# Patient Record
Sex: FEMALE | Race: WHITE | NOT HISPANIC OR LATINO | Employment: FULL TIME | URBAN - METROPOLITAN AREA
[De-identification: names, ages, dates, MRNs, and addresses within clinical notes are randomized per-mention and may not be internally consistent; named-entity substitution may affect disease eponyms.]

---

## 2022-08-16 PROBLEM — E66.813 CLASS 3 SEVERE OBESITY IN ADULT (HCC): Status: ACTIVE | Noted: 2022-08-16

## 2024-01-22 ENCOUNTER — OFFICE VISIT (OUTPATIENT)
Dept: WOUND CARE | Facility: CLINIC | Age: 44
End: 2024-01-22
Payer: COMMERCIAL

## 2024-01-22 VITALS
HEART RATE: 87 BPM | TEMPERATURE: 98 F | RESPIRATION RATE: 20 BRPM | SYSTOLIC BLOOD PRESSURE: 159 MMHG | DIASTOLIC BLOOD PRESSURE: 88 MMHG

## 2024-01-22 DIAGNOSIS — F17.200 TOBACCO USE DISORDER: ICD-10-CM

## 2024-01-22 DIAGNOSIS — Z90.710 S/P HYSTERECTOMY: ICD-10-CM

## 2024-01-22 DIAGNOSIS — Z98.890 S/P HERNIA REPAIR: ICD-10-CM

## 2024-01-22 DIAGNOSIS — Z87.19 S/P HERNIA REPAIR: ICD-10-CM

## 2024-01-22 DIAGNOSIS — E66.01 OBESITY, CLASS III, BMI 40-49.9 (MORBID OBESITY) (HCC): ICD-10-CM

## 2024-01-22 DIAGNOSIS — S31.109D OPEN WOUND OF ABDOMEN, SUBSEQUENT ENCOUNTER: Primary | ICD-10-CM

## 2024-01-22 DIAGNOSIS — L30.8 DERMATITIS ASSOCIATED WITH MOISTURE: ICD-10-CM

## 2024-01-22 PROCEDURE — 99213 OFFICE O/P EST LOW 20 MIN: CPT | Performed by: STUDENT IN AN ORGANIZED HEALTH CARE EDUCATION/TRAINING PROGRAM

## 2024-01-22 PROCEDURE — 99213 OFFICE O/P EST LOW 20 MIN: CPT | Performed by: FAMILY MEDICINE

## 2024-01-22 PROCEDURE — G0463 HOSPITAL OUTPT CLINIC VISIT: HCPCS | Performed by: STUDENT IN AN ORGANIZED HEALTH CARE EDUCATION/TRAINING PROGRAM

## 2024-01-22 NOTE — PROGRESS NOTES
Patient ID: Maria R Webb is a 43 y.o. female Date of Birth 1980       Chief Complaint   Patient presents with    Follow Up Wound Care Visit     Abdomen wound        Allergies:  Patient has no known allergies.    Diagnosis:   Diagnosis ICD-10-CM Associated Orders   1. Open wound of abdomen, subsequent encounter  S31.109D Wound cleansing and dressings Abdomen      2. Dermatitis associated with moisture  L30.8       3. S/P hysterectomy  Z90.710       4. S/P hernia repair  Z98.890     Z87.19       5. Tobacco use disorder  F17.200       6. Obesity, Class III, BMI 40-49.9 (morbid obesity) (Formerly [facility])  E66.01            Assessment  & Plan:    F/u open wound of abdomen. Measuring smaller in size. There is no longer green discoloration to drainage visualized on dressing. Drainage has lessened and is no longer purulent when compared to prior visit. Periwound MASD is improved compared to prior visit.   Continue with Dakin wet-to-dry packing for 10 days total then will transition to silver gel coated plain packing. Change daily to twice daily depending on drainage amount. Continue with barrier cream to periwound for protection.   Attempt smoking reduction to assist with healing.   Monitor for signs of infection including fevers, chills, N/V, increase in redness of periwound, change in malodor.   Obtain 3-4 servings of protein daily for wound healing.   F/u in 2 weeks. Instructed to call if any questions or concerns arise in meantime.            Subjective:   01/27/23: 43 y/o F with PMHx of HTN, asthma, RA,abnormal uterine bleeding due to intramural leiomyoma s/p hysterectomy on 12/15/22  presents for evaluation of abdominal wound related to surgical wound dehiscence. Pt initially had hysterectomy on 12/15/2022 with operation complicated by desaturation events in the middle of the robotic procedure which forced a conversion to open ex lap with midline abdominal incision.  Her recovery course was complicated by a fascial  dehiscence and abdominal herniation and on 12/20/2022 she underwent surgery for incisional hernia with mesh. Her recovery was once again complicated by fluid collection underneath surgical incision that was treated with drain placement by interventional radiology on 1/9/2022- no bacterial growthained fluid. Pt then presented to ED on 01/13/23 over concerns of further surgical wound dehiscence at inferior aspect of the wound. She was evaluated by surgery at that time whom probed the wound and noted all areas appeared intact with a portion of the inferior aspect probing to intact fascia without concerns for evisceration or breakdown of fascial closure on examination. Wet-to-dry packing to the wound was recommended and an abdominal binder was provided. She has been continuing to follow with IR in regards to the abdominal cavity from seroma and underwent tube check and sclerotherapy session of lymphocele on 01/23/23 as well as today (01/27/23)- size fluid of cavity continues to decrease. On evaluation today, pt notes that she has been getting blue/green drainage from her wound. Drainage is moderate. Does not feel as though the wound has gotten worse since initiation of packing, but does not feel as though it has improved either which is concerning to her. Denies denzel abdominal pain but does note a full/pulling sensation intermittently. No fevers, chills, malaise. Does not have a hx of DM. Currently smoking approximately 1/2 PPD which is reduced from previous PPD.       02/06/23: Pt presents for f/u of abdominal wound related to surgical wound dehiscence. Pt is s/p hysterectomy on 12/15/22 which was complicated by fascial dehiscence and abdominal herniation which then required incisional hernia repair with mesh on 12/20/22. Pt was using dry packing until she was able to obtain the Dakin solution from the pharmacy- she has been using Dakins solution for approximately five days now. Her wound cultures from previous visit  demonstrated growth of Pseudomonas and Klebsiella oxytoca both susceptible to Ciprofloxacin. Has two pills left in course of abx. States the green/blue discoloration of the drainage has resolved. She has been following with interventional radiology related to abdominal fluid collection underneath the surgical incision site- at most recent IR visit on 02/03/23, it was found the drain space communicated with the open wound- sclerosis was performed with 200 mg doxycyline at that time. Her next IR appointment is on 02/13/23. No longer using abdominal binder. Is limiting her activities. Continues to smoke approximately a 1/2 PPD. She denies fevers, chills, denzel abdominal pain but does endorse a soreness and occasional tugging sensation.       02/15/23: Pt presents for f/u abdominal wound related to surgical wound dehiscence s/p hysterectomy complicated by fascial dehiscence and abdominal herniation requiring incisional hernia repair with mesh. Last week the pt noticed a return of green drainage on the Dakins wet-to-dry dressing and therefore she was initiated on an additional course of Ciprofloxacin; green drainage has resolved after starting the antibiotic. She is continuing to follow with interventional radiology for abdominal fluid collection adjacent to the wound. At her most recent appointment with IR on 02/13/23, the drain was exchanged and repositioned for 10 Chinese drain deeper in the collection and sclerosis with Doxycyline was performed- there are concerns that the fluid collection communicates with the wound and that healing with current intervention may not be possible/surgical intervention may be required regarding the collection of fluid. Pt continues to have soreness behind her umbilical region but not at the site of the abdominal wound itself. Denies fevers, chills, malaise.     07/10/23: Pt presents for evaluation of surgical abdominal wound dehiscence s/p hysterectomy complicated by fascial dehiscence  and abdominal herniation requiring incision hernia repair with mesh. Pt was last seen in wound care center on 03/09/23 by Dr. Montano in March whom recommended debridement of the wound in the OR at that time. Pt has since undergone wound debridement and wound VAC placement in OR with Dr. Conroy on 03/15/23. Intraoperative cultures were taken and pt was admitted for IV abx therapy with Zosyn. Following d/c from hospital she has been following with general surgery for wound management. Wound has been swabbed with Betadine then irrigated at each visit and pt has been continuing with wound VAC since March. It is noted the wound has exposed mesh at the base that has slowly had granulation tissue incorporating into it. There was formation of abscess around 5 o'clock position on 04/17/23; abscess was irrigated and packed and pt was treated with course of Levaquin. The cavity has since granulated in. On evaluation today pt states she has been doing much better and feels much healthier. Her  is currently assisting with VAC changes. She does endorse a chronic odor to the wound that has been present for months and is unchanged. She obtains protein in her diet. Was recommended she shower and cleanse the area. Has regular bowel movements. No N/V/D, fevers, chills. Continues to smoke about 1/2 PPD and is not ready to quit at this moment.       07/17/23: Pt presents for f/u abdominal wound secondary to dehiscence of surgical site. Pt notes that no new VAC supplies were sent to her so she has returned to use of the saline wet to dry and is changing this twice daily. Since using the saline wet-to-dry she has noticed less odor with dressing changes. Overall there have not been significant changes since previous visit. Denies N/V/D, no change in BM. Denies fevers, chills.        07/31/23: Pt presents for f/u abdominal wound secondary to surgical site dehiscence. Since previous visit pt has been using wound VAC.     08/07/23: Pt  presents for f/u abdominal wound secondary to surgical site dehiscence. Wound VAC was irritating skin and therefore was d/c at previous visit. Pt has been using aquacel ag rope packing but there was a lag time in supply delivery and therefore she returned to wet-to-dry until supplies were received. Notes the drainage remains heavy and the dressing is typically soaked about mid-day following packing changes. No fevers, chills.     08/21/23: Pt presents for f/u abdominal wound secondary to surgical site dehiscence. Is doing well with aquacel ag rope packing. Is feeling well without changes in bowel habits, abdominal pain, fevers, chills. Wound continues to have significant drainage but drainage does not have fecal smell. Offers no complaints today.     08/28/23: Presents for follow-up abdominal wound postsurgical site dehiscence.  Patient has heavy drainage but unfortunately Aquacel Ag rope packing is not covered for daily changes via insurance therefore pt has been switching between use of Aquacel Ag rope packing and saline wet to dry packing. Will occasionally forget to use zinc to the periwound.  Overall  no significant changes since prior visit.  Benjamin the same.  No severe abdominal pain, change in bowel habits, nausea, vomiting, fever, chills.     09/11/23: Patient presents for follow-up abdominal wound postsurgical site dehiscence.  Has been using saline wet-to-dry recently to insurance only covering specific amount of Aquacel AG rope dressings.  Has been having some irritation of surrounding skin due to the surgical tape.  Overall no major changes since previous visit.  Denies abdominal pain, changes in the smell or amount of drainage.  No nausea, vomiting, fevers, chills.    09/25/23: Pt presents for f/u abdominal wound post surgical site dehiscence.  Currently Aquacel Ag rope is being packed into the wound.  She states she has noticed an improvement since the prior visit in terms of the drainage slowing down  and is no longer noticing an odor.  Overall feels well.  Denies significant abdominal pain, nausea, vomiting, change in bowel habits, fevers or chills.     10/09/23: Pt presents for f/u abdominal wound post surgical site dehiscence. Is continuing to use aquacel ag rope; drainage is reduced from previous amounts but remains moderate still saturating the dressing. Overall no significant changes in medical history from prior visit.      10/23/23: Pt presents for f/u abdominal wound post surgical site dehiscence. Continues to do well. Is getting about 1.5 day wear-time out of aquacel ag rope, drainage is amount the same or possibly a little less. Remains the same color without any new malodor. Overall is doing well. Continues to get protein in her diet. Is smoking approximately the same amount. No severe abdominal pain, fevers, chills, changes in bowel habits, N/V.     11/06/23: Pt presents for f/u abdominal wound post surgical site dehiscence. Has been doing well with aquacel ag rope. Drainage has picked up a small amount since previous visit. Overall no major changes, continues to deny malodor to drainage, abdominal pain, fevers, chills, bowel habit changes.     11/20/23: Patient presents for follow-up of abdominal wound postsurgical site dehiscence.  Currently the wound is being dressed with Aquacel Ag rope packing.  The defect continues to drain however there are no major changes since prior visit. Is attempting to reduce tobacco intake but has not yet been able to do so. Continues to deny malodor to drainage, abdominal pain, fevers, chills, bowel habit changes.       12/04/23: Pt presents for f/u abdominal wound post surgical site dehiscence. Currently Aquacel ag rope packing is being utilized. Pt continues to report the same amount of drainage without changes since prior visit. She is inquiring if any further imaging of abdomen is necessary at this time. No fevers, chills.       12/26/23: Pt presents for f/u  abdominal wound s/p hysterectomy complicated by fascial dehiscence and abdominal herniation requiring incisional hernia repair with mesh repair. She is continuing with silver alginate to the site. Pt had f/u with general surgery since prior visit at which time a culture was obtained from the wound as well as a repeat CT scan to r/o un-drained fluid collections. Culture results demonstrated 3+ growth of Pasturella multocida, 2+ growth of E. Coli and 3+ growth of mixed skin mary. Pt was since initiated on Bactrim and has been taking this as prescribed without any reported SE. Has f/u with Dr. Conroy scheduled on 01/08/24 to discuss results of CT scan. Wound is continuing to drain with some reports of occasional malodor to the drainage which has been chronic. No reports of drainage smelling fecal in nature. Notes some soreness in abdomen but has been very active recently.       01/15/24: Pt presents for f/u abdominal wound s/p hysterectomy complicated by fascial dehiscence and abdominal herniation requiring incisional hernia repair with mesh repair. She has completed a course of Bactrim based on a wound culture taken at general surgery a few weeks prior. Since her most recent visit at the wound care center pt had a f/u with general surgery on 01/08/24. It was noted her updated CT did not demonstrate any abscess to warrant additional antibiotics and there was improvement in the size of the wound compared to prior CT. It was encouraged the pt focus on smoking cessation, continue with wound care as there is no acute role for surgical intervention at this time. She will additionally be following up with Dr. Conroy, general surgery at the end of the month as well. Is currently alternating between alginate and wet to dry dressings when product is not available. There is a change in the color of the drainage since previous visit. No new sx. Denies abdominal pain, nausea, vomiting, changes with bowel movements, fevers,  chills.     24: Pt presents for f/u abdominal wound. Has been using Dakin soaked packing since Wednesday. Has noticed a small amount of green tinge to the drainage still but overall improved. Denies any severe abdominal pain, N/V, change in bowel movements, fevers, chills. Is continuing to smoke about 1/2 PPD.  States she has an upcoming appointment with Dr. Conroy on 2024.            The following portions of the patient's history were reviewed and updated as appropriate:   Patient Active Problem List   Diagnosis    Tobacco use disorder    COVID-19    Obesity, morbid, BMI 50 or higher (HCC)    Bulky or enlarged uterus    Pelvic pain    Preoperative exam for gynecologic surgery    HTN (hypertension)    Gastroesophageal reflux disease    Asthma    Obstructive sleep apnea syndrome    S/P hysterectomy    Postoperative anemia    Rheumatoid arthritis involving multiple sites with positive rheumatoid factor (HCC)    Encounter for postoperative care    Open wound of abdomen    Surgical wound, non healing    Cigarette nicotine dependence without complication     Past Medical History:   Diagnosis Date    Abdominal wall abscess at site of surgical wound 2023    Abnormal uterine bleeding due to intramural leiomyoma 2022    Arthritis     Rheumatoid    Asthma     Breathing difficulty     only occured during inverted robotic hysterectomy    Cellulitis of drainage site following surgery 2023    CPAP (continuous positive airway pressure) dependence     GERD (gastroesophageal reflux disease)     Hypertension     Obese     Pneumonia 2007    Sleep apnea     Uterine fibroid     LTH today 12/15/2022    Wound dehiscence, surgical 2022     Past Surgical History:   Procedure Laterality Date    ABDOMINAL WASHOUT      post  with wound vac     SECTION      had hematoma removed after the surgery    FOREARM SURGERY Right     repair of fracture with plating    INCISIONAL HERNIA REPAIR   12/20/2022    Procedure: REPAIR  RECURRENT HERNIA INCISIONAL WITH MESH;  Surgeon: Donna Lau MD;  Location: [facility];  Service: Gynecology    IR DRAINAGE TUBE CHECK WITH SCLEROSIS  2/3/2023    IR DRAINAGE TUBE CHECK WITH SCLEROSIS  3/7/2023    IR DRAINAGE TUBE CHECK/CHANGE/REPOSITION/REINSERTION/UPSIZE  1/23/2023    IR DRAINAGE TUBE CHECK/CHANGE/REPOSITION/REINSERTION/UPSIZE  1/27/2023    IR DRAINAGE TUBE CHECK/CHANGE/REPOSITION/REINSERTION/UPSIZE  2/13/2023    IR DRAINAGE TUBE CHECK/CHANGE/REPOSITION/REINSERTION/UPSIZE  2/27/2023    IR DRAINAGE TUBE PLACEMENT  1/9/2023    LAPAROTOMY N/A 12/20/2022    Procedure: LAPAROTOMY EXPLORATORY;  Surgeon: Donna Lau MD;  Location: [facility];  Service: Gynecology    MYRINGOTOMY W/ TUBES      two sets as a young child    OK CYSTOURETHROSCOPY N/A 12/15/2022    Procedure: CYSTO W/ ROBOT;  Surgeon: Donna Lau MD;  Location: [facility];  Service: Gynecology    OK LAPS TOTAL HYSTERECT 250 GM/< W/RMVL TUBE/OVARY N/A 12/15/2022    Procedure: (LTH) W/ BILATERAL SALPINGECTOMY  W/ ROBOT COVERTED TO OPEN;  Surgeon: Donna Lau MD;  Location: [facility];  Service: Gynecology    VAC DRESSING APPLICATION N/A 3/17/2023    Procedure: APPLICATION VAC DRESSING ABDOMEN/TRUNK;  Surgeon: Chan Conroy MD;  Location: [facility];  Service: General    WOUND DEBRIDEMENT N/A 3/15/2023    Procedure: DEBRIDEMENT Abdominal WOUND and placement of Wound VAC;  Surgeon: Chan Conroy MD;  Location: [facility];  Service: General    WOUND DEBRIDEMENT N/A 3/17/2023    Procedure: DEBRIDEMENT WOUND (WASH OUT);  Surgeon: Chan Conroy MD;  Location: [facility];  Service: General     Family History   Problem Relation Age of Onset    Multiple sclerosis Mother     Hypertension Father     Hyperlipidemia Father     Cerebral aneurysm Father     Pulmonary embolism Father     Liver disease Brother      Social History     Socioeconomic History    Marital status:       Spouse name: None    Number of children: None    Years of education: None    Highest education level: None   Occupational History    None   Tobacco Use    Smoking status: Every Day     Current packs/day: 0.50     Average packs/day: 0.5 packs/day for 30.1 years (15.0 ttl pk-yrs)     Types: Cigarettes     Start date: 1995    Smokeless tobacco: Never   Vaping Use    Vaping status: Never Used   Substance and Sexual Activity    Alcohol use: Not Currently     Alcohol/week: 1.0 standard drink of alcohol     Types: 1 Standard drinks or equivalent per week     Comment: 3 x monthly    Drug use: Never    Sexual activity: Yes     Partners: Male     Birth control/protection: Male Sterilization   Other Topics Concern    None   Social History Narrative    None     Social Determinants of Health     Financial Resource Strain: Not on file   Food Insecurity: No Food Insecurity (3/16/2023)    Hunger Vital Sign     Worried About Running Out of Food in the Last Year: Never true     Ran Out of Food in the Last Year: Never true   Transportation Needs: No Transportation Needs (3/16/2023)    PRAPARE - Transportation     Lack of Transportation (Medical): No     Lack of Transportation (Non-Medical): No   Physical Activity: Not on file   Stress: Not on file   Social Connections: Not on file   Intimate Partner Violence: Not on file   Housing Stability: Low Risk  (3/16/2023)    Housing Stability Vital Sign     Unable to Pay for Housing in the Last Year: No     Number of Places Lived in the Last Year: 1     Unstable Housing in the Last Year: No       Current Outpatient Medications:     neomycin-polymyxin-hydrocortisone (CORTISPORIN) otic solution, Administer 3 drops into the left ear every 6 (six) hours, Disp: , Rfl:     albuterol (2.5 mg/3 mL) 0.083 % nebulizer solution, Take 2.5 mg by nebulization every 4 (four) hours as needed for shortness of breath or wheezing, Disp: , Rfl:     albuterol (ProAir HFA) 90 mcg/act inhaler, Inhale 2 puffs  every 6 (six) hours as needed for wheezing or shortness of breath, Disp: 8.5 g, Rfl: 0    albuterol (PROVENTIL HFA,VENTOLIN HFA) 90 mcg/act inhaler, Inhale 2 puffs every 6 (six) hours as needed for wheezing, Disp: 8 g, Rfl: 2    amoxicillin (AMOXIL) 500 mg capsule, , Disp: , Rfl:     Arthritis Pain Relief 650 MG CR tablet, take 1 tablet by mouth every 8 hours if needed for mild pain, Disp: , Rfl:     aspirin 325 mg tablet, Take 325 mg by mouth daily. Indications: as needed, Disp: , Rfl:     chlorhexidine (HIBICLENS) 4 % external liquid, Apply 1 Application topically daily as needed for wound care Can use to clean wound during VAC changes., Disp: 473 mL, Rfl: 5    clonazePAM (KlonoPIN) 0.5 mg tablet, Take 0.5 mg by mouth 2 (two) times a day as needed, Disp: , Rfl:     cyclobenzaprine (FLEXERIL) 5 mg tablet, Take 1 tablet (5 mg total) by mouth 3 (three) times a day as needed for muscle spasms (Patient taking differently: Take 5 mg by mouth 3 (three) times a day as needed for muscle spasms As needed), Disp: 60 tablet, Rfl: 0    dulaglutide (Trulicity) 0.75 MG/0.5ML injection, Inject 0.75 mg under the skin Once a week, Disp: , Rfl:     escitalopram (LEXAPRO) 10 mg tablet, Take 10 mg by mouth daily, Disp: , Rfl:     escitalopram (LEXAPRO) 5 mg tablet, Take 5 mg by mouth daily, Disp: , Rfl:     fluticasone (FLONASE) 50 mcg/act nasal spray, 2 sprays into each nostril daily, Disp: 16 g, Rfl: 4    Fluticasone-Salmeterol (Advair) 250-50 mcg/dose inhaler, , Disp: , Rfl:     Fluticasone-Salmeterol (Wixela Inhub) 500-50 mcg/dose inhaler, Inhale 1 puff 2 (two) times a day, Disp: , Rfl:     folic acid (FOLVITE) 1 mg tablet, Take by mouth daily, Disp: , Rfl:     gabapentin (NEURONTIN) 100 mg capsule, Take 1 capsule (100 mg total) by mouth 3 (three) times a day (Patient not taking: Reported on 6/2/2023), Disp: 90 capsule, Rfl: 0    hydrochlorothiazide (HYDRODIURIL) 12.5 mg tablet, Take 12.5 mg by mouth daily Pt only  taking as  needed for leg swelling, Disp: , Rfl:     ibuprofen (MOTRIN) 600 mg tablet, Take 600 mg by mouth every 6 (six) hours as needed for moderate pain As needed, Disp: , Rfl:     methocarbamol (ROBAXIN) 500 mg tablet, Take 1 tablet (500 mg total) by mouth every 6 (six) hours for 14 days (Patient taking differently: Take 500 mg by mouth every 6 (six) hours As needed), Disp: 56 tablet, Rfl: 0    methotrexate 2.5 mg tablet, Four 2.5 tabs one time a week ( Every Saturday), Disp: , Rfl:     montelukast (SINGULAIR) 10 mg tablet, Take by mouth daily at bedtime as needed, Disp: , Rfl:     naloxone (NARCAN) 4 mg/0.1 mL nasal spray, Administer 1 spray into a nostril. If no response after 2-3 minutes, give another dose in the other nostril using a new spray. (Patient not taking: Reported on 4/14/2023), Disp: 1 each, Rfl: 1    nicotine (NICODERM CQ) 14 mg/24hr TD 24 hr patch, Place 1 patch on the skin every 24 hours (Patient not taking: Reported on 6/2/2023), Disp: 28 patch, Rfl: 3    omeprazole (PriLOSEC) 40 MG capsule, Take 40 mg by mouth daily, Disp: , Rfl:     predniSONE 10 mg tablet, Take 5 tabs po x 2 days; 4 tabs po x 2 days; 3 tabs po x 1 day; 2 tabs po x 1 day. 1 tab po x 1 day., Disp: 24 tablet, Rfl: 0    semaglutide, 0.25 or 0.5 mg/dose, (Ozempic) 2 mg/3 mL injection pen, Inject 0.5 mg under the skin Once a week, Disp: , Rfl:     sodium chloride, PF, 0.9 %, Inject 10 mL into a catheter in a vein 3 (three) times a week Wound irrigation., Disp: 100 mL, Rfl: 10    sodium hypochlorite (DAKIN'S HALF-STRENGTH) external solution, Apply 1 Application topically daily for 7 days, Disp: 3311 mL, Rfl: 0    Vitamin D, Ergocalciferol, 42397 units CAPS, Take by mouth once a week, Disp: , Rfl:     Review of Systems   Constitutional:  Negative for chills and fever.   Gastrointestinal:  Negative for abdominal pain, constipation, diarrhea, nausea and vomiting.   Skin:  Positive for wound (abdomen). Negative for color change and rash.    Psychiatric/Behavioral:  Negative for agitation and confusion.          Objective:  /88   Pulse 87   Temp 98 °F (36.7 °C)   Resp 20   LMP 11/23/2022 (Exact Date) Comment: partial  Pain Score: 0-No pain     Physical Exam  Vitals reviewed.   Constitutional:       Appearance: She is morbidly obese.   Pulmonary:      Effort: Pulmonary effort is normal. No respiratory distress.   Abdominal:          Comments: Open wound of the abdomen is measuring smaller in size. There is no longer green discoloration to drainage visualized on dressing. Drainage has lessened and is no longer purulent when compared to prior visit. Periwound MASD is improved compared to prior visit.        Skin:     Findings: Wound present.   Neurological:      Mental Status: She is alert.   Psychiatric:         Mood and Affect: Mood normal.                Wound 07/10/23 Abdomen (Active)   Wound Image   01/22/24 0823   Wound Description Pale;Pink;White 01/22/24 0823   Alexa-wound Assessment Dry;Intact;Erythema;Scar Tissue 01/22/24 0823   Wound Length (cm) 0.3 cm 01/22/24 0823   Wound Width (cm) 0.2 cm 01/22/24 0823   Wound Depth (cm) 1.9 cm 01/22/24 0823   Wound Surface Area (cm^2) 0.06 cm^2 01/22/24 0823   Wound Volume (cm^3) 0.114 cm^3 01/22/24 0823   Calculated Wound Volume (cm^3) 0.11 cm^3 01/22/24 0823   Change in Wound Size % 99.74 01/22/24 0823   Tunneling 1 2.4 cm 08/28/23 0855   Tunneling 1 in depth located at 12 o clock 08/28/23 0855   Number of underminings 1 01/22/24 0823   Undermining 1 1.6 01/22/24 0823   Undermining 1 is depth extending from 11-12 o'clock 01/22/24 0823   Drainage Amount Moderate 01/22/24 0823   Drainage Description Serosanguineous 01/22/24 0823   Non-staged Wound Description Full thickness 01/22/24 0823   Treatments Cleansed 01/22/24 0823   Dressing Wound V.A.C. 07/31/23 0859   Wound packed? Yes 01/22/24 0823   Packing- # removed 1 01/22/24 0823   Packing- # inserted 2 07/24/23 0832   Dressing Changed New  "07/24/23 0832   Patient Tolerance Tolerated well 01/22/24 0823   Dressing Status Intact 01/22/24 0823       Results from last 6 Months   Lab Units 12/18/23  1030   WOUND CULTURE  3+ Growth of Pasteurella multocida*  2+ Growth of Escherichia coli*  3+ Growth of           Wound Instructions:  Orders Placed This Encounter   Procedures    Wound cleansing and dressings Abdomen     Remove abdominal dressing, wash with soap and water (Dove for sensitive skin)     May apply Zinc Oxide to surrounding skin with each dressing change     Loosely Pack the wound with Dakins moistened Plain Packing and leave a tail out taped to the skin. Do not pack too tightly.  Cover with ABD pad secure with tape.   Continue this dressing until 1-27-24                       Then Starting on 1-28-24 Apply silvergel (this is available over the counter) to the plain packing then loosely pack the wound    Change dressing 1-2 x daily and as needed for leakage.    Treatments completed as above today at the Wound Center     Please call the [facility]aling Center Monday through Friday between the hours of 8:00 AM and 4:30 PM at [phone] if you have any questions, if you experience any major changes in your wound(s) or for any signs or symptoms of infection such as fever; changes in the redness, swelling, drainage, or odor of your wound. After hours, weekends or holidays please contact your primary care physician or go to the hospital emergency room.    Follow up in 2 weeks     Standing Status:   Future     Standing Expiration Date:   1/22/2025       Salma Garcia PA-C      Portions of the record may have been created with voice recognition software. Occasional wrong word or \"sound alike\" substitutions may have occurred due to the inherent limitations of voice recognition software. Read the chart carefully and recognize, using context, where substitutions have occurred.    "

## 2024-01-26 RX ORDER — FLUTICASONE PROPIONATE AND SALMETEROL 500; 50 UG/1; UG/1
1 POWDER RESPIRATORY (INHALATION) 2 TIMES DAILY
COMMUNITY
Start: 2024-01-25

## 2024-01-26 NOTE — PROGRESS NOTES
"Assessment/Plan:    Open wound of abdomen  Patient is a pleasant 43-year-old female status post repair of a complex open wound with onlay phasic's mesh on December 20, 2022 returns for routine general surgery follow-up.    Patient has chronic sinus extending down to and involving the face especially.    Patient is working full-time without restriction.  He has no specific complaints at the time of today's appointment.    On physical examination she is well-appearing.  Quique competent reliable as a historian.  Accompanied by her significant other.  Inspection of the wound reveals chronic sinus extending down to her phasic's mesh.  The maximum diameter less than a centimeter.  Wound was repacked with silver and \"gauze.  The original dressing taken down had a green discoloration consistent with colonization by Pseudomonas.    Patient is clinically stable.  No additional diagnostic or therapeutic interventions are indicated at present time.    I look forward to seeing her back in 2 months time.       Diagnoses and all orders for this visit:    Open wound of abdomen, subsequent encounter    Non-healing surgical wound, sequela    Other orders  -     Fluticasone-Salmeterol (Advair) 500-50 mcg/dose inhaler; Inhale 1 puff 2 (two) times a day  -     gabapentin (NEURONTIN) 300 mg capsule; Take 300 mg by mouth 3 (three) times a day          Subjective:      Patient ID: Maria R Webb is a 43 y.o. female.    Patient is here for a abd wound check/ CT scan abd/pelvis 12/26/23. Patient states she there blue/ greenish drainage due to exposure of pseudomas. She has been on dakins, a solution with bleach for 11 days. Patient wants to ask the doctor of possibly getting on antibiotics. Patient denies foul smell, pain or fevers/chills. BRIAN Bryant            Review of Systems   Constitutional:  Negative for chills and fever.   HENT:  Negative for ear pain and sore throat.    Eyes:  Negative for pain and visual disturbance. " "  Respiratory:  Negative for cough and shortness of breath.    Cardiovascular:  Negative for chest pain and palpitations.   Gastrointestinal:  Negative for abdominal pain and vomiting.   Genitourinary:  Negative for dysuria and hematuria.   Musculoskeletal:  Negative for arthralgias and back pain.   Skin:  Negative for color change and rash.   Neurological:  Negative for seizures and syncope.   All other systems reviewed and are negative.        Objective:      /80 (BP Location: Left arm, Patient Position: Sitting, Cuff Size: Large)   Pulse 89   Temp (!) 97.2 °F (36.2 °C) (Temporal)   Resp 18   Ht 5' 5\" (1.651 m)   Wt 136 kg (299 lb)   LMP 11/23/2022 (Exact Date) Comment: partial  SpO2 98%   BMI 49.76 kg/m²          Physical Exam  Constitutional:       Appearance: She is well-developed.   HENT:      Head: Normocephalic and atraumatic.   Eyes:      Conjunctiva/sclera: Conjunctivae normal.      Pupils: Pupils are equal, round, and reactive to light.   Cardiovascular:      Rate and Rhythm: Normal rate and regular rhythm.   Pulmonary:      Effort: Pulmonary effort is normal.      Breath sounds: Normal breath sounds.   Abdominal:      General: Bowel sounds are normal.      Palpations: Abdomen is soft.      Comments: Abdominal wound exam as described above   Musculoskeletal:         General: Normal range of motion.      Cervical back: Normal range of motion and neck supple.   Skin:     General: Skin is warm and dry.   Neurological:      Mental Status: She is alert and oriented to person, place, and time.   Psychiatric:         Behavior: Behavior normal.         Thought Content: Thought content normal.         Judgment: Judgment normal.           "

## 2024-01-29 ENCOUNTER — OFFICE VISIT (OUTPATIENT)
Dept: SURGERY | Facility: CLINIC | Age: 44
End: 2024-01-29
Payer: COMMERCIAL

## 2024-01-29 VITALS
HEART RATE: 89 BPM | OXYGEN SATURATION: 98 % | RESPIRATION RATE: 18 BRPM | WEIGHT: 293 LBS | HEIGHT: 65 IN | DIASTOLIC BLOOD PRESSURE: 80 MMHG | TEMPERATURE: 97.2 F | BODY MASS INDEX: 48.82 KG/M2 | SYSTOLIC BLOOD PRESSURE: 114 MMHG

## 2024-01-29 DIAGNOSIS — T81.89XS NON-HEALING SURGICAL WOUND, SEQUELA: ICD-10-CM

## 2024-01-29 DIAGNOSIS — S31.109D OPEN WOUND OF ABDOMEN, SUBSEQUENT ENCOUNTER: Primary | ICD-10-CM

## 2024-01-29 PROCEDURE — 99213 OFFICE O/P EST LOW 20 MIN: CPT | Performed by: SURGERY

## 2024-01-29 RX ORDER — GABAPENTIN 300 MG/1
300 CAPSULE ORAL 3 TIMES DAILY
COMMUNITY
Start: 2023-10-25

## 2024-01-29 NOTE — ASSESSMENT & PLAN NOTE
"Patient is a pleasant 43-year-old female status post repair of a complex open wound with onlay phasic's mesh on December 20, 2022 returns for routine general surgery follow-up.    Patient has chronic sinus extending down to and involving the face especially.    Patient is working full-time without restriction.  He has no specific complaints at the time of today's appointment.    On physical examination she is well-appearing.  Pleasant competent reliable as a historian.  Accompanied by her significant other.  Inspection of the wound reveals chronic sinus extending down to her phasic's mesh.  The maximum diameter less than a centimeter.  Wound was repacked with silver and \"gauze.  The original dressing taken down had a green discoloration consistent with colonization by Pseudomonas.    Patient is clinically stable.  No additional diagnostic or therapeutic interventions are indicated at present time.    I look forward to seeing her back in 2 months time.  "

## 2024-02-05 ENCOUNTER — OFFICE VISIT (OUTPATIENT)
Dept: WOUND CARE | Facility: CLINIC | Age: 44
End: 2024-02-05
Payer: COMMERCIAL

## 2024-02-05 VITALS
RESPIRATION RATE: 20 BRPM | SYSTOLIC BLOOD PRESSURE: 138 MMHG | TEMPERATURE: 97.5 F | DIASTOLIC BLOOD PRESSURE: 84 MMHG | HEART RATE: 90 BPM

## 2024-02-05 DIAGNOSIS — Z98.890 S/P HERNIA REPAIR: ICD-10-CM

## 2024-02-05 DIAGNOSIS — Z87.19 S/P HERNIA REPAIR: ICD-10-CM

## 2024-02-05 DIAGNOSIS — L30.8 DERMATITIS ASSOCIATED WITH MOISTURE: ICD-10-CM

## 2024-02-05 DIAGNOSIS — E66.01 OBESITY, CLASS III, BMI 40-49.9 (MORBID OBESITY) (HCC): ICD-10-CM

## 2024-02-05 DIAGNOSIS — F17.200 TOBACCO USE DISORDER: ICD-10-CM

## 2024-02-05 DIAGNOSIS — S31.109D OPEN WOUND OF ABDOMEN, SUBSEQUENT ENCOUNTER: Primary | ICD-10-CM

## 2024-02-05 DIAGNOSIS — Z90.710 S/P HYSTERECTOMY: ICD-10-CM

## 2024-02-05 PROCEDURE — 99213 OFFICE O/P EST LOW 20 MIN: CPT | Performed by: STUDENT IN AN ORGANIZED HEALTH CARE EDUCATION/TRAINING PROGRAM

## 2024-02-05 PROCEDURE — G0463 HOSPITAL OUTPT CLINIC VISIT: HCPCS | Performed by: STUDENT IN AN ORGANIZED HEALTH CARE EDUCATION/TRAINING PROGRAM

## 2024-02-05 NOTE — PATIENT INSTRUCTIONS
Orders Placed This Encounter   Procedures    Wound cleansing and dressings     Remove abdominal dressing, wash with soap and water (Dove for sensitive skin)      May apply Zinc Oxide to surrounding skin with each dressing change      Apply silvergel (this is available over the counter) to the plain packing then loosely pack the wound. Then cover with dry dressing. Change dressing 1-2 x daily and as needed for leakage.     Treatments completed as above today at the Wound Center      Please call the [facility]aling Center Monday through Friday between the hours of 8:00 AM and 4:30 PM at [phone] if you have any questions, if you experience any major changes in your wound(s) or for any signs or symptoms of infection such as fever; changes in the redness, swelling, drainage, or odor of your wound. After hours, weekends or holidays please contact your primary care physician or go to the hospital emergency room.     Follow up in 2 weeks     Standing Status:   Future     Standing Expiration Date:   2/5/2025

## 2024-02-05 NOTE — PROGRESS NOTES
Patient ID: Maria R Webb is a 43 y.o. female Date of Birth 1980       Chief Complaint   Patient presents with    Follow Up Wound Care Visit       Allergies:  Patient has no known allergies.    Diagnosis:   Diagnosis ICD-10-CM Associated Orders   1. Open wound of abdomen, subsequent encounter  S31.109D Wound cleansing and dressings      2. Dermatitis associated with moisture  L30.8       3. S/P hysterectomy  Z90.710       4. S/P hernia repair  Z98.890     Z87.19       5. Tobacco use disorder  F17.200       6. Obesity, Class III, BMI 40-49.9 (morbid obesity) ([facility])  E66.01            Assessment  & Plan:    F/u open wound of abdomen. Is measuring the same in size. Very minimal green discoloration to drainage on removed packing. Appears to have epithelization from edges of cavity. Cavity with granulation tissue and Phasix mesh. Area of deepest depth is at approximately 7 o'clock measuring 2.4 cm. Periwound continues to appear irritated consistent with MASD. No malodor appreciated.   Continue with silver gel coated plain packing. Change daily to twice daily depending on drainage amount. Discussed importance of being consistent with use of zinc to periwound given irritation of skin.   Attempt smoking reduction to assist with healing.   Had appropriate f/u with general surgery. Continued local wound care was recommended. No need for further intervention or oral abx at this time.   Monitor for signs of infection including fevers, chills, N/V, increase in redness of periwound, change in malodor.   Obtain 3-4 servings of protein daily for wound healing.   F/u in 2-3 weeks. Instructed to call if any questions or concerns arise in meantime.          Subjective:   01/27/23: 41 y/o F with PMHx of HTN, asthma, RA,abnormal uterine bleeding due to intramural leiomyoma s/p hysterectomy on 12/15/22  presents for evaluation of abdominal wound related to surgical wound dehiscence. Pt initially had hysterectomy on 12/15/2022 with  operation complicated by desaturation events in the middle of the robotic procedure which forced a conversion to open ex lap with midline abdominal incision.  Her recovery course was complicated by a fascial dehiscence and abdominal herniation and on 12/20/2022 she underwent surgery for incisional hernia with mesh. Her recovery was once again complicated by fluid collection underneath surgical incision that was treated with drain placement by interventional radiology on 1/9/2022- no bacterial growthained fluid. Pt then presented to ED on 01/13/23 over concerns of further surgical wound dehiscence at inferior aspect of the wound. She was evaluated by surgery at that time whom probed the wound and noted all areas appeared intact with a portion of the inferior aspect probing to intact fascia without concerns for evisceration or breakdown of fascial closure on examination. Wet-to-dry packing to the wound was recommended and an abdominal binder was provided. She has been continuing to follow with IR in regards to the abdominal cavity from seroma and underwent tube check and sclerotherapy session of lymphocele on 01/23/23 as well as today (01/27/23)- size fluid of cavity continues to decrease. On evaluation today, pt notes that she has been getting blue/green drainage from her wound. Drainage is moderate. Does not feel as though the wound has gotten worse since initiation of packing, but does not feel as though it has improved either which is concerning to her. Denies denzel abdominal pain but does note a full/pulling sensation intermittently. No fevers, chills, malaise. Does not have a hx of DM. Currently smoking approximately 1/2 PPD which is reduced from previous PPD.       02/06/23: Pt presents for f/u of abdominal wound related to surgical wound dehiscence. Pt is s/p hysterectomy on 12/15/22 which was complicated by fascial dehiscence and abdominal herniation which then required incisional hernia repair with mesh on  12/20/22. Pt was using dry packing until she was able to obtain the Dakin solution from the pharmacy- she has been using Dakins solution for approximately five days now. Her wound cultures from previous visit demonstrated growth of Pseudomonas and Klebsiella oxytoca both susceptible to Ciprofloxacin. Has two pills left in course of abx. States the green/blue discoloration of the drainage has resolved. She has been following with interventional radiology related to abdominal fluid collection underneath the surgical incision site- at most recent IR visit on 02/03/23, it was found the drain space communicated with the open wound- sclerosis was performed with 200 mg doxycyline at that time. Her next IR appointment is on 02/13/23. No longer using abdominal binder. Is limiting her activities. Continues to smoke approximately a 1/2 PPD. She denies fevers, chills, denzel abdominal pain but does endorse a soreness and occasional tugging sensation.       02/15/23: Pt presents for f/u abdominal wound related to surgical wound dehiscence s/p hysterectomy complicated by fascial dehiscence and abdominal herniation requiring incisional hernia repair with mesh. Last week the pt noticed a return of green drainage on the Dakins wet-to-dry dressing and therefore she was initiated on an additional course of Ciprofloxacin; green drainage has resolved after starting the antibiotic. She is continuing to follow with interventional radiology for abdominal fluid collection adjacent to the wound. At her most recent appointment with IR on 02/13/23, the drain was exchanged and repositioned for 10 Frisian drain deeper in the collection and sclerosis with Doxycyline was performed- there are concerns that the fluid collection communicates with the wound and that healing with current intervention may not be possible/surgical intervention may be required regarding the collection of fluid. Pt continues to have soreness behind her umbilical region but  not at the site of the abdominal wound itself. Denies fevers, chills, malaise.     07/10/23: Pt presents for evaluation of surgical abdominal wound dehiscence s/p hysterectomy complicated by fascial dehiscence and abdominal herniation requiring incision hernia repair with mesh. Pt was last seen in wound care center on 03/09/23 by Dr. Montano in March whom recommended debridement of the wound in the OR at that time. Pt has since undergone wound debridement and wound VAC placement in OR with Dr. Conroy on 03/15/23. Intraoperative cultures were taken and pt was admitted for IV abx therapy with Zosyn. Following d/c from hospital she has been following with general surgery for wound management. Wound has been swabbed with Betadine then irrigated at each visit and pt has been continuing with wound VAC since March. It is noted the wound has exposed mesh at the base that has slowly had granulation tissue incorporating into it. There was formation of abscess around 5 o'clock position on 04/17/23; abscess was irrigated and packed and pt was treated with course of Levaquin. The cavity has since granulated in. On evaluation today pt states she has been doing much better and feels much healthier. Her  is currently assisting with VAC changes. She does endorse a chronic odor to the wound that has been present for months and is unchanged. She obtains protein in her diet. Was recommended she shower and cleanse the area. Has regular bowel movements. No N/V/D, fevers, chills. Continues to smoke about 1/2 PPD and is not ready to quit at this moment.       07/17/23: Pt presents for f/u abdominal wound secondary to dehiscence of surgical site. Pt notes that no new VAC supplies were sent to her so she has returned to use of the saline wet to dry and is changing this twice daily. Since using the saline wet-to-dry she has noticed less odor with dressing changes. Overall there have not been significant changes since previous visit.  Denies N/V/D, no change in BM. Denies fevers, chills.        07/31/23: Pt presents for f/u abdominal wound secondary to surgical site dehiscence. Since previous visit pt has been using wound VAC.     08/07/23: Pt presents for f/u abdominal wound secondary to surgical site dehiscence. Wound VAC was irritating skin and therefore was d/c at previous visit. Pt has been using aquacel ag rope packing but there was a lag time in supply delivery and therefore she returned to wet-to-dry until supplies were received. Notes the drainage remains heavy and the dressing is typically soaked about mid-day following packing changes. No fevers, chills.     08/21/23: Pt presents for f/u abdominal wound secondary to surgical site dehiscence. Is doing well with aquacel ag rope packing. Is feeling well without changes in bowel habits, abdominal pain, fevers, chills. Wound continues to have significant drainage but drainage does not have fecal smell. Offers no complaints today.     08/28/23: Presents for follow-up abdominal wound postsurgical site dehiscence.  Patient has heavy drainage but unfortunately Aquacel Ag rope packing is not covered for daily changes via insurance therefore pt has been switching between use of Aquacel Ag rope packing and saline wet to dry packing. Will occasionally forget to use zinc to the periwound.  Overall  no significant changes since prior visit.  Benjamin the same.  No severe abdominal pain, change in bowel habits, nausea, vomiting, fever, chills.     09/11/23: Patient presents for follow-up abdominal wound postsurgical site dehiscence.  Has been using saline wet-to-dry recently to insurance only covering specific amount of Aquacel AG rope dressings.  Has been having some irritation of surrounding skin due to the surgical tape.  Overall no major changes since previous visit.  Denies abdominal pain, changes in the smell or amount of drainage.  No nausea, vomiting, fevers, chills.    09/25/23: Pt presents for  f/u abdominal wound post surgical site dehiscence.  Currently Aquacel Ag rope is being packed into the wound.  She states she has noticed an improvement since the prior visit in terms of the drainage slowing down and is no longer noticing an odor.  Overall feels well.  Denies significant abdominal pain, nausea, vomiting, change in bowel habits, fevers or chills.     10/09/23: Pt presents for f/u abdominal wound post surgical site dehiscence. Is continuing to use aquacel ag rope; drainage is reduced from previous amounts but remains moderate still saturating the dressing. Overall no significant changes in medical history from prior visit.      10/23/23: Pt presents for f/u abdominal wound post surgical site dehiscence. Continues to do well. Is getting about 1.5 day wear-time out of aquacel ag rope, drainage is amount the same or possibly a little less. Remains the same color without any new malodor. Overall is doing well. Continues to get protein in her diet. Is smoking approximately the same amount. No severe abdominal pain, fevers, chills, changes in bowel habits, N/V.     11/06/23: Pt presents for f/u abdominal wound post surgical site dehiscence. Has been doing well with aquacel ag rope. Drainage has picked up a small amount since previous visit. Overall no major changes, continues to deny malodor to drainage, abdominal pain, fevers, chills, bowel habit changes.     11/20/23: Patient presents for follow-up of abdominal wound postsurgical site dehiscence.  Currently the wound is being dressed with Aquacel Ag rope packing.  The defect continues to drain however there are no major changes since prior visit. Is attempting to reduce tobacco intake but has not yet been able to do so. Continues to deny malodor to drainage, abdominal pain, fevers, chills, bowel habit changes.       12/04/23: Pt presents for f/u abdominal wound post surgical site dehiscence. Currently Aquacel ag rope packing is being utilized. Pt  continues to report the same amount of drainage without changes since prior visit. She is inquiring if any further imaging of abdomen is necessary at this time. No fevers, chills.       12/26/23: Pt presents for f/u abdominal wound s/p hysterectomy complicated by fascial dehiscence and abdominal herniation requiring incisional hernia repair with mesh repair. She is continuing with silver alginate to the site. Pt had f/u with general surgery since prior visit at which time a culture was obtained from the wound as well as a repeat CT scan to r/o un-drained fluid collections. Culture results demonstrated 3+ growth of Pasturella multocida, 2+ growth of E. Coli and 3+ growth of mixed skin mary. Pt was since initiated on Bactrim and has been taking this as prescribed without any reported SE. Has f/u with Dr. Conroy scheduled on 01/08/24 to discuss results of CT scan. Wound is continuing to drain with some reports of occasional malodor to the drainage which has been chronic. No reports of drainage smelling fecal in nature. Notes some soreness in abdomen but has been very active recently.       01/15/24: Pt presents for f/u abdominal wound s/p hysterectomy complicated by fascial dehiscence and abdominal herniation requiring incisional hernia repair with mesh repair. She has completed a course of Bactrim based on a wound culture taken at general surgery a few weeks prior. Since her most recent visit at the wound care center pt had a f/u with general surgery on 01/08/24. It was noted her updated CT did not demonstrate any abscess to warrant additional antibiotics and there was improvement in the size of the wound compared to prior CT. It was encouraged the pt focus on smoking cessation, continue with wound care as there is no acute role for surgical intervention at this time. She will additionally be following up with Dr. Conroy, general surgery at the end of the month as well. Is currently alternating between alginate  and wet to dry dressings when product is not available. There is a change in the color of the drainage since previous visit. No new sx. Denies abdominal pain, nausea, vomiting, changes with bowel movements, fevers, chills.     01/22/24: Pt presents for f/u abdominal wound. Has been using Dakin soaked packing since Wednesday. Has noticed a small amount of green tinge to the drainage still but overall improved. Denies any severe abdominal pain, N/V, change in bowel movements, fevers, chills. Is continuing to smoke about 1/2 PPD.  States she has an upcoming appointment with Dr. Conroy on 01/29/2024.      02/05/24: Pt presents for f/u abdominal wound. Has switched from Dakin soaked packing to silver gel coated plain packing and has noticed a decrease in the amount of green drainage. Has been consistently forgetting to put zinc around the periwound which is contributing to some irritation of surrounding skin. Saw Dr. Conroy, general surgery since prior visit whom did not feel as though any further medication/imaging/intervention was necessary at this time and would like pt to continue with local wound care and follow up with him in 3 months. Pt denies fevers, chills, N/V, change in bowel movements or abdominal pain. Continues to smoke.           The following portions of the patient's history were reviewed and updated as appropriate:   Patient Active Problem List   Diagnosis    Tobacco use disorder    COVID-19    Obesity, morbid, BMI 50 or higher (HCC)    Bulky or enlarged uterus    Pelvic pain    Preoperative exam for gynecologic surgery    HTN (hypertension)    Gastroesophageal reflux disease    Asthma    Obstructive sleep apnea syndrome    S/P hysterectomy    Postoperative anemia    Rheumatoid arthritis involving multiple sites with positive rheumatoid factor (HCC)    Encounter for postoperative care    Open wound of abdomen    Surgical wound, non healing    Cigarette nicotine dependence without complication      Past Medical History:   Diagnosis Date    Abdominal wall abscess at site of surgical wound 2023    Abnormal uterine bleeding due to intramural leiomyoma 2022    Arthritis     Rheumatoid    Asthma     Breathing difficulty     only occured during inverted robotic hysterectomy    Cellulitis of drainage site following surgery 2023    CPAP (continuous positive airway pressure) dependence     GERD (gastroesophageal reflux disease)     Hypertension     Obese     Pneumonia 2007    Sleep apnea     Uterine fibroid     LTH today 12/15/2022    Wound dehiscence, surgical 2022     Past Surgical History:   Procedure Laterality Date    ABDOMINAL WASHOUT      post  with wound vac     SECTION      had hematoma removed after the surgery    FOREARM SURGERY Right     repair of fracture with plating    INCISIONAL HERNIA REPAIR  2022    Procedure: REPAIR  RECURRENT HERNIA INCISIONAL WITH MESH;  Surgeon: Donna Lau MD;  Location: [facility];  Service: Gynecology    IR DRAINAGE TUBE CHECK WITH SCLEROSIS  2/3/2023    IR DRAINAGE TUBE CHECK WITH SCLEROSIS  3/7/2023    IR DRAINAGE TUBE CHECK/CHANGE/REPOSITION/REINSERTION/UPSIZE  2023    IR DRAINAGE TUBE CHECK/CHANGE/REPOSITION/REINSERTION/UPSIZE  2023    IR DRAINAGE TUBE CHECK/CHANGE/REPOSITION/REINSERTION/UPSIZE  2023    IR DRAINAGE TUBE CHECK/CHANGE/REPOSITION/REINSERTION/UPSIZE  2023    IR DRAINAGE TUBE PLACEMENT  2023    LAPAROTOMY N/A 2022    Procedure: LAPAROTOMY EXPLORATORY;  Surgeon: Donna Lau MD;  Location: [facility];  Service: Gynecology    MYRINGOTOMY W/ TUBES      two sets as a young child    NH CYSTOURETHROSCOPY N/A 12/15/2022    Procedure: CYSTO W/ ROBOT;  Surgeon: Donna Lau MD;  Location: [facility];  Service: Gynecology    NH LAPS TOTAL HYSTERECT 250 GM/< W/RMVL TUBE/OVARY N/A 12/15/2022    Procedure: (LTH) W/ BILATERAL SALPINGECTOMY  W/ ROBOT COVERTED TO OPEN;   Surgeon: Donna Lau MD;  Location: [facility];  Service: Gynecology    VAC DRESSING APPLICATION N/A 3/17/2023    Procedure: APPLICATION VAC DRESSING ABDOMEN/TRUNK;  Surgeon: Chan Conroy MD;  Location: [facility];  Service: General    WOUND DEBRIDEMENT N/A 3/15/2023    Procedure: DEBRIDEMENT Abdominal WOUND and placement of Wound VAC;  Surgeon: Chan Conroy MD;  Location: [facility];  Service: General    WOUND DEBRIDEMENT N/A 3/17/2023    Procedure: DEBRIDEMENT WOUND (WASH OUT);  Surgeon: Chan Conroy MD;  Location: [facility];  Service: General     Family History   Problem Relation Age of Onset    Multiple sclerosis Mother     Hypertension Father     Hyperlipidemia Father     Cerebral aneurysm Father     Pulmonary embolism Father     Liver disease Brother      Social History     Socioeconomic History    Marital status:      Spouse name: Not on file    Number of children: Not on file    Years of education: Not on file    Highest education level: Not on file   Occupational History    Not on file   Tobacco Use    Smoking status: Every Day     Current packs/day: 0.50     Average packs/day: 0.5 packs/day for 30.1 years (15.0 ttl pk-yrs)     Types: Cigarettes     Start date: 1995    Smokeless tobacco: Never   Vaping Use    Vaping status: Never Used   Substance and Sexual Activity    Alcohol use: Not Currently     Alcohol/week: 1.0 standard drink of alcohol     Types: 1 Standard drinks or equivalent per week     Comment: 3 x monthly    Drug use: Never    Sexual activity: Yes     Partners: Male     Birth control/protection: Male Sterilization   Other Topics Concern    Not on file   Social History Narrative    Not on file     Social Determinants of Health     Financial Resource Strain: Not on file   Food Insecurity: No Food Insecurity (3/16/2023)    Hunger Vital Sign     Worried About Running Out of Food in the Last Year: Never true     Ran Out of Food in the Last Year: Never true    Transportation Needs: No Transportation Needs (3/16/2023)    PRAPARE - Transportation     Lack of Transportation (Medical): No     Lack of Transportation (Non-Medical): No   Physical Activity: Not on file   Stress: Not on file   Social Connections: Not on file   Intimate Partner Violence: Not on file   Housing Stability: Low Risk  (3/16/2023)    Housing Stability Vital Sign     Unable to Pay for Housing in the Last Year: No     Number of Places Lived in the Last Year: 1     Unstable Housing in the Last Year: No       Current Outpatient Medications:     albuterol (2.5 mg/3 mL) 0.083 % nebulizer solution, Take 2.5 mg by nebulization every 4 (four) hours as needed for shortness of breath or wheezing, Disp: , Rfl:     albuterol (ProAir HFA) 90 mcg/act inhaler, Inhale 2 puffs every 6 (six) hours as needed for wheezing or shortness of breath, Disp: 8.5 g, Rfl: 0    albuterol (PROVENTIL HFA,VENTOLIN HFA) 90 mcg/act inhaler, Inhale 2 puffs every 6 (six) hours as needed for wheezing, Disp: 8 g, Rfl: 2    amoxicillin (AMOXIL) 500 mg capsule, , Disp: , Rfl:     Arthritis Pain Relief 650 MG CR tablet, take 1 tablet by mouth every 8 hours if needed for mild pain, Disp: , Rfl:     aspirin 325 mg tablet, Take 325 mg by mouth daily. Indications: as needed, Disp: , Rfl:     chlorhexidine (HIBICLENS) 4 % external liquid, Apply 1 Application topically daily as needed for wound care Can use to clean wound during VAC changes. (Patient not taking: Reported on 1/29/2024), Disp: 473 mL, Rfl: 5    clonazePAM (KlonoPIN) 0.5 mg tablet, Take 0.5 mg by mouth 2 (two) times a day as needed (Patient not taking: Reported on 1/29/2024), Disp: , Rfl:     cyclobenzaprine (FLEXERIL) 5 mg tablet, Take 1 tablet (5 mg total) by mouth 3 (three) times a day as needed for muscle spasms (Patient taking differently: Take 5 mg by mouth 3 (three) times a day as needed for muscle spasms As needed), Disp: 60 tablet, Rfl: 0    dulaglutide (Trulicity) 0.75  MG/0.5ML injection, Inject 0.75 mg under the skin Once a week (Patient not taking: Reported on 1/29/2024), Disp: , Rfl:     escitalopram (LEXAPRO) 10 mg tablet, Take 10 mg by mouth daily, Disp: , Rfl:     escitalopram (LEXAPRO) 5 mg tablet, Take 5 mg by mouth daily, Disp: , Rfl:     fluticasone (FLONASE) 50 mcg/act nasal spray, 2 sprays into each nostril daily, Disp: 16 g, Rfl: 4    Fluticasone-Salmeterol (Advair) 250-50 mcg/dose inhaler, , Disp: , Rfl:     Fluticasone-Salmeterol (Advair) 500-50 mcg/dose inhaler, Inhale 1 puff 2 (two) times a day, Disp: , Rfl:     folic acid (FOLVITE) 1 mg tablet, Take by mouth daily, Disp: , Rfl:     gabapentin (NEURONTIN) 100 mg capsule, Take 1 capsule (100 mg total) by mouth 3 (three) times a day (Patient not taking: Reported on 6/2/2023), Disp: 90 capsule, Rfl: 0    gabapentin (NEURONTIN) 300 mg capsule, Take 300 mg by mouth 3 (three) times a day, Disp: , Rfl:     hydrochlorothiazide (HYDRODIURIL) 12.5 mg tablet, Take 12.5 mg by mouth daily Pt only  taking as needed for leg swelling, Disp: , Rfl:     ibuprofen (MOTRIN) 600 mg tablet, Take 600 mg by mouth every 6 (six) hours as needed for moderate pain As needed, Disp: , Rfl:     methocarbamol (ROBAXIN) 500 mg tablet, Take 1 tablet (500 mg total) by mouth every 6 (six) hours for 14 days (Patient taking differently: Take 500 mg by mouth every 6 (six) hours As needed), Disp: 56 tablet, Rfl: 0    methotrexate 2.5 mg tablet, Four 2.5 tabs one time a week ( Every Saturday), Disp: , Rfl:     montelukast (SINGULAIR) 10 mg tablet, Take by mouth daily at bedtime as needed, Disp: , Rfl:     naloxone (NARCAN) 4 mg/0.1 mL nasal spray, Administer 1 spray into a nostril. If no response after 2-3 minutes, give another dose in the other nostril using a new spray. (Patient not taking: Reported on 4/14/2023), Disp: 1 each, Rfl: 1    neomycin-polymyxin-hydrocortisone (CORTISPORIN) otic solution, Administer 3 drops into the left ear every 6 (six)  hours (Patient not taking: Reported on 1/29/2024), Disp: , Rfl:     nicotine (NICODERM CQ) 14 mg/24hr TD 24 hr patch, Place 1 patch on the skin every 24 hours (Patient not taking: Reported on 6/2/2023), Disp: 28 patch, Rfl: 3    omeprazole (PriLOSEC) 40 MG capsule, Take 40 mg by mouth daily, Disp: , Rfl:     predniSONE 10 mg tablet, Take 5 tabs po x 2 days; 4 tabs po x 2 days; 3 tabs po x 1 day; 2 tabs po x 1 day. 1 tab po x 1 day. (Patient not taking: Reported on 1/29/2024), Disp: 24 tablet, Rfl: 0    semaglutide, 0.25 or 0.5 mg/dose, (Ozempic) 2 mg/3 mL injection pen, Inject 0.5 mg under the skin Once a week (Patient not taking: Reported on 1/29/2024), Disp: , Rfl:     sodium chloride, PF, 0.9 %, Inject 10 mL into a catheter in a vein 3 (three) times a week Wound irrigation. (Patient not taking: Reported on 1/29/2024), Disp: 100 mL, Rfl: 10    Vitamin D, Ergocalciferol, 10941 units CAPS, Take by mouth once a week, Disp: , Rfl:     Review of Systems   Constitutional:  Negative for chills and fever.   Gastrointestinal:  Negative for abdominal pain, constipation, diarrhea, nausea and vomiting.   Skin:  Positive for rash (periwound) and wound (abdomen). Negative for color change.   Psychiatric/Behavioral:  Negative for agitation and confusion.          Objective:  /84   Pulse 90   Temp 97.5 °F (36.4 °C)   Resp 20   LMP 11/23/2022 (Exact Date) Comment: partial  Pain Score: 0-No pain     Physical Exam  Vitals reviewed.   Constitutional:       Appearance: She is morbidly obese.   Pulmonary:      Effort: Pulmonary effort is normal. No respiratory distress.   Abdominal:          Comments: Open wound of abdomen. Is measuring the same in size. Very minimal green discoloration to drainage on removed packing. Appears to have epithelization from edges of cavity. Cavity with granulation tissue and Phasix mesh. Area of deepest depth is at approximately 7 o'clock measuring 2.4 cm. Periwound continues to appear irritated  consistent with MASD. No malodor appreciated.      Skin:     Findings: Wound present.   Neurological:      Mental Status: She is alert.   Psychiatric:         Mood and Affect: Mood normal.         Wound 07/10/23 Abdomen (Active)   Wound Image Images linked 02/05/24 0821   Wound Description Pale;Pink;White 02/05/24 0821   Alexa-wound Assessment Excoriated;Scar Tissue 02/05/24 0821   Wound Length (cm) 0.3 cm 02/05/24 0821   Wound Width (cm) 0.2 cm 02/05/24 0821   Wound Depth (cm) 0.5 cm 02/05/24 0821   Wound Surface Area (cm^2) 0.06 cm^2 02/05/24 0821   Wound Volume (cm^3) 0.03 cm^3 02/05/24 0821   Calculated Wound Volume (cm^3) 0.03 cm^3 02/05/24 0821   Change in Wound Size % 99.93 02/05/24 0821   Number of underminings 1 02/05/24 0821   Undermining 1 2.5 02/05/24 0821   Undermining 1 is depth extending from 6-7 02/05/24 0821   Drainage Amount Moderate 02/05/24 0821   Drainage Description Serosanguineous 02/05/24 0821   Non-staged Wound Description Full thickness 02/05/24 0821   Treatments Irrigation with NSS 02/05/24 0821   Patient Tolerance Tolerated well 02/05/24 0821          Results from last 6 Months   Lab Units 12/18/23  1030   WOUND CULTURE  3+ Growth of Pasteurella multocida*  2+ Growth of Escherichia coli*  3+ Growth of           Wound Instructions:  Orders Placed This Encounter   Procedures    Wound cleansing and dressings     Remove abdominal dressing, wash with soap and water (Dove for sensitive skin)      May apply Zinc Oxide to surrounding skin with each dressing change      Apply silvergel (this is available over the counter) to the plain packing then loosely pack the wound. Then cover with dry dressing. Change dressing 1-2 x daily and as needed for leakage.     Treatments completed as above today at the Wound Center      Please call the [facility] Monday through Friday between the hours of 8:00 AM and 4:30 PM at [phone] if you have any questions, if you experience any major changes in  "your wound(s) or for any signs or symptoms of infection such as fever; changes in the redness, swelling, drainage, or odor of your wound. After hours, weekends or holidays please contact your primary care physician or go to the hospital emergency room.     Follow up in 2 weeks     Standing Status:   Future     Standing Expiration Date:   2/5/2025       Salma Garcia PA-C      Portions of the record may have been created with voice recognition software. Occasional wrong word or \"sound alike\" substitutions may have occurred due to the inherent limitations of voice recognition software. Read the chart carefully and recognize, using context, where substitutions have occurred.    "

## 2024-02-19 ENCOUNTER — OFFICE VISIT (OUTPATIENT)
Dept: WOUND CARE | Facility: CLINIC | Age: 44
End: 2024-02-19
Payer: COMMERCIAL

## 2024-02-19 VITALS — RESPIRATION RATE: 16 BRPM | TEMPERATURE: 97.2 F | SYSTOLIC BLOOD PRESSURE: 140 MMHG | DIASTOLIC BLOOD PRESSURE: 76 MMHG

## 2024-02-19 DIAGNOSIS — F17.200 TOBACCO USE DISORDER: ICD-10-CM

## 2024-02-19 DIAGNOSIS — Z90.710 S/P HYSTERECTOMY: ICD-10-CM

## 2024-02-19 DIAGNOSIS — S31.109D OPEN WOUND OF ABDOMEN, SUBSEQUENT ENCOUNTER: Primary | ICD-10-CM

## 2024-02-19 DIAGNOSIS — Z87.19 S/P HERNIA REPAIR: ICD-10-CM

## 2024-02-19 DIAGNOSIS — Z98.890 S/P HERNIA REPAIR: ICD-10-CM

## 2024-02-19 DIAGNOSIS — E66.01 OBESITY, CLASS III, BMI 40-49.9 (MORBID OBESITY) (HCC): ICD-10-CM

## 2024-02-19 PROCEDURE — G0463 HOSPITAL OUTPT CLINIC VISIT: HCPCS | Performed by: STUDENT IN AN ORGANIZED HEALTH CARE EDUCATION/TRAINING PROGRAM

## 2024-02-19 PROCEDURE — 99213 OFFICE O/P EST LOW 20 MIN: CPT | Performed by: STUDENT IN AN ORGANIZED HEALTH CARE EDUCATION/TRAINING PROGRAM

## 2024-02-19 NOTE — PATIENT INSTRUCTIONS
Orders Placed This Encounter   Procedures    Wound cleansing and dressings Abdomen     Wound cleansing and dressings     Remove abdominal dressing, wash with soap and water (Dove for sensitive skin)      May apply Zinc Oxide to surrounding skin with each dressing change      Apply silvergel (this is available over the counter) to the 1/4 inch plain packing then loosely pack the wound. Then cover with dry dressing and tape. Change dressing 1-2 x daily and as needed for leakage.      Treatments completed as above today at the Wound Center      Please call the [facility]aling Center Monday through Friday between the hours of 8:00 AM and 4:30 PM at [phone] if you have any questions, if you experience any major changes in your wound(s) or for any signs or symptoms of infection such as fever; changes in the redness, swelling, drainage, or odor of your wound. After hours, weekends or holidays please contact your primary care physician or go to the hospital emergency room.      Follow up in 2 weeks     Standing Status:   Future     Standing Expiration Date:   2/19/2025

## 2024-02-19 NOTE — PROGRESS NOTES
Patient ID: Maria R Cadena is a 43 y.o. female Date of Birth 1980       Chief Complaint   Patient presents with    Follow Up Wound Care Visit     Abdominal wound care       Allergies:  Patient has no known allergies.    Diagnosis:   Diagnosis ICD-10-CM Associated Orders   1. Open wound of abdomen, subsequent encounter  S31.109D Wound cleansing and dressings Abdomen      2. S/P hysterectomy  Z90.710       3. S/P hernia repair  Z98.890     Z87.19       4. Tobacco use disorder  F17.200       5. Obesity, Class III, BMI 40-49.9 (morbid obesity) ([facility])  E66.01            Assessment  & Plan:    F/u open wound of abdomen. Very small diameter opening remaining. Periwound appears less irritated. Drainage is small-moderate. No further green discoloration present on dressing removed today. Area of deepest depth at 7 o'clock is filling in and no longer with apparent tunnel. Cavity with granulation tissue and exposed Phaisx mesh. No malodor appreciated.   Continue with silver gel coated plain packing. Change daily to twice daily depending on drainage amount. Discussed importance of being consistent with use of zinc to periwound given irritation of skin.   Attempt smoking reduction to assist with healing.   Monitor for signs of infection including fevers, chills, N/V, increase in redness of periwound, change in malodor.   Obtain 3-4 servings of protein daily for wound healing.   F/u in 2-3 weeks. Instructed to call if any questions or concerns arise in meantime.          Subjective:   01/27/23: 43 y/o F with PMHx of HTN, asthma, RA,abnormal uterine bleeding due to intramural leiomyoma s/p hysterectomy on 12/15/22  presents for evaluation of abdominal wound related to surgical wound dehiscence. Pt initially had hysterectomy on 12/15/2022 with operation complicated by desaturation events in the middle of the robotic procedure which forced a conversion to open ex lap with midline abdominal incision.  Her recovery course was  complicated by a fascial dehiscence and abdominal herniation and on 12/20/2022 she underwent surgery for incisional hernia with mesh. Her recovery was once again complicated by fluid collection underneath surgical incision that was treated with drain placement by interventional radiology on 1/9/2022- no bacterial growthained fluid. Pt then presented to ED on 01/13/23 over concerns of further surgical wound dehiscence at inferior aspect of the wound. She was evaluated by surgery at that time whom probed the wound and noted all areas appeared intact with a portion of the inferior aspect probing to intact fascia without concerns for evisceration or breakdown of fascial closure on examination. Wet-to-dry packing to the wound was recommended and an abdominal binder was provided. She has been continuing to follow with IR in regards to the abdominal cavity from seroma and underwent tube check and sclerotherapy session of lymphocele on 01/23/23 as well as today (01/27/23)- size fluid of cavity continues to decrease. On evaluation today, pt notes that she has been getting blue/green drainage from her wound. Drainage is moderate. Does not feel as though the wound has gotten worse since initiation of packing, but does not feel as though it has improved either which is concerning to her. Denies denzel abdominal pain but does note a full/pulling sensation intermittently. No fevers, chills, malaise. Does not have a hx of DM. Currently smoking approximately 1/2 PPD which is reduced from previous PPD.       02/06/23: Pt presents for f/u of abdominal wound related to surgical wound dehiscence. Pt is s/p hysterectomy on 12/15/22 which was complicated by fascial dehiscence and abdominal herniation which then required incisional hernia repair with mesh on 12/20/22. Pt was using dry packing until she was able to obtain the Dakin solution from the pharmacy- she has been using Dakins solution for approximately five days now. Her wound  cultures from previous visit demonstrated growth of Pseudomonas and Klebsiella oxytoca both susceptible to Ciprofloxacin. Has two pills left in course of abx. States the green/blue discoloration of the drainage has resolved. She has been following with interventional radiology related to abdominal fluid collection underneath the surgical incision site- at most recent IR visit on 02/03/23, it was found the drain space communicated with the open wound- sclerosis was performed with 200 mg doxycyline at that time. Her next IR appointment is on 02/13/23. No longer using abdominal binder. Is limiting her activities. Continues to smoke approximately a 1/2 PPD. She denies fevers, chills, denzel abdominal pain but does endorse a soreness and occasional tugging sensation.       02/15/23: Pt presents for f/u abdominal wound related to surgical wound dehiscence s/p hysterectomy complicated by fascial dehiscence and abdominal herniation requiring incisional hernia repair with mesh. Last week the pt noticed a return of green drainage on the Dakins wet-to-dry dressing and therefore she was initiated on an additional course of Ciprofloxacin; green drainage has resolved after starting the antibiotic. She is continuing to follow with interventional radiology for abdominal fluid collection adjacent to the wound. At her most recent appointment with IR on 02/13/23, the drain was exchanged and repositioned for 10 [place] drain deeper in the collection and sclerosis with Doxycyline was performed- there are concerns that the fluid collection communicates with the wound and that healing with current intervention may not be possible/surgical intervention may be required regarding the collection of fluid. Pt continues to have soreness behind her umbilical region but not at the site of the abdominal wound itself. Denies fevers, chills, malaise.     07/10/23: Pt presents for evaluation of surgical abdominal wound dehiscence s/p hysterectomy  complicated by fascial dehiscence and abdominal herniation requiring incision hernia repair with mesh. Pt was last seen in wound care center on 03/09/23 by Dr. Montano in March whom recommended debridement of the wound in the OR at that time. Pt has since undergone wound debridement and wound VAC placement in OR with Dr. Conroy on 03/15/23. Intraoperative cultures were taken and pt was admitted for IV abx therapy with Zosyn. Following d/c from hospital she has been following with general surgery for wound management. Wound has been swabbed with Betadine then irrigated at each visit and pt has been continuing with wound VAC since March. It is noted the wound has exposed mesh at the base that has slowly had granulation tissue incorporating into it. There was formation of abscess around 5 o'clock position on 04/17/23; abscess was irrigated and packed and pt was treated with course of Levaquin. The cavity has since granulated in. On evaluation today pt states she has been doing much better and feels much healthier. Her  is currently assisting with VAC changes. She does endorse a chronic odor to the wound that has been present for months and is unchanged. She obtains protein in her diet. Was recommended she shower and cleanse the area. Has regular bowel movements. No N/V/D, fevers, chills. Continues to smoke about 1/2 PPD and is not ready to quit at this moment.       07/17/23: Pt presents for f/u abdominal wound secondary to dehiscence of surgical site. Pt notes that no new VAC supplies were sent to her so she has returned to use of the saline wet to dry and is changing this twice daily. Since using the saline wet-to-dry she has noticed less odor with dressing changes. Overall there have not been significant changes since previous visit. Denies N/V/D, no change in BM. Denies fevers, chills.        07/31/23: Pt presents for f/u abdominal wound secondary to surgical site dehiscence. Since previous visit pt has been  using wound VAC.     08/07/23: Pt presents for f/u abdominal wound secondary to surgical site dehiscence. Wound VAC was irritating skin and therefore was d/c at previous visit. Pt has been using aquacel ag rope packing but there was a lag time in supply delivery and therefore she returned to wet-to-dry until supplies were received. Notes the drainage remains heavy and the dressing is typically soaked about mid-day following packing changes. No fevers, chills.     08/21/23: Pt presents for f/u abdominal wound secondary to surgical site dehiscence. Is doing well with aquacel ag rope packing. Is feeling well without changes in bowel habits, abdominal pain, fevers, chills. Wound continues to have significant drainage but drainage does not have fecal smell. Offers no complaints today.     08/28/23: Presents for follow-up abdominal wound postsurgical site dehiscence.  Patient has heavy drainage but unfortunately Aquacel Ag rope packing is not covered for daily changes via insurance therefore pt has been switching between use of Aquacel Ag rope packing and saline wet to dry packing. Will occasionally forget to use zinc to the periwound.  Overall  no significant changes since prior visit.  Benjamin the same.  No severe abdominal pain, change in bowel habits, nausea, vomiting, fever, chills.     09/11/23: Patient presents for follow-up abdominal wound postsurgical site dehiscence.  Has been using saline wet-to-dry recently to insurance only covering specific amount of Aquacel AG rope dressings.  Has been having some irritation of surrounding skin due to the surgical tape.  Overall no major changes since previous visit.  Denies abdominal pain, changes in the smell or amount of drainage.  No nausea, vomiting, fevers, chills.    09/25/23: Pt presents for f/u abdominal wound post surgical site dehiscence.  Currently Aquacel Ag rope is being packed into the wound.  She states she has noticed an improvement since the prior visit in  terms of the drainage slowing down and is no longer noticing an odor.  Overall feels well.  Denies significant abdominal pain, nausea, vomiting, change in bowel habits, fevers or chills.     10/09/23: Pt presents for f/u abdominal wound post surgical site dehiscence. Is continuing to use aquacel ag rope; drainage is reduced from previous amounts but remains moderate still saturating the dressing. Overall no significant changes in medical history from prior visit.      10/23/23: Pt presents for f/u abdominal wound post surgical site dehiscence. Continues to do well. Is getting about 1.5 day wear-time out of aquacel ag rope, drainage is amount the same or possibly a little less. Remains the same color without any new malodor. Overall is doing well. Continues to get protein in her diet. Is smoking approximately the same amount. No severe abdominal pain, fevers, chills, changes in bowel habits, N/V.     11/06/23: Pt presents for f/u abdominal wound post surgical site dehiscence. Has been doing well with aquacel ag rope. Drainage has picked up a small amount since previous visit. Overall no major changes, continues to deny malodor to drainage, abdominal pain, fevers, chills, bowel habit changes.     11/20/23: Patient presents for follow-up of abdominal wound postsurgical site dehiscence.  Currently the wound is being dressed with Aquacel Ag rope packing.  The defect continues to drain however there are no major changes since prior visit. Is attempting to reduce tobacco intake but has not yet been able to do so. Continues to deny malodor to drainage, abdominal pain, fevers, chills, bowel habit changes.       12/04/23: Pt presents for f/u abdominal wound post surgical site dehiscence. Currently Aquacel ag rope packing is being utilized. Pt continues to report the same amount of drainage without changes since prior visit. She is inquiring if any further imaging of abdomen is necessary at this time. No fevers, chills.        12/26/23: Pt presents for f/u abdominal wound s/p hysterectomy complicated by fascial dehiscence and abdominal herniation requiring incisional hernia repair with mesh repair. She is continuing with silver alginate to the site. Pt had f/u with general surgery since prior visit at which time a culture was obtained from the wound as well as a repeat CT scan to r/o un-drained fluid collections. Culture results demonstrated 3+ growth of Pasturella multocida, 2+ growth of E. Coli and 3+ growth of mixed skin mary. Pt was since initiated on Bactrim and has been taking this as prescribed without any reported SE. Has f/u with Dr. Conroy scheduled on 01/08/24 to discuss results of CT scan. Wound is continuing to drain with some reports of occasional malodor to the drainage which has been chronic. No reports of drainage smelling fecal in nature. Notes some soreness in abdomen but has been very active recently.       01/15/24: Pt presents for f/u abdominal wound s/p hysterectomy complicated by fascial dehiscence and abdominal herniation requiring incisional hernia repair with mesh repair. She has completed a course of Bactrim based on a wound culture taken at general surgery a few weeks prior. Since her most recent visit at the wound care center pt had a f/u with general surgery on 01/08/24. It was noted her updated CT did not demonstrate any abscess to warrant additional antibiotics and there was improvement in the size of the wound compared to prior CT. It was encouraged the pt focus on smoking cessation, continue with wound care as there is no acute role for surgical intervention at this time. She will additionally be following up with Dr. Conroy, general surgery at the end of the month as well. Is currently alternating between alginate and wet to dry dressings when product is not available. There is a change in the color of the drainage since previous visit. No new sx. Denies abdominal pain, nausea, vomiting,  changes with bowel movements, fevers, chills.     01/22/24: Pt presents for f/u abdominal wound. Has been using Dakin soaked packing since Wednesday. Has noticed a small amount of green tinge to the drainage still but overall improved. Denies any severe abdominal pain, N/V, change in bowel movements, fevers, chills. Is continuing to smoke about 1/2 PPD.  States she has an upcoming appointment with Dr. Conroy on 01/29/2024.      02/05/24: Pt presents for f/u abdominal wound. Has switched from Dakin soaked packing to silver gel coated plain packing and has noticed a decrease in the amount of green drainage. Has been consistently forgetting to put zinc around the periwound which is contributing to some irritation of surrounding skin. Saw Dr. Conroy, general surgery since prior visit whom did not feel as though any further medication/imaging/intervention was necessary at this time and would like pt to continue with local wound care and follow up with him in 3 months. Pt denies fevers, chills, N/V, change in bowel movements or abdominal pain. Continues to smoke.     02/19/24: Pt presents for f/u abdominal wound. Currently silver gel coated packing is being utilized. There is minimal to no green drainage on the packing. Has been consistent with use of zinc to the periwound. Overall no significant changes since prior visit.           The following portions of the patient's history were reviewed and updated as appropriate:   Patient Active Problem List   Diagnosis    Tobacco use disorder    COVID-19    Obesity, morbid, BMI 50 or higher (HCC)    Bulky or enlarged uterus    Pelvic pain    Preoperative exam for gynecologic surgery    HTN (hypertension)    Gastroesophageal reflux disease    Asthma    Obstructive sleep apnea syndrome    S/P hysterectomy    Postoperative anemia    Rheumatoid arthritis involving multiple sites with positive rheumatoid factor (HCC)    Encounter for postoperative care    Open wound of abdomen     Surgical wound, non healing    Cigarette nicotine dependence without complication     Past Medical History:   Diagnosis Date    Abdominal wall abscess at site of surgical wound 2023    Abnormal uterine bleeding due to intramural leiomyoma 2022    Arthritis     Rheumatoid    Asthma     Breathing difficulty     only occured during inverted robotic hysterectomy    Cellulitis of drainage site following surgery 2023    CPAP (continuous positive airway pressure) dependence     GERD (gastroesophageal reflux disease)     Hypertension     Obese     Pneumonia 2007    Sleep apnea     Uterine fibroid     LTH today 12/15/2022    Wound dehiscence, surgical 2022     Past Surgical History:   Procedure Laterality Date    ABDOMINAL WASHOUT      post  with wound vac     SECTION      had hematoma removed after the surgery    FOREARM SURGERY Right     repair of fracture with plating    INCISIONAL HERNIA REPAIR  2022    Procedure: REPAIR  RECURRENT HERNIA INCISIONAL WITH MESH;  Surgeon: Donna Lau MD;  Location: [facility];  Service: Gynecology    IR DRAINAGE TUBE CHECK WITH SCLEROSIS  2/3/2023    IR DRAINAGE TUBE CHECK WITH SCLEROSIS  3/7/2023    IR DRAINAGE TUBE CHECK/CHANGE/REPOSITION/REINSERTION/UPSIZE  2023    IR DRAINAGE TUBE CHECK/CHANGE/REPOSITION/REINSERTION/UPSIZE  2023    IR DRAINAGE TUBE CHECK/CHANGE/REPOSITION/REINSERTION/UPSIZE  2023    IR DRAINAGE TUBE CHECK/CHANGE/REPOSITION/REINSERTION/UPSIZE  2023    IR DRAINAGE TUBE PLACEMENT  2023    LAPAROTOMY N/A 2022    Procedure: LAPAROTOMY EXPLORATORY;  Surgeon: Donna Lau MD;  Location: [facility];  Service: Gynecology    MYRINGOTOMY W/ TUBES      two sets as a young child    TX CYSTOURETHROSCOPY N/A 12/15/2022    Procedure: CYSTO W/ ROBOT;  Surgeon: Donna Lau MD;  Location: [facility];  Service: Gynecology    TX LAPS TOTAL HYSTERECT 250 GM/< W/RMVL TUBE/OVARY N/A 12/15/2022     Procedure: (LTH) W/ BILATERAL SALPINGECTOMY  W/ ROBOT COVERTED TO OPEN;  Surgeon: Donna Lau MD;  Location: [facility];  Service: Gynecology    VAC DRESSING APPLICATION N/A 3/17/2023    Procedure: APPLICATION VAC DRESSING ABDOMEN/TRUNK;  Surgeon: Chan Conroy MD;  Location: [facility];  Service: General    WOUND DEBRIDEMENT N/A 3/15/2023    Procedure: DEBRIDEMENT Abdominal WOUND and placement of Wound VAC;  Surgeon: Chan Conroy MD;  Location: [facility];  Service: General    WOUND DEBRIDEMENT N/A 3/17/2023    Procedure: DEBRIDEMENT WOUND (WASH OUT);  Surgeon: Chan Conroy MD;  Location: [facility];  Service: General     Family History   Problem Relation Age of Onset    Multiple sclerosis Mother     Hypertension Father     Hyperlipidemia Father     Cerebral aneurysm Father     Pulmonary embolism Father     Liver disease Brother      Social History     Socioeconomic History    Marital status:      Spouse name: None    Number of children: None    Years of education: None    Highest education level: None   Occupational History    None   Tobacco Use    Smoking status: Every Day     Current packs/day: 0.50     Average packs/day: 0.5 packs/day for 30.1 years (15.1 ttl pk-yrs)     Types: Cigarettes     Start date: 1995    Smokeless tobacco: Never   Vaping Use    Vaping status: Never Used   Substance and Sexual Activity    Alcohol use: Not Currently     Alcohol/week: 1.0 standard drink of alcohol     Types: 1 Standard drinks or equivalent per week     Comment: 3 x monthly    Drug use: Never    Sexual activity: Yes     Partners: Male     Birth control/protection: Male Sterilization   Other Topics Concern    None   Social History Narrative    None     Social Determinants of Health     Financial Resource Strain: Not on file   Food Insecurity: No Food Insecurity (3/16/2023)    Hunger Vital Sign     Worried About Running Out of Food in the Last Year: Never true     Ran Out of Food in the Last  Year: Never true   Transportation Needs: No Transportation Needs (3/16/2023)    PRAPARE - Transportation     Lack of Transportation (Medical): No     Lack of Transportation (Non-Medical): No   Physical Activity: Not on file   Stress: Not on file   Social Connections: Not on file   Intimate Partner Violence: Not on file   Housing Stability: Low Risk  (3/16/2023)    Housing Stability Vital Sign     Unable to Pay for Housing in the Last Year: No     Number of Places Lived in the Last Year: 1     Unstable Housing in the Last Year: No       Current Outpatient Medications:     albuterol (2.5 mg/3 mL) 0.083 % nebulizer solution, Take 2.5 mg by nebulization every 4 (four) hours as needed for shortness of breath or wheezing, Disp: , Rfl:     albuterol (ProAir HFA) 90 mcg/act inhaler, Inhale 2 puffs every 6 (six) hours as needed for wheezing or shortness of breath, Disp: 8.5 g, Rfl: 0    albuterol (PROVENTIL HFA,VENTOLIN HFA) 90 mcg/act inhaler, Inhale 2 puffs every 6 (six) hours as needed for wheezing, Disp: 8 g, Rfl: 2    amoxicillin (AMOXIL) 500 mg capsule, , Disp: , Rfl:     Arthritis Pain Relief 650 MG CR tablet, take 1 tablet by mouth every 8 hours if needed for mild pain, Disp: , Rfl:     aspirin 325 mg tablet, Take 325 mg by mouth daily. Indications: as needed, Disp: , Rfl:     chlorhexidine (HIBICLENS) 4 % external liquid, Apply 1 Application topically daily as needed for wound care Can use to clean wound during VAC changes. (Patient not taking: Reported on 1/29/2024), Disp: 473 mL, Rfl: 5    clonazePAM (KlonoPIN) 0.5 mg tablet, Take 0.5 mg by mouth 2 (two) times a day as needed (Patient not taking: Reported on 1/29/2024), Disp: , Rfl:     cyclobenzaprine (FLEXERIL) 5 mg tablet, Take 1 tablet (5 mg total) by mouth 3 (three) times a day as needed for muscle spasms (Patient taking differently: Take 5 mg by mouth 3 (three) times a day as needed for muscle spasms As needed), Disp: 60 tablet, Rfl: 0    dulaglutide  (Trulicity) 0.75 MG/0.5ML injection, Inject 0.75 mg under the skin Once a week (Patient not taking: Reported on 1/29/2024), Disp: , Rfl:     escitalopram (LEXAPRO) 10 mg tablet, Take 10 mg by mouth daily, Disp: , Rfl:     escitalopram (LEXAPRO) 5 mg tablet, Take 5 mg by mouth daily, Disp: , Rfl:     fluticasone (FLONASE) 50 mcg/act nasal spray, 2 sprays into each nostril daily, Disp: 16 g, Rfl: 4    Fluticasone-Salmeterol (Advair) 250-50 mcg/dose inhaler, , Disp: , Rfl:     Fluticasone-Salmeterol (Advair) 500-50 mcg/dose inhaler, Inhale 1 puff 2 (two) times a day, Disp: , Rfl:     folic acid (FOLVITE) 1 mg tablet, Take by mouth daily, Disp: , Rfl:     gabapentin (NEURONTIN) 100 mg capsule, Take 1 capsule (100 mg total) by mouth 3 (three) times a day (Patient not taking: Reported on 6/2/2023), Disp: 90 capsule, Rfl: 0    gabapentin (NEURONTIN) 300 mg capsule, Take 300 mg by mouth 3 (three) times a day, Disp: , Rfl:     hydrochlorothiazide (HYDRODIURIL) 12.5 mg tablet, Take 12.5 mg by mouth daily Pt only  taking as needed for leg swelling, Disp: , Rfl:     ibuprofen (MOTRIN) 600 mg tablet, Take 600 mg by mouth every 6 (six) hours as needed for moderate pain As needed, Disp: , Rfl:     methocarbamol (ROBAXIN) 500 mg tablet, Take 1 tablet (500 mg total) by mouth every 6 (six) hours for 14 days (Patient taking differently: Take 500 mg by mouth every 6 (six) hours As needed), Disp: 56 tablet, Rfl: 0    methotrexate 2.5 mg tablet, Four 2.5 tabs one time a week ( Every Saturday), Disp: , Rfl:     montelukast (SINGULAIR) 10 mg tablet, Take by mouth daily at bedtime as needed, Disp: , Rfl:     naloxone (NARCAN) 4 mg/0.1 mL nasal spray, Administer 1 spray into a nostril. If no response after 2-3 minutes, give another dose in the other nostril using a new spray. (Patient not taking: Reported on 4/14/2023), Disp: 1 each, Rfl: 1    neomycin-polymyxin-hydrocortisone (CORTISPORIN) otic solution, Administer 3 drops into the left ear  every 6 (six) hours (Patient not taking: Reported on 1/29/2024), Disp: , Rfl:     nicotine (NICODERM CQ) 14 mg/24hr TD 24 hr patch, Place 1 patch on the skin every 24 hours (Patient not taking: Reported on 6/2/2023), Disp: 28 patch, Rfl: 3    omeprazole (PriLOSEC) 40 MG capsule, Take 40 mg by mouth daily, Disp: , Rfl:     predniSONE 10 mg tablet, Take 5 tabs po x 2 days; 4 tabs po x 2 days; 3 tabs po x 1 day; 2 tabs po x 1 day. 1 tab po x 1 day. (Patient not taking: Reported on 1/29/2024), Disp: 24 tablet, Rfl: 0    semaglutide, 0.25 or 0.5 mg/dose, (Ozempic) 2 mg/3 mL injection pen, Inject 0.5 mg under the skin Once a week (Patient not taking: Reported on 1/29/2024), Disp: , Rfl:     sodium chloride, PF, 0.9 %, Inject 10 mL into a catheter in a vein 3 (three) times a week Wound irrigation. (Patient not taking: Reported on 1/29/2024), Disp: 100 mL, Rfl: 10    Vitamin D, Ergocalciferol, 23339 units CAPS, Take by mouth once a week, Disp: , Rfl:     Review of Systems   Constitutional:  Negative for chills and fever.   Gastrointestinal:  Negative for abdominal pain, constipation, diarrhea, nausea and vomiting.   Skin:  Positive for rash (periwound) and wound (abdomen). Negative for color change.   Psychiatric/Behavioral:  Negative for agitation and confusion.          Objective:  /76   Temp (!) 97.2 °F (36.2 °C)   Resp 16   LMP 11/23/2022 (Exact Date) Comment: partial        Physical Exam  Vitals reviewed.   Constitutional:       Appearance: She is morbidly obese.   Pulmonary:      Effort: Pulmonary effort is normal. No respiratory distress.   Abdominal:          Comments: Open wound of abdomen. Very small diameter opening remaining. Periwound appears less irritated. Drainage is small-moderate. No further green discoloration present on dressing removed today. Area of deepest depth at 7 o'clock is filling in and no longer with apparent tunnel. Cavity with granulation tissue and exposed Phaisx mesh. No malodor  "appreciated.      Skin:     Findings: Wound present.   Neurological:      Mental Status: She is alert.   Psychiatric:         Mood and Affect: Mood normal.                Results from last 6 Months   Lab Units 12/18/23  1030   WOUND CULTURE  3+ Growth of Pasteurella multocida*  2+ Growth of Escherichia coli*  3+ Growth of           Wound Instructions:  Orders Placed This Encounter   Procedures    Wound cleansing and dressings Abdomen     Wound cleansing and dressings     Remove abdominal dressing, wash with soap and water (Dove for sensitive skin)      May apply Zinc Oxide to surrounding skin with each dressing change      Apply silvergel (this is available over the counter) to the 1/4 inch plain packing then loosely pack the wound. Then cover with dry dressing and tape. Change dressing 1-2 x daily and as needed for leakage.      Treatments completed as above today at the [facility]      Please call the [facility] Monday through Friday between the hours of 8:00 AM and 4:30 PM at [phone] if you have any questions, if you experience any major changes in your wound(s) or for any signs or symptoms of infection such as fever; changes in the redness, swelling, drainage, or odor of your wound. After hours, weekends or holidays please contact your primary care physician or go to the hospital emergency room.      Follow up in 2 weeks     Standing Status:   Future     Standing Expiration Date:   2/19/2025       Salma Garcia PA-C      Portions of the record may have been created with voice recognition software. Occasional wrong word or \"sound alike\" substitutions may have occurred due to the inherent limitations of voice recognition software. Read the chart carefully and recognize, using context, where substitutions have occurred.    "

## 2024-03-04 ENCOUNTER — OFFICE VISIT (OUTPATIENT)
Dept: WOUND CARE | Facility: CLINIC | Age: 44
End: 2024-03-04
Payer: COMMERCIAL

## 2024-03-04 VITALS
RESPIRATION RATE: 20 BRPM | DIASTOLIC BLOOD PRESSURE: 70 MMHG | TEMPERATURE: 97.6 F | SYSTOLIC BLOOD PRESSURE: 130 MMHG | HEART RATE: 96 BPM

## 2024-03-04 DIAGNOSIS — Z98.890 S/P HERNIA REPAIR: ICD-10-CM

## 2024-03-04 DIAGNOSIS — T81.31XS SURGICAL WOUND DEHISCENCE, SEQUELA: ICD-10-CM

## 2024-03-04 DIAGNOSIS — Z90.710 S/P HYSTERECTOMY: ICD-10-CM

## 2024-03-04 DIAGNOSIS — S31.109D OPEN WOUND OF ABDOMEN, SUBSEQUENT ENCOUNTER: Primary | ICD-10-CM

## 2024-03-04 DIAGNOSIS — F17.200 TOBACCO USE DISORDER: ICD-10-CM

## 2024-03-04 DIAGNOSIS — Z87.19 S/P HERNIA REPAIR: ICD-10-CM

## 2024-03-04 DIAGNOSIS — E66.01 OBESITY, CLASS III, BMI 40-49.9 (MORBID OBESITY) (HCC): ICD-10-CM

## 2024-03-04 PROCEDURE — NC001 PR NO CHARGE: Performed by: STUDENT IN AN ORGANIZED HEALTH CARE EDUCATION/TRAINING PROGRAM

## 2024-03-04 PROCEDURE — 97597 DBRDMT OPN WND 1ST 20 CM/<: CPT | Performed by: STUDENT IN AN ORGANIZED HEALTH CARE EDUCATION/TRAINING PROGRAM

## 2024-03-04 RX ORDER — PREDNISONE 10 MG/1
TABLET ORAL 2 TIMES DAILY WITH MEALS
COMMUNITY

## 2024-03-04 NOTE — PROGRESS NOTES
Patient ID: Maria R Webb is a 43 y.o. female Date of Birth 1980       Chief Complaint   Patient presents with    Follow Up Wound Care Visit     Abdomen wound        Allergies:  Patient has no known allergies.    Diagnosis:   Diagnosis ICD-10-CM Associated Orders   1. Open wound of abdomen, subsequent encounter  S31.109D Wound cleansing and dressings Abdomen     Debridement Abdomen      2. Surgical wound dehiscence, sequela  T81.31XS       3. S/P hysterectomy  Z90.710       4. S/P hernia repair  Z98.890     Z87.19       5. Tobacco use disorder  F17.200       6. Obesity, Class III, BMI 40-49.9 (morbid obesity) ([facility])  E66.01            Assessment  & Plan:    F/u surgical wound of abdomen. Is measuring approximately the same in size. Continues to have a tunnel at 7 o'clock. Wound with fibrinous material, granulation tissue, no obvious exposed Phasix mesh. Drainage appears bright green but is small-moderate. No malodor appreciated. Periwound with scar tissue and minor irritation but no diffuse erythema or swelling to suggest soft tissue infection.   Selective debridement, as below.   Will return to use of Dakin soaked packing given bright green drainage. After bright green drainage resolves for multiple consecutive days may return to use of silver gel coated packing. Continue with zinc to the periwound.   Attempt smoking reduction to assist with healing.   Obtain 3-4 servings of protein daily for wound healing.    Attempt smoking reduction.   Plans to initiate use of semiglutide for weight reduction. No CI from wound care perspective as medication is not considered an immunosuppressant.   F/u in one week. Instructed to call if any questions or concerns arise in meantime.            Subjective:   01/27/23: 43 y/o F with PMHx of HTN, asthma, RA,abnormal uterine bleeding due to intramural leiomyoma s/p hysterectomy on 12/15/22  presents for evaluation of abdominal wound related to surgical wound dehiscence. Pt  initially had hysterectomy on 12/15/2022 with operation complicated by desaturation events in the middle of the robotic procedure which forced a conversion to open ex lap with midline abdominal incision.  Her recovery course was complicated by a fascial dehiscence and abdominal herniation and on 12/20/2022 she underwent surgery for incisional hernia with mesh. Her recovery was once again complicated by fluid collection underneath surgical incision that was treated with drain placement by interventional radiology on 1/9/2022- no bacterial growthained fluid. Pt then presented to ED on 01/13/23 over concerns of further surgical wound dehiscence at inferior aspect of the wound. She was evaluated by surgery at that time whom probed the wound and noted all areas appeared intact with a portion of the inferior aspect probing to intact fascia without concerns for evisceration or breakdown of fascial closure on examination. Wet-to-dry packing to the wound was recommended and an abdominal binder was provided. She has been continuing to follow with IR in regards to the abdominal cavity from seroma and underwent tube check and sclerotherapy session of lymphocele on 01/23/23 as well as today (01/27/23)- size fluid of cavity continues to decrease. On evaluation today, pt notes that she has been getting blue/green drainage from her wound. Drainage is moderate. Does not feel as though the wound has gotten worse since initiation of packing, but does not feel as though it has improved either which is concerning to her. Denies denzel abdominal pain but does note a full/pulling sensation intermittently. No fevers, chills, malaise. Does not have a hx of DM. Currently smoking approximately 1/2 PPD which is reduced from previous PPD.       02/06/23: Pt presents for f/u of abdominal wound related to surgical wound dehiscence. Pt is s/p hysterectomy on 12/15/22 which was complicated by fascial dehiscence and abdominal herniation which then  required incisional hernia repair with mesh on 12/20/22. Pt was using dry packing until she was able to obtain the Dakin solution from the pharmacy- she has been using Dakins solution for approximately five days now. Her wound cultures from previous visit demonstrated growth of Pseudomonas and Klebsiella oxytoca both susceptible to Ciprofloxacin. Has two pills left in course of abx. States the green/blue discoloration of the drainage has resolved. She has been following with interventional radiology related to abdominal fluid collection underneath the surgical incision site- at most recent IR visit on 02/03/23, it was found the drain space communicated with the open wound- sclerosis was performed with 200 mg doxycyline at that time. Her next IR appointment is on 02/13/23. No longer using abdominal binder. Is limiting her activities. Continues to smoke approximately a 1/2 PPD. She denies fevers, chills, denzel abdominal pain but does endorse a soreness and occasional tugging sensation.       02/15/23: Pt presents for f/u abdominal wound related to surgical wound dehiscence s/p hysterectomy complicated by fascial dehiscence and abdominal herniation requiring incisional hernia repair with mesh. Last week the pt noticed a return of green drainage on the Dakins wet-to-dry dressing and therefore she was initiated on an additional course of Ciprofloxacin; green drainage has resolved after starting the antibiotic. She is continuing to follow with interventional radiology for abdominal fluid collection adjacent to the wound. At her most recent appointment with IR on 02/13/23, the drain was exchanged and repositioned for 10 Kinyarwanda drain deeper in the collection and sclerosis with Doxycyline was performed- there are concerns that the fluid collection communicates with the wound and that healing with current intervention may not be possible/surgical intervention may be required regarding the collection of fluid. Pt continues to  have soreness behind her umbilical region but not at the site of the abdominal wound itself. Denies fevers, chills, malaise.     07/10/23: Pt presents for evaluation of surgical abdominal wound dehiscence s/p hysterectomy complicated by fascial dehiscence and abdominal herniation requiring incision hernia repair with mesh. Pt was last seen in wound care center on 03/09/23 by Dr. Montano in March whom recommended debridement of the wound in the OR at that time. Pt has since undergone wound debridement and wound VAC placement in OR with Dr. Conroy on 03/15/23. Intraoperative cultures were taken and pt was admitted for IV abx therapy with Zosyn. Following d/c from hospital she has been following with general surgery for wound management. Wound has been swabbed with Betadine then irrigated at each visit and pt has been continuing with wound VAC since March. It is noted the wound has exposed mesh at the base that has slowly had granulation tissue incorporating into it. There was formation of abscess around 5 o'clock position on 04/17/23; abscess was irrigated and packed and pt was treated with course of Levaquin. The cavity has since granulated in. On evaluation today pt states she has been doing much better and feels much healthier. Her  is currently assisting with VAC changes. She does endorse a chronic odor to the wound that has been present for months and is unchanged. She obtains protein in her diet. Was recommended she shower and cleanse the area. Has regular bowel movements. No N/V/D, fevers, chills. Continues to smoke about 1/2 PPD and is not ready to quit at this moment.       07/17/23: Pt presents for f/u abdominal wound secondary to dehiscence of surgical site. Pt notes that no new VAC supplies were sent to her so she has returned to use of the saline wet to dry and is changing this twice daily. Since using the saline wet-to-dry she has noticed less odor with dressing changes. Overall there have not  been significant changes since previous visit. Denies N/V/D, no change in BM. Denies fevers, chills.        07/31/23: Pt presents for f/u abdominal wound secondary to surgical site dehiscence. Since previous visit pt has been using wound VAC.     08/07/23: Pt presents for f/u abdominal wound secondary to surgical site dehiscence. Wound VAC was irritating skin and therefore was d/c at previous visit. Pt has been using aquacel ag rope packing but there was a lag time in supply delivery and therefore she returned to wet-to-dry until supplies were received. Notes the drainage remains heavy and the dressing is typically soaked about mid-day following packing changes. No fevers, chills.     08/21/23: Pt presents for f/u abdominal wound secondary to surgical site dehiscence. Is doing well with aquacel ag rope packing. Is feeling well without changes in bowel habits, abdominal pain, fevers, chills. Wound continues to have significant drainage but drainage does not have fecal smell. Offers no complaints today.     08/28/23: Presents for follow-up abdominal wound postsurgical site dehiscence.  Patient has heavy drainage but unfortunately Aquacel Ag rope packing is not covered for daily changes via insurance therefore pt has been switching between use of Aquacel Ag rope packing and saline wet to dry packing. Will occasionally forget to use zinc to the periwound.  Overall  no significant changes since prior visit.  Benjamin the same.  No severe abdominal pain, change in bowel habits, nausea, vomiting, fever, chills.     09/11/23: Patient presents for follow-up abdominal wound postsurgical site dehiscence.  Has been using saline wet-to-dry recently to insurance only covering specific amount of Aquacel AG rope dressings.  Has been having some irritation of surrounding skin due to the surgical tape.  Overall no major changes since previous visit.  Denies abdominal pain, changes in the smell or amount of drainage.  No nausea,  vomiting, fevers, chills.    09/25/23: Pt presents for f/u abdominal wound post surgical site dehiscence.  Currently Aquacel Ag rope is being packed into the wound.  She states she has noticed an improvement since the prior visit in terms of the drainage slowing down and is no longer noticing an odor.  Overall feels well.  Denies significant abdominal pain, nausea, vomiting, change in bowel habits, fevers or chills.     10/09/23: Pt presents for f/u abdominal wound post surgical site dehiscence. Is continuing to use aquacel ag rope; drainage is reduced from previous amounts but remains moderate still saturating the dressing. Overall no significant changes in medical history from prior visit.      10/23/23: Pt presents for f/u abdominal wound post surgical site dehiscence. Continues to do well. Is getting about 1.5 day wear-time out of aquacel ag rope, drainage is amount the same or possibly a little less. Remains the same color without any new malodor. Overall is doing well. Continues to get protein in her diet. Is smoking approximately the same amount. No severe abdominal pain, fevers, chills, changes in bowel habits, N/V.     11/06/23: Pt presents for f/u abdominal wound post surgical site dehiscence. Has been doing well with aquacel ag rope. Drainage has picked up a small amount since previous visit. Overall no major changes, continues to deny malodor to drainage, abdominal pain, fevers, chills, bowel habit changes.     11/20/23: Patient presents for follow-up of abdominal wound postsurgical site dehiscence.  Currently the wound is being dressed with Aquacel Ag rope packing.  The defect continues to drain however there are no major changes since prior visit. Is attempting to reduce tobacco intake but has not yet been able to do so. Continues to deny malodor to drainage, abdominal pain, fevers, chills, bowel habit changes.       12/04/23: Pt presents for f/u abdominal wound post surgical site dehiscence. Currently  Aquacel ag rope packing is being utilized. Pt continues to report the same amount of drainage without changes since prior visit. She is inquiring if any further imaging of abdomen is necessary at this time. No fevers, chills.       12/26/23: Pt presents for f/u abdominal wound s/p hysterectomy complicated by fascial dehiscence and abdominal herniation requiring incisional hernia repair with mesh repair. She is continuing with silver alginate to the site. Pt had f/u with general surgery since prior visit at which time a culture was obtained from the wound as well as a repeat CT scan to r/o un-drained fluid collections. Culture results demonstrated 3+ growth of Pasturella multocida, 2+ growth of E. Coli and 3+ growth of mixed skin mary. Pt was since initiated on Bactrim and has been taking this as prescribed without any reported SE. Has f/u with Dr. Conroy scheduled on 01/08/24 to discuss results of CT scan. Wound is continuing to drain with some reports of occasional malodor to the drainage which has been chronic. No reports of drainage smelling fecal in nature. Notes some soreness in abdomen but has been very active recently.       01/15/24: Pt presents for f/u abdominal wound s/p hysterectomy complicated by fascial dehiscence and abdominal herniation requiring incisional hernia repair with mesh repair. She has completed a course of Bactrim based on a wound culture taken at general surgery a few weeks prior. Since her most recent visit at the wound care center pt had a f/u with general surgery on 01/08/24. It was noted her updated CT did not demonstrate any abscess to warrant additional antibiotics and there was improvement in the size of the wound compared to prior CT. It was encouraged the pt focus on smoking cessation, continue with wound care as there is no acute role for surgical intervention at this time. She will additionally be following up with Dr. Conroy, general surgery at the end of the month as  well. Is currently alternating between alginate and wet to dry dressings when product is not available. There is a change in the color of the drainage since previous visit. No new sx. Denies abdominal pain, nausea, vomiting, changes with bowel movements, fevers, chills.     01/22/24: Pt presents for f/u abdominal wound. Has been using Dakin soaked packing since Wednesday. Has noticed a small amount of green tinge to the drainage still but overall improved. Denies any severe abdominal pain, N/V, change in bowel movements, fevers, chills. Is continuing to smoke about 1/2 PPD.  States she has an upcoming appointment with Dr. Conroy on 01/29/2024.      02/05/24: Pt presents for f/u abdominal wound. Has switched from Dakin soaked packing to silver gel coated plain packing and has noticed a decrease in the amount of green drainage. Has been consistently forgetting to put zinc around the periwound which is contributing to some irritation of surrounding skin. Saw Dr. Conroy, general surgery since prior visit whom did not feel as though any further medication/imaging/intervention was necessary at this time and would like pt to continue with local wound care and follow up with him in 3 months. Pt denies fevers, chills, N/V, change in bowel movements or abdominal pain. Continues to smoke.     02/19/24: Pt presents for f/u abdominal wound. Currently silver gel coated packing is being utilized. There is minimal to no green drainage on the packing. Has been consistent with use of zinc to the periwound. Overall no significant changes since prior visit.     03/04/24: Pt presents for f/u abdominal wound s/p hysterectomy complicated by fascial dehiscence and abdominal herniation requiring incisional hernia repair with mesh repair. Since previous visit she has noticed a return of bright green drainage approximately two days ago. Is currently using silver gel coated packing and zinc to periwound. Is feeling better overall. Denies  abdominal pain, N/V, fevers, chills, change in bowel movements. Plans to start semiglutide injections to assist with weight reduction.           The following portions of the patient's history were reviewed and updated as appropriate:   Patient Active Problem List   Diagnosis    Tobacco use disorder    COVID-19    Obesity, morbid, BMI 50 or higher (HCC)    Bulky or enlarged uterus    Pelvic pain    Preoperative exam for gynecologic surgery    HTN (hypertension)    Gastroesophageal reflux disease    Asthma    Obstructive sleep apnea syndrome    S/P hysterectomy    Postoperative anemia    Rheumatoid arthritis involving multiple sites with positive rheumatoid factor (HCC)    Encounter for postoperative care    Open wound of abdomen    Surgical wound, non healing    Cigarette nicotine dependence without complication     Past Medical History:   Diagnosis Date    Abdominal wall abscess at site of surgical wound 2023    Abnormal uterine bleeding due to intramural leiomyoma 2022    Arthritis     Rheumatoid    Asthma     Breathing difficulty     only occured during inverted robotic hysterectomy    Cellulitis of drainage site following surgery 2023    CPAP (continuous positive airway pressure) dependence     GERD (gastroesophageal reflux disease)     Hypertension     Obese     Pneumonia 2007    Sleep apnea     Uterine fibroid     LTH today 12/15/2022    Wound dehiscence, surgical 2022     Past Surgical History:   Procedure Laterality Date    ABDOMINAL WASHOUT      post  with wound vac     SECTION      had hematoma removed after the surgery    FOREARM SURGERY Right     repair of fracture with plating    INCISIONAL HERNIA REPAIR  2022    Procedure: REPAIR  RECURRENT HERNIA INCISIONAL WITH MESH;  Surgeon: Donna Lau MD;  Location: [facility];  Service: Gynecology    IR DRAINAGE TUBE CHECK WITH SCLEROSIS  2/3/2023    IR DRAINAGE TUBE CHECK WITH SCLEROSIS  3/7/2023    IR  DRAINAGE TUBE CHECK/CHANGE/REPOSITION/REINSERTION/UPSIZE  1/23/2023    IR DRAINAGE TUBE CHECK/CHANGE/REPOSITION/REINSERTION/UPSIZE  1/27/2023    IR DRAINAGE TUBE CHECK/CHANGE/REPOSITION/REINSERTION/UPSIZE  2/13/2023    IR DRAINAGE TUBE CHECK/CHANGE/REPOSITION/REINSERTION/UPSIZE  2/27/2023    IR DRAINAGE TUBE PLACEMENT  1/9/2023    LAPAROTOMY N/A 12/20/2022    Procedure: LAPAROTOMY EXPLORATORY;  Surgeon: Donna Lau MD;  Location: [facility];  Service: Gynecology    MYRINGOTOMY W/ TUBES      two sets as a young child    WI CYSTOURETHROSCOPY N/A 12/15/2022    Procedure: CYSTO W/ ROBOT;  Surgeon: Donna Lau MD;  Location: [facility];  Service: Gynecology    WI LAPS TOTAL HYSTERECT 250 GM/< W/RMVL TUBE/OVARY N/A 12/15/2022    Procedure: (LTH) W/ BILATERAL SALPINGECTOMY  W/ ROBOT COVERTED TO OPEN;  Surgeon: Donna Lau MD;  Location: [facility];  Service: Gynecology    VAC DRESSING APPLICATION N/A 3/17/2023    Procedure: APPLICATION VAC DRESSING ABDOMEN/TRUNK;  Surgeon: Chan Conroy MD;  Location: [facility];  Service: General    WOUND DEBRIDEMENT N/A 3/15/2023    Procedure: DEBRIDEMENT Abdominal WOUND and placement of Wound VAC;  Surgeon: Chan Conroy MD;  Location: [facility];  Service: General    WOUND DEBRIDEMENT N/A 3/17/2023    Procedure: DEBRIDEMENT WOUND (WASH OUT);  Surgeon: Chan Conroy MD;  Location: [facility];  Service: General     Family History   Problem Relation Age of Onset    Multiple sclerosis Mother     Hypertension Father     Hyperlipidemia Father     Cerebral aneurysm Father     Pulmonary embolism Father     Liver disease Brother      Social History     Socioeconomic History    Marital status:      Spouse name: None    Number of children: None    Years of education: None    Highest education level: None   Occupational History    None   Tobacco Use    Smoking status: Every Day     Current packs/day: 0.50     Average packs/day: 0.5 packs/day for 30.2  years (15.1 ttl pk-yrs)     Types: Cigarettes     Start date: 1995    Smokeless tobacco: Never   Vaping Use    Vaping status: Never Used   Substance and Sexual Activity    Alcohol use: Not Currently     Alcohol/week: 1.0 standard drink of alcohol     Types: 1 Standard drinks or equivalent per week     Comment: 3 x monthly    Drug use: Never    Sexual activity: Yes     Partners: Male     Birth control/protection: Male Sterilization   Other Topics Concern    None   Social History Narrative    None     Social Determinants of Health     Financial Resource Strain: Not on file   Food Insecurity: No Food Insecurity (3/16/2023)    Hunger Vital Sign     Worried About Running Out of Food in the Last Year: Never true     Ran Out of Food in the Last Year: Never true   Transportation Needs: No Transportation Needs (3/16/2023)    PRAPARE - Transportation     Lack of Transportation (Medical): No     Lack of Transportation (Non-Medical): No   Physical Activity: Not on file   Stress: Not on file   Social Connections: Not on file   Intimate Partner Violence: Not on file   Housing Stability: Low Risk  (3/16/2023)    Housing Stability Vital Sign     Unable to Pay for Housing in the Last Year: No     Number of Places Lived in the Last Year: 1     Unstable Housing in the Last Year: No       Current Outpatient Medications:     predniSONE 10 mg tablet, Take by mouth 2 (two) times a day with meals For 10 days, Disp: , Rfl:     albuterol (2.5 mg/3 mL) 0.083 % nebulizer solution, Take 2.5 mg by nebulization every 4 (four) hours as needed for shortness of breath or wheezing (Patient not taking: Reported on 3/7/2024), Disp: , Rfl:     albuterol (ProAir HFA) 90 mcg/act inhaler, Inhale 2 puffs every 6 (six) hours as needed for wheezing or shortness of breath, Disp: 8.5 g, Rfl: 0    albuterol (PROVENTIL HFA,VENTOLIN HFA) 90 mcg/act inhaler, Inhale 2 puffs every 6 (six) hours as needed for wheezing, Disp: 8 g, Rfl: 2    amoxicillin (AMOXIL) 500 mg  capsule, , Disp: , Rfl:     Arthritis Pain Relief 650 MG CR tablet, take 1 tablet by mouth every 8 hours if needed for mild pain (Patient not taking: Reported on 3/7/2024), Disp: , Rfl:     aspirin 325 mg tablet, Take 325 mg by mouth daily. Indications: as needed (Patient not taking: Reported on 3/7/2024), Disp: , Rfl:     azithromycin (ZITHROMAX) 250 mg tablet, Take 2 tablets today then 1 tablet daily x 4 days, Disp: 6 tablet, Rfl: 0    chlorhexidine (HIBICLENS) 4 % external liquid, Apply 1 Application topically daily as needed for wound care Can use to clean wound during VAC changes. (Patient not taking: Reported on 1/29/2024), Disp: 473 mL, Rfl: 5    clonazePAM (KlonoPIN) 0.5 mg tablet, Take 0.5 mg by mouth 2 (two) times a day as needed (Patient not taking: Reported on 1/29/2024), Disp: , Rfl:     cyclobenzaprine (FLEXERIL) 5 mg tablet, Take 1 tablet (5 mg total) by mouth 3 (three) times a day as needed for muscle spasms (Patient taking differently: Take 5 mg by mouth 3 (three) times a day as needed for muscle spasms As needed), Disp: 60 tablet, Rfl: 0    dulaglutide (Trulicity) 0.75 MG/0.5ML injection, Inject 0.75 mg under the skin Once a week (Patient not taking: Reported on 1/29/2024), Disp: , Rfl:     escitalopram (LEXAPRO) 10 mg tablet, Take 10 mg by mouth daily (Patient not taking: Reported on 3/7/2024), Disp: , Rfl:     escitalopram (LEXAPRO) 5 mg tablet, Take 5 mg by mouth daily (Patient not taking: Reported on 3/7/2024), Disp: , Rfl:     fluticasone (FLONASE) 50 mcg/act nasal spray, 2 sprays into each nostril daily, Disp: 16 g, Rfl: 4    Fluticasone-Salmeterol (Advair) 250-50 mcg/dose inhaler, , Disp: , Rfl:     Fluticasone-Salmeterol (Advair) 500-50 mcg/dose inhaler, Inhale 1 puff 2 (two) times a day, Disp: , Rfl:     folic acid (FOLVITE) 1 mg tablet, Take by mouth daily, Disp: , Rfl:     gabapentin (NEURONTIN) 100 mg capsule, Take 1 capsule (100 mg total) by mouth 3 (three) times a day (Patient not  taking: Reported on 3/7/2024), Disp: 90 capsule, Rfl: 0    gabapentin (NEURONTIN) 300 mg capsule, Take 300 mg by mouth 3 (three) times a day, Disp: , Rfl:     hydrochlorothiazide (HYDRODIURIL) 12.5 mg tablet, Take 12.5 mg by mouth daily Pt only  taking as needed for leg swelling (Patient not taking: Reported on 3/7/2024), Disp: , Rfl:     ibuprofen (MOTRIN) 600 mg tablet, Take 600 mg by mouth every 6 (six) hours as needed for moderate pain As needed, Disp: , Rfl:     methocarbamol (ROBAXIN) 500 mg tablet, Take 1 tablet (500 mg total) by mouth every 6 (six) hours for 14 days (Patient taking differently: Take 500 mg by mouth every 6 (six) hours As needed), Disp: 56 tablet, Rfl: 0    methotrexate 2.5 mg tablet, Four 2.5 tabs one time a week ( Every Saturday), Disp: , Rfl:     montelukast (SINGULAIR) 10 mg tablet, Take by mouth daily at bedtime as needed, Disp: , Rfl:     naloxone (NARCAN) 4 mg/0.1 mL nasal spray, Administer 1 spray into a nostril. If no response after 2-3 minutes, give another dose in the other nostril using a new spray. (Patient not taking: Reported on 4/14/2023), Disp: 1 each, Rfl: 1    neomycin-polymyxin-hydrocortisone (CORTISPORIN) otic solution, Administer 3 drops into the left ear every 6 (six) hours (Patient not taking: Reported on 1/29/2024), Disp: , Rfl:     nicotine (NICODERM CQ) 14 mg/24hr TD 24 hr patch, Place 1 patch on the skin every 24 hours (Patient not taking: Reported on 6/2/2023), Disp: 28 patch, Rfl: 3    omeprazole (PriLOSEC) 40 MG capsule, Take 40 mg by mouth daily, Disp: , Rfl:     predniSONE 10 mg tablet, Take 5 tabs po x 2 days; 4 tabs po x 2 days; 3 tabs po x 1 day; 2 tabs po x 1 day. 1 tab po x 1 day. (Patient not taking: Reported on 1/29/2024), Disp: 24 tablet, Rfl: 0    predniSONE 10 mg tablet, Take 5 tabs po x 2 days; 4 tabs po x 2 days; 3 tabs po x 1 day; 2 tabs po x 1 day. 1 tab po x 1 day., Disp: 24 tablet, Rfl: 0    semaglutide, 0.25 or 0.5 mg/dose, (Ozempic) 2 mg/3 mL  injection pen, Inject 0.5 mg under the skin Once a week (Patient not taking: Reported on 1/29/2024), Disp: , Rfl:     sodium chloride, PF, 0.9 %, Inject 10 mL into a catheter in a vein 3 (three) times a week Wound irrigation. (Patient not taking: Reported on 1/29/2024), Disp: 100 mL, Rfl: 10    Vitamin D, Ergocalciferol, 33861 units CAPS, Take by mouth once a week, Disp: , Rfl:     Review of Systems   Constitutional:  Negative for chills and fever.   Gastrointestinal:  Negative for abdominal pain, constipation, diarrhea, nausea and vomiting.   Skin:  Positive for rash (periwound) and wound (abdomen). Negative for color change.   Psychiatric/Behavioral:  Negative for agitation and confusion.          Objective:  /70   Pulse 96   Temp 97.6 °F (36.4 °C)   Resp 20   LMP 11/23/2022 (Exact Date) Comment: partial  Pain Score: 0-No pain     Physical Exam  Vitals reviewed.   Constitutional:       Appearance: She is morbidly obese.   Pulmonary:      Effort: Pulmonary effort is normal. No respiratory distress.   Abdominal:          Comments: Open wound of abdomen. Small diameter opening remaining. Is measuring approximately the same in size. Continues to have a tunnel at 7 o'clock. Drainage appears bright green but is small-moderate. No malodor appreciated. Periwound with scar tissue and minor irritation but no diffuse erythema or swelling to suggest soft tissue infection. See full wound assessment         Skin:     Findings: Wound present.   Neurological:      Mental Status: She is alert.   Psychiatric:         Mood and Affect: Mood normal.         Wound 07/10/23 Abdomen (Active)   Wound Image   03/04/24 0818   Wound Description Pink;Pale;White 03/04/24 0822   Alexa-wound Assessment Pink 03/04/24 0822   Wound Length (cm) 0.7 cm 03/04/24 0822   Wound Width (cm) 0.3 cm 03/04/24 0822   Wound Depth (cm) 1.4 cm 03/04/24 0822   Wound Surface Area (cm^2) 0.21 cm^2 03/04/24 0822   Wound Volume (cm^3) 0.294 cm^3 03/04/24 0822  "  Calculated Wound Volume (cm^3) 0.29 cm^3 03/04/24 0822   Change in Wound Size % 99.32 03/04/24 0822   Tunneling 1 2 cm 03/04/24 0822   Tunneling 1 in depth located at 7 o'clock 03/04/24 0822   Number of underminings 1 02/05/24 0821   Undermining 1 2.5 02/05/24 0821   Undermining 1 is depth extending from 6-7 02/05/24 0821   Drainage Amount Moderate 03/04/24 0822   Drainage Description Yellow;Green 03/04/24 0822   Non-staged Wound Description Full thickness 03/04/24 0822   Treatments Cleansed 03/04/24 0822   Dressing Wound V.A.C. 07/31/23 0859   Wound packed? Yes 01/22/24 0823   Packing- # removed 1 01/22/24 0823   Packing- # inserted 2 07/24/23 0832   Dressing Changed New 07/24/23 0832   Patient Tolerance Tolerated well 03/04/24 0822   Dressing Status Intact 03/04/24 0822              Debridement   Wound 07/10/23 Abdomen    Universal Protocol:  Consent: Verbal consent obtained.  Consent given by: patient  Time out: Immediately prior to procedure a \"time out\" was called to verify the correct patient, procedure, equipment, support staff and site/side marked as required.  Patient understanding: patient states understanding of the procedure being performed  Patient identity confirmed: verbally with patient    Debridement Details  Performed by: PA  Debridement type: selective  Pain control: lidocaine 4%      Post-debridement measurements  Length (cm): 0.7  Width (cm): 0.3  Depth (cm): 1.4  Percent debrided: 30%  Surface Area (cm^2): 0.21  Area Debrided (cm^2): 0.06  Volume (cm^3): 0.29    Devitalized tissue debrided: fibrin  Instrument(s) utilized: curette  Bleeding: small  Hemostasis obtained with: pressure  Procedural pain (0-10): 0  Post-procedural pain: 0   Response to treatment: procedure was tolerated well          Results from last 6 Months   Lab Units 12/18/23  1030   WOUND CULTURE  3+ Growth of Pasteurella multocida*  2+ Growth of Escherichia coli*  3+ Growth of           Wound Instructions:  Orders Placed " "This Encounter   Procedures    Wound cleansing and dressings Abdomen     Abdomen Wound     Remove abdominal dressing, wash with soap and water (Dove for sensitive skin), pat dry prior to applying new dressing      May apply Zinc Oxide to surrounding skin with each dressing change     Apply Dakins soaked plain packing  (1/4 inch plain packing) then loosely pack the wound. Then cover with dry dressing and tape. Change dressing daily and as needed for leakage.    May change back to the Silver Gel soaked Plain packing if the drainage is not green for 3 days     Treatments completed as above today at the Wound Center     Please call the Wound Healing Center Monday through Friday between the hours of 8:00 AM and 4:30 PM at [phone] if you have any questions, if you experience any major changes in your wound(s) or for any signs or symptoms of infection such as fever; changes in the redness, swelling, drainage, or odor of your wound. After hours, weekends or holidays please contact your primary care physician or go to the hospital emergency room.     Follow up in 1 week with Dr. Vines     Standing Status:   Future     Standing Expiration Date:   3/4/2025    Debridement Abdomen     This order was created via procedure documentation       Cally Garcia, PA-C      Portions of the record may have been created with voice recognition software. Occasional wrong word or \"sound alike\" substitutions may have occurred due to the inherent limitations of voice recognition software. Read the chart carefully and recognize, using context, where substitutions have occurred.    "

## 2024-03-04 NOTE — PATIENT INSTRUCTIONS
Orders Placed This Encounter   Procedures    Wound cleansing and dressings Abdomen     Abdomen Wound     Remove abdominal dressing, wash with soap and water (Dove for sensitive skin), pat dry prior to applying new dressing      May apply Zinc Oxide to surrounding skin with each dressing change     Apply Dakins soaked plain packing  (1/4 inch plain packing) then loosely pack the wound. Then cover with dry dressing and tape. Change dressing daily and as needed for leakage.    May change back to the Silver Gel soaked Plain packing if the drainage is not green for 3 days     Treatments completed as above today at the Wound Center     Please call the Wound Healing Center Monday through Friday between the hours of 8:00 AM and 4:30 PM at [phone] if you have any questions, if you experience any major changes in your wound(s) or for any signs or symptoms of infection such as fever; changes in the redness, swelling, drainage, or odor of your wound. After hours, weekends or holidays please contact your primary care physician or go to the hospital emergency room.     Follow up in 1 week with Dr. Vines     Standing Status:   Future     Standing Expiration Date:   3/4/2025

## 2024-03-07 ENCOUNTER — OFFICE VISIT (OUTPATIENT)
Dept: URGENT CARE | Facility: CLINIC | Age: 44
End: 2024-03-07
Payer: COMMERCIAL

## 2024-03-07 ENCOUNTER — APPOINTMENT (OUTPATIENT)
Dept: RADIOLOGY | Facility: CLINIC | Age: 44
End: 2024-03-07
Payer: COMMERCIAL

## 2024-03-07 VITALS
HEART RATE: 88 BPM | OXYGEN SATURATION: 95 % | SYSTOLIC BLOOD PRESSURE: 131 MMHG | RESPIRATION RATE: 22 BRPM | DIASTOLIC BLOOD PRESSURE: 88 MMHG | TEMPERATURE: 97.9 F

## 2024-03-07 DIAGNOSIS — Z87.09 HISTORY OF ASTHMA: ICD-10-CM

## 2024-03-07 DIAGNOSIS — J22 ACUTE RESPIRATORY INFECTION: ICD-10-CM

## 2024-03-07 DIAGNOSIS — R06.02 SHORTNESS OF BREATH: ICD-10-CM

## 2024-03-07 DIAGNOSIS — R06.2 WHEEZING: ICD-10-CM

## 2024-03-07 DIAGNOSIS — R06.02 SHORTNESS OF BREATH: Primary | ICD-10-CM

## 2024-03-07 PROCEDURE — 71046 X-RAY EXAM CHEST 2 VIEWS: CPT

## 2024-03-07 PROCEDURE — 99214 OFFICE O/P EST MOD 30 MIN: CPT | Performed by: NURSE PRACTITIONER

## 2024-03-07 RX ORDER — PREDNISONE 10 MG/1
TABLET ORAL
Qty: 24 TABLET | Refills: 0 | Status: SHIPPED | OUTPATIENT
Start: 2024-03-07

## 2024-03-07 RX ORDER — AZITHROMYCIN 250 MG/1
TABLET, FILM COATED ORAL
Qty: 6 TABLET | Refills: 0 | Status: SHIPPED | OUTPATIENT
Start: 2024-03-07 | End: 2024-03-11

## 2024-03-07 NOTE — PROGRESS NOTES
"=  [facility]        NAME: Maria R Webb is a 43 y.o. female  : 1980    MRN: [id number]  DATE: 2024  TIME: 10:31 AM    Assessment and Plan   Shortness of breath [R06.02]  1. Shortness of breath  XR chest pa & lateral    azithromycin (ZITHROMAX) 250 mg tablet    predniSONE 10 mg tablet      2. Acute respiratory infection  azithromycin (ZITHROMAX) 250 mg tablet    predniSONE 10 mg tablet      3. Wheezing  azithromycin (ZITHROMAX) 250 mg tablet    predniSONE 10 mg tablet      4. History of asthma  azithromycin (ZITHROMAX) 250 mg tablet    predniSONE 10 mg tablet            Patient Instructions       Follow up with PCP in 3-5 days.  Proceed to  ER if symptoms worsen.    If tests have been performed at [facility], our office will contact you with results if changes need to be made to the care plan discussed with you at the visit.  [facility] on [facility]      You have been prescribed prednisone for wheezing.  You have been prescribed prednisone. Take with food. Do NOT take any NSAID products (motrin, ibuprofen, aleve, advil) while taking this medication.  You may take tylenol.   You have been prescribed azithromycin.   You are to use your inhalers you stated you have at home.  Drink plenty of water  Follow up with your PCP in 3-5 days  Go to the ED if symptoms worsen     Chief Complaint     Chief Complaint   Patient presents with    Cold Like Symptoms    Cough     Chest congestion           History of Present Illness       This is a 43 year old female who has had a head cold that started last Wednesday. Has had \"rough breathing problems\". Friday felt better.  Now with loss of voice, congestion, cold like symptoms.  Yesterday woke up with trouble breathing, + wheezing.  + asthma, using inhalers.  Denies fevers chills, n/v/d.  + hot/cold.   Tested for covid last Friday night and it was negative.  Denies pregnancy.   PMH is listed  Pt states she has RA and this also " limits the ability of her breathing.    Daughter is ill with similar.     Cough  Associated symptoms include shortness of breath and wheezing.       Review of Systems   Review of Systems   Constitutional: Negative.    HENT:  Positive for congestion.    Eyes: Negative.    Respiratory:  Positive for cough, chest tightness, shortness of breath and wheezing.    Cardiovascular: Negative.    Gastrointestinal: Negative.    Endocrine: Negative.    Genitourinary: Negative.    Musculoskeletal: Negative.    Skin: Negative.    Allergic/Immunologic: Negative.    Neurological: Negative.    Hematological: Negative.    Psychiatric/Behavioral: Negative.           Current Medications       Current Outpatient Medications:     albuterol (ProAir HFA) 90 mcg/act inhaler, Inhale 2 puffs every 6 (six) hours as needed for wheezing or shortness of breath, Disp: 8.5 g, Rfl: 0    albuterol (PROVENTIL HFA,VENTOLIN HFA) 90 mcg/act inhaler, Inhale 2 puffs every 6 (six) hours as needed for wheezing, Disp: 8 g, Rfl: 2    azithromycin (ZITHROMAX) 250 mg tablet, Take 2 tablets today then 1 tablet daily x 4 days, Disp: 6 tablet, Rfl: 0    folic acid (FOLVITE) 1 mg tablet, Take by mouth daily, Disp: , Rfl:     gabapentin (NEURONTIN) 300 mg capsule, Take 300 mg by mouth 3 (three) times a day, Disp: , Rfl:     ibuprofen (MOTRIN) 600 mg tablet, Take 600 mg by mouth every 6 (six) hours as needed for moderate pain As needed, Disp: , Rfl:     methotrexate 2.5 mg tablet, Four 2.5 tabs one time a week ( Every Saturday), Disp: , Rfl:     montelukast (SINGULAIR) 10 mg tablet, Take by mouth daily at bedtime as needed, Disp: , Rfl:     omeprazole (PriLOSEC) 40 MG capsule, Take 40 mg by mouth daily, Disp: , Rfl:     predniSONE 10 mg tablet, Take by mouth 2 (two) times a day with meals For 10 days, Disp: , Rfl:     predniSONE 10 mg tablet, Take 5 tabs po x 2 days; 4 tabs po x 2 days; 3 tabs po x 1 day; 2 tabs po x 1 day. 1 tab po x 1 day., Disp: 24 tablet, Rfl: 0     Vitamin D, Ergocalciferol, 22748 units CAPS, Take by mouth once a week, Disp: , Rfl:     albuterol (2.5 mg/3 mL) 0.083 % nebulizer solution, Take 2.5 mg by nebulization every 4 (four) hours as needed for shortness of breath or wheezing (Patient not taking: Reported on 3/7/2024), Disp: , Rfl:     amoxicillin (AMOXIL) 500 mg capsule, , Disp: , Rfl:     Arthritis Pain Relief 650 MG CR tablet, take 1 tablet by mouth every 8 hours if needed for mild pain (Patient not taking: Reported on 3/7/2024), Disp: , Rfl:     aspirin 325 mg tablet, Take 325 mg by mouth daily. Indications: as needed (Patient not taking: Reported on 3/7/2024), Disp: , Rfl:     chlorhexidine (HIBICLENS) 4 % external liquid, Apply 1 Application topically daily as needed for wound care Can use to clean wound during VAC changes. (Patient not taking: Reported on 1/29/2024), Disp: 473 mL, Rfl: 5    clonazePAM (KlonoPIN) 0.5 mg tablet, Take 0.5 mg by mouth 2 (two) times a day as needed (Patient not taking: Reported on 1/29/2024), Disp: , Rfl:     cyclobenzaprine (FLEXERIL) 5 mg tablet, Take 1 tablet (5 mg total) by mouth 3 (three) times a day as needed for muscle spasms (Patient taking differently: Take 5 mg by mouth 3 (three) times a day as needed for muscle spasms As needed), Disp: 60 tablet, Rfl: 0    dulaglutide (Trulicity) 0.75 MG/0.5ML injection, Inject 0.75 mg under the skin Once a week (Patient not taking: Reported on 1/29/2024), Disp: , Rfl:     escitalopram (LEXAPRO) 10 mg tablet, Take 10 mg by mouth daily (Patient not taking: Reported on 3/7/2024), Disp: , Rfl:     escitalopram (LEXAPRO) 5 mg tablet, Take 5 mg by mouth daily (Patient not taking: Reported on 3/7/2024), Disp: , Rfl:     fluticasone (FLONASE) 50 mcg/act nasal spray, 2 sprays into each nostril daily, Disp: 16 g, Rfl: 4    Fluticasone-Salmeterol (Advair) 250-50 mcg/dose inhaler, , Disp: , Rfl:     Fluticasone-Salmeterol (Advair) 500-50 mcg/dose inhaler, Inhale 1 puff 2 (two) times a  day, Disp: , Rfl:     gabapentin (NEURONTIN) 100 mg capsule, Take 1 capsule (100 mg total) by mouth 3 (three) times a day (Patient not taking: Reported on 3/7/2024), Disp: 90 capsule, Rfl: 0    hydrochlorothiazide (HYDRODIURIL) 12.5 mg tablet, Take 12.5 mg by mouth daily Pt only  taking as needed for leg swelling (Patient not taking: Reported on 3/7/2024), Disp: , Rfl:     methocarbamol (ROBAXIN) 500 mg tablet, Take 1 tablet (500 mg total) by mouth every 6 (six) hours for 14 days (Patient taking differently: Take 500 mg by mouth every 6 (six) hours As needed), Disp: 56 tablet, Rfl: 0    naloxone (NARCAN) 4 mg/0.1 mL nasal spray, Administer 1 spray into a nostril. If no response after 2-3 minutes, give another dose in the other nostril using a new spray. (Patient not taking: Reported on 4/14/2023), Disp: 1 each, Rfl: 1    neomycin-polymyxin-hydrocortisone (CORTISPORIN) otic solution, Administer 3 drops into the left ear every 6 (six) hours (Patient not taking: Reported on 1/29/2024), Disp: , Rfl:     nicotine (NICODERM CQ) 14 mg/24hr TD 24 hr patch, Place 1 patch on the skin every 24 hours (Patient not taking: Reported on 6/2/2023), Disp: 28 patch, Rfl: 3    predniSONE 10 mg tablet, Take 5 tabs po x 2 days; 4 tabs po x 2 days; 3 tabs po x 1 day; 2 tabs po x 1 day. 1 tab po x 1 day. (Patient not taking: Reported on 1/29/2024), Disp: 24 tablet, Rfl: 0    semaglutide, 0.25 or 0.5 mg/dose, (Ozempic) 2 mg/3 mL injection pen, Inject 0.5 mg under the skin Once a week (Patient not taking: Reported on 1/29/2024), Disp: , Rfl:     sodium chloride, PF, 0.9 %, Inject 10 mL into a catheter in a vein 3 (three) times a week Wound irrigation. (Patient not taking: Reported on 1/29/2024), Disp: 100 mL, Rfl: 10    Current Allergies     Allergies as of 03/07/2024    (No Known Allergies)            The following portions of the patient's history were reviewed and updated as appropriate: allergies, current medications, past family  history, past medical history, past social history, past surgical history and problem list.     Past Medical History:   Diagnosis Date    Abdominal wall abscess at site of surgical wound 2023    Abnormal uterine bleeding due to intramural leiomyoma 2022    Arthritis     Rheumatoid    Asthma     Breathing difficulty     only occured during inverted robotic hysterectomy    Cellulitis of drainage site following surgery 2023    CPAP (continuous positive airway pressure) dependence     GERD (gastroesophageal reflux disease)     Hypertension     Obese     Pneumonia 2007    Sleep apnea     Uterine fibroid     LTH today 12/15/2022    Wound dehiscence, surgical 2022       Past Surgical History:   Procedure Laterality Date    ABDOMINAL WASHOUT      post  with wound vac     SECTION      had hematoma removed after the surgery    FOREARM SURGERY Right     repair of fracture with plating    INCISIONAL HERNIA REPAIR  2022    Procedure: REPAIR  RECURRENT HERNIA INCISIONAL WITH MESH;  Surgeon: Donna Lau MD;  Location: [facility];  Service: Gynecology    IR DRAINAGE TUBE CHECK WITH SCLEROSIS  2/3/2023    IR DRAINAGE TUBE CHECK WITH SCLEROSIS  3/7/2023    IR DRAINAGE TUBE CHECK/CHANGE/REPOSITION/REINSERTION/UPSIZE  2023    IR DRAINAGE TUBE CHECK/CHANGE/REPOSITION/REINSERTION/UPSIZE  2023    IR DRAINAGE TUBE CHECK/CHANGE/REPOSITION/REINSERTION/UPSIZE  2023    IR DRAINAGE TUBE CHECK/CHANGE/REPOSITION/REINSERTION/UPSIZE  2023    IR DRAINAGE TUBE PLACEMENT  2023    LAPAROTOMY N/A 2022    Procedure: LAPAROTOMY EXPLORATORY;  Surgeon: Donna Lau MD;  Location: [facility];  Service: Gynecology    MYRINGOTOMY W/ TUBES      two sets as a young child    RI CYSTOURETHROSCOPY N/A 12/15/2022    Procedure: CYSTO W/ ROBOT;  Surgeon: Donna Lau MD;  [facility]: [facility];  Service: Gynecology    RI LAPS TOTAL HYSTERECT 250 GM/< W/RMVL TUBE/OVARY N/A  12/15/2022    Procedure: (LTH) W/ BILATERAL SALPINGECTOMY  W/ ROBOT COVERTED TO OPEN;  Surgeon: Donna Lau MD;  Location: [facility];  Service: Gynecology    VAC DRESSING APPLICATION N/A 3/17/2023    Procedure: APPLICATION VAC DRESSING ABDOMEN/TRUNK;  Surgeon: Chan Conroy MD;  Location: [facility];  Service: General    WOUND DEBRIDEMENT N/A 3/15/2023    Procedure: DEBRIDEMENT Abdominal WOUND and placement of Wound VAC;  Surgeon: Chan Conroy MD;  Location: [facility];  Service: General    WOUND DEBRIDEMENT N/A 3/17/2023    Procedure: DEBRIDEMENT WOUND (WASH OUT);  Surgeon: Chan Conroy MD;  Location: [facility];  Service: General       Family History   Problem Relation Age of Onset    Multiple sclerosis Mother     Hypertension Father     Hyperlipidemia Father     Cerebral aneurysm Father     Pulmonary embolism Father     Liver disease Brother          Medications have been verified.        Objective   /88   Pulse 88   Temp 97.9 °F (36.6 °C)   Resp 22   LMP 11/23/2022 (Exact Date) Comment: partial  SpO2 95%   Patient's last menstrual period was 11/23/2022 (exact date).       Physical Exam     Physical Exam  Vitals and nursing note reviewed.   Constitutional:       General: She is not in acute distress.     Appearance: Normal appearance. She is obese. She is not ill-appearing, toxic-appearing or diaphoretic.   HENT:      Head: Normocephalic and atraumatic.      Right Ear: Tympanic membrane and ear canal normal.      Left Ear: Tympanic membrane and ear canal normal.      Nose: Congestion present. No rhinorrhea.      Mouth/Throat:      Mouth: Mucous membranes are moist.      Pharynx: No oropharyngeal exudate or posterior oropharyngeal erythema.   Eyes:      Extraocular Movements: Extraocular movements intact.   Cardiovascular:      Rate and Rhythm: Normal rate and regular rhythm.      Pulses: Normal pulses.      Heart sounds: Normal heart sounds. No murmur heard.  Pulmonary:       Effort: Pulmonary effort is normal. Tachypnea present.      Breath sounds: Normal air entry. Wheezing present.      Comments: Slight tachypnea noted with prolonged speaking.   + wheezing noted RUL   Musculoskeletal:         General: Normal range of motion.      Cervical back: Normal range of motion and neck supple.   Skin:     General: Skin is warm and dry.      Capillary Refill: Capillary refill takes less than 2 seconds.   Neurological:      General: No focal deficit present.      Mental Status: She is alert and oriented to person, place, and time.   Psychiatric:         Mood and Affect: Mood normal.         Behavior: Behavior normal.         Thought Content: Thought content normal.         Judgment: Judgment normal.         Preliminary reading CXR  Bronchial enlargement ? Bronchitis   Waiting on rad read

## 2024-03-07 NOTE — LETTER
March 7, 2024     Patient: Maria R Webb   YOB: 1980   Date of Visit: 3/7/2024       To Whom It May Concern:    It is my medical opinion that Maria R Webb may return to work on 3/11/2024 .    If you have any questions or concerns, please don't hesitate to call.         Sincerely,        GOPI Gaston    CC: No Recipients

## 2024-03-07 NOTE — PATIENT INSTRUCTIONS
You have been prescribed prednisone for wheezing.  You have been prescribed prednisone. Take with food. Do NOT take any NSAID products (motrin, ibuprofen, aleve, advil) while taking this medication.  You may take tylenol.   You have been prescribed azithromycin.   You are to use your inhalers you stated you have at home.  Drink plenty of water  Follow up with your PCP in 3-5 days  Go to the ED if symptoms worsen   If tests have been performed at [facility], our office will contact you with results if changes need to be made to the care plan discussed with you at the visit.  You can review your full results on [facility] MyChart.

## 2024-03-11 ENCOUNTER — OFFICE VISIT (OUTPATIENT)
Dept: WOUND CARE | Facility: CLINIC | Age: 44
End: 2024-03-11
Payer: COMMERCIAL

## 2024-03-11 VITALS
HEART RATE: 92 BPM | TEMPERATURE: 96.9 F | SYSTOLIC BLOOD PRESSURE: 142 MMHG | RESPIRATION RATE: 20 BRPM | DIASTOLIC BLOOD PRESSURE: 84 MMHG

## 2024-03-11 DIAGNOSIS — F17.200 TOBACCO USE DISORDER: ICD-10-CM

## 2024-03-11 DIAGNOSIS — Z98.890 S/P HERNIA REPAIR: ICD-10-CM

## 2024-03-11 DIAGNOSIS — S31.109D OPEN WOUND OF ABDOMEN, SUBSEQUENT ENCOUNTER: Primary | ICD-10-CM

## 2024-03-11 DIAGNOSIS — T81.31XS SURGICAL WOUND DEHISCENCE, SEQUELA: ICD-10-CM

## 2024-03-11 DIAGNOSIS — Z87.19 S/P HERNIA REPAIR: ICD-10-CM

## 2024-03-11 DIAGNOSIS — Z90.710 S/P HYSTERECTOMY: ICD-10-CM

## 2024-03-11 PROCEDURE — 11042 DBRDMT SUBQ TIS 1ST 20SQCM/<: CPT | Performed by: FAMILY MEDICINE

## 2024-03-11 NOTE — PATIENT INSTRUCTIONS
Orders Placed This Encounter   Procedures    Wound cleansing and dressings Abdomen     Abdomen Wound      Remove abdominal dressing, wash with soap and water (Dove for sensitive skin), pat dry prior to applying new dressing      May apply Zinc Oxide to surrounding skin with each dressing change     Apply Dakins soaked plain packing  (1/4 inch plain packing) then loosely pack the wound. Then cover with dry dressing and tape. Change dressing daily and as needed for leakage.        Treatments completed as above today at the Wound Center     Standing Status:   Future     Standing Expiration Date:   3/11/2025    Debridement     This order was created via procedure documentation

## 2024-03-11 NOTE — PROGRESS NOTES
"Patient ID: Maria R Webb is a 43 y.o. female Date of Birth 1980       Chief Complaint   Patient presents with   • Follow Up Wound Care Visit     Abdominal wound       Allergies:  Patient has no known allergies.    Diagnosis:      Diagnosis ICD-10-CM Associated Orders   1. Open wound of abdomen, subsequent encounter  S31.109D Wound cleansing and dressings Abdomen     Debridement      2. Surgical wound dehiscence, sequela  T81.31XS Wound cleansing and dressings Abdomen      3. S/P hysterectomy  Z90.710 Wound cleansing and dressings Abdomen      4. S/P hernia repair  Z98.890 Wound cleansing and dressings Abdomen    Z87.19       5. Tobacco use disorder  F17.200 Wound cleansing and dressings Abdomen                Assessment & Plan:  Follow-up surgical wound of abdomen s/p hysterectomy, s/p hernia repair: Small improvement in measurements, tunnel at 7:00 no longer appreciated.  Wound bed very fibrous with loose pieces of mesh.  No malodor, purulent drainage.  Periwound 10 scar tissue slightly pink no acute erythema.  Surgical debridement  As measurements improved, will continue Dakin's packing for this week and reevaluate at next visit  Tobacco use disorder: Continues to smoke, encouraged smoking cessation.  She expressed understanding  ED precautions reviewed with patient, she expressed understanding  Follow-up in 1 week or sooner if needed    Portions of the record may have been created with voice recognition software. Occasional wrong word or \"sound alike\" substitutions may have occurred due to the inherent limitations of voice recognition software. Read the chart carefully and recognize, using context, where substitutions have occurred.    Subjective:   3/11/2024: Maria R is a 43-year-old female who is presenting to wound center today for follow-up of abdominal wound s/p hysterectomy complicated by fascial dehiscence and abdominal herniation that required incisional hernia repair w/ mesh.  Has been following " "with wound center since January '23.  Per wound center visit on 2/5/2024 \"saw Dr. Conroy, general surgery since prior visit whom did not feel as though any further medication/imaging/intervention was necessary at this time and would like pt to continue with local wound care and follow up with him in 3 months.\" At last wound center visit, was changed to Dakins soaked packing after she was experiencing green drainage.  She was instructed to resume silver gel coated packing once the green drainage resolved.  She reports that she has been utilizing Dakin soaked packing over the past week and had not switched to green drainage though this seemed to resolve a few days ago. Of note, is to have recently started Ozempic for weight reduction.  Currently on Z-Dallin and prednisone for bronchitis.  She denies fever and/or chills.  Continues with cough and shortness of breath but no acute worsening.        The following portions of the patient's history were reviewed and updated as appropriate:   Patient Active Problem List   Diagnosis   • Tobacco use disorder   • COVID-19   • Obesity, morbid, BMI 50 or higher (HCC)   • Bulky or enlarged uterus   • Pelvic pain   • Preoperative exam for gynecologic surgery   • HTN (hypertension)   • Gastroesophageal reflux disease   • Asthma   • Obstructive sleep apnea syndrome   • S/P hysterectomy   • Postoperative anemia   • Rheumatoid arthritis involving multiple sites with positive rheumatoid factor (HCC)   • Encounter for postoperative care   • Open wound of abdomen   • Surgical wound, non healing   • Cigarette nicotine dependence without complication     Past Medical History:   Diagnosis Date   • Abdominal wall abscess at site of surgical wound 1/9/2023   • Abnormal uterine bleeding due to intramural leiomyoma 08/16/2022   • Arthritis     Rheumatoid   • Asthma    • Breathing difficulty     only occured during inverted robotic hysterectomy   • Cellulitis of drainage site following surgery " 2023   • CPAP (continuous positive airway pressure) dependence    • GERD (gastroesophageal reflux disease)    • Hypertension    • Obese    • Pneumonia    • Sleep apnea    • Uterine fibroid     LTH today 12/15/2022   • Wound dehiscence, surgical 2022     Past Surgical History:   Procedure Laterality Date   • ABDOMINAL WASHOUT      post  with wound vac   •  SECTION      had hematoma removed after the surgery   • FOREARM SURGERY Right     repair of fracture with plating   • INCISIONAL HERNIA REPAIR  2022    Procedure: REPAIR  RECURRENT HERNIA INCISIONAL WITH MESH;  Surgeon: Donna Lau MD;  Location: [facility];  Service: Gynecology   • IR DRAINAGE TUBE CHECK WITH SCLEROSIS  2/3/2023   • IR DRAINAGE TUBE CHECK WITH SCLEROSIS  3/7/2023   • IR DRAINAGE TUBE CHECK/CHANGE/REPOSITION/REINSERTION/UPSIZE  2023   • IR DRAINAGE TUBE CHECK/CHANGE/REPOSITION/REINSERTION/UPSIZE  2023   • IR DRAINAGE TUBE CHECK/CHANGE/REPOSITION/REINSERTION/UPSIZE  2023   • IR DRAINAGE TUBE CHECK/CHANGE/REPOSITION/REINSERTION/UPSIZE  2023   • IR DRAINAGE TUBE PLACEMENT  2023   • LAPAROTOMY N/A 2022    Procedure: LAPAROTOMY EXPLORATORY;  Surgeon: Donna Lau MD;  Location: [facility];  Service: Gynecology   • MYRINGOTOMY W/ TUBES      two sets as a young child   • OH CYSTOURETHROSCOPY N/A 12/15/2022    Procedure: CYSTO W/ ROBOT;  Surgeon: Donna Lau MD;  Location[facility];  Service: Gynecology   • OH LAPS TOTAL HYSTERECT 250 GM/< W/RMVL TUBE/OVARY N/A 12/15/2022    Procedure: (LTH) W/ BILATERAL SALPINGECTOMY  W/ ROBOT COVERTED TO OPEN;  Surgeon: Donna Lau MD;  Location: [facility];  Service: Gynecology   • VAC DRESSING APPLICATION N/A 3/17/2023    Procedure: APPLICATION VAC DRESSING ABDOMEN/TRUNK;  Surgeon: Chan Conroy MD;  Location: [facility];  Service: General   • WOUND DEBRIDEMENT N/A 3/15/2023    Procedure: DEBRIDEMENT Abdominal  WOUND and placement of Wound VAC;  Surgeon: Chan Conroy MD;  Location: [facility];  Service: General   • WOUND DEBRIDEMENT N/A 3/17/2023    Procedure: DEBRIDEMENT WOUND (WASH OUT);  Surgeon: Chan Conroy MD;  Location: [facility];  Service: General     Family History   Problem Relation Age of Onset   • Multiple sclerosis Mother    • Hypertension Father    • Hyperlipidemia Father    • Cerebral aneurysm Father    • Pulmonary embolism Father    • Liver disease Brother       Social History     Socioeconomic History   • Marital status:      Spouse name: None   • Number of children: None   • Years of education: None   • Highest education level: None   Occupational History   • None   Tobacco Use   • Smoking status: Every Day     Current packs/day: 0.50     Average packs/day: 0.5 packs/day for 30.2 years (15.1 ttl pk-yrs)     Types: Cigarettes     Start date: 1995   • Smokeless tobacco: Never   Vaping Use   • Vaping status: Never Used   Substance and Sexual Activity   • Alcohol use: Not Currently     Alcohol/week: 1.0 standard drink of alcohol     Types: 1 Standard drinks or equivalent per week     Comment: 3 x monthly   • Drug use: Never   • Sexual activity: Yes     Partners: Male     Birth control/protection: Male Sterilization   Other Topics Concern   • None   Social History Narrative   • None     Social Determinants of Health     Financial Resource Strain: Not on file   Food Insecurity: No Food Insecurity (3/16/2023)    Hunger Vital Sign    • Worried About Running Out of Food in the Last Year: Never true    • Ran Out of Food in the Last Year: Never true   Transportation Needs: No Transportation Needs (3/16/2023)    PRAPARE - Transportation    • Lack of Transportation (Medical): No    • Lack of Transportation (Non-Medical): No   Physical Activity: Not on file   Stress: Not on file   Social Connections: Not on file   Intimate Partner Violence: Not on file   Housing Stability: Low Risk  (3/16/2023)     Housing Stability Vital Sign    • Unable to Pay for Housing in the Last Year: No    • Number of Places Lived in the Last Year: 1    • Unstable Housing in the Last Year: No        Current Outpatient Medications:   •  albuterol (2.5 mg/3 mL) 0.083 % nebulizer solution, Take 2.5 mg by nebulization every 4 (four) hours as needed for shortness of breath or wheezing (Patient not taking: Reported on 3/7/2024), Disp: , Rfl:   •  albuterol (ProAir HFA) 90 mcg/act inhaler, Inhale 2 puffs every 6 (six) hours as needed for wheezing or shortness of breath, Disp: 8.5 g, Rfl: 0  •  albuterol (PROVENTIL HFA,VENTOLIN HFA) 90 mcg/act inhaler, Inhale 2 puffs every 6 (six) hours as needed for wheezing, Disp: 8 g, Rfl: 2  •  amoxicillin (AMOXIL) 500 mg capsule, , Disp: , Rfl:   •  Arthritis Pain Relief 650 MG CR tablet, take 1 tablet by mouth every 8 hours if needed for mild pain (Patient not taking: Reported on 3/7/2024), Disp: , Rfl:   •  aspirin 325 mg tablet, Take 325 mg by mouth daily. Indications: as needed (Patient not taking: Reported on 3/7/2024), Disp: , Rfl:   •  azithromycin (ZITHROMAX) 250 mg tablet, Take 2 tablets today then 1 tablet daily x 4 days, Disp: 6 tablet, Rfl: 0  •  chlorhexidine (HIBICLENS) 4 % external liquid, Apply 1 Application topically daily as needed for wound care Can use to clean wound during VAC changes. (Patient not taking: Reported on 1/29/2024), Disp: 473 mL, Rfl: 5  •  clonazePAM (KlonoPIN) 0.5 mg tablet, Take 0.5 mg by mouth 2 (two) times a day as needed (Patient not taking: Reported on 1/29/2024), Disp: , Rfl:   •  cyclobenzaprine (FLEXERIL) 5 mg tablet, Take 1 tablet (5 mg total) by mouth 3 (three) times a day as needed for muscle spasms (Patient taking differently: Take 5 mg by mouth 3 (three) times a day as needed for muscle spasms As needed), Disp: 60 tablet, Rfl: 0  •  dulaglutide (Trulicity) 0.75 MG/0.5ML injection, Inject 0.75 mg under the skin Once a week (Patient not taking: Reported on  1/29/2024), Disp: , Rfl:   •  escitalopram (LEXAPRO) 10 mg tablet, Take 10 mg by mouth daily (Patient not taking: Reported on 3/7/2024), Disp: , Rfl:   •  escitalopram (LEXAPRO) 5 mg tablet, Take 5 mg by mouth daily (Patient not taking: Reported on 3/7/2024), Disp: , Rfl:   •  fluticasone (FLONASE) 50 mcg/act nasal spray, 2 sprays into each nostril daily, Disp: 16 g, Rfl: 4  •  Fluticasone-Salmeterol (Advair) 250-50 mcg/dose inhaler, , Disp: , Rfl:   •  Fluticasone-Salmeterol (Advair) 500-50 mcg/dose inhaler, Inhale 1 puff 2 (two) times a day, Disp: , Rfl:   •  folic acid (FOLVITE) 1 mg tablet, Take by mouth daily, Disp: , Rfl:   •  gabapentin (NEURONTIN) 100 mg capsule, Take 1 capsule (100 mg total) by mouth 3 (three) times a day (Patient not taking: Reported on 3/7/2024), Disp: 90 capsule, Rfl: 0  •  gabapentin (NEURONTIN) 300 mg capsule, Take 300 mg by mouth 3 (three) times a day, Disp: , Rfl:   •  hydrochlorothiazide (HYDRODIURIL) 12.5 mg tablet, Take 12.5 mg by mouth daily Pt only  taking as needed for leg swelling (Patient not taking: Reported on 3/7/2024), Disp: , Rfl:   •  ibuprofen (MOTRIN) 600 mg tablet, Take 600 mg by mouth every 6 (six) hours as needed for moderate pain As needed, Disp: , Rfl:   •  methocarbamol (ROBAXIN) 500 mg tablet, Take 1 tablet (500 mg total) by mouth every 6 (six) hours for 14 days (Patient taking differently: Take 500 mg by mouth every 6 (six) hours As needed), Disp: 56 tablet, Rfl: 0  •  methotrexate 2.5 mg tablet, Four 2.5 tabs one time a week ( Every Saturday), Disp: , Rfl:   •  montelukast (SINGULAIR) 10 mg tablet, Take by mouth daily at bedtime as needed, Disp: , Rfl:   •  naloxone (NARCAN) 4 mg/0.1 mL nasal spray, Administer 1 spray into a nostril. If no response after 2-3 minutes, give another dose in the other nostril using a new spray. (Patient not taking: Reported on 4/14/2023), Disp: 1 each, Rfl: 1  •  neomycin-polymyxin-hydrocortisone (CORTISPORIN) otic solution,  Administer 3 drops into the left ear every 6 (six) hours (Patient not taking: Reported on 1/29/2024), Disp: , Rfl:   •  nicotine (NICODERM CQ) 14 mg/24hr TD 24 hr patch, Place 1 patch on the skin every 24 hours (Patient not taking: Reported on 6/2/2023), Disp: 28 patch, Rfl: 3  •  omeprazole (PriLOSEC) 40 MG capsule, Take 40 mg by mouth daily, Disp: , Rfl:   •  predniSONE 10 mg tablet, Take 5 tabs po x 2 days; 4 tabs po x 2 days; 3 tabs po x 1 day; 2 tabs po x 1 day. 1 tab po x 1 day. (Patient not taking: Reported on 1/29/2024), Disp: 24 tablet, Rfl: 0  •  predniSONE 10 mg tablet, Take by mouth 2 (two) times a day with meals For 10 days, Disp: , Rfl:   •  predniSONE 10 mg tablet, Take 5 tabs po x 2 days; 4 tabs po x 2 days; 3 tabs po x 1 day; 2 tabs po x 1 day. 1 tab po x 1 day., Disp: 24 tablet, Rfl: 0  •  semaglutide, 0.25 or 0.5 mg/dose, (Ozempic) 2 mg/3 mL injection pen, Inject 0.5 mg under the skin Once a week (Patient not taking: Reported on 1/29/2024), Disp: , Rfl:   •  sodium chloride, PF, 0.9 %, Inject 10 mL into a catheter in a vein 3 (three) times a week Wound irrigation. (Patient not taking: Reported on 1/29/2024), Disp: 100 mL, Rfl: 10  •  Vitamin D, Ergocalciferol, 20133 units CAPS, Take by mouth once a week, Disp: , Rfl:     Review of Systems   Constitutional:  Negative for chills and fever.   Respiratory:  Positive for cough and shortness of breath (History of asthma, current bronchitis).         Recent urgent care visit for bronchitis    Gastrointestinal:  Negative for abdominal pain, constipation, diarrhea, nausea and vomiting.   Skin:  Positive for wound (open surgical wound abdomen).   Psychiatric/Behavioral:  The patient is not nervous/anxious.        Objective:  /84   Pulse 92   Temp (!) 96.9 °F (36.1 °C)   Resp 20   LMP 11/23/2022 (Exact Date) Comment: partial  Pain Score: 0-No pain     Physical Exam  Vitals reviewed.   Constitutional:       General: She is not in acute distress.      Appearance: She is morbidly obese. She is not ill-appearing, toxic-appearing or diaphoretic.      Comments: Very pleasant, no acute distress   HENT:      Head: Normocephalic and atraumatic.   Cardiovascular:      Rate and Rhythm: Normal rate.   Pulmonary:      Effort: Pulmonary effort is normal. No respiratory distress.   Abdominal:      Tenderness: There is no abdominal tenderness.          Comments: Open surgical wound of abdomen: Small improvement in measurements, tunnel at 7:00 no longer appreciated.  Wound bed very fibrous with loose pieces of mesh.  No malodor, purulent drainage.  Periwound 10 scar tissue slightly pink no acute erythema.  See full wound assessment     Skin:     Findings: Wound present.   Neurological:      Mental Status: She is alert and oriented to person, place, and time.   Psychiatric:         Mood and Affect: Mood normal.         Behavior: Behavior normal.           Wound 07/10/23 Abdomen (Active)   Wound Image   03/11/24 0830   Wound Description Pink;Pale;White 03/11/24 0831   Alexa-wound Assessment Pink;Scar Tissue;Maceration 03/11/24 0831   Wound Length (cm) 0.3 cm 03/11/24 0831   Wound Width (cm) 0.3 cm 03/11/24 0831   Wound Depth (cm) 0.7 cm 03/11/24 0831   Wound Surface Area (cm^2) 0.09 cm^2 03/11/24 0831   Wound Volume (cm^3) 0.063 cm^3 03/11/24 0831   Calculated Wound Volume (cm^3) 0.06 cm^3 03/11/24 0831   Change in Wound Size % 99.86 03/11/24 0831   Tunneling 1 2 cm 03/04/24 0822   Tunneling 1 in depth located at 7 o'clock 03/04/24 0822   Number of underminings 1 02/05/24 0821   Undermining 1 2.5 02/05/24 0821   Undermining 1 is depth extending from 6-7 02/05/24 0821   Drainage Amount Small 03/11/24 0831   Drainage Description Yellow 03/11/24 0831   Non-staged Wound Description Full thickness 03/11/24 0831   Treatments Cleansed 03/04/24 0822   Dressing Wound V.A.C. 07/31/23 0859   Wound packed? Yes 03/11/24 0831   Packing- # removed 1 03/11/24 0831   Packing- # inserted 2 07/24/23  "0832   Dressing Changed New 07/24/23 0832   Patient Tolerance Tolerated well 03/04/24 0822   Dressing Status Intact 03/11/24 0831       Debridement    Universal Protocol:  Consent: Verbal consent obtained. Written consent obtained.  Risks and benefits: risks, benefits and alternatives were discussed  Consent given by: patient  Time out: Immediately prior to procedure a \"time out\" was called to verify the correct patient, procedure, equipment, support staff and site/side marked as required.  Patient understanding: patient states understanding of the procedure being performed  Patient identity confirmed: verbally with patient    Debridement Details  Performed by: physician  Debridement type: surgical  Level of debridement: subcutaneous tissue  Pain control: lidocaine 4%      Post-debridement measurements  Length (cm): 0.3  Width (cm): 0.3  Depth (cm): 0.8  Percent debrided: 100%  Surface Area (cm^2): 0.09  Area Debrided (cm^2): 0.09  Volume (cm^3): 0.07    Tissue and other material debrided: subcutaneous tissue  Devitalized tissue debrided: fibrin, slough and Loose pieces of mesh  Instrument(s) utilized: curette  Bleeding: medium  Hemostasis obtained with: pressure  Procedural pain (0-10): 0  Post-procedural pain: 0   Response to treatment: procedure was tolerated well         Results from last 6 Months   Lab Units 12/18/23  1030   WOUND CULTURE  3+ Growth of Pasteurella multocida*  2+ Growth of Escherichia coli*  3+ Growth of         Lab Results   Component Value Date    HGBA1C 5.7 (H) 12/04/2023       Wound Instructions:  Orders Placed This Encounter   Procedures   • Wound cleansing and dressings Abdomen     Abdomen Wound      Remove abdominal dressing, wash with soap and water (Dove for sensitive skin), pat dry prior to applying new dressing      May apply Zinc Oxide to surrounding skin with each dressing change     Apply Dakins soaked plain packing  (1/4 inch plain packing) then loosely pack the wound. Then " cover with dry dressing and tape. Change dressing daily and as needed for leakage.        Treatments completed as above today at the Wound Center     Standing Status:   Future     Standing Expiration Date:   3/11/2025   • Debridement     This order was created via procedure documentation       Aria Lopez MD

## 2024-03-18 ENCOUNTER — OFFICE VISIT (OUTPATIENT)
Dept: WOUND CARE | Facility: CLINIC | Age: 44
End: 2024-03-18
Payer: COMMERCIAL

## 2024-03-18 VITALS
HEART RATE: 82 BPM | TEMPERATURE: 97.4 F | RESPIRATION RATE: 18 BRPM | DIASTOLIC BLOOD PRESSURE: 82 MMHG | SYSTOLIC BLOOD PRESSURE: 132 MMHG

## 2024-03-18 DIAGNOSIS — F17.200 TOBACCO USE DISORDER: ICD-10-CM

## 2024-03-18 DIAGNOSIS — Z98.890 S/P HERNIA REPAIR: ICD-10-CM

## 2024-03-18 DIAGNOSIS — Z87.19 S/P HERNIA REPAIR: ICD-10-CM

## 2024-03-18 DIAGNOSIS — E66.01 OBESITY, CLASS III, BMI 40-49.9 (MORBID OBESITY) (HCC): ICD-10-CM

## 2024-03-18 DIAGNOSIS — Z90.710 S/P HYSTERECTOMY: ICD-10-CM

## 2024-03-18 DIAGNOSIS — T81.31XS SURGICAL WOUND DEHISCENCE, SEQUELA: ICD-10-CM

## 2024-03-18 DIAGNOSIS — S31.109D OPEN WOUND OF ABDOMEN, SUBSEQUENT ENCOUNTER: Primary | ICD-10-CM

## 2024-03-18 PROCEDURE — 99214 OFFICE O/P EST MOD 30 MIN: CPT | Performed by: STUDENT IN AN ORGANIZED HEALTH CARE EDUCATION/TRAINING PROGRAM

## 2024-03-18 PROCEDURE — 99213 OFFICE O/P EST LOW 20 MIN: CPT | Performed by: STUDENT IN AN ORGANIZED HEALTH CARE EDUCATION/TRAINING PROGRAM

## 2024-03-18 PROCEDURE — G0463 HOSPITAL OUTPT CLINIC VISIT: HCPCS | Performed by: STUDENT IN AN ORGANIZED HEALTH CARE EDUCATION/TRAINING PROGRAM

## 2024-03-18 NOTE — PATIENT INSTRUCTIONS
Orders Placed This Encounter   Procedures    Wound cleansing and dressings Abdomen     Wound cleansing and dressings Abdomen   Abdomen Wound      Remove abdominal dressing, wash with soap and water (Dove for sensitive skin), pat dry prior to applying new dressing      May apply Zinc Oxide to surrounding skin with each dressing change       Gently pack AMD packing strip into abdominal wound and then cover with dry dressing and tape. Change daily and as needed for excessive drainage.    If you notice increased odor or see greenish drainage, you may resume using the Dakin's soak with plain packing strips.      Treatments completed as above today at the Wound Center     Standing Status:   Future     Standing Expiration Date:   3/18/2025

## 2024-03-18 NOTE — PROGRESS NOTES
"Patient ID: Maria R Webb is a 43 y.o. female Date of Birth 1980       Chief Complaint   Patient presents with    Follow Up Wound Care Visit     Abd wound       Allergies:  Patient has no known allergies.    Diagnosis:   Diagnosis ICD-10-CM Associated Orders   1. Open wound of abdomen, subsequent encounter  S31.109D Wound cleansing and dressings Abdomen      2. Surgical wound dehiscence, sequela  T81.31XS       3. S/P hernia repair  Z98.890     Z87.19       4. S/P hysterectomy  Z90.710       5. Tobacco use disorder  F17.200       6. Obesity, Class III, BMI 40-49.9 (morbid obesity) ([facility])  E66.01            Assessment  & Plan:    F/u surgical wound of abdomen s/p herniation post hysterectomy and dehiscence post hernia repair. Measuring about the same. Small diameter opening remains. Quality of tissue appears improved there is noticeable epithelization surrounding open cavity. No further green discoloration to drainage. No further tunnel. Periwound with scar tissue (no maceration, irritation or erythema despite looking erythematous in photograph-none on clinical examination).   AMD packing to the site. D/c use of Dakins for now. Should there be any return of green discoloration, increased creamy drainage or malodor re-initiate use of Dakin soaked packing.   Attempt smoking reduction to assist with healing.   Obtain 3-4 servings of protein daily for wound healing.    Attempt smoking reduction.   F/u in 2-3 weeks. Instructed to call if any questions or concerns arise in meantime.           Subjective:   3/11/2024: Maria R is a 43-year-old female who is presenting to wound center today for follow-up of abdominal wound s/p hysterectomy complicated by fascial dehiscence and abdominal herniation that required incisional hernia repair w/ mesh.  Has been following with wound center since January '23.  Per wound center visit on 2/5/2024 \"saw Dr. Conroy, general surgery since prior visit whom did not feel as though any " "further medication/imaging/intervention was necessary at this time and would like pt to continue with local wound care and follow up with him in 3 months.\" At last wound center visit, was changed to Dakins soaked packing after she was experiencing green drainage.  She was instructed to resume silver gel coated packing once the green drainage resolved.  She reports that she has been utilizing Dakin soaked packing over the past week and had not switched to green drainage though this seemed to resolve a few days ago. Of note, is to have recently started Ozempic for weight reduction.  Currently on Z-Dallin and prednisone for bronchitis.  She denies fever and/or chills.  Continues with cough and shortness of breath but no acute worsening.    03/18/24: Pt presents for f/u surgical abdominal wound dehiscence s/p hysterectomy complicated by fascial dehiscence and abdominal herniation requiring incision hernia repair with mesh complicated by further dehiscence. Pt notes that Dr. Lopez was able to surgically debride a piece of remaining mesh at her visit last week and opted to continue with Dakin soaked packing giving significant improvement in the wound with its use. No nausea, vomiting, severe abdominal pain, change in bowel habits nor fevers/chills. Continuing to smoke about the same amount.           The following portions of the patient's history were reviewed and updated as appropriate:   Patient Active Problem List   Diagnosis    Tobacco use disorder    COVID-19    Obesity, morbid, BMI 50 or higher (HCC)    Bulky or enlarged uterus    Pelvic pain    Preoperative exam for gynecologic surgery    HTN (hypertension)    Gastroesophageal reflux disease    Asthma    Obstructive sleep apnea syndrome    S/P hysterectomy    Postoperative anemia    Rheumatoid arthritis involving multiple sites with positive rheumatoid factor (HCC)    Encounter for postoperative care    Open wound of abdomen    Surgical wound, non healing    " Cigarette nicotine dependence without complication     Past Medical History:   Diagnosis Date    Abdominal wall abscess at site of surgical wound 2023    Abnormal uterine bleeding due to intramural leiomyoma 2022    Arthritis     Rheumatoid    Asthma     Breathing difficulty     only occured during inverted robotic hysterectomy    Cellulitis of drainage site following surgery 2023    CPAP (continuous positive airway pressure) dependence     GERD (gastroesophageal reflux disease)     Hypertension     Obese     Pneumonia 2007    Sleep apnea     Uterine fibroid     LTH today 12/15/2022    Wound dehiscence, surgical 2022     Past Surgical History:   Procedure Laterality Date    ABDOMINAL WASHOUT      post  with wound vac     SECTION      had hematoma removed after the surgery    FOREARM SURGERY Right     repair of fracture with plating    INCISIONAL HERNIA REPAIR  2022    Procedure: REPAIR  RECURRENT HERNIA INCISIONAL WITH MESH;  Surgeon: Donna Lau MD;  Location: [facility];  Service: Gynecology    IR DRAINAGE TUBE CHECK WITH SCLEROSIS  2/3/2023    IR DRAINAGE TUBE CHECK WITH SCLEROSIS  3/7/2023    IR DRAINAGE TUBE CHECK/CHANGE/REPOSITION/REINSERTION/UPSIZE  2023    IR DRAINAGE TUBE CHECK/CHANGE/REPOSITION/REINSERTION/UPSIZE  2023    IR DRAINAGE TUBE CHECK/CHANGE/REPOSITION/REINSERTION/UPSIZE  2023    IR DRAINAGE TUBE CHECK/CHANGE/REPOSITION/REINSERTION/UPSIZE  2023    IR DRAINAGE TUBE PLACEMENT  2023    LAPAROTOMY N/A 2022    Procedure: LAPAROTOMY EXPLORATORY;  Surgeon: Donna Lau MD;  Location[facility];  Service: Gynecology    MYRINGOTOMY W/ TUBES      two sets as a young child    MS CYSTOURETHROSCOPY N/A 12/15/2022    Procedure: CYSTO W/ ROBOT;  Surgeon: Donna Lau MD;  [facility];  Service: Gynecology    MS LAPS TOTAL HYSTERECT 250 GM/< W/RMVL TUBE/OVARY N/A 12/15/2022    Procedure: (LTH) W/ BILATERAL  SALPINGECTOMY  W/ ROBOT COVERTED TO OPEN;  Surgeon: Donna Lau MD;  Location: [facility];  Service: Gynecology    VAC DRESSING APPLICATION N/A 3/17/2023    Procedure: APPLICATION VAC DRESSING ABDOMEN/TRUNK;  Surgeon: Chan Conroy MD;  Location: [facility];  Service: General    WOUND DEBRIDEMENT N/A 3/15/2023    Procedure: DEBRIDEMENT Abdominal WOUND and placement of Wound VAC;  Surgeon: Chan Conroy MD;  Location: [facility];  Service: General    WOUND DEBRIDEMENT N/A 3/17/2023    Procedure: DEBRIDEMENT WOUND (WASH OUT);  Surgeon: Chan Conroy MD;  Location: [facility];  Service: General     Family History   Problem Relation Age of Onset    Multiple sclerosis Mother     Hypertension Father     Hyperlipidemia Father     Cerebral aneurysm Father     Pulmonary embolism Father     Liver disease Brother      Social History     Socioeconomic History    Marital status:      Spouse name: None    Number of children: None    Years of education: None    Highest education level: None   Occupational History    None   Tobacco Use    Smoking status: Every Day     Current packs/day: 0.50     Average packs/day: 0.5 packs/day for 30.2 years (15.1 ttl pk-yrs)     Types: Cigarettes     Start date: 1995    Smokeless tobacco: Never   Vaping Use    Vaping status: Never Used   Substance and Sexual Activity    Alcohol use: Not Currently     Alcohol/week: 1.0 standard drink of alcohol     Types: 1 Standard drinks or equivalent per week     Comment: 3 x monthly    Drug use: Never    Sexual activity: Yes     Partners: Male     Birth control/protection: Male Sterilization   Other Topics Concern    None   Social History Narrative    None     Social Determinants of Health     Financial Resource Strain: Not on file   Food Insecurity: No Food Insecurity (3/16/2023)    Hunger Vital Sign     Worried About Running Out of Food in the Last Year: Never true     Ran Out of Food in the Last Year: Never true   Transportation  Needs: No Transportation Needs (3/16/2023)    PRAPARE - Transportation     Lack of Transportation (Medical): No     Lack of Transportation (Non-Medical): No   Physical Activity: Not on file   Stress: Not on file   Social Connections: Not on file   Intimate Partner Violence: Not on file   Housing Stability: Low Risk  (3/16/2023)    Housing Stability Vital Sign     Unable to Pay for Housing in the Last Year: No     Number of Places Lived in the Last Year: 1     Unstable Housing in the Last Year: No       Current Outpatient Medications:     albuterol (2.5 mg/3 mL) 0.083 % nebulizer solution, Take 2.5 mg by nebulization every 4 (four) hours as needed for shortness of breath or wheezing (Patient not taking: Reported on 3/7/2024), Disp: , Rfl:     albuterol (ProAir HFA) 90 mcg/act inhaler, Inhale 2 puffs every 6 (six) hours as needed for wheezing or shortness of breath, Disp: 8.5 g, Rfl: 0    albuterol (PROVENTIL HFA,VENTOLIN HFA) 90 mcg/act inhaler, Inhale 2 puffs every 6 (six) hours as needed for wheezing, Disp: 8 g, Rfl: 2    amoxicillin (AMOXIL) 500 mg capsule, , Disp: , Rfl:     Arthritis Pain Relief 650 MG CR tablet, take 1 tablet by mouth every 8 hours if needed for mild pain (Patient not taking: Reported on 3/7/2024), Disp: , Rfl:     aspirin 325 mg tablet, Take 325 mg by mouth daily. Indications: as needed (Patient not taking: Reported on 3/7/2024), Disp: , Rfl:     chlorhexidine (HIBICLENS) 4 % external liquid, Apply 1 Application topically daily as needed for wound care Can use to clean wound during VAC changes. (Patient not taking: Reported on 1/29/2024), Disp: 473 mL, Rfl: 5    clonazePAM (KlonoPIN) 0.5 mg tablet, Take 0.5 mg by mouth 2 (two) times a day as needed (Patient not taking: Reported on 1/29/2024), Disp: , Rfl:     cyclobenzaprine (FLEXERIL) 5 mg tablet, Take 1 tablet (5 mg total) by mouth 3 (three) times a day as needed for muscle spasms (Patient taking differently: Take 5 mg by mouth 3 (three)  times a day as needed for muscle spasms As needed), Disp: 60 tablet, Rfl: 0    dulaglutide (Trulicity) 0.75 MG/0.5ML injection, Inject 0.75 mg under the skin Once a week (Patient not taking: Reported on 1/29/2024), Disp: , Rfl:     escitalopram (LEXAPRO) 10 mg tablet, Take 10 mg by mouth daily (Patient not taking: Reported on 3/7/2024), Disp: , Rfl:     escitalopram (LEXAPRO) 5 mg tablet, Take 5 mg by mouth daily (Patient not taking: Reported on 3/7/2024), Disp: , Rfl:     fluticasone (FLONASE) 50 mcg/act nasal spray, 2 sprays into each nostril daily, Disp: 16 g, Rfl: 4    Fluticasone-Salmeterol (Advair) 250-50 mcg/dose inhaler, , Disp: , Rfl:     Fluticasone-Salmeterol (Advair) 500-50 mcg/dose inhaler, Inhale 1 puff 2 (two) times a day, Disp: , Rfl:     folic acid (FOLVITE) 1 mg tablet, Take by mouth daily, Disp: , Rfl:     gabapentin (NEURONTIN) 100 mg capsule, Take 1 capsule (100 mg total) by mouth 3 (three) times a day (Patient not taking: Reported on 3/7/2024), Disp: 90 capsule, Rfl: 0    gabapentin (NEURONTIN) 300 mg capsule, Take 300 mg by mouth 3 (three) times a day, Disp: , Rfl:     hydrochlorothiazide (HYDRODIURIL) 12.5 mg tablet, Take 12.5 mg by mouth daily Pt only  taking as needed for leg swelling (Patient not taking: Reported on 3/7/2024), Disp: , Rfl:     ibuprofen (MOTRIN) 600 mg tablet, Take 600 mg by mouth every 6 (six) hours as needed for moderate pain As needed, Disp: , Rfl:     methocarbamol (ROBAXIN) 500 mg tablet, Take 1 tablet (500 mg total) by mouth every 6 (six) hours for 14 days (Patient taking differently: Take 500 mg by mouth every 6 (six) hours As needed), Disp: 56 tablet, Rfl: 0    methotrexate 2.5 mg tablet, Four 2.5 tabs one time a week ( Every Saturday), Disp: , Rfl:     montelukast (SINGULAIR) 10 mg tablet, Take by mouth daily at bedtime as needed, Disp: , Rfl:     naloxone (NARCAN) 4 mg/0.1 mL nasal spray, Administer 1 spray into a nostril. If no response after 2-3 minutes, give  another dose in the other nostril using a new spray. (Patient not taking: Reported on 4/14/2023), Disp: 1 each, Rfl: 1    neomycin-polymyxin-hydrocortisone (CORTISPORIN) otic solution, Administer 3 drops into the left ear every 6 (six) hours (Patient not taking: Reported on 1/29/2024), Disp: , Rfl:     nicotine (NICODERM CQ) 14 mg/24hr TD 24 hr patch, Place 1 patch on the skin every 24 hours (Patient not taking: Reported on 6/2/2023), Disp: 28 patch, Rfl: 3    omeprazole (PriLOSEC) 40 MG capsule, Take 40 mg by mouth daily, Disp: , Rfl:     predniSONE 10 mg tablet, Take 5 tabs po x 2 days; 4 tabs po x 2 days; 3 tabs po x 1 day; 2 tabs po x 1 day. 1 tab po x 1 day. (Patient not taking: Reported on 1/29/2024), Disp: 24 tablet, Rfl: 0    predniSONE 10 mg tablet, Take by mouth 2 (two) times a day with meals For 10 days, Disp: , Rfl:     predniSONE 10 mg tablet, Take 5 tabs po x 2 days; 4 tabs po x 2 days; 3 tabs po x 1 day; 2 tabs po x 1 day. 1 tab po x 1 day., Disp: 24 tablet, Rfl: 0    semaglutide, 0.25 or 0.5 mg/dose, (Ozempic) 2 mg/3 mL injection pen, Inject 0.5 mg under the skin Once a week (Patient not taking: Reported on 1/29/2024), Disp: , Rfl:     sodium chloride, PF, 0.9 %, Inject 10 mL into a catheter in a vein 3 (three) times a week Wound irrigation. (Patient not taking: Reported on 1/29/2024), Disp: 100 mL, Rfl: 10    Vitamin D, Ergocalciferol, 59182 units CAPS, Take by mouth once a week, Disp: , Rfl:     Review of Systems   Constitutional:  Negative for chills and fever.   Respiratory:          Recent urgent care visit for bronchitis    Gastrointestinal:  Negative for abdominal pain, constipation, diarrhea, nausea and vomiting.   Skin:  Positive for wound (open surgical wound abdomen).   Psychiatric/Behavioral:  The patient is not nervous/anxious.          Objective:  /82   Pulse 82   Temp (!) 97.4 °F (36.3 °C)   Resp 18   LMP 11/23/2022 (Exact Date) Comment: partial        Physical Exam  Vitals  reviewed.   Constitutional:       General: She is not in acute distress.     Appearance: She is morbidly obese. She is not ill-appearing, toxic-appearing or diaphoretic.      Comments: Very pleasant, no acute distress   HENT:      Head: Normocephalic and atraumatic.   Cardiovascular:      Rate and Rhythm: Normal rate.   Pulmonary:      Effort: Pulmonary effort is normal. No respiratory distress.   Abdominal:      Tenderness: There is no abdominal tenderness.          Comments: Open surgical wound of abdomen secondary to dehiscence. Measuring about the same. Small diameter opening remains. Quality of tissue appears improved there is noticeable epithelization surrounding open cavity. No further green discoloration to drainage. No further tunnel. Periwound with scar tissue (no maceration, irritation or erythema despite looking erythematous in photograph-none on clinical examination).      Skin:     Findings: Wound present.   Neurological:      Mental Status: She is alert and oriented to person, place, and time.   Psychiatric:         Mood and Affect: Mood normal.         Behavior: Behavior normal.                Results from last 6 Months   Lab Units 12/18/23  1030   WOUND CULTURE  3+ Growth of Pasteurella multocida*  2+ Growth of Escherichia coli*  3+ Growth of           Wound Instructions:  Orders Placed This Encounter   Procedures    Wound cleansing and dressings Abdomen     Wound cleansing and dressings Abdomen   Abdomen Wound      Remove abdominal dressing, wash with soap and water (Dove for sensitive skin), pat dry prior to applying new dressing      May apply Zinc Oxide to surrounding skin with each dressing change       Gently pack AMD packing strip into abdominal wound and then cover with dry dressing and tape. Change daily and as needed for excessive drainage.    If you notice increased odor or see greenish drainage, you may resume using the Dakin's soak with plain packing strips.      Treatments completed  "as above today at the Wound Center     Standing Status:   Future     Standing Expiration Date:   3/18/2025       Salma Garcia PA-C      Portions of the record may have been created with voice recognition software. Occasional wrong word or \"sound alike\" substitutions may have occurred due to the inherent limitations of voice recognition software. Read the chart carefully and recognize, using context, where substitutions have occurred.    "

## 2024-03-29 NOTE — PROGRESS NOTES
Assessment/Plan:    Open wound of abdomen  The patient is a pleasant 43-year-old female presenting status post open repair of her complex open wound with onlay phasic's mesh on December 20, 2022.    Patient continues to follow with the wound care center.    She has voiced no complaints or concerns at the time of today's appointment.    On physical examination she is well-appearing.  She is accompanied by her significant other.  Lower midline has a small central sinus perhaps millimeters in maximum diameter.  There is no active soft tissue infection.  The Pseudomonas colonization appears to have resolved.    The wound was redressed with silver gel and packing.  ABD applied.    Patient is stable from a surgical perspective.    She will continue to follow with wound care the patient was instructed to follow-up with our practice on an as-needed basis.    All questions answered to satisfaction.  Patient is in agreement with the treatment plan outlined above.       Diagnoses and all orders for this visit:    Open wound of abdomen, subsequent encounter          Subjective:      Patient ID: Maria R Webb is a 43 y.o. female.    Patient is here for a wound check of the abd. Patient last packing change was Saturday night. Patient states theres still drainage but denies foul smell, pain or fevers/chills Patient states the wound is getting smaller. BRIAN Bryant          Review of Systems   Constitutional:  Negative for chills and fever.   HENT:  Negative for ear pain and sore throat.    Eyes:  Negative for pain and visual disturbance.   Respiratory:  Negative for cough and shortness of breath.    Cardiovascular:  Negative for chest pain and palpitations.   Gastrointestinal:  Negative for abdominal pain and vomiting.   Genitourinary:  Negative for dysuria and hematuria.   Musculoskeletal:  Negative for arthralgias and back pain.   Skin:  Negative for color change and rash.   Neurological:  Negative for seizures and syncope.   All  "other systems reviewed and are negative.        Objective:      /80 (BP Location: Left arm, Patient Position: Sitting, Cuff Size: Large)   Pulse 85   Temp (!) 97.2 °F (36.2 °C) (Temporal)   Resp 18   Ht 5' 5\" (1.651 m)   Wt (!) 139 kg (306 lb)   LMP 11/23/2022 (Exact Date) Comment: partial  SpO2 96%   BMI 50.92 kg/m²          Physical Exam  Constitutional:       Appearance: She is well-developed.   HENT:      Head: Normocephalic and atraumatic.   Eyes:      Conjunctiva/sclera: Conjunctivae normal.      Pupils: Pupils are equal, round, and reactive to light.   Cardiovascular:      Rate and Rhythm: Normal rate and regular rhythm.   Pulmonary:      Effort: Pulmonary effort is normal.      Breath sounds: Normal breath sounds.   Abdominal:      General: Bowel sounds are normal.      Palpations: Abdomen is soft.      Comments: Wound as described above   Musculoskeletal:         General: Normal range of motion.      Cervical back: Normal range of motion and neck supple.   Skin:     General: Skin is warm and dry.   Neurological:      Mental Status: She is alert and oriented to person, place, and time.   Psychiatric:         Behavior: Behavior normal.         Thought Content: Thought content normal.         Judgment: Judgment normal.           "

## 2024-04-01 ENCOUNTER — OFFICE VISIT (OUTPATIENT)
Dept: SURGERY | Facility: CLINIC | Age: 44
End: 2024-04-01
Payer: COMMERCIAL

## 2024-04-01 VITALS
SYSTOLIC BLOOD PRESSURE: 124 MMHG | DIASTOLIC BLOOD PRESSURE: 80 MMHG | WEIGHT: 293 LBS | HEART RATE: 85 BPM | OXYGEN SATURATION: 96 % | BODY MASS INDEX: 48.82 KG/M2 | HEIGHT: 65 IN | RESPIRATION RATE: 18 BRPM | TEMPERATURE: 97.2 F

## 2024-04-01 DIAGNOSIS — S31.109D OPEN WOUND OF ABDOMEN, SUBSEQUENT ENCOUNTER: Primary | ICD-10-CM

## 2024-04-01 PROCEDURE — 99213 OFFICE O/P EST LOW 20 MIN: CPT | Performed by: SURGERY

## 2024-04-01 NOTE — ASSESSMENT & PLAN NOTE
The patient is a pleasant 43-year-old female presenting status post open repair of her complex open wound with onlay phasic's mesh on December 20, 2022.    Patient continues to follow with the wound care center.    She has voiced no complaints or concerns at the time of today's appointment.    On physical examination she is well-appearing.  She is accompanied by her significant other.  Lower midline has a small central sinus perhaps millimeters in maximum diameter.  There is no active soft tissue infection.  The Pseudomonas colonization appears to have resolved.    The wound was redressed with silver gel and packing.  ABD applied.    Patient is stable from a surgical perspective.    She will continue to follow with wound care the patient was instructed to follow-up with our practice on an as-needed basis.    All questions answered to satisfaction.  Patient is in agreement with the treatment plan outlined above.

## 2024-04-08 ENCOUNTER — OFFICE VISIT (OUTPATIENT)
Dept: WOUND CARE | Facility: CLINIC | Age: 44
End: 2024-04-08
Payer: COMMERCIAL

## 2024-04-08 VITALS
DIASTOLIC BLOOD PRESSURE: 66 MMHG | SYSTOLIC BLOOD PRESSURE: 122 MMHG | TEMPERATURE: 97.2 F | HEART RATE: 64 BPM | RESPIRATION RATE: 20 BRPM

## 2024-04-08 DIAGNOSIS — Z90.710 S/P HYSTERECTOMY: ICD-10-CM

## 2024-04-08 DIAGNOSIS — T81.31XS SURGICAL WOUND DEHISCENCE, SEQUELA: ICD-10-CM

## 2024-04-08 DIAGNOSIS — Z98.890 S/P HERNIA REPAIR: ICD-10-CM

## 2024-04-08 DIAGNOSIS — F17.200 TOBACCO USE DISORDER: ICD-10-CM

## 2024-04-08 DIAGNOSIS — Z87.19 S/P HERNIA REPAIR: ICD-10-CM

## 2024-04-08 DIAGNOSIS — E66.01 OBESITY, CLASS III, BMI 40-49.9 (MORBID OBESITY) (HCC): ICD-10-CM

## 2024-04-08 DIAGNOSIS — S31.109D OPEN WOUND OF ABDOMEN, SUBSEQUENT ENCOUNTER: Primary | ICD-10-CM

## 2024-04-08 PROCEDURE — G0463 HOSPITAL OUTPT CLINIC VISIT: HCPCS | Performed by: STUDENT IN AN ORGANIZED HEALTH CARE EDUCATION/TRAINING PROGRAM

## 2024-04-08 PROCEDURE — 99213 OFFICE O/P EST LOW 20 MIN: CPT | Performed by: STUDENT IN AN ORGANIZED HEALTH CARE EDUCATION/TRAINING PROGRAM

## 2024-04-08 NOTE — PROGRESS NOTES
"Patient ID: Maria R Webb is a 43 y.o. female Date of Birth 1980       Chief Complaint   Patient presents with    Follow Up Wound Care Visit     Abdomen wound          Allergies:  Patient has no known allergies.    Diagnosis:   Diagnosis ICD-10-CM Associated Orders   1. Open wound of abdomen, subsequent encounter  S31.109D Wound compression and edema control Abdomen      2. Surgical wound dehiscence, sequela  T81.31XS       3. S/P hysterectomy  Z90.710       4. S/P hernia repair  Z98.890     Z87.19       5. Tobacco use disorder  F17.200       6. Obesity, Class III, BMI 40-49.9 (morbid obesity) ([facility])  E66.01            Assessment  & Plan:    F/u surgical wound of abdomen s/p herniation post hysterectomy and dehiscence post hernia repair. Slight reduction in size. New edge epithelization is present at the superior aspect of the wound. Depth has reduced from 2 cm to 1.5 cm. Continues to drain moderate amount. No malodor appreciated. Periowund with scar tissue.   Endoform am packed into remaining depth. Change daily to every other day.   May use Dakins should there be a return of foul odor or green drainage.   Attempt smoking reduction to assist with healing.   Obtain 3-4 servings of protein daily for wound healing.    Instructed to monitor for any changes including redness or swelling surrounding the wound, increased drainage or pain as well as fevers or chills.    F/u in one week. Instructed to call if any questions or concerns arise in meantime.            Subjective:   3/11/2024: Maria R is a 43-year-old female who is presenting to wound center today for follow-up of abdominal wound s/p hysterectomy complicated by fascial dehiscence and abdominal herniation that required incisional hernia repair w/ mesh.  Has been following with wound center since January '23.  Per wound center visit on 2/5/2024 \"saw Dr. Conroy, general surgery since prior visit whom did not feel as though any further " "medication/imaging/intervention was necessary at this time and would like pt to continue with local wound care and follow up with him in 3 months.\" At last wound center visit, was changed to Dakins soaked packing after she was experiencing green drainage.  She was instructed to resume silver gel coated packing once the green drainage resolved.  She reports that she has been utilizing Dakin soaked packing over the past week and had not switched to green drainage though this seemed to resolve a few days ago. Of note, is to have recently started Ozempic for weight reduction.  Currently on Z-Dallin and prednisone for bronchitis.  She denies fever and/or chills.  Continues with cough and shortness of breath but no acute worsening.    03/18/24: Pt presents for f/u surgical abdominal wound dehiscence s/p hysterectomy complicated by fascial dehiscence and abdominal herniation requiring incision hernia repair with mesh complicated by further dehiscence. Pt notes that Dr. Lopez was able to surgically debride a piece of remaining mesh at her visit last week and opted to continue with Dakin soaked packing giving significant improvement in the wound with its use. No nausea, vomiting, severe abdominal pain, change in bowel habits nor fevers/chills. Continuing to smoke about the same amount.     04/08/24: Pt presents for f/u surgical abdominal wound dehiscence. Since her previous visit she has had f/u with Dr. Conroy, general surgery. No further f/u with general surgery was required, pt is to continue with wound care but may f/u with surgery if needed. Pt reports she noticed a slight increase in the odor related to the drainage the other day so she used Dakins and it went away. She ran out of the AMD packing therefore returned to use of the silver gel coated packing. Is feeling well. Has started Ozempic and has noticed a 3 lb weight loss already. Smoking about the same amount. No fevers, chills.           The following portions of " the patient's history were reviewed and updated as appropriate:   Patient Active Problem List   Diagnosis    Tobacco use disorder    COVID-19    Obesity, morbid, BMI 50 or higher (HCC)    Bulky or enlarged uterus    Pelvic pain    Preoperative exam for gynecologic surgery    HTN (hypertension)    Gastroesophageal reflux disease    Asthma    Obstructive sleep apnea syndrome    S/P hysterectomy    Postoperative anemia    Rheumatoid arthritis involving multiple sites with positive rheumatoid factor (HCC)    Encounter for postoperative care    Open wound of abdomen    Surgical wound, non healing    Cigarette nicotine dependence without complication     Past Medical History:   Diagnosis Date    Abdominal wall abscess at site of surgical wound 2023    Abnormal uterine bleeding due to intramural leiomyoma 2022    Arthritis     Rheumatoid    Asthma     Breathing difficulty     only occured during inverted robotic hysterectomy    Cellulitis of drainage site following surgery 2023    CPAP (continuous positive airway pressure) dependence     GERD (gastroesophageal reflux disease)     Hypertension     Obese     Pneumonia 2007    Sleep apnea     Uterine fibroid     LTH today 12/15/2022    Wound dehiscence, surgical 2022     Past Surgical History:   Procedure Laterality Date    ABDOMINAL WASHOUT      post  with wound vac     SECTION      had hematoma removed after the surgery    FOREARM SURGERY Right     repair of fracture with plating    INCISIONAL HERNIA REPAIR  2022    Procedure: REPAIR  RECURRENT HERNIA INCISIONAL WITH MESH;  Surgeon: Donna Lau MD;  Location: [facility];  Service: Gynecology    IR DRAINAGE TUBE CHECK WITH SCLEROSIS  2/3/2023    IR DRAINAGE TUBE CHECK WITH SCLEROSIS  3/7/2023    IR DRAINAGE TUBE CHECK/CHANGE/REPOSITION/REINSERTION/UPSIZE  2023    IR DRAINAGE TUBE CHECK/CHANGE/REPOSITION/REINSERTION/UPSIZE  2023    IR DRAINAGE TUBE  CHECK/CHANGE/REPOSITION/REINSERTION/UPSIZE  2/13/2023    IR DRAINAGE TUBE CHECK/CHANGE/REPOSITION/REINSERTION/UPSIZE  2/27/2023    IR DRAINAGE TUBE PLACEMENT  1/9/2023    LAPAROTOMY N/A 12/20/2022    Procedure: LAPAROTOMY EXPLORATORY;  Surgeon: Donna Lau MD;  Location: [facility];  Service: Gynecology    MYRINGOTOMY W/ TUBES      two sets as a young child    ID CYSTOURETHROSCOPY N/A 12/15/2022    Procedure: CYSTO W/ ROBOT;  Surgeon: Donna Lau MD;  Location: [facility];  Service: Gynecology    ID LAPS TOTAL HYSTERECT 250 GM/< W/RMVL TUBE/OVARY N/A 12/15/2022    Procedure: (LTH) W/ BILATERAL SALPINGECTOMY  W/ ROBOT COVERTED TO OPEN;  Surgeon: Donna Lau MD;  Location: [facility];  Service: Gynecology    VAC DRESSING APPLICATION N/A 3/17/2023    Procedure: APPLICATION VAC DRESSING ABDOMEN/TRUNK;  Surgeon: Chan Conroy MD;  Location: [facility];  Service: General    WOUND DEBRIDEMENT N/A 3/15/2023    Procedure: DEBRIDEMENT Abdominal WOUND and placement of Wound VAC;  Surgeon: Chan Conroy MD;  Location: [facility];  Service: General    WOUND DEBRIDEMENT N/A 3/17/2023    Procedure: DEBRIDEMENT WOUND (WASH OUT);  Surgeon: Chan Conroy MD;  Location: [facility];  Service: General     Family History   Problem Relation Age of Onset    Multiple sclerosis Mother     Hypertension Father     Hyperlipidemia Father     Cerebral aneurysm Father     Pulmonary embolism Father     Liver disease Brother      Social History     Socioeconomic History    Marital status:      Spouse name: None    Number of children: None    Years of education: None    Highest education level: None   Occupational History    None   Tobacco Use    Smoking status: Every Day     Current packs/day: 0.50     Average packs/day: 0.5 packs/day for 30.3 years (15.1 ttl pk-yrs)     Types: Cigarettes     Start date: 1995    Smokeless tobacco: Never   Vaping Use    Vaping status: Never Used   Substance and Sexual  Activity    Alcohol use: Not Currently     Alcohol/week: 1.0 standard drink of alcohol     Types: 1 Standard drinks or equivalent per week     Comment: 3 x monthly    Drug use: Never    Sexual activity: Yes     Partners: Male     Birth control/protection: Male Sterilization   Other Topics Concern    None   Social History Narrative    None     Social Determinants of Health     Financial Resource Strain: Not on file   Food Insecurity: No Food Insecurity (3/16/2023)    Hunger Vital Sign     Worried About Running Out of Food in the Last Year: Never true     Ran Out of Food in the Last Year: Never true   Transportation Needs: No Transportation Needs (3/16/2023)    PRAPARE - Transportation     Lack of Transportation (Medical): No     Lack of Transportation (Non-Medical): No   Physical Activity: Not on file   Stress: Not on file   Social Connections: Not on file   Intimate Partner Violence: Not on file   Housing Stability: Low Risk  (3/16/2023)    Housing Stability Vital Sign     Unable to Pay for Housing in the Last Year: No     Number of Places Lived in the Last Year: 1     Unstable Housing in the Last Year: No       Current Outpatient Medications:     albuterol (2.5 mg/3 mL) 0.083 % nebulizer solution, Take 2.5 mg by nebulization every 4 (four) hours as needed for shortness of breath or wheezing, Disp: , Rfl:     albuterol (ProAir HFA) 90 mcg/act inhaler, Inhale 2 puffs every 6 (six) hours as needed for wheezing or shortness of breath, Disp: 8.5 g, Rfl: 0    albuterol (PROVENTIL HFA,VENTOLIN HFA) 90 mcg/act inhaler, Inhale 2 puffs every 6 (six) hours as needed for wheezing, Disp: 8 g, Rfl: 2    amoxicillin (AMOXIL) 500 mg capsule, , Disp: , Rfl:     Arthritis Pain Relief 650 MG CR tablet, take 1 tablet by mouth every 8 hours if needed for mild pain (Patient not taking: Reported on 3/7/2024), Disp: , Rfl:     aspirin 325 mg tablet, Take 325 mg by mouth daily. Indications: as needed (Patient not taking: Reported on  3/7/2024), Disp: , Rfl:     chlorhexidine (HIBICLENS) 4 % external liquid, Apply 1 Application topically daily as needed for wound care Can use to clean wound during VAC changes. (Patient not taking: Reported on 1/29/2024), Disp: 473 mL, Rfl: 5    clonazePAM (KlonoPIN) 0.5 mg tablet, Take 0.5 mg by mouth 2 (two) times a day as needed (Patient not taking: Reported on 1/29/2024), Disp: , Rfl:     cyclobenzaprine (FLEXERIL) 5 mg tablet, Take 1 tablet (5 mg total) by mouth 3 (three) times a day as needed for muscle spasms (Patient taking differently: Take 5 mg by mouth 3 (three) times a day as needed for muscle spasms As needed), Disp: 60 tablet, Rfl: 0    dulaglutide (Trulicity) 0.75 MG/0.5ML injection, Inject 0.75 mg under the skin Once a week (Patient not taking: Reported on 1/29/2024), Disp: , Rfl:     escitalopram (LEXAPRO) 10 mg tablet, Take 10 mg by mouth daily, Disp: , Rfl:     escitalopram (LEXAPRO) 5 mg tablet, Take 5 mg by mouth daily, Disp: , Rfl:     fluticasone (FLONASE) 50 mcg/act nasal spray, 2 sprays into each nostril daily, Disp: 16 g, Rfl: 4    Fluticasone-Salmeterol (Advair) 250-50 mcg/dose inhaler, , Disp: , Rfl:     Fluticasone-Salmeterol (Advair) 500-50 mcg/dose inhaler, Inhale 1 puff 2 (two) times a day, Disp: , Rfl:     folic acid (FOLVITE) 1 mg tablet, Take by mouth daily, Disp: , Rfl:     gabapentin (NEURONTIN) 100 mg capsule, Take 1 capsule (100 mg total) by mouth 3 (three) times a day (Patient not taking: Reported on 3/7/2024), Disp: 90 capsule, Rfl: 0    gabapentin (NEURONTIN) 300 mg capsule, Take 300 mg by mouth 3 (three) times a day, Disp: , Rfl:     hydrochlorothiazide (HYDRODIURIL) 12.5 mg tablet, Take 12.5 mg by mouth daily Pt only  taking as needed for leg swelling (Patient not taking: Reported on 3/7/2024), Disp: , Rfl:     ibuprofen (MOTRIN) 600 mg tablet, Take 600 mg by mouth every 6 (six) hours as needed for moderate pain As needed, Disp: , Rfl:     methocarbamol (ROBAXIN) 500 mg  tablet, Take 1 tablet (500 mg total) by mouth every 6 (six) hours for 14 days (Patient taking differently: Take 500 mg by mouth every 6 (six) hours As needed), Disp: 56 tablet, Rfl: 0    methotrexate 2.5 mg tablet, Four 2.5 tabs one time a week ( Every Saturday), Disp: , Rfl:     montelukast (SINGULAIR) 10 mg tablet, Take by mouth daily at bedtime as needed, Disp: , Rfl:     naloxone (NARCAN) 4 mg/0.1 mL nasal spray, Administer 1 spray into a nostril. If no response after 2-3 minutes, give another dose in the other nostril using a new spray. (Patient not taking: Reported on 4/14/2023), Disp: 1 each, Rfl: 1    neomycin-polymyxin-hydrocortisone (CORTISPORIN) otic solution, Administer 3 drops into the left ear every 6 (six) hours (Patient not taking: Reported on 1/29/2024), Disp: , Rfl:     nicotine (NICODERM CQ) 14 mg/24hr TD 24 hr patch, Place 1 patch on the skin every 24 hours (Patient not taking: Reported on 6/2/2023), Disp: 28 patch, Rfl: 3    omeprazole (PriLOSEC) 40 MG capsule, Take 40 mg by mouth daily, Disp: , Rfl:     predniSONE 10 mg tablet, Take 5 tabs po x 2 days; 4 tabs po x 2 days; 3 tabs po x 1 day; 2 tabs po x 1 day. 1 tab po x 1 day. (Patient not taking: Reported on 1/29/2024), Disp: 24 tablet, Rfl: 0    predniSONE 10 mg tablet, Take by mouth 2 (two) times a day with meals For 10 days (Patient not taking: Reported on 4/1/2024), Disp: , Rfl:     predniSONE 10 mg tablet, Take 5 tabs po x 2 days; 4 tabs po x 2 days; 3 tabs po x 1 day; 2 tabs po x 1 day. 1 tab po x 1 day. (Patient not taking: Reported on 4/1/2024), Disp: 24 tablet, Rfl: 0    semaglutide, 0.25 or 0.5 mg/dose, (Ozempic) 2 mg/3 mL injection pen, Inject 0.5 mg under the skin Once a week (Patient not taking: Reported on 1/29/2024), Disp: , Rfl:     sodium chloride, PF, 0.9 %, Inject 10 mL into a catheter in a vein 3 (three) times a week Wound irrigation. (Patient not taking: Reported on 1/29/2024), Disp: 100 mL, Rfl: 10    Vitamin D,  Ergocalciferol, 07456 units CAPS, Take by mouth once a week, Disp: , Rfl:     Review of Systems   Constitutional:  Negative for chills and fever.   Respiratory:          Recent urgent care visit for bronchitis    Gastrointestinal:  Negative for abdominal pain, constipation, diarrhea, nausea and vomiting.   Skin:  Positive for wound (open surgical wound abdomen).   Psychiatric/Behavioral:  The patient is not nervous/anxious.          Objective:  /66   Pulse 64   Temp (!) 97.2 °F (36.2 °C)   Resp 20   LMP 11/23/2022 (Exact Date) Comment: partial  Pain Score: 0-No pain     Physical Exam  Vitals reviewed.   Constitutional:       General: She is not in acute distress.     Appearance: She is morbidly obese. She is not ill-appearing, toxic-appearing or diaphoretic.      Comments: Very pleasant, no acute distress   HENT:      Head: Normocephalic and atraumatic.   Cardiovascular:      Rate and Rhythm: Normal rate.   Pulmonary:      Effort: Pulmonary effort is normal. No respiratory distress.   Abdominal:          Comments: Open surgical wound of abdomen secondary to dehiscence. Slight reduction in size. New epithelization is visible at superior aspect of the wound. Depth has reduced from 2 cm to 1.5 cm. Continues to drain moderate amount. No malodor appreciated. Periowund with scar tissue. See full wound assessment.      Skin:     Findings: Wound present.   Neurological:      Mental Status: She is alert and oriented to person, place, and time.   Psychiatric:         Mood and Affect: Mood normal.         Behavior: Behavior normal.           Wound 07/10/23 Abdomen (Active)   Wound Image   04/08/24 0820   Wound Description Pink;Pale 04/08/24 0822   Alexa-wound Assessment Pink;Scar Tissue 04/08/24 0822   Wound Length (cm) 0.3 cm 04/08/24 0822   Wound Width (cm) 0.2 cm 04/08/24 0822   Wound Depth (cm) 1.5 cm 04/08/24 0822   Wound Surface Area (cm^2) 0.06 cm^2 04/08/24 0822   Wound Volume (cm^3) 0.09 cm^3 04/08/24 0822  "  Calculated Wound Volume (cm^3) 0.09 cm^3 04/08/24 0822   Change in Wound Size % 99.79 04/08/24 0822   Tunneling 1 2 cm 03/04/24 0822   Tunneling 1 in depth located at 7 o'clock 03/04/24 0822   Number of underminings 1 02/05/24 0821   Undermining 1 2.5 02/05/24 0821   Undermining 1 is depth extending from 6-7 02/05/24 0821   Drainage Amount Scant 04/08/24 0822   Drainage Description Yellow;Brown 04/08/24 0822   Non-staged Wound Description Full thickness 04/08/24 0822   Treatments Irrigation with NSS 04/08/24 0822   Dressing Wound V.A.C. 07/31/23 0859   Wound packed? No 04/08/24 0822   Packing- # removed 1 03/11/24 0831   Packing- # inserted 2 07/24/23 0832   Dressing Changed New 07/24/23 0832   Patient Tolerance Tolerated well 04/08/24 0822   Dressing Status Intact;Old drainage 04/08/24 0822              Results from last 6 Months   Lab Units 12/18/23  1030   WOUND CULTURE  3+ Growth of Pasteurella multocida*  2+ Growth of Escherichia coli*  3+ Growth of           Wound Instructions:  Orders Placed This Encounter   Procedures    Wound compression and edema control Abdomen     Abdomen Wound      Remove abdominal dressing, wash with Normal Saline, pat dry prior to applying new dressing      Apply Endoform AM to the wound.   Cover with dry dressing and tape.   Change every other day and as needed for excessive drainage.   if you notice increased odor or see greenish drainage, you may resume using the Dakin's soak with plain packing strips. Do not use the endoform am if you use the Dakins.      Treatments completed as above today at the Wound Center.    Follow up in 1 week     Standing Status:   Future     Standing Expiration Date:   4/8/2025       Salma Garcia PA-C      Portions of the record may have been created with voice recognition software. Occasional wrong word or \"sound alike\" substitutions may have occurred due to the inherent limitations of voice recognition software. Read the chart carefully and " recognize, using context, where substitutions have occurred.

## 2024-04-08 NOTE — PATIENT INSTRUCTIONS
Orders Placed This Encounter   Procedures    Wound compression and edema control Abdomen     Abdomen Wound      Remove abdominal dressing, wash with Normal Saline, pat dry prior to applying new dressing      Apply Endoform AM to the wound.   Cover with dry dressing and tape.   Change every other day and as needed for excessive drainage.   if you notice increased odor or see greenish drainage, you may resume using the Dakin's soak with plain packing strips. Do not use the endoform am if you use the Dakins.      Treatments completed as above today at the Wound Center.    Follow up in 1 week     Standing Status:   Future     Standing Expiration Date:   4/8/2025

## 2024-04-15 ENCOUNTER — OFFICE VISIT (OUTPATIENT)
Dept: WOUND CARE | Facility: CLINIC | Age: 44
End: 2024-04-15
Payer: COMMERCIAL

## 2024-04-15 VITALS
SYSTOLIC BLOOD PRESSURE: 130 MMHG | DIASTOLIC BLOOD PRESSURE: 72 MMHG | HEART RATE: 72 BPM | RESPIRATION RATE: 18 BRPM | TEMPERATURE: 97.3 F

## 2024-04-15 DIAGNOSIS — F17.200 TOBACCO USE DISORDER: ICD-10-CM

## 2024-04-15 DIAGNOSIS — Z87.19 S/P HERNIA REPAIR: ICD-10-CM

## 2024-04-15 DIAGNOSIS — Z90.710 S/P HYSTERECTOMY: ICD-10-CM

## 2024-04-15 DIAGNOSIS — E66.01 OBESITY, CLASS III, BMI 40-49.9 (MORBID OBESITY) (HCC): ICD-10-CM

## 2024-04-15 DIAGNOSIS — S31.109D OPEN WOUND OF ABDOMEN, SUBSEQUENT ENCOUNTER: Primary | ICD-10-CM

## 2024-04-15 DIAGNOSIS — Z98.890 S/P HERNIA REPAIR: ICD-10-CM

## 2024-04-15 DIAGNOSIS — T81.31XS SURGICAL WOUND DEHISCENCE, SEQUELA: ICD-10-CM

## 2024-04-15 PROCEDURE — G0463 HOSPITAL OUTPT CLINIC VISIT: HCPCS | Performed by: STUDENT IN AN ORGANIZED HEALTH CARE EDUCATION/TRAINING PROGRAM

## 2024-04-15 PROCEDURE — 99213 OFFICE O/P EST LOW 20 MIN: CPT | Performed by: STUDENT IN AN ORGANIZED HEALTH CARE EDUCATION/TRAINING PROGRAM

## 2024-04-15 NOTE — PATIENT INSTRUCTIONS
Orders Placed This Encounter   Procedures    Wound Procedure Treatment     This order was created via procedure documentation    Wound cleansing and dressings Abdomen     Routine, Clinic Performed, Future, Expires: 4/8/2025,     Abdomen Wound    Remove abdominal dressing, wash with Normal Saline, pat dry prior to applying new dressing        Apply Endoform AM to the wound.  Cover with dry dressing and tape.  Change every other day and as needed for excessive drainage.  if you notice increased odor or see greenish drainage, you may resume using the Dakin's soak with plain packing strips.      If you run out of Endoform AM, you may use the plain packing strips and silver gel until more Endoform is acquired.     Standing Status:   Future     Standing Expiration Date:   4/22/2024

## 2024-04-15 NOTE — PROGRESS NOTES
Patient ID: Maria R Webb is a 43 y.o. female Date of Birth 1980       Chief Complaint   Patient presents with    Follow Up Wound Care Visit     ABD wound care follow up       Allergies:  Patient has no known allergies.    Diagnosis:   Diagnosis ICD-10-CM Associated Orders   1. Open wound of abdomen, subsequent encounter  S31.109D Wound Procedure Treatment Abdomen     Wound cleansing and dressings Abdomen      2. Surgical wound dehiscence, sequela  T81.31XS       3. S/P hysterectomy  Z90.710       4. S/P hernia repair  Z98.890     Z87.19       5. Tobacco use disorder  F17.200       6. Obesity, Class III, BMI 40-49.9 (morbid obesity) ([facility])  E66.01            Assessment  & Plan:    F/u surgical wound of abdomen s/p herniation post hysterectomy and dehiscence post hernia repair. No significant change since prior visit. Wound continues to have a depth of 1.5 cm. Continues to have moderate drainage. No appreciable odor. Periwound with scar tissue.   Continue with current regimen of endoform am packing into remaining opening. Change daily to every other day. Should she run out of product may return to use of silver gel coated packing until next appointment.   May use Dakins should there be a return of foul odor or green drainage. Notify office should this occur.   Attempt smoking reduction to assist with healing.   Obtain 3-4 servings of protein daily for wound healing.   Instructed to monitor for any changes including redness or swelling surrounding the wound, increased drainage or pain as well as fevers or chills.    F/u in two weeks. Instructed to call if any questions or concerns arise in meantime.         Subjective:   3/11/2024: Maria R is a 43-year-old female who is presenting to wound center today for follow-up of abdominal wound s/p hysterectomy complicated by fascial dehiscence and abdominal herniation that required incisional hernia repair w/ mesh.  Has been following with wound center since January '23.   "Per wound center visit on 2/5/2024 \"saw Dr. Conroy, general surgery since prior visit whom did not feel as though any further medication/imaging/intervention was necessary at this time and would like pt to continue with local wound care and follow up with him in 3 months.\" At last wound center visit, was changed to Dakins soaked packing after she was experiencing green drainage.  She was instructed to resume silver gel coated packing once the green drainage resolved.  She reports that she has been utilizing Dakin soaked packing over the past week and had not switched to green drainage though this seemed to resolve a few days ago. Of note, is to have recently started Ozempic for weight reduction.  Currently on Z-Dallin and prednisone for bronchitis.  She denies fever and/or chills.  Continues with cough and shortness of breath but no acute worsening.    03/18/24: Pt presents for f/u surgical abdominal wound dehiscence s/p hysterectomy complicated by fascial dehiscence and abdominal herniation requiring incision hernia repair with mesh complicated by further dehiscence. Pt notes that Dr. Lopez was able to surgically debride a piece of remaining mesh at her visit last week and opted to continue with Dakin soaked packing giving significant improvement in the wound with its use. No nausea, vomiting, severe abdominal pain, change in bowel habits nor fevers/chills. Continuing to smoke about the same amount.     04/08/24: Pt presents for f/u surgical abdominal wound dehiscence. Since her previous visit she has had f/u with Dr. Conroy, general surgery. No further f/u with general surgery was required, pt is to continue with wound care but may f/u with surgery if needed. Pt reports she noticed a slight increase in the odor related to the drainage the other day so she used Dakins and it went away. She ran out of the AMD packing therefore returned to use of the silver gel coated packing. Is feeling well. Has started Ozempic " and has noticed a 3 lb weight loss already. Smoking about the same amount. No fevers, chills.     04/15/24: Pt presents for wound related to surgical site dehiscence of the abdomen. Has been using endoform am packing into the remaining opening. Has not had a return of green or foul smelling drainage since her previous visit. Offers no complaints today. No significant changes in medical history since prior visit.           The following portions of the patient's history were reviewed and updated as appropriate:   Patient Active Problem List   Diagnosis    Tobacco use disorder    COVID-19    Obesity, morbid, BMI 50 or higher (HCC)    Bulky or enlarged uterus    Pelvic pain    Preoperative exam for gynecologic surgery    HTN (hypertension)    Gastroesophageal reflux disease    Asthma    Obstructive sleep apnea syndrome    S/P hysterectomy    Postoperative anemia    Rheumatoid arthritis involving multiple sites with positive rheumatoid factor (HCC)    Encounter for postoperative care    Open wound of abdomen    Surgical wound, non healing    Cigarette nicotine dependence without complication     Past Medical History:   Diagnosis Date    Abdominal wall abscess at site of surgical wound 2023    Abnormal uterine bleeding due to intramural leiomyoma 2022    Arthritis     Rheumatoid    Asthma     Breathing difficulty     only occured during inverted robotic hysterectomy    Cellulitis of drainage site following surgery 2023    CPAP (continuous positive airway pressure) dependence     GERD (gastroesophageal reflux disease)     Hypertension     Obese     Pneumonia 2007    Sleep apnea     Uterine fibroid     LTH today 12/15/2022    Wound dehiscence, surgical 2022     Past Surgical History:   Procedure Laterality Date    ABDOMINAL WASHOUT      post  with wound vac     SECTION      had hematoma removed after the surgery    FOREARM SURGERY Right     repair of fracture with plating    INCISIONAL  HERNIA REPAIR  12/20/2022    Procedure: REPAIR  RECURRENT HERNIA INCISIONAL WITH MESH;  Surgeon: Donna Lau MD;  Location: [facility];  Service: Gynecology    IR DRAINAGE TUBE CHECK WITH SCLEROSIS  2/3/2023    IR DRAINAGE TUBE CHECK WITH SCLEROSIS  3/7/2023    IR DRAINAGE TUBE CHECK/CHANGE/REPOSITION/REINSERTION/UPSIZE  1/23/2023    IR DRAINAGE TUBE CHECK/CHANGE/REPOSITION/REINSERTION/UPSIZE  1/27/2023    IR DRAINAGE TUBE CHECK/CHANGE/REPOSITION/REINSERTION/UPSIZE  2/13/2023    IR DRAINAGE TUBE CHECK/CHANGE/REPOSITION/REINSERTION/UPSIZE  2/27/2023    IR DRAINAGE TUBE PLACEMENT  1/9/2023    LAPAROTOMY N/A 12/20/2022    Procedure: LAPAROTOMY EXPLORATORY;  Surgeon: Donna Lau MD;  Location[facility];  Service: Gynecology    MYRINGOTOMY W/ TUBES      two sets as a young child    AR CYSTOURETHROSCOPY N/A 12/15/2022    Procedure: CYSTO W/ ROBOT;  Surgeon: Donna Lau MD;  [facility];  Service: Gynecology    AR LAPS TOTAL HYSTERECT 250 GM/< W/RMVL TUBE/OVARY N/A 12/15/2022    Procedure: (LTH) W/ BILATERAL SALPINGECTOMY  W/ ROBOT COVERTED TO OPEN;  Surgeon: Donna Lau MD;  Location: [facility];  Service: Gynecology    VAC DRESSING APPLICATION N/A 3/17/2023    Procedure: APPLICATION VAC DRESSING ABDOMEN/TRUNK;  Surgeon: Chan Conroy MD;  Location: [facility];  Service: General    WOUND DEBRIDEMENT N/A 3/15/2023    Procedure: DEBRIDEMENT Abdominal WOUND and placement of Wound VAC;  Surgeon: Chan Conroy MD;  Location[facility];  Service: General    WOUND DEBRIDEMENT N/A 3/17/2023    Procedure: DEBRIDEMENT WOUND (WASH OUT);  Surgeon: Chan Conroy MD;  [facility];  Service: General     Family History   Problem Relation Age of Onset    Multiple sclerosis Mother     Hypertension Father     Hyperlipidemia Father     Cerebral aneurysm Father     Pulmonary embolism Father     Liver disease Brother      Social History     Socioeconomic History    Marital  status:      Spouse name: None    Number of children: None    Years of education: None    Highest education level: None   Occupational History    None   Tobacco Use    Smoking status: Every Day     Current packs/day: 0.50     Average packs/day: 0.5 packs/day for 30.3 years (15.1 ttl pk-yrs)     Types: Cigarettes     Start date: 1995    Smokeless tobacco: Never   Vaping Use    Vaping status: Never Used   Substance and Sexual Activity    Alcohol use: Not Currently     Alcohol/week: 1.0 standard drink of alcohol     Types: 1 Standard drinks or equivalent per week     Comment: 3 x monthly    Drug use: Never    Sexual activity: Yes     Partners: Male     Birth control/protection: Male Sterilization   Other Topics Concern    None   Social History Narrative    None     Social Determinants of Health     Financial Resource Strain: Not on file   Food Insecurity: No Food Insecurity (3/16/2023)    Hunger Vital Sign     Worried About Running Out of Food in the Last Year: Never true     Ran Out of Food in the Last Year: Never true   Transportation Needs: No Transportation Needs (3/16/2023)    PRAPARE - Transportation     Lack of Transportation (Medical): No     Lack of Transportation (Non-Medical): No   Physical Activity: Not on file   Stress: Not on file   Social Connections: Not on file   Intimate Partner Violence: Not on file   Housing Stability: Low Risk  (3/16/2023)    Housing Stability Vital Sign     Unable to Pay for Housing in the Last Year: No     Number of Places Lived in the Last Year: 1     Unstable Housing in the Last Year: No       Current Outpatient Medications:     albuterol (2.5 mg/3 mL) 0.083 % nebulizer solution, Take 2.5 mg by nebulization every 4 (four) hours as needed for shortness of breath or wheezing, Disp: , Rfl:     albuterol (ProAir HFA) 90 mcg/act inhaler, Inhale 2 puffs every 6 (six) hours as needed for wheezing or shortness of breath, Disp: 8.5 g, Rfl: 0    albuterol (PROVENTIL HFA,VENTOLIN  HFA) 90 mcg/act inhaler, Inhale 2 puffs every 6 (six) hours as needed for wheezing, Disp: 8 g, Rfl: 2    amoxicillin (AMOXIL) 500 mg capsule, , Disp: , Rfl:     Arthritis Pain Relief 650 MG CR tablet, take 1 tablet by mouth every 8 hours if needed for mild pain (Patient not taking: Reported on 3/7/2024), Disp: , Rfl:     aspirin 325 mg tablet, Take 325 mg by mouth daily. Indications: as needed (Patient not taking: Reported on 3/7/2024), Disp: , Rfl:     chlorhexidine (HIBICLENS) 4 % external liquid, Apply 1 Application topically daily as needed for wound care Can use to clean wound during VAC changes. (Patient not taking: Reported on 1/29/2024), Disp: 473 mL, Rfl: 5    clonazePAM (KlonoPIN) 0.5 mg tablet, Take 0.5 mg by mouth 2 (two) times a day as needed (Patient not taking: Reported on 1/29/2024), Disp: , Rfl:     cyclobenzaprine (FLEXERIL) 5 mg tablet, Take 1 tablet (5 mg total) by mouth 3 (three) times a day as needed for muscle spasms (Patient taking differently: Take 5 mg by mouth 3 (three) times a day as needed for muscle spasms As needed), Disp: 60 tablet, Rfl: 0    dulaglutide (Trulicity) 0.75 MG/0.5ML injection, Inject 0.75 mg under the skin Once a week (Patient not taking: Reported on 1/29/2024), Disp: , Rfl:     escitalopram (LEXAPRO) 10 mg tablet, Take 10 mg by mouth daily, Disp: , Rfl:     escitalopram (LEXAPRO) 5 mg tablet, Take 5 mg by mouth daily, Disp: , Rfl:     fluticasone (FLONASE) 50 mcg/act nasal spray, 2 sprays into each nostril daily, Disp: 16 g, Rfl: 4    Fluticasone-Salmeterol (Advair) 250-50 mcg/dose inhaler, , Disp: , Rfl:     Fluticasone-Salmeterol (Advair) 500-50 mcg/dose inhaler, Inhale 1 puff 2 (two) times a day, Disp: , Rfl:     folic acid (FOLVITE) 1 mg tablet, Take by mouth daily, Disp: , Rfl:     gabapentin (NEURONTIN) 100 mg capsule, Take 1 capsule (100 mg total) by mouth 3 (three) times a day (Patient not taking: Reported on 3/7/2024), Disp: 90 capsule, Rfl: 0    gabapentin  (NEURONTIN) 300 mg capsule, Take 300 mg by mouth 3 (three) times a day, Disp: , Rfl:     hydrochlorothiazide (HYDRODIURIL) 12.5 mg tablet, Take 12.5 mg by mouth daily Pt only  taking as needed for leg swelling (Patient not taking: Reported on 3/7/2024), Disp: , Rfl:     ibuprofen (MOTRIN) 600 mg tablet, Take 600 mg by mouth every 6 (six) hours as needed for moderate pain As needed, Disp: , Rfl:     methocarbamol (ROBAXIN) 500 mg tablet, Take 1 tablet (500 mg total) by mouth every 6 (six) hours for 14 days (Patient taking differently: Take 500 mg by mouth every 6 (six) hours As needed), Disp: 56 tablet, Rfl: 0    methotrexate 2.5 mg tablet, Four 2.5 tabs one time a week ( Every Saturday), Disp: , Rfl:     montelukast (SINGULAIR) 10 mg tablet, Take by mouth daily at bedtime as needed, Disp: , Rfl:     naloxone (NARCAN) 4 mg/0.1 mL nasal spray, Administer 1 spray into a nostril. If no response after 2-3 minutes, give another dose in the other nostril using a new spray. (Patient not taking: Reported on 4/14/2023), Disp: 1 each, Rfl: 1    neomycin-polymyxin-hydrocortisone (CORTISPORIN) otic solution, Administer 3 drops into the left ear every 6 (six) hours (Patient not taking: Reported on 1/29/2024), Disp: , Rfl:     nicotine (NICODERM CQ) 14 mg/24hr TD 24 hr patch, Place 1 patch on the skin every 24 hours (Patient not taking: Reported on 6/2/2023), Disp: 28 patch, Rfl: 3    omeprazole (PriLOSEC) 40 MG capsule, Take 40 mg by mouth daily, Disp: , Rfl:     predniSONE 10 mg tablet, Take 5 tabs po x 2 days; 4 tabs po x 2 days; 3 tabs po x 1 day; 2 tabs po x 1 day. 1 tab po x 1 day. (Patient not taking: Reported on 1/29/2024), Disp: 24 tablet, Rfl: 0    predniSONE 10 mg tablet, Take by mouth 2 (two) times a day with meals For 10 days (Patient not taking: Reported on 4/1/2024), Disp: , Rfl:     predniSONE 10 mg tablet, Take 5 tabs po x 2 days; 4 tabs po x 2 days; 3 tabs po x 1 day; 2 tabs po x 1 day. 1 tab po x 1 day. (Patient  not taking: Reported on 4/1/2024), Disp: 24 tablet, Rfl: 0    semaglutide, 0.25 or 0.5 mg/dose, (Ozempic) 2 mg/3 mL injection pen, Inject 0.5 mg under the skin Once a week (Patient not taking: Reported on 1/29/2024), Disp: , Rfl:     sodium chloride, PF, 0.9 %, Inject 10 mL into a catheter in a vein 3 (three) times a week Wound irrigation. (Patient not taking: Reported on 1/29/2024), Disp: 100 mL, Rfl: 10    Vitamin D, Ergocalciferol, 64238 units CAPS, Take by mouth once a week, Disp: , Rfl:     Review of Systems   Constitutional:  Negative for chills and fever.   Respiratory:          Recent urgent care visit for bronchitis    Gastrointestinal:  Negative for abdominal pain, constipation, diarrhea, nausea and vomiting.   Skin:  Positive for wound (open surgical wound abdomen).   Psychiatric/Behavioral:  The patient is not nervous/anxious.          Objective:  /72   Pulse 72   Temp (!) 97.3 °F (36.3 °C)   Resp 18   LMP 11/23/2022 (Exact Date) Comment: partial  Pain Score: 0-No pain     Physical Exam  Vitals reviewed.   Constitutional:       General: She is not in acute distress.     Appearance: She is morbidly obese. She is not ill-appearing, toxic-appearing or diaphoretic.      Comments: Very pleasant, no acute distress   HENT:      Head: Normocephalic and atraumatic.   Cardiovascular:      Rate and Rhythm: Normal rate.   Pulmonary:      Effort: Pulmonary effort is normal. No respiratory distress.   Abdominal:          Comments: Open surgical wound of abdomen secondary to dehiscence. Measuring the same in size at 1.5 cm depth. Continues to drain moderate amount. No malodor appreciated. Periowund with scar tissue. See full wound assessment.      Skin:     Findings: Wound present.   Neurological:      Mental Status: She is alert and oriented to person, place, and time.   Psychiatric:         Mood and Affect: Mood normal.         Behavior: Behavior normal.                  Wound 07/10/23 Abdomen (Active)    Wound Image   04/15/24 0818   Wound Description Pink;Pale 04/15/24 0800   Alexa-wound Assessment Pink;Scar Tissue;Clean 04/15/24 0800   Wound Length (cm) 0.4 cm 04/15/24 0800   Wound Width (cm) 0.3 cm 04/15/24 0800   Wound Depth (cm) 1.5 cm 04/15/24 0800   Wound Surface Area (cm^2) 0.12 cm^2 04/15/24 0800   Wound Volume (cm^3) 0.18 cm^3 04/15/24 0800   Calculated Wound Volume (cm^3) 0.18 cm^3 04/15/24 0800   Change in Wound Size % 99.58 04/15/24 0800   Tunneling 1 2 cm 03/04/24 0822   Tunneling 1 in depth located at 7 o'clock 03/04/24 0822   Number of underminings 1 02/05/24 0821   Undermining 1 2.5 02/05/24 0821   Undermining 1 is depth extending from 6-7 02/05/24 0821   Drainage Amount Moderate 04/15/24 0800   Drainage Description Yellow;Brown 04/15/24 0800   Non-staged Wound Description Full thickness 04/15/24 0800   Treatments Irrigation with NSS 04/08/24 0822   Dressing Wound V.A.C. 07/31/23 0859   Wound packed? No 04/08/24 0822   Packing- # removed 1 03/11/24 0831   Packing- # inserted 2 07/24/23 0832   Dressing Changed New 07/24/23 0832   Patient Tolerance Tolerated well 04/15/24 0800   Dressing Status Intact;Old drainage 04/15/24 0800                     Results from last 6 Months   Lab Units 12/18/23  1030   WOUND CULTURE  3+ Growth of Pasteurella multocida*  2+ Growth of Escherichia coli*  3+ Growth of           Wound Instructions:  Orders Placed This Encounter   Procedures    Wound Procedure Treatment Abdomen     This order was created via procedure documentation    Wound cleansing and dressings Abdomen     Routine, Clinic Performed, Future, Expires: 4/8/2025,     Abdomen Wound    Remove abdominal dressing, wash with Normal Saline, pat dry prior to applying new dressing        Apply Endoform AM to the wound.  Cover with dry dressing and tape.  Change every other day and as needed for excessive drainage.  if you notice increased odor or see greenish drainage, you may resume using the Dakin's soak with  "plain packing strips.      If you run out of Endoform AM, you may use the plain packing strips and silver gel until more Endoform is acquired.     Standing Status:   Future     Standing Expiration Date:   4/22/2024       Salma Garcia PA-C      Portions of the record may have been created with voice recognition software. Occasional wrong word or \"sound alike\" substitutions may have occurred due to the inherent limitations of voice recognition software. Read the chart carefully and recognize, using context, where substitutions have occurred.      "

## 2024-04-15 NOTE — PROGRESS NOTES
Wound Procedure Treatment Abdomen    Performed by: Gudelia Combs RN  Authorized by: Salma Garcia PA-C  Associated wounds:   Wound 07/10/23 Abdomen  Wound cleansed with:  NSS  Applied to periwound:  Zinc oxide paste  Applied primary dressing:  Collagen dressing  Applied secondary dressing:  ABD  Wound secured with:  Tape

## 2024-04-29 ENCOUNTER — OFFICE VISIT (OUTPATIENT)
Dept: WOUND CARE | Facility: CLINIC | Age: 44
End: 2024-04-29
Payer: COMMERCIAL

## 2024-04-29 VITALS
HEART RATE: 88 BPM | TEMPERATURE: 97.6 F | SYSTOLIC BLOOD PRESSURE: 122 MMHG | RESPIRATION RATE: 18 BRPM | DIASTOLIC BLOOD PRESSURE: 62 MMHG

## 2024-04-29 DIAGNOSIS — Z90.710 S/P HYSTERECTOMY: ICD-10-CM

## 2024-04-29 DIAGNOSIS — S31.109D OPEN WOUND OF ABDOMEN, SUBSEQUENT ENCOUNTER: Primary | ICD-10-CM

## 2024-04-29 DIAGNOSIS — T81.31XS SURGICAL WOUND DEHISCENCE, SEQUELA: ICD-10-CM

## 2024-04-29 PROCEDURE — 99214 OFFICE O/P EST MOD 30 MIN: CPT | Performed by: STUDENT IN AN ORGANIZED HEALTH CARE EDUCATION/TRAINING PROGRAM

## 2024-04-29 PROCEDURE — 99213 OFFICE O/P EST LOW 20 MIN: CPT | Performed by: STUDENT IN AN ORGANIZED HEALTH CARE EDUCATION/TRAINING PROGRAM

## 2024-04-29 PROCEDURE — G0463 HOSPITAL OUTPT CLINIC VISIT: HCPCS | Performed by: STUDENT IN AN ORGANIZED HEALTH CARE EDUCATION/TRAINING PROGRAM

## 2024-04-29 RX ORDER — GENTAMICIN SULFATE 1 MG/G
OINTMENT TOPICAL DAILY
Qty: 15 G | Refills: 1 | Status: SHIPPED | OUTPATIENT
Start: 2024-04-29 | End: 2024-05-06 | Stop reason: SDUPTHER

## 2024-04-29 NOTE — PATIENT INSTRUCTIONS
Orders Placed This Encounter   Procedures    Wound cleansing and dressings Abdomen     Abdomen Wound    Remove abdominal dressing, wash with Normal Saline, pat dry prior to applying new dressing        Loosely pack the wound with Plain packing coated with Gentamicin ointment    -----if the packing does not stay in then may apply small amount of the Gentamicin ointment to a q tip and place small amount into the wound   Cover with dry dressing and tape.    Change daily and as needed for excessive drainage      Please  the Gentamicin ointment at your pharmacy today     Standing Status:   Future     Standing Expiration Date:   5/6/2024

## 2024-04-29 NOTE — PROGRESS NOTES
Wound Procedure Treatment Abdomen    Performed by: Qi Tripp RN  Authorized by: Salma Garcia PA-C  Associated wounds:   Wound 07/10/23 Abdomen  Wound cleansed with:  NSS  Applied primary dressing:  Packing  Applied secondary dressing:  ABD  Wound secured with:  Tape    Silvasorb gel coated to 1/4 inch plain packing

## 2024-04-29 NOTE — PROGRESS NOTES
"Patient ID: Maria R Webb is a 43 y.o. female Date of Birth 1980       Chief Complaint   Patient presents with    Follow Up Wound Care Visit     Abdomen wound         Allergies:  Patient has no known allergies.    Diagnosis:   Diagnosis ICD-10-CM Associated Orders   1. Open wound of abdomen, subsequent encounter  S31.109D Wound cleansing and dressings Abdomen     Wound Procedure Treatment Abdomen     gentamicin (GARAMYCIN) 0.1 % ointment      2. Surgical wound dehiscence, sequela  T81.31XS gentamicin (GARAMYCIN) 0.1 % ointment      3. S/P hysterectomy  Z90.710 gentamicin (GARAMYCIN) 0.1 % ointment           Assessment  & Plan:    F/u surgical wound of abdomen s/p herniation post hysterectomy and dehiscence post hernia repair. Depth is improved and is currently measuring 0.7 cm comapred to prior 1.5 cm. Continues to drainage moderate amount of tan colored drainage. No obvious odor on examination however pt reports more obvious odor with use of endoform compared to silver gel. Periwound with scar tissue.   Given reports of odor will  to gentamicin coated packing rather than endoform. Change daily. If cannot place packing into wound can place gentamicin ointment directly to wound bed.   Attempt smoking reduction to assist with healing.   Obtain 3-4 servings of protein daily for wound healing.   Instructed to monitor for any changes including redness or swelling surrounding the wound, increased drainage or pain as well as fevers or chills.    F/u in one weeks. Instructed to call if any questions or concerns arise in meantime.         Subjective:   3/11/2024: Maria R is a 43-year-old female who is presenting to wound center today for follow-up of abdominal wound s/p hysterectomy complicated by fascial dehiscence and abdominal herniation that required incisional hernia repair w/ mesh.  Has been following with wound center since January '23.  Per wound center visit on 2/5/2024 \"saw Dr. Conroy, " "general surgery since prior visit whom did not feel as though any further medication/imaging/intervention was necessary at this time and would like pt to continue with local wound care and follow up with him in 3 months.\" At last wound center visit, was changed to Dakins soaked packing after she was experiencing green drainage.  She was instructed to resume silver gel coated packing once the green drainage resolved.  She reports that she has been utilizing Dakin soaked packing over the past week and had not switched to green drainage though this seemed to resolve a few days ago. Of note, is to have recently started Ozempic for weight reduction.  Currently on Z-Dallin and prednisone for bronchitis.  She denies fever and/or chills.  Continues with cough and shortness of breath but no acute worsening.    03/18/24: Pt presents for f/u surgical abdominal wound dehiscence s/p hysterectomy complicated by fascial dehiscence and abdominal herniation requiring incision hernia repair with mesh complicated by further dehiscence. Pt notes that Dr. Lopez was able to surgically debride a piece of remaining mesh at her visit last week and opted to continue with Dakin soaked packing giving significant improvement in the wound with its use. No nausea, vomiting, severe abdominal pain, change in bowel habits nor fevers/chills. Continuing to smoke about the same amount.     04/08/24: Pt presents for f/u surgical abdominal wound dehiscence. Since her previous visit she has had f/u with Dr. Conroy, general surgery. No further f/u with general surgery was required, pt is to continue with wound care but may f/u with surgery if needed. Pt reports she noticed a slight increase in the odor related to the drainage the other day so she used Dakins and it went away. She ran out of the AMD packing therefore returned to use of the silver gel coated packing. Is feeling well. Has started Ozempic and has noticed a 3 lb weight loss already. Smoking " about the same amount. No fevers, chills.     04/15/24: Pt presents for wound related to surgical site dehiscence of the abdomen. Has been using endoform am packing into the remaining opening. Has not had a return of green or foul smelling drainage since her previous visit. Offers no complaints today. No significant changes in medical history since prior visit.     04/29/24: Pt presents for f/u wound related to surgical site dehiscence of the abdomen. Is using endoform am but has noticed a more obvious odor since switching from use of silver gel to the endoform. Drainage is tan in color. Aside from the odor of the drainage offers no complaints today.           The following portions of the patient's history were reviewed and updated as appropriate:   Patient Active Problem List   Diagnosis    Tobacco use disorder    COVID-19    Obesity, morbid, BMI 50 or higher (HCC)    Bulky or enlarged uterus    Pelvic pain    Preoperative exam for gynecologic surgery    HTN (hypertension)    Gastroesophageal reflux disease    Asthma    Obstructive sleep apnea syndrome    S/P hysterectomy    Postoperative anemia    Rheumatoid arthritis involving multiple sites with positive rheumatoid factor (HCC)    Encounter for postoperative care    Open wound of abdomen    Surgical wound, non healing    Cigarette nicotine dependence without complication     Past Medical History:   Diagnosis Date    Abdominal wall abscess at site of surgical wound 1/9/2023    Abnormal uterine bleeding due to intramural leiomyoma 08/16/2022    Arthritis     Rheumatoid    Asthma     Breathing difficulty     only occured during inverted robotic hysterectomy    Cellulitis of drainage site following surgery 1/9/2023    CPAP (continuous positive airway pressure) dependence     GERD (gastroesophageal reflux disease)     Hypertension     Obese     Pneumonia 2007    Sleep apnea     Uterine fibroid     LTH today 12/15/2022    Wound dehiscence, surgical 12/20/2022      Past Surgical History:   Procedure Laterality Date    ABDOMINAL WASHOUT      post  with wound vac     SECTION      had hematoma removed after the surgery    FOREARM SURGERY Right     repair of fracture with plating    INCISIONAL HERNIA REPAIR  2022    Procedure: REPAIR  RECURRENT HERNIA INCISIONAL WITH MESH;  Surgeon: Donna Lau MD;  Location: [facility];  Service: Gynecology    IR DRAINAGE TUBE CHECK WITH SCLEROSIS  2/3/2023    IR DRAINAGE TUBE CHECK WITH SCLEROSIS  3/7/2023    IR DRAINAGE TUBE CHECK/CHANGE/REPOSITION/REINSERTION/UPSIZE  2023    IR DRAINAGE TUBE CHECK/CHANGE/REPOSITION/REINSERTION/UPSIZE  2023    IR DRAINAGE TUBE CHECK/CHANGE/REPOSITION/REINSERTION/UPSIZE  2023    IR DRAINAGE TUBE CHECK/CHANGE/REPOSITION/REINSERTION/UPSIZE  2023    IR DRAINAGE TUBE PLACEMENT  2023    LAPAROTOMY N/A 2022    Procedure: LAPAROTOMY EXPLORATORY;  Surgeon: Donna Lau MD;  Location: [facility];  Service: Gynecology    MYRINGOTOMY W/ TUBES      two sets as a young child    MT CYSTOURETHROSCOPY N/A 12/15/2022    Procedure: CYSTO W/ ROBOT;  Surgeon: Donna Lau MD;  Location: [facility];  Service: Gynecology    MT LAPS TOTAL HYSTERECT 250 GM/< W/RMVL TUBE/OVARY N/A 12/15/2022    Procedure: (LTH) W/ BILATERAL SALPINGECTOMY  W/ ROBOT COVERTED TO OPEN;  Surgeon: Donna Lau MD;  Location: [facility];  Service: Gynecology    VAC DRESSING APPLICATION N/A 3/17/2023    Procedure: APPLICATION VAC DRESSING ABDOMEN/TRUNK;  Surgeon: Chan Conroy MD;  Location: [facility];  Service: General    WOUND DEBRIDEMENT N/A 3/15/2023    Procedure: DEBRIDEMENT Abdominal WOUND and placement of Wound VAC;  Surgeon: Chan Conroy MD;  Location: [facility];  Service: General    WOUND DEBRIDEMENT N/A 3/17/2023    Procedure: DEBRIDEMENT WOUND (WASH OUT);  Surgeon: Chan Conroy MD;  Location: [facility];  Service: General     Family History    Problem Relation Age of Onset    Multiple sclerosis Mother     Hypertension Father     Hyperlipidemia Father     Cerebral aneurysm Father     Pulmonary embolism Father     Liver disease Brother      Social History     Socioeconomic History    Marital status:      Spouse name: None    Number of children: None    Years of education: None    Highest education level: None   Occupational History    None   Tobacco Use    Smoking status: Every Day     Current packs/day: 0.50     Average packs/day: 0.5 packs/day for 30.3 years (15.2 ttl pk-yrs)     Types: Cigarettes     Start date: 1995    Smokeless tobacco: Never   Vaping Use    Vaping status: Never Used   Substance and Sexual Activity    Alcohol use: Not Currently     Alcohol/week: 1.0 standard drink of alcohol     Types: 1 Standard drinks or equivalent per week     Comment: 3 x monthly    Drug use: Never    Sexual activity: Yes     Partners: Male     Birth control/protection: Male Sterilization   Other Topics Concern    None   Social History Narrative    None     Social Determinants of Health     Financial Resource Strain: Not on file   Food Insecurity: No Food Insecurity (3/16/2023)    Hunger Vital Sign     Worried About Running Out of Food in the Last Year: Never true     Ran Out of Food in the Last Year: Never true   Transportation Needs: No Transportation Needs (3/16/2023)    PRAPARE - Transportation     Lack of Transportation (Medical): No     Lack of Transportation (Non-Medical): No   Physical Activity: Not on file   Stress: Not on file   Social Connections: Not on file   Intimate Partner Violence: Not on file   Housing Stability: Low Risk  (3/16/2023)    Housing Stability Vital Sign     Unable to Pay for Housing in the Last Year: No     Number of Places Lived in the Last Year: 1     Unstable Housing in the Last Year: No       Current Outpatient Medications:     gentamicin (GARAMYCIN) 0.1 % ointment, Apply topically daily for 7 days To packing and place  into open wound, Disp: 15 g, Rfl: 1    albuterol (2.5 mg/3 mL) 0.083 % nebulizer solution, Take 2.5 mg by nebulization every 4 (four) hours as needed for shortness of breath or wheezing, Disp: , Rfl:     albuterol (ProAir HFA) 90 mcg/act inhaler, Inhale 2 puffs every 6 (six) hours as needed for wheezing or shortness of breath, Disp: 8.5 g, Rfl: 0    albuterol (PROVENTIL HFA,VENTOLIN HFA) 90 mcg/act inhaler, Inhale 2 puffs every 6 (six) hours as needed for wheezing, Disp: 8 g, Rfl: 2    amoxicillin (AMOXIL) 500 mg capsule, , Disp: , Rfl:     Arthritis Pain Relief 650 MG CR tablet, take 1 tablet by mouth every 8 hours if needed for mild pain (Patient not taking: Reported on 3/7/2024), Disp: , Rfl:     aspirin 325 mg tablet, Take 325 mg by mouth daily. Indications: as needed (Patient not taking: Reported on 3/7/2024), Disp: , Rfl:     chlorhexidine (HIBICLENS) 4 % external liquid, Apply 1 Application topically daily as needed for wound care Can use to clean wound during VAC changes. (Patient not taking: Reported on 1/29/2024), Disp: 473 mL, Rfl: 5    clonazePAM (KlonoPIN) 0.5 mg tablet, Take 0.5 mg by mouth 2 (two) times a day as needed (Patient not taking: Reported on 1/29/2024), Disp: , Rfl:     cyclobenzaprine (FLEXERIL) 5 mg tablet, Take 1 tablet (5 mg total) by mouth 3 (three) times a day as needed for muscle spasms (Patient taking differently: Take 5 mg by mouth 3 (three) times a day as needed for muscle spasms As needed), Disp: 60 tablet, Rfl: 0    dulaglutide (Trulicity) 0.75 MG/0.5ML injection, Inject 0.75 mg under the skin Once a week (Patient not taking: Reported on 1/29/2024), Disp: , Rfl:     escitalopram (LEXAPRO) 10 mg tablet, Take 10 mg by mouth daily, Disp: , Rfl:     escitalopram (LEXAPRO) 5 mg tablet, Take 5 mg by mouth daily, Disp: , Rfl:     fluticasone (FLONASE) 50 mcg/act nasal spray, 2 sprays into each nostril daily, Disp: 16 g, Rfl: 4    Fluticasone-Salmeterol (Advair) 250-50 mcg/dose inhaler,  , Disp: , Rfl:     Fluticasone-Salmeterol (Advair) 500-50 mcg/dose inhaler, Inhale 1 puff 2 (two) times a day, Disp: , Rfl:     folic acid (FOLVITE) 1 mg tablet, Take by mouth daily, Disp: , Rfl:     gabapentin (NEURONTIN) 100 mg capsule, Take 1 capsule (100 mg total) by mouth 3 (three) times a day (Patient not taking: Reported on 3/7/2024), Disp: 90 capsule, Rfl: 0    gabapentin (NEURONTIN) 300 mg capsule, Take 300 mg by mouth 3 (three) times a day, Disp: , Rfl:     hydrochlorothiazide (HYDRODIURIL) 12.5 mg tablet, Take 12.5 mg by mouth daily Pt only  taking as needed for leg swelling (Patient not taking: Reported on 3/7/2024), Disp: , Rfl:     ibuprofen (MOTRIN) 600 mg tablet, Take 600 mg by mouth every 6 (six) hours as needed for moderate pain As needed, Disp: , Rfl:     methocarbamol (ROBAXIN) 500 mg tablet, Take 1 tablet (500 mg total) by mouth every 6 (six) hours for 14 days (Patient taking differently: Take 500 mg by mouth every 6 (six) hours As needed), Disp: 56 tablet, Rfl: 0    methotrexate 2.5 mg tablet, Four 2.5 tabs one time a week ( Every Saturday), Disp: , Rfl:     montelukast (SINGULAIR) 10 mg tablet, Take by mouth daily at bedtime as needed, Disp: , Rfl:     naloxone (NARCAN) 4 mg/0.1 mL nasal spray, Administer 1 spray into a nostril. If no response after 2-3 minutes, give another dose in the other nostril using a new spray. (Patient not taking: Reported on 4/14/2023), Disp: 1 each, Rfl: 1    neomycin-polymyxin-hydrocortisone (CORTISPORIN) otic solution, Administer 3 drops into the left ear every 6 (six) hours (Patient not taking: Reported on 1/29/2024), Disp: , Rfl:     nicotine (NICODERM CQ) 14 mg/24hr TD 24 hr patch, Place 1 patch on the skin every 24 hours (Patient not taking: Reported on 6/2/2023), Disp: 28 patch, Rfl: 3    omeprazole (PriLOSEC) 40 MG capsule, Take 40 mg by mouth daily, Disp: , Rfl:     predniSONE 10 mg tablet, Take 5 tabs po x 2 days; 4 tabs po x 2 days; 3 tabs po x 1 day; 2  tabs po x 1 day. 1 tab po x 1 day. (Patient not taking: Reported on 1/29/2024), Disp: 24 tablet, Rfl: 0    predniSONE 10 mg tablet, Take by mouth 2 (two) times a day with meals For 10 days (Patient not taking: Reported on 4/1/2024), Disp: , Rfl:     predniSONE 10 mg tablet, Take 5 tabs po x 2 days; 4 tabs po x 2 days; 3 tabs po x 1 day; 2 tabs po x 1 day. 1 tab po x 1 day. (Patient not taking: Reported on 4/1/2024), Disp: 24 tablet, Rfl: 0    semaglutide, 0.25 or 0.5 mg/dose, (Ozempic) 2 mg/3 mL injection pen, Inject 0.5 mg under the skin Once a week (Patient not taking: Reported on 1/29/2024), Disp: , Rfl:     sodium chloride, PF, 0.9 %, Inject 10 mL into a catheter in a vein 3 (three) times a week Wound irrigation. (Patient not taking: Reported on 1/29/2024), Disp: 100 mL, Rfl: 10    Vitamin D, Ergocalciferol, 26895 units CAPS, Take by mouth once a week, Disp: , Rfl:     Review of Systems   Constitutional:  Negative for chills and fever.   Respiratory:          Recent urgent care visit for bronchitis    Gastrointestinal:  Negative for abdominal pain, constipation, diarrhea, nausea and vomiting.   Skin:  Positive for wound (open surgical wound abdomen).   Psychiatric/Behavioral:  The patient is not nervous/anxious.          Objective:  /62   Pulse 88   Temp 97.6 °F (36.4 °C)   Resp 18   LMP 11/23/2022 (Exact Date) Comment: partial  Pain Score: 0-No pain     Physical Exam  Vitals reviewed.   Constitutional:       General: She is not in acute distress.     Appearance: She is morbidly obese. She is not ill-appearing, toxic-appearing or diaphoretic.      Comments: Very pleasant, no acute distress   HENT:      Head: Normocephalic and atraumatic.   Cardiovascular:      Rate and Rhythm: Normal rate.   Pulmonary:      Effort: Pulmonary effort is normal. No respiratory distress.   Abdominal:          Comments: Open surgical wound of abdomen secondary to dehiscence. Depth is improved and is currently measuring 0.7  cm comapred to prior 1.5 cm. Continues to drainage moderate amount of tan colored drainage. No obvious odor on examination however pt reports more obvious odor with use of endoform compared to silver gel. Periwound with scar tissue.      Skin:     Findings: Wound present.   Neurological:      Mental Status: She is alert and oriented to person, place, and time.   Psychiatric:         Mood and Affect: Mood normal.         Behavior: Behavior normal.                Wound 07/10/23 Abdomen (Active)   Wound Image   04/29/24 1129   Wound Description Pink;Pale 04/15/24 0800   Alexa-wound Assessment Pink;Scar Tissue;Clean 04/15/24 0800   Wound Length (cm) 0.4 cm 04/29/24 1136   Wound Width (cm) 0.3 cm 04/29/24 1136   Wound Depth (cm) 0.7 cm 04/29/24 1136   Wound Surface Area (cm^2) 0.12 cm^2 04/29/24 1136   Wound Volume (cm^3) 0.084 cm^3 04/29/24 1136   Calculated Wound Volume (cm^3) 0.08 cm^3 04/29/24 1136   Change in Wound Size % 99.81 04/29/24 1136   Tunneling 1 2 cm 03/04/24 0822   Tunneling 1 in depth located at 7 o'clock 03/04/24 0822   Number of underminings 1 02/05/24 0821   Undermining 1 2.5 02/05/24 0821   Undermining 1 is depth extending from 6-7 02/05/24 0821   Drainage Amount Moderate 04/29/24 1136   Drainage Description Yellow;Brown;Serosanguineous 04/29/24 1136   Non-staged Wound Description Full thickness 04/29/24 1136   Treatments Irrigation with NSS 04/29/24 1136   Dressing Wound V.A.C. 07/31/23 0859   Wound packed? No 04/08/24 0822   Packing- # removed 1 03/11/24 0831   Packing- # inserted 2 07/24/23 0832   Dressing Changed New 07/24/23 0832   Patient Tolerance Tolerated well 04/29/24 1136   Dressing Status Intact;Old drainage 04/29/24 1136              Results from last 6 Months   Lab Units 12/18/23  1030   WOUND CULTURE  3+ Growth of Pasteurella multocida*  2+ Growth of Escherichia coli*  3+ Growth of           Wound Instructions:  Orders Placed This Encounter   Procedures    Wound cleansing and  "dressings Abdomen     Abdomen Wound    Remove abdominal dressing, wash with Normal Saline, pat dry prior to applying new dressing        Loosely pack the wound with Plain packing coated with Gentamicin ointment    -----if the packing does not stay in then may apply small amount of the Gentamicin ointment to a q tip and place small amount into the wound   Cover with dry dressing and tape.    Change daily and as needed for excessive drainage      Please  the Gentamicin ointment at your pharmacy today     Standing Status:   Future     Standing Expiration Date:   5/6/2024    Wound Procedure Treatment Abdomen     This order was created via procedure documentation       Cally Garcia, PA-C      Portions of the record may have been created with voice recognition software. Occasional wrong word or \"sound alike\" substitutions may have occurred due to the inherent limitations of voice recognition software. Read the chart carefully and recognize, using context, where substitutions have occurred.      "

## 2024-05-06 ENCOUNTER — OFFICE VISIT (OUTPATIENT)
Dept: WOUND CARE | Facility: CLINIC | Age: 44
End: 2024-05-06
Payer: COMMERCIAL

## 2024-05-06 VITALS
TEMPERATURE: 97 F | SYSTOLIC BLOOD PRESSURE: 120 MMHG | DIASTOLIC BLOOD PRESSURE: 60 MMHG | HEART RATE: 72 BPM | RESPIRATION RATE: 18 BRPM

## 2024-05-06 DIAGNOSIS — S31.109D OPEN WOUND OF ABDOMEN, SUBSEQUENT ENCOUNTER: Primary | ICD-10-CM

## 2024-05-06 DIAGNOSIS — T81.31XS SURGICAL WOUND DEHISCENCE, SEQUELA: ICD-10-CM

## 2024-05-06 DIAGNOSIS — Z90.710 S/P HYSTERECTOMY: ICD-10-CM

## 2024-05-06 PROCEDURE — 99213 OFFICE O/P EST LOW 20 MIN: CPT | Performed by: STUDENT IN AN ORGANIZED HEALTH CARE EDUCATION/TRAINING PROGRAM

## 2024-05-06 PROCEDURE — G0463 HOSPITAL OUTPT CLINIC VISIT: HCPCS | Performed by: STUDENT IN AN ORGANIZED HEALTH CARE EDUCATION/TRAINING PROGRAM

## 2024-05-06 PROCEDURE — 99212 OFFICE O/P EST SF 10 MIN: CPT | Performed by: STUDENT IN AN ORGANIZED HEALTH CARE EDUCATION/TRAINING PROGRAM

## 2024-05-06 RX ORDER — GENTAMICIN SULFATE 1 MG/G
OINTMENT TOPICAL 3 TIMES DAILY
Qty: 30 G | Refills: 1 | Status: SHIPPED | OUTPATIENT
Start: 2024-05-06 | End: 2024-05-13

## 2024-05-06 NOTE — PROGRESS NOTES
Wound Procedure Treatment Abdomen    Performed by: Qi Tripp RN  Authorized by: Salma Garcia PA-C  Associated wounds:   Wound 07/10/23 Abdomen  Wound cleansed with:  NSS  Applied secondary dressing:  ABD  Wound secured with:  Tape    Mupirocin

## 2024-05-06 NOTE — PROGRESS NOTES
"Patient ID: MariaR Webb is a 43 y.o. female Date of Birth 1980       Chief Complaint   Patient presents with    Follow Up Wound Care Visit     Abdominal wound       Allergies:  Patient has no known allergies.    Diagnosis:   Diagnosis ICD-10-CM Associated Orders   1. Open wound of abdomen, subsequent encounter  S31.109D Wound cleansing and dressings Abdomen     gentamicin (GARAMYCIN) 0.1 % ointment      2. Surgical wound dehiscence, sequela  T81.31XS gentamicin (GARAMYCIN) 0.1 % ointment      3. S/P hysterectomy  Z90.710 gentamicin (GARAMYCIN) 0.1 % ointment           Assessment  & Plan:    F/u surgical wound of abdomen s/p herniation post hysterectomy and dehiscence post hernia repair. Very small opening remains. Small amount of drainage. No odor appreciated. Periwound with scar tissue.   Gentamicin ointment to the opening three times daily. D/c packing as the open area is very small and packing will not stay. Continue to keep covered with ABD pad.   Attempt smoking reduction to assist with healing.   Obtain 3-4 servings of protein daily for wound healing.   Instructed to monitor for any changes including redness or swelling surrounding the wound, increased drainage or pain as well as fevers or chills.    F/u in one weeks. Instructed to call if any questions or concerns arise in meantime.         Subjective:   3/11/2024: Maria R is a 43-year-old female who is presenting to wound center today for follow-up of abdominal wound s/p hysterectomy complicated by fascial dehiscence and abdominal herniation that required incisional hernia repair w/ mesh.  Has been following with wound center since January '23.  Per wound center visit on 2/5/2024 \"saw Dr. Conroy, general surgery since prior visit whom did not feel as though any further medication/imaging/intervention was necessary at this time and would like pt to continue with local wound care and follow up with him in 3 months.\" At last wound center visit, was " changed to Dakins soaked packing after she was experiencing green drainage.  She was instructed to resume silver gel coated packing once the green drainage resolved.  She reports that she has been utilizing Dakin soaked packing over the past week and had not switched to green drainage though this seemed to resolve a few days ago. Of note, is to have recently started Ozempic for weight reduction.  Currently on Z-Dallin and prednisone for bronchitis.  She denies fever and/or chills.  Continues with cough and shortness of breath but no acute worsening.    03/18/24: Pt presents for f/u surgical abdominal wound dehiscence s/p hysterectomy complicated by fascial dehiscence and abdominal herniation requiring incision hernia repair with mesh complicated by further dehiscence. Pt notes that Dr. Lopez was able to surgically debride a piece of remaining mesh at her visit last week and opted to continue with Dakin soaked packing giving significant improvement in the wound with its use. No nausea, vomiting, severe abdominal pain, change in bowel habits nor fevers/chills. Continuing to smoke about the same amount.     04/08/24: Pt presents for f/u surgical abdominal wound dehiscence. Since her previous visit she has had f/u with Dr. Conroy, general surgery. No further f/u with general surgery was required, pt is to continue with wound care but may f/u with surgery if needed. Pt reports she noticed a slight increase in the odor related to the drainage the other day so she used Dakins and it went away. She ran out of the AMD packing therefore returned to use of the silver gel coated packing. Is feeling well. Has started Ozempic and has noticed a 3 lb weight loss already. Smoking about the same amount. No fevers, chills.     04/15/24: Pt presents for wound related to surgical site dehiscence of the abdomen. Has been using endoform am packing into the remaining opening. Has not had a return of green or foul smelling drainage since her  previous visit. Offers no complaints today. No significant changes in medical history since prior visit.     04/29/24: Pt presents for f/u wound related to surgical site dehiscence of the abdomen. Is using endoform am but has noticed a more obvious odor since switching from use of silver gel to the endoform. Drainage is tan in color. Aside from the odor of the drainage offers no complaints today.     05/06/24: Pt presents for f/u wound related to surgical site dehiscence of the abdomen. Has been using gentamicin ointment; packing is no longer staying in well. The odor from the drainage has improved from the prior visit. Offers no complaints today.           The following portions of the patient's history were reviewed and updated as appropriate:   Patient Active Problem List   Diagnosis    Tobacco use disorder    COVID-19    Obesity, morbid, BMI 50 or higher ([facility])    Bulky or enlarged uterus    Pelvic pain    Preoperative exam for gynecologic surgery    HTN (hypertension)    Gastroesophageal reflux disease    Asthma    Obstructive sleep apnea syndrome    S/P hysterectomy    Postoperative anemia    Rheumatoid arthritis involving multiple sites with positive rheumatoid factor ([facility])    Encounter for postoperative care    Open wound of abdomen    Surgical wound, non healing    Cigarette nicotine dependence without complication     Past Medical History:   Diagnosis Date    Abdominal wall abscess at site of surgical wound 1/9/2023    Abnormal uterine bleeding due to intramural leiomyoma 08/16/2022    Arthritis     Rheumatoid    Asthma     Breathing difficulty     only occured during inverted robotic hysterectomy    Cellulitis of drainage site following surgery 1/9/2023    CPAP (continuous positive airway pressure) dependence     GERD (gastroesophageal reflux disease)     Hypertension     Obese     Pneumonia 2007    Sleep apnea     Uterine fibroid     LTH today 12/15/2022    Wound dehiscence, surgical 12/20/2022     Past  Surgical History:   Procedure Laterality Date    ABDOMINAL WASHOUT      post  with wound vac     SECTION      had hematoma removed after the surgery    FOREARM SURGERY Right     repair of fracture with plating    INCISIONAL HERNIA REPAIR  2022    Procedure: REPAIR  RECURRENT HERNIA INCISIONAL WITH MESH;  Surgeon: Donna Lau MD;  Location: [facility];  Service: Gynecology    IR DRAINAGE TUBE CHECK WITH SCLEROSIS  2/3/2023    IR DRAINAGE TUBE CHECK WITH SCLEROSIS  3/7/2023    IR DRAINAGE TUBE CHECK/CHANGE/REPOSITION/REINSERTION/UPSIZE  2023    IR DRAINAGE TUBE CHECK/CHANGE/REPOSITION/REINSERTION/UPSIZE  2023    IR DRAINAGE TUBE CHECK/CHANGE/REPOSITION/REINSERTION/UPSIZE  2023    IR DRAINAGE TUBE CHECK/CHANGE/REPOSITION/REINSERTION/UPSIZE  2023    IR DRAINAGE TUBE PLACEMENT  2023    LAPAROTOMY N/A 2022    Procedure: LAPAROTOMY EXPLORATORY;  Surgeon: Donna Lau MD;  [facility];  Service: Gynecology    MYRINGOTOMY W/ TUBES      two sets as a young child    CT CYSTOURETHROSCOPY N/A 12/15/2022    Procedure: CYSTO W/ ROBOT;  Surgeon: Donna Lau MD;  [facility];  Service: Gynecology    CT LAPS TOTAL HYSTERECT 250 GM/< W/RMVL TUBE/OVARY N/A 12/15/2022    Procedure: (LTH) W/ BILATERAL SALPINGECTOMY  W/ ROBOT COVERTED TO OPEN;  Surgeon: Donna Lau MD;  Location: [facility];  Service: Gynecology    VAC DRESSING APPLICATION N/A 3/17/2023    Procedure: APPLICATION VAC DRESSING ABDOMEN/TRUNK;  Surgeon: Chan Conroy MD;  Location: [facility];  Service: General    WOUND DEBRIDEMENT N/A 3/15/2023    Procedure: DEBRIDEMENT Abdominal WOUND and placement of Wound VAC;  Surgeon: Chan Conroy MD;  [facility];  Service: General    WOUND DEBRIDEMENT N/A 3/17/2023    Procedure: DEBRIDEMENT WOUND (WASH OUT);  Surgeon: Chan Conroy MD;  Location: [facility];  Service: General     Family History   Problem  Relation Age of Onset    Multiple sclerosis Mother     Hypertension Father     Hyperlipidemia Father     Cerebral aneurysm Father     Pulmonary embolism Father     Liver disease Brother      Social History     Socioeconomic History    Marital status:      Spouse name: Not on file    Number of children: Not on file    Years of education: Not on file    Highest education level: Not on file   Occupational History    Not on file   Tobacco Use    Smoking status: Every Day     Current packs/day: 0.50     Average packs/day: 0.5 packs/day for 30.3 years (15.2 ttl pk-yrs)     Types: Cigarettes     Start date: 1995    Smokeless tobacco: Never   Vaping Use    Vaping status: Never Used   Substance and Sexual Activity    Alcohol use: Not Currently     Alcohol/week: 1.0 standard drink of alcohol     Types: 1 Standard drinks or equivalent per week     Comment: 3 x monthly    Drug use: Never    Sexual activity: Yes     Partners: Male     Birth control/protection: Male Sterilization   Other Topics Concern    Not on file   Social History Narrative    Not on file     Social Determinants of Health     Financial Resource Strain: Not on file   Food Insecurity: No Food Insecurity (3/16/2023)    Hunger Vital Sign     Worried About Running Out of Food in the Last Year: Never true     Ran Out of Food in the Last Year: Never true   Transportation Needs: No Transportation Needs (3/16/2023)    PRAPARE - Transportation     Lack of Transportation (Medical): No     Lack of Transportation (Non-Medical): No   Physical Activity: Not on file   Stress: Not on file   Social Connections: Not on file   Intimate Partner Violence: Not on file   Housing Stability: Low Risk  (3/16/2023)    Housing Stability Vital Sign     Unable to Pay for Housing in the Last Year: No     Number of Places Lived in the Last Year: 1     Unstable Housing in the Last Year: No       Current Outpatient Medications:     albuterol (2.5 mg/3 mL) 0.083 % nebulizer solution,  Take 2.5 mg by nebulization every 4 (four) hours as needed for shortness of breath or wheezing, Disp: , Rfl:     albuterol (ProAir HFA) 90 mcg/act inhaler, Inhale 2 puffs every 6 (six) hours as needed for wheezing or shortness of breath, Disp: 8.5 g, Rfl: 0    albuterol (PROVENTIL HFA,VENTOLIN HFA) 90 mcg/act inhaler, Inhale 2 puffs every 6 (six) hours as needed for wheezing, Disp: 8 g, Rfl: 2    amoxicillin (AMOXIL) 500 mg capsule, , Disp: , Rfl:     Arthritis Pain Relief 650 MG CR tablet, take 1 tablet by mouth every 8 hours if needed for mild pain (Patient not taking: Reported on 3/7/2024), Disp: , Rfl:     aspirin 325 mg tablet, Take 325 mg by mouth daily. Indications: as needed (Patient not taking: Reported on 3/7/2024), Disp: , Rfl:     chlorhexidine (HIBICLENS) 4 % external liquid, Apply 1 Application topically daily as needed for wound care Can use to clean wound during VAC changes. (Patient not taking: Reported on 1/29/2024), Disp: 473 mL, Rfl: 5    clonazePAM (KlonoPIN) 0.5 mg tablet, Take 0.5 mg by mouth 2 (two) times a day as needed (Patient not taking: Reported on 1/29/2024), Disp: , Rfl:     cyclobenzaprine (FLEXERIL) 5 mg tablet, Take 1 tablet (5 mg total) by mouth 3 (three) times a day as needed for muscle spasms (Patient taking differently: Take 5 mg by mouth 3 (three) times a day as needed for muscle spasms As needed), Disp: 60 tablet, Rfl: 0    dulaglutide (Trulicity) 0.75 MG/0.5ML injection, Inject 0.75 mg under the skin Once a week (Patient not taking: Reported on 1/29/2024), Disp: , Rfl:     escitalopram (LEXAPRO) 10 mg tablet, Take 10 mg by mouth daily, Disp: , Rfl:     escitalopram (LEXAPRO) 5 mg tablet, Take 5 mg by mouth daily, Disp: , Rfl:     fluticasone (FLONASE) 50 mcg/act nasal spray, 2 sprays into each nostril daily, Disp: 16 g, Rfl: 4    Fluticasone-Salmeterol (Advair) 250-50 mcg/dose inhaler, , Disp: , Rfl:     Fluticasone-Salmeterol (Advair) 500-50 mcg/dose inhaler, Inhale 1 puff 2  (two) times a day, Disp: , Rfl:     folic acid (FOLVITE) 1 mg tablet, Take by mouth daily, Disp: , Rfl:     gabapentin (NEURONTIN) 100 mg capsule, Take 1 capsule (100 mg total) by mouth 3 (three) times a day (Patient not taking: Reported on 3/7/2024), Disp: 90 capsule, Rfl: 0    gabapentin (NEURONTIN) 300 mg capsule, Take 300 mg by mouth 3 (three) times a day, Disp: , Rfl:     gentamicin (GARAMYCIN) 0.1 % ointment, Apply topically 3 (three) times a day for 7 days To packing and place into open wound, Disp: 30 g, Rfl: 1    hydrochlorothiazide (HYDRODIURIL) 12.5 mg tablet, Take 12.5 mg by mouth daily Pt only  taking as needed for leg swelling (Patient not taking: Reported on 3/7/2024), Disp: , Rfl:     ibuprofen (MOTRIN) 600 mg tablet, Take 600 mg by mouth every 6 (six) hours as needed for moderate pain As needed, Disp: , Rfl:     methocarbamol (ROBAXIN) 500 mg tablet, Take 1 tablet (500 mg total) by mouth every 6 (six) hours for 14 days (Patient taking differently: Take 500 mg by mouth every 6 (six) hours As needed), Disp: 56 tablet, Rfl: 0    methotrexate 2.5 mg tablet, Four 2.5 tabs one time a week ( Every Saturday), Disp: , Rfl:     montelukast (SINGULAIR) 10 mg tablet, Take by mouth daily at bedtime as needed, Disp: , Rfl:     naloxone (NARCAN) 4 mg/0.1 mL nasal spray, Administer 1 spray into a nostril. If no response after 2-3 minutes, give another dose in the other nostril using a new spray. (Patient not taking: Reported on 4/14/2023), Disp: 1 each, Rfl: 1    neomycin-polymyxin-hydrocortisone (CORTISPORIN) otic solution, Administer 3 drops into the left ear every 6 (six) hours (Patient not taking: Reported on 1/29/2024), Disp: , Rfl:     nicotine (NICODERM CQ) 14 mg/24hr TD 24 hr patch, Place 1 patch on the skin every 24 hours (Patient not taking: Reported on 6/2/2023), Disp: 28 patch, Rfl: 3    omeprazole (PriLOSEC) 40 MG capsule, Take 40 mg by mouth daily, Disp: , Rfl:     predniSONE 10 mg tablet, Take 5 tabs  po x 2 days; 4 tabs po x 2 days; 3 tabs po x 1 day; 2 tabs po x 1 day. 1 tab po x 1 day. (Patient not taking: Reported on 1/29/2024), Disp: 24 tablet, Rfl: 0    predniSONE 10 mg tablet, Take by mouth 2 (two) times a day with meals For 10 days (Patient not taking: Reported on 4/1/2024), Disp: , Rfl:     predniSONE 10 mg tablet, Take 5 tabs po x 2 days; 4 tabs po x 2 days; 3 tabs po x 1 day; 2 tabs po x 1 day. 1 tab po x 1 day. (Patient not taking: Reported on 4/1/2024), Disp: 24 tablet, Rfl: 0    semaglutide, 0.25 or 0.5 mg/dose, (Ozempic) 2 mg/3 mL injection pen, Inject 0.5 mg under the skin Once a week (Patient not taking: Reported on 1/29/2024), Disp: , Rfl:     sodium chloride, PF, 0.9 %, Inject 10 mL into a catheter in a vein 3 (three) times a week Wound irrigation. (Patient not taking: Reported on 1/29/2024), Disp: 100 mL, Rfl: 10    Vitamin D, Ergocalciferol, 57907 units CAPS, Take by mouth once a week, Disp: , Rfl:     Review of Systems   Constitutional:  Negative for chills and fever.   Respiratory:          Recent urgent care visit for bronchitis    Gastrointestinal:  Negative for abdominal pain, constipation, diarrhea, nausea and vomiting.   Skin:  Positive for wound (open surgical wound abdomen).   Psychiatric/Behavioral:  The patient is not nervous/anxious.          Objective:  /60   Pulse 72   Temp (!) 97 °F (36.1 °C)   Resp 18   LMP 11/23/2022 (Exact Date) Comment: partial  Pain Score: 0-No pain     Physical Exam  Vitals reviewed.   Constitutional:       General: She is not in acute distress.     Appearance: She is morbidly obese. She is not ill-appearing, toxic-appearing or diaphoretic.      Comments: Very pleasant, no acute distress   HENT:      Head: Normocephalic and atraumatic.   Cardiovascular:      Rate and Rhythm: Normal rate.   Pulmonary:      Effort: Pulmonary effort is normal. No respiratory distress.   Abdominal:          Comments: Open surgical wound of abdomen secondary to  "dehiscence. Very small opening remains. Small amount of drainage. No odor appreciated. Periwound with scar tissue. See full wound assessment.    Skin:     Findings: Wound present.   Neurological:      Mental Status: She is alert and oriented to person, place, and time.   Psychiatric:         Mood and Affect: Mood normal.         Behavior: Behavior normal.         Wound 07/10/23 Abdomen (Active)   Wound Image Images linked 05/06/24 0831   Wound Description Yellow;Pink 05/06/24 0829   Alexa-wound Assessment Pink;Scar Tissue;Clean 05/06/24 0829   Wound Length (cm) 0.2 cm 05/06/24 0829   Wound Width (cm) 0.2 cm 05/06/24 0829   Wound Depth (cm) 0.7 cm 05/06/24 0829   Wound Surface Area (cm^2) 0.04 cm^2 05/06/24 0829   Wound Volume (cm^3) 0.028 cm^3 05/06/24 0829   Calculated Wound Volume (cm^3) 0.03 cm^3 05/06/24 0829   Change in Wound Size % 99.93 05/06/24 0829   Drainage Amount Small 05/06/24 0829   Drainage Description Yellow;Tan 05/06/24 0829   Non-staged Wound Description Full thickness 05/06/24 0829   Wound packed? Yes 05/06/24 0829   Packing- # removed 1 05/06/24 0829   Dressing Status Intact 05/06/24 0829       Results from last 6 Months   Lab Units 12/18/23  1030   WOUND CULTURE  3+ Growth of Pasteurella multocida*  2+ Growth of Escherichia coli*  3+ Growth of           Wound Instructions:  Orders Placed This Encounter   Procedures    Wound cleansing and dressings Abdomen     Abdomen Wound     Remove abdominal dressing, wash with Normal Saline, pat dry prior to applying new dressing        Apply small amount of the Gentamicin ointment to a q tip and place small amount into the wound 2-3 x daily  Cover with dry dressing and tape.    Change 2-3 x daily and as needed for excessive drainage     Standing Status:   Future     Standing Expiration Date:   5/20/2024       Salma Garcia PA-C  Portions of the record may have been created with voice recognition software. Occasional wrong word or \"sound alike\" " substitutions may have occurred due to the inherent limitations of voice recognition software. Read the chart carefully and recognize, using context, where substitutions have occurred.

## 2024-05-06 NOTE — PATIENT INSTRUCTIONS
Orders Placed This Encounter   Procedures    Wound cleansing and dressings Abdomen     Abdomen Wound     Remove abdominal dressing, wash with Normal Saline, pat dry prior to applying new dressing        Apply small amount of the Gentamicin ointment to a q tip and place small amount into the wound 2-3 x daily  Cover with dry dressing and tape.    Change 2-3 x daily and as needed for excessive drainage     Standing Status:   Future     Standing Expiration Date:   5/20/2024

## 2024-05-20 ENCOUNTER — OFFICE VISIT (OUTPATIENT)
Dept: WOUND CARE | Facility: CLINIC | Age: 44
End: 2024-05-20
Payer: COMMERCIAL

## 2024-05-20 VITALS
SYSTOLIC BLOOD PRESSURE: 116 MMHG | RESPIRATION RATE: 18 BRPM | DIASTOLIC BLOOD PRESSURE: 72 MMHG | TEMPERATURE: 98 F | HEART RATE: 78 BPM

## 2024-05-20 DIAGNOSIS — S31.109D OPEN WOUND OF ABDOMEN, SUBSEQUENT ENCOUNTER: Primary | ICD-10-CM

## 2024-05-20 DIAGNOSIS — Z90.710 S/P HYSTERECTOMY: ICD-10-CM

## 2024-05-20 DIAGNOSIS — T81.31XS SURGICAL WOUND DEHISCENCE, SEQUELA: ICD-10-CM

## 2024-05-20 DIAGNOSIS — F17.200 TOBACCO USE DISORDER: ICD-10-CM

## 2024-05-20 DIAGNOSIS — E66.01 OBESITY, CLASS III, BMI 40-49.9 (MORBID OBESITY) (HCC): ICD-10-CM

## 2024-05-20 DIAGNOSIS — Z87.19 S/P HERNIA REPAIR: ICD-10-CM

## 2024-05-20 DIAGNOSIS — Z98.890 S/P HERNIA REPAIR: ICD-10-CM

## 2024-05-20 PROCEDURE — 99213 OFFICE O/P EST LOW 20 MIN: CPT | Performed by: STUDENT IN AN ORGANIZED HEALTH CARE EDUCATION/TRAINING PROGRAM

## 2024-05-20 PROCEDURE — G0463 HOSPITAL OUTPT CLINIC VISIT: HCPCS | Performed by: STUDENT IN AN ORGANIZED HEALTH CARE EDUCATION/TRAINING PROGRAM

## 2024-05-20 NOTE — PROGRESS NOTES
"Patient ID: Maria R Webb is a 43 y.o. female Date of Birth 1980       Chief Complaint   Patient presents with    Follow Up Wound Care Visit     Abdomen wound       Allergies:  Patient has no known allergies.    Diagnosis:   Diagnosis ICD-10-CM Associated Orders   1. Open wound of abdomen, subsequent encounter  S31.109D Wound cleansing and dressings Abdomen     Wound Procedure Treatment Abdomen      2. Surgical wound dehiscence, sequela  T81.31XS       3. S/P hysterectomy  Z90.710       4. S/P hernia repair  Z98.890     Z87.19       5. Tobacco use disorder  F17.200       6. Obesity, Class III, BMI 40-49.9 (morbid obesity) ([facility])  E66.01            Assessment  & Plan:    F/u surgical wound of abdomen s/p herniation post hysterectomy and dehiscence post hernia repair. Very small opening remains with small-moderate serous drainage. No odor appreciated. Cavity with mostly epithelial tissue. Periwound with scar tissue.   Medihoney to remaining open wound. Continue to keep covered. Change daily. Continue to cover with ABD.   Attempt smoking reduction to assist with healing.   Obtain 3-4 servings of protein daily for wound healing.   Instructed to monitor for any changes including redness or swelling surrounding the wound, increased drainage or pain as well as fevers or chills.    F/u in 2 weeks. Instructed to call if any questions or concerns arise in meantime.         Subjective:   3/11/2024: Maria R is a 43-year-old female who is presenting to wound center today for follow-up of abdominal wound s/p hysterectomy complicated by fascial dehiscence and abdominal herniation that required incisional hernia repair w/ mesh.  Has been following with wound center since January '23.  Per wound center visit on 2/5/2024 \"saw Dr. Conroy, general surgery since prior visit whom did not feel as though any further medication/imaging/intervention was necessary at this time and would like pt to continue with local wound care and " "follow up with him in 3 months.\" At last wound center visit, was changed to Dakins soaked packing after she was experiencing green drainage.  She was instructed to resume silver gel coated packing once the green drainage resolved.  She reports that she has been utilizing Dakin soaked packing over the past week and had not switched to green drainage though this seemed to resolve a few days ago. Of note, is to have recently started Ozempic for weight reduction.  Currently on Z-Dallin and prednisone for bronchitis.  She denies fever and/or chills.  Continues with cough and shortness of breath but no acute worsening.    03/18/24: Pt presents for f/u surgical abdominal wound dehiscence s/p hysterectomy complicated by fascial dehiscence and abdominal herniation requiring incision hernia repair with mesh complicated by further dehiscence. Pt notes that Dr. Lopez was able to surgically debride a piece of remaining mesh at her visit last week and opted to continue with Dakin soaked packing giving significant improvement in the wound with its use. No nausea, vomiting, severe abdominal pain, change in bowel habits nor fevers/chills. Continuing to smoke about the same amount.     04/08/24: Pt presents for f/u surgical abdominal wound dehiscence. Since her previous visit she has had f/u with Dr. Conroy, general surgery. No further f/u with general surgery was required, pt is to continue with wound care but may f/u with surgery if needed. Pt reports she noticed a slight increase in the odor related to the drainage the other day so she used Dakins and it went away. She ran out of the AMD packing therefore returned to use of the silver gel coated packing. Is feeling well. Has started Ozempic and has noticed a 3 lb weight loss already. Smoking about the same amount. No fevers, chills.     04/15/24: Pt presents for wound related to surgical site dehiscence of the abdomen. Has been using endoform am packing into the remaining opening. " Has not had a return of green or foul smelling drainage since her previous visit. Offers no complaints today. No significant changes in medical history since prior visit.     04/29/24: Pt presents for f/u wound related to surgical site dehiscence of the abdomen. Is using endoform am but has noticed a more obvious odor since switching from use of silver gel to the endoform. Drainage is tan in color. Aside from the odor of the drainage offers no complaints today.     05/06/24: Pt presents for f/u wound related to surgical site dehiscence of the abdomen. Has been using gentamicin ointment; packing is no longer staying in well. The odor from the drainage has improved from the prior visit. Offers no complaints today.     05/20/24: Pt presents for f/u wound related to surgical site dehiscence of the abdomen. She has continued with gentamicin ointment three times daily. The wound continues to drain, she has not noticed a return of odor or odd coloration to the drainage. Is continuing to keep the site covered. Denies abdominal pain, N/V, change in bowel movements, fevers, chils.           The following portions of the patient's history were reviewed and updated as appropriate:   Patient Active Problem List   Diagnosis    Tobacco use disorder    COVID-19    Obesity, morbid, BMI 50 or higher (HCC)    Bulky or enlarged uterus    Pelvic pain    Preoperative exam for gynecologic surgery    HTN (hypertension)    Gastroesophageal reflux disease    Asthma    Obstructive sleep apnea syndrome    S/P hysterectomy    Postoperative anemia    Rheumatoid arthritis involving multiple sites with positive rheumatoid factor (HCC)    Encounter for postoperative care    Open wound of abdomen    Surgical wound, non healing    Cigarette nicotine dependence without complication     Past Medical History:   Diagnosis Date    Abdominal wall abscess at site of surgical wound 1/9/2023    Abnormal uterine bleeding due to intramural leiomyoma 08/16/2022     Arthritis     Rheumatoid    Asthma     Breathing difficulty     only occured during inverted robotic hysterectomy    Cellulitis of drainage site following surgery 2023    CPAP (continuous positive airway pressure) dependence     GERD (gastroesophageal reflux disease)     Hypertension     Obese     Pneumonia 2007    Sleep apnea     Uterine fibroid     LTH today 12/15/2022    Wound dehiscence, surgical 2022     Past Surgical History:   Procedure Laterality Date    ABDOMINAL WASHOUT      post  with wound vac     SECTION      had hematoma removed after the surgery    FOREARM SURGERY Right     repair of fracture with plating    INCISIONAL HERNIA REPAIR  2022    Procedure: REPAIR  RECURRENT HERNIA INCISIONAL WITH MESH;  Surgeon: Donna Lau MD;  Location: [facility];  Service: Gynecology    IR DRAINAGE TUBE CHECK WITH SCLEROSIS  2/3/2023    IR DRAINAGE TUBE CHECK WITH SCLEROSIS  3/7/2023    IR DRAINAGE TUBE CHECK/CHANGE/REPOSITION/REINSERTION/UPSIZE  2023    IR DRAINAGE TUBE CHECK/CHANGE/REPOSITION/REINSERTION/UPSIZE  2023    IR DRAINAGE TUBE CHECK/CHANGE/REPOSITION/REINSERTION/UPSIZE  2023    IR DRAINAGE TUBE CHECK/CHANGE/REPOSITION/REINSERTION/UPSIZE  2023    IR DRAINAGE TUBE PLACEMENT  2023    LAPAROTOMY N/A 2022    Procedure: LAPAROTOMY EXPLORATORY;  Surgeon: Donna Lau MD;  Location: [facility];  Service: Gynecology    MYRINGOTOMY W/ TUBES      two sets as a young child    OK CYSTOURETHROSCOPY N/A 12/15/2022    Procedure: CYSTO W/ ROBOT;  Surgeon: Donna Lau MD;  [facility];  Service: Gynecology    OK LAPS TOTAL HYSTERECT 250 GM/< W/RMVL TUBE/OVARY N/A 12/15/2022    Procedure: (LTH) W/ BILATERAL SALPINGECTOMY  W/ ROBOT COVERTED TO OPEN;  Surgeon: Donna Lau MD;  Location: [facility];  Service: Gynecology    VAC DRESSING APPLICATION N/A 3/17/2023    Procedure: APPLICATION VAC DRESSING ABDOMEN/TRUNK;   Surgeon: Chan Conroy MD;  Location: [facility];  Service: General    WOUND DEBRIDEMENT N/A 3/15/2023    Procedure: DEBRIDEMENT Abdominal WOUND and placement of Wound VAC;  Surgeon: Chan Conroy MD;  Location: [facility];  Service: General    WOUND DEBRIDEMENT N/A 3/17/2023    Procedure: DEBRIDEMENT WOUND (WASH OUT);  Surgeon: Chan Conroy MD;  [facility]: [facility];  Service: General     Family History   Problem Relation Age of Onset    Multiple sclerosis Mother     Hypertension Father     Hyperlipidemia Father     Cerebral aneurysm Father     Pulmonary embolism Father     Liver disease Brother      Social History     Socioeconomic History    Marital status:      Spouse name: None    Number of children: None    Years of education: None    Highest education level: None   Occupational History    None   Tobacco Use    Smoking status: Every Day     Current packs/day: 0.50     Average packs/day: 0.5 packs/day for 30.4 years (15.2 ttl pk-yrs)     Types: Cigarettes     Start date: 1995    Smokeless tobacco: Never   Vaping Use    Vaping status: Never Used   Substance and Sexual Activity    Alcohol use: Not Currently     Alcohol/week: 1.0 standard drink of alcohol     Types: 1 Standard drinks or equivalent per week     Comment: 3 x monthly    Drug use: Never    Sexual activity: Yes     Partners: Male     Birth control/protection: Male Sterilization   Other Topics Concern    None   Social History Narrative    None     Social Determinants of Health     Financial Resource Strain: Not on file   Food Insecurity: No Food Insecurity (3/16/2023)    Hunger Vital Sign     Worried About Running Out of Food in the Last Year: Never true     Ran Out of Food in the Last Year: Never true   Transportation Needs: No Transportation Needs (3/16/2023)    PRAPARE - Transportation     Lack of Transportation (Medical): No     Lack of Transportation (Non-Medical): No   Physical Activity: Not on file   Stress: Not on file    Social Connections: Not on file   Intimate Partner Violence: Not on file   Housing Stability: Low Risk  (3/16/2023)    Housing Stability Vital Sign     Unable to Pay for Housing in the Last Year: No     Number of Places Lived in the Last Year: 1     Unstable Housing in the Last Year: No       Current Outpatient Medications:     albuterol (2.5 mg/3 mL) 0.083 % nebulizer solution, Take 2.5 mg by nebulization every 4 (four) hours as needed for shortness of breath or wheezing, Disp: , Rfl:     albuterol (ProAir HFA) 90 mcg/act inhaler, Inhale 2 puffs every 6 (six) hours as needed for wheezing or shortness of breath, Disp: 8.5 g, Rfl: 0    albuterol (PROVENTIL HFA,VENTOLIN HFA) 90 mcg/act inhaler, Inhale 2 puffs every 6 (six) hours as needed for wheezing, Disp: 8 g, Rfl: 2    amoxicillin (AMOXIL) 500 mg capsule, , Disp: , Rfl:     Arthritis Pain Relief 650 MG CR tablet, take 1 tablet by mouth every 8 hours if needed for mild pain (Patient not taking: Reported on 3/7/2024), Disp: , Rfl:     aspirin 325 mg tablet, Take 325 mg by mouth daily. Indications: as needed (Patient not taking: Reported on 3/7/2024), Disp: , Rfl:     chlorhexidine (HIBICLENS) 4 % external liquid, Apply 1 Application topically daily as needed for wound care Can use to clean wound during VAC changes. (Patient not taking: Reported on 1/29/2024), Disp: 473 mL, Rfl: 5    clonazePAM (KlonoPIN) 0.5 mg tablet, Take 0.5 mg by mouth 2 (two) times a day as needed (Patient not taking: Reported on 1/29/2024), Disp: , Rfl:     cyclobenzaprine (FLEXERIL) 5 mg tablet, Take 1 tablet (5 mg total) by mouth 3 (three) times a day as needed for muscle spasms (Patient taking differently: Take 5 mg by mouth 3 (three) times a day as needed for muscle spasms As needed), Disp: 60 tablet, Rfl: 0    dulaglutide (Trulicity) 0.75 MG/0.5ML injection, Inject 0.75 mg under the skin Once a week (Patient not taking: Reported on 1/29/2024), Disp: , Rfl:     escitalopram (LEXAPRO) 10  mg tablet, Take 10 mg by mouth daily, Disp: , Rfl:     escitalopram (LEXAPRO) 5 mg tablet, Take 5 mg by mouth daily, Disp: , Rfl:     fluticasone (FLONASE) 50 mcg/act nasal spray, 2 sprays into each nostril daily, Disp: 16 g, Rfl: 4    Fluticasone-Salmeterol (Advair) 250-50 mcg/dose inhaler, , Disp: , Rfl:     Fluticasone-Salmeterol (Advair) 500-50 mcg/dose inhaler, Inhale 1 puff 2 (two) times a day, Disp: , Rfl:     folic acid (FOLVITE) 1 mg tablet, Take by mouth daily, Disp: , Rfl:     gabapentin (NEURONTIN) 100 mg capsule, Take 1 capsule (100 mg total) by mouth 3 (three) times a day (Patient not taking: Reported on 3/7/2024), Disp: 90 capsule, Rfl: 0    gabapentin (NEURONTIN) 300 mg capsule, Take 300 mg by mouth 3 (three) times a day, Disp: , Rfl:     gentamicin (GARAMYCIN) 0.1 % ointment, Apply topically 3 (three) times a day for 7 days To packing and place into open wound, Disp: 30 g, Rfl: 1    hydrochlorothiazide (HYDRODIURIL) 12.5 mg tablet, Take 12.5 mg by mouth daily Pt only  taking as needed for leg swelling (Patient not taking: Reported on 3/7/2024), Disp: , Rfl:     ibuprofen (MOTRIN) 600 mg tablet, Take 600 mg by mouth every 6 (six) hours as needed for moderate pain As needed, Disp: , Rfl:     methocarbamol (ROBAXIN) 500 mg tablet, Take 1 tablet (500 mg total) by mouth every 6 (six) hours for 14 days (Patient taking differently: Take 500 mg by mouth every 6 (six) hours As needed), Disp: 56 tablet, Rfl: 0    methotrexate 2.5 mg tablet, Four 2.5 tabs one time a week ( Every Saturday), Disp: , Rfl:     montelukast (SINGULAIR) 10 mg tablet, Take by mouth daily at bedtime as needed, Disp: , Rfl:     naloxone (NARCAN) 4 mg/0.1 mL nasal spray, Administer 1 spray into a nostril. If no response after 2-3 minutes, give another dose in the other nostril using a new spray. (Patient not taking: Reported on 4/14/2023), Disp: 1 each, Rfl: 1    neomycin-polymyxin-hydrocortisone (CORTISPORIN) otic solution, Administer 3  drops into the left ear every 6 (six) hours (Patient not taking: Reported on 1/29/2024), Disp: , Rfl:     nicotine (NICODERM CQ) 14 mg/24hr TD 24 hr patch, Place 1 patch on the skin every 24 hours (Patient not taking: Reported on 6/2/2023), Disp: 28 patch, Rfl: 3    omeprazole (PriLOSEC) 40 MG capsule, Take 40 mg by mouth daily, Disp: , Rfl:     predniSONE 10 mg tablet, Take 5 tabs po x 2 days; 4 tabs po x 2 days; 3 tabs po x 1 day; 2 tabs po x 1 day. 1 tab po x 1 day. (Patient not taking: Reported on 1/29/2024), Disp: 24 tablet, Rfl: 0    predniSONE 10 mg tablet, Take by mouth 2 (two) times a day with meals For 10 days (Patient not taking: Reported on 4/1/2024), Disp: , Rfl:     predniSONE 10 mg tablet, Take 5 tabs po x 2 days; 4 tabs po x 2 days; 3 tabs po x 1 day; 2 tabs po x 1 day. 1 tab po x 1 day. (Patient not taking: Reported on 4/1/2024), Disp: 24 tablet, Rfl: 0    semaglutide, 0.25 or 0.5 mg/dose, (Ozempic) 2 mg/3 mL injection pen, Inject 0.5 mg under the skin Once a week (Patient not taking: Reported on 1/29/2024), Disp: , Rfl:     sodium chloride, PF, 0.9 %, Inject 10 mL into a catheter in a vein 3 (three) times a week Wound irrigation. (Patient not taking: Reported on 1/29/2024), Disp: 100 mL, Rfl: 10    Vitamin D, Ergocalciferol, 92678 units CAPS, Take by mouth once a week, Disp: , Rfl:     Review of Systems   Constitutional:  Negative for chills and fever.   Respiratory:          Recent urgent care visit for bronchitis    Gastrointestinal:  Negative for abdominal pain, constipation, diarrhea, nausea and vomiting.   Skin:  Positive for wound (open surgical wound abdomen).   Psychiatric/Behavioral:  The patient is not nervous/anxious.          Objective:  /72   Pulse 78   Temp 98 °F (36.7 °C) (Temporal)   Resp 18   LMP 11/23/2022 (Exact Date) Comment: partial  Pain Score: 0-No pain     Physical Exam  Vitals reviewed.   Constitutional:       General: She is not in acute distress.     Appearance:  "She is morbidly obese. She is not ill-appearing, toxic-appearing or diaphoretic.      Comments: Very pleasant, no acute distress   HENT:      Head: Normocephalic and atraumatic.   Cardiovascular:      Rate and Rhythm: Normal rate.   Pulmonary:      Effort: Pulmonary effort is normal. No respiratory distress.   Abdominal:          Comments: Open surgical wound of abdomen secondary to dehiscence. Very small opening remains with small-moderate serous drainage. No odor appreciated. Cavity with mostly epithelial tissue. Periwound with scar tissue.      Skin:     Findings: Wound present.   Neurological:      Mental Status: She is alert and oriented to person, place, and time.   Psychiatric:         Mood and Affect: Mood normal.         Behavior: Behavior normal.                  Results from last 6 Months   Lab Units 12/18/23  1030   WOUND CULTURE  3+ Growth of Pasteurella multocida*  2+ Growth of Escherichia coli*  3+ Growth of           Wound Instructions:  Orders Placed This Encounter   Procedures    Wound cleansing and dressings Abdomen     Wound cleansing and dressings Abdomen       Abdomen Wound     Remove abdominal dressing, wash with Normal Saline, pat dry prior to applying new dressing        Apply small amount of the medihoney ointment to a q tip and place small amount into the wound daily  Cover with ABD dressing and tape.    Change 2-3 x daily and as needed for excessive drainage     Standing Status:   Future     Standing Expiration Date:   5/27/2024    Wound Procedure Treatment Abdomen     This order was created via procedure documentation       Cally Garcia, PA-C    Portions of the record may have been created with voice recognition software. Occasional wrong word or \"sound alike\" substitutions may have occurred due to the inherent limitations of voice recognition software. Read the chart carefully and recognize, using context, where substitutions have occurred.    "

## 2024-05-20 NOTE — PATIENT INSTRUCTIONS
Orders Placed This Encounter   Procedures    Wound cleansing and dressings Abdomen     Wound cleansing and dressings Abdomen       Abdomen Wound     Remove abdominal dressing, wash with Normal Saline, pat dry prior to applying new dressing        Apply small amount of the medihoney ointment to a q tip and place small amount into the wound daily  Cover with ABD dressing and tape.    Change 2-3 x daily and as needed for excessive drainage     Standing Status:   Future     Standing Expiration Date:   5/27/2024

## 2024-05-20 NOTE — PROGRESS NOTES
Wound Procedure Treatment Abdomen    Performed by: Estefani Carpenter LPN  Authorized by: Salma Garcia PA-C    Associated wounds:   Wound 07/10/23 Abdomen  Applied secondary dressing:  ABD  Wound secured with:  Tape

## 2024-06-03 ENCOUNTER — OFFICE VISIT (OUTPATIENT)
Facility: HOSPITAL | Age: 44
End: 2024-06-03
Payer: COMMERCIAL

## 2024-06-03 VITALS
RESPIRATION RATE: 18 BRPM | HEART RATE: 80 BPM | TEMPERATURE: 96.9 F | DIASTOLIC BLOOD PRESSURE: 64 MMHG | SYSTOLIC BLOOD PRESSURE: 100 MMHG

## 2024-06-03 DIAGNOSIS — E66.01 OBESITY, CLASS III, BMI 40-49.9 (MORBID OBESITY) (HCC): ICD-10-CM

## 2024-06-03 DIAGNOSIS — Z98.890 S/P HERNIA REPAIR: ICD-10-CM

## 2024-06-03 DIAGNOSIS — F17.200 TOBACCO USE DISORDER: ICD-10-CM

## 2024-06-03 DIAGNOSIS — Z90.710 S/P HYSTERECTOMY: ICD-10-CM

## 2024-06-03 DIAGNOSIS — T81.31XS SURGICAL WOUND DEHISCENCE, SEQUELA: ICD-10-CM

## 2024-06-03 DIAGNOSIS — Z87.19 S/P HERNIA REPAIR: ICD-10-CM

## 2024-06-03 DIAGNOSIS — S31.109D OPEN WOUND OF ABDOMEN, SUBSEQUENT ENCOUNTER: Primary | ICD-10-CM

## 2024-06-03 PROCEDURE — 99213 OFFICE O/P EST LOW 20 MIN: CPT | Performed by: STUDENT IN AN ORGANIZED HEALTH CARE EDUCATION/TRAINING PROGRAM

## 2024-06-03 PROCEDURE — G0463 HOSPITAL OUTPT CLINIC VISIT: HCPCS | Performed by: STUDENT IN AN ORGANIZED HEALTH CARE EDUCATION/TRAINING PROGRAM

## 2024-06-03 NOTE — PROGRESS NOTES
"Patient ID: Maria R Webb is a 44 y.o. female Date of Birth 1980       Chief Complaint   Patient presents with    Follow Up Wound Care Visit     Abdominal wound care        Allergies:  Patient has no known allergies.    Diagnosis:   Diagnosis ICD-10-CM Associated Orders   1. Open wound of abdomen, subsequent encounter  S31.109D Wound cleansing and dressings Abdomen      2. Surgical wound dehiscence, sequela  T81.31XS       3. S/P hysterectomy  Z90.710       4. S/P hernia repair  Z98.890     Z87.19       5. Tobacco use disorder  F17.200       6. Obesity, Class III, BMI 40-49.9 (morbid obesity) ([facility])  E66.01            Assessment  & Plan:    F/u surgical wound of abdomen s/p herniation post hysterectomy and dehiscence post hernia repair. Remains about the same. There continues to be a small sinus that remains open with small-moderate drainage. No significant depth or tunnel. No malodor appreciated. Periwound with scar tissue.   Medihoney to remaining open wound. Continue to keep covered. Change daily. Continue to cover with ABD.   Attempt smoking reduction to assist with healing.   Obtain 3-4 servings of protein daily for wound healing.   Instructed to monitor for any changes including redness or swelling surrounding the wound, increased drainage or pain as well as fevers or chills.    F/u in 3 weeks. Instructed to call if any questions or concerns arise in meantime.         Subjective:   3/11/2024: Maria R is a 43-year-old female who is presenting to wound center today for follow-up of abdominal wound s/p hysterectomy complicated by fascial dehiscence and abdominal herniation that required incisional hernia repair w/ mesh.  Has been following with wound center since January '23.  Per wound center visit on 2/5/2024 \"saw Dr. Conroy, general surgery since prior visit whom did not feel as though any further medication/imaging/intervention was necessary at this time and would like pt to continue with local wound " "care and follow up with him in 3 months.\" At last wound center visit, was changed to Dakins soaked packing after she was experiencing green drainage.  She was instructed to resume silver gel coated packing once the green drainage resolved.  She reports that she has been utilizing Dakin soaked packing over the past week and had not switched to green drainage though this seemed to resolve a few days ago. Of note, is to have recently started Ozempic for weight reduction.  Currently on Z-Dallin and prednisone for bronchitis.  She denies fever and/or chills.  Continues with cough and shortness of breath but no acute worsening.    03/18/24: Pt presents for f/u surgical abdominal wound dehiscence s/p hysterectomy complicated by fascial dehiscence and abdominal herniation requiring incision hernia repair with mesh complicated by further dehiscence. Pt notes that Dr. Lopez was able to surgically debride a piece of remaining mesh at her visit last week and opted to continue with Dakin soaked packing giving significant improvement in the wound with its use. No nausea, vomiting, severe abdominal pain, change in bowel habits nor fevers/chills. Continuing to smoke about the same amount.     04/08/24: Pt presents for f/u surgical abdominal wound dehiscence. Since her previous visit she has had f/u with Dr. Conroy, general surgery. No further f/u with general surgery was required, pt is to continue with wound care but may f/u with surgery if needed. Pt reports she noticed a slight increase in the odor related to the drainage the other day so she used Dakins and it went away. She ran out of the AMD packing therefore returned to use of the silver gel coated packing. Is feeling well. Has started Ozempic and has noticed a 3 lb weight loss already. Smoking about the same amount. No fevers, chills.     04/15/24: Pt presents for wound related to surgical site dehiscence of the abdomen. Has been using endoform am packing into the remaining " opening. Has not had a return of green or foul smelling drainage since her previous visit. Offers no complaints today. No significant changes in medical history since prior visit.     04/29/24: Pt presents for f/u wound related to surgical site dehiscence of the abdomen. Is using endoform am but has noticed a more obvious odor since switching from use of silver gel to the endoform. Drainage is tan in color. Aside from the odor of the drainage offers no complaints today.     05/06/24: Pt presents for f/u wound related to surgical site dehiscence of the abdomen. Has been using gentamicin ointment; packing is no longer staying in well. The odor from the drainage has improved from the prior visit. Offers no complaints today.     05/20/24: Pt presents for f/u wound related to surgical site dehiscence of the abdomen. She has continued with gentamicin ointment three times daily. The wound continues to drain, she has not noticed a return of odor or odd coloration to the drainage. Is continuing to keep the site covered. Denies abdominal pain, N/V, change in bowel movements, fevers, chils.     06/03/24: Pt presents for f/u wound secondary to surgical site dehiscence of the abdomen. Currently Medihoney is being used. ABD is being used to cover the site. There continues to be small-moderate drainage without any foul smell or green discoloration of the drainage. Pt offers no complaints today.           The following portions of the patient's history were reviewed and updated as appropriate:   Patient Active Problem List   Diagnosis    Tobacco use disorder    COVID-19    Obesity, morbid, BMI 50 or higher (HCC)    Bulky or enlarged uterus    Pelvic pain    Preoperative exam for gynecologic surgery    HTN (hypertension)    Gastroesophageal reflux disease    Asthma    Obstructive sleep apnea syndrome    S/P hysterectomy    Postoperative anemia    Rheumatoid arthritis involving multiple sites with positive rheumatoid factor (HCC)     Encounter for postoperative care    Open wound of abdomen    Surgical wound, non healing    Cigarette nicotine dependence without complication     Past Medical History:   Diagnosis Date    Abdominal wall abscess at site of surgical wound 2023    Abnormal uterine bleeding due to intramural leiomyoma 2022    Arthritis     Rheumatoid    Asthma     Breathing difficulty     only occured during inverted robotic hysterectomy    Cellulitis of drainage site following surgery 2023    CPAP (continuous positive airway pressure) dependence     GERD (gastroesophageal reflux disease)     Hypertension     Obese     Pneumonia 2007    Sleep apnea     Uterine fibroid     LTH today 12/15/2022    Wound dehiscence, surgical 2022     Past Surgical History:   Procedure Laterality Date    ABDOMINAL WASHOUT      post  with wound vac     SECTION      had hematoma removed after the surgery    FOREARM SURGERY Right     repair of fracture with plating    INCISIONAL HERNIA REPAIR  2022    Procedure: REPAIR  RECURRENT HERNIA INCISIONAL WITH MESH;  Surgeon: Donna Lau MD;  Location: [facility];  Service: Gynecology    IR DRAINAGE TUBE CHECK WITH SCLEROSIS  2/3/2023    IR DRAINAGE TUBE CHECK WITH SCLEROSIS  3/7/2023    IR DRAINAGE TUBE CHECK/CHANGE/REPOSITION/REINSERTION/UPSIZE  2023    IR DRAINAGE TUBE CHECK/CHANGE/REPOSITION/REINSERTION/UPSIZE  2023    IR DRAINAGE TUBE CHECK/CHANGE/REPOSITION/REINSERTION/UPSIZE  2023    IR DRAINAGE TUBE CHECK/CHANGE/REPOSITION/REINSERTION/UPSIZE  2023    IR DRAINAGE TUBE PLACEMENT  2023    LAPAROTOMY N/A 2022    Procedure: LAPAROTOMY EXPLORATORY;  Surgeon: Donna Lau MD;  Location: [facility];  Service: Gynecology    MYRINGOTOMY W/ TUBES      two sets as a young child    AR CYSTOURETHROSCOPY N/A 12/15/2022    Procedure: CYSTO W/ ROBOT;  Surgeon: Donna Lua MD;  Location: [facility];  Service: Gynecology    AR  LAPS TOTAL HYSTERECT 250 GM/< W/RMVL TUBE/OVARY N/A 12/15/2022    Procedure: (LTH) W/ BILATERAL SALPINGECTOMY  W/ ROBOT COVERTED TO OPEN;  Surgeon: Donna Lau MD;  Location: [facility];  Service: Gynecology    VAC DRESSING APPLICATION N/A 3/17/2023    Procedure: APPLICATION VAC DRESSING ABDOMEN/TRUNK;  Surgeon: Chan Conroy MD;  Location: [facility];  Service: General    WOUND DEBRIDEMENT N/A 3/15/2023    Procedure: DEBRIDEMENT Abdominal WOUND and placement of Wound VAC;  Surgeon: Chan Conroy MD;  Location: [facility];  Service: General    WOUND DEBRIDEMENT N/A 3/17/2023    Procedure: DEBRIDEMENT WOUND (WASH OUT);  Surgeon: Chan Conroy MD;  Location: [facility];  Service: General     Family History   Problem Relation Age of Onset    Multiple sclerosis Mother     Hypertension Father     Hyperlipidemia Father     Cerebral aneurysm Father     Pulmonary embolism Father     Liver disease Brother      Social History     Socioeconomic History    Marital status:      Spouse name: None    Number of children: None    Years of education: None    Highest education level: None   Occupational History    None   Tobacco Use    Smoking status: Every Day     Current packs/day: 0.50     Average packs/day: 0.5 packs/day for 30.4 years (15.2 ttl pk-yrs)     Types: Cigarettes     Start date: 1995    Smokeless tobacco: Never   Vaping Use    Vaping status: Never Used   Substance and Sexual Activity    Alcohol use: Not Currently     Alcohol/week: 1.0 standard drink of alcohol     Types: 1 Standard drinks or equivalent per week     Comment: 3 x monthly    Drug use: Never    Sexual activity: Yes     Partners: Male     Birth control/protection: Male Sterilization   Other Topics Concern    None   Social History Narrative    None     Social Determinants of Health     Financial Resource Strain: Not on file   Food Insecurity: No Food Insecurity (3/16/2023)    Hunger Vital Sign     Worried About Running Out of  Food in the Last Year: Never true     Ran Out of Food in the Last Year: Never true   Transportation Needs: No Transportation Needs (3/16/2023)    PRAPARE - Transportation     Lack of Transportation (Medical): No     Lack of Transportation (Non-Medical): No   Physical Activity: Not on file   Stress: Not on file   Social Connections: Not on file   Intimate Partner Violence: Not on file   Housing Stability: Low Risk  (3/16/2023)    Housing Stability Vital Sign     Unable to Pay for Housing in the Last Year: No     Number of Places Lived in the Last Year: 1     Unstable Housing in the Last Year: No       Current Outpatient Medications:     albuterol (2.5 mg/3 mL) 0.083 % nebulizer solution, Take 2.5 mg by nebulization every 4 (four) hours as needed for shortness of breath or wheezing, Disp: , Rfl:     albuterol (ProAir HFA) 90 mcg/act inhaler, Inhale 2 puffs every 6 (six) hours as needed for wheezing or shortness of breath, Disp: 8.5 g, Rfl: 0    albuterol (PROVENTIL HFA,VENTOLIN HFA) 90 mcg/act inhaler, Inhale 2 puffs every 6 (six) hours as needed for wheezing, Disp: 8 g, Rfl: 2    amoxicillin (AMOXIL) 500 mg capsule, , Disp: , Rfl:     Arthritis Pain Relief 650 MG CR tablet, take 1 tablet by mouth every 8 hours if needed for mild pain (Patient not taking: Reported on 3/7/2024), Disp: , Rfl:     aspirin 325 mg tablet, Take 325 mg by mouth daily. Indications: as needed (Patient not taking: Reported on 3/7/2024), Disp: , Rfl:     chlorhexidine (HIBICLENS) 4 % external liquid, Apply 1 Application topically daily as needed for wound care Can use to clean wound during VAC changes. (Patient not taking: Reported on 1/29/2024), Disp: 473 mL, Rfl: 5    clonazePAM (KlonoPIN) 0.5 mg tablet, Take 0.5 mg by mouth 2 (two) times a day as needed (Patient not taking: Reported on 1/29/2024), Disp: , Rfl:     cyclobenzaprine (FLEXERIL) 5 mg tablet, Take 1 tablet (5 mg total) by mouth 3 (three) times a day as needed for muscle spasms  (Patient taking differently: Take 5 mg by mouth 3 (three) times a day as needed for muscle spasms As needed), Disp: 60 tablet, Rfl: 0    dulaglutide (Trulicity) 0.75 MG/0.5ML injection, Inject 0.75 mg under the skin Once a week (Patient not taking: Reported on 1/29/2024), Disp: , Rfl:     escitalopram (LEXAPRO) 10 mg tablet, Take 10 mg by mouth daily, Disp: , Rfl:     escitalopram (LEXAPRO) 5 mg tablet, Take 5 mg by mouth daily, Disp: , Rfl:     fluticasone (FLONASE) 50 mcg/act nasal spray, 2 sprays into each nostril daily, Disp: 16 g, Rfl: 4    Fluticasone-Salmeterol (Advair) 250-50 mcg/dose inhaler, , Disp: , Rfl:     Fluticasone-Salmeterol (Advair) 500-50 mcg/dose inhaler, Inhale 1 puff 2 (two) times a day, Disp: , Rfl:     folic acid (FOLVITE) 1 mg tablet, Take by mouth daily, Disp: , Rfl:     gabapentin (NEURONTIN) 100 mg capsule, Take 1 capsule (100 mg total) by mouth 3 (three) times a day (Patient not taking: Reported on 3/7/2024), Disp: 90 capsule, Rfl: 0    gabapentin (NEURONTIN) 300 mg capsule, Take 300 mg by mouth 3 (three) times a day, Disp: , Rfl:     gentamicin (GARAMYCIN) 0.1 % ointment, Apply topically 3 (three) times a day for 7 days To packing and place into open wound, Disp: 30 g, Rfl: 1    hydrochlorothiazide (HYDRODIURIL) 12.5 mg tablet, Take 12.5 mg by mouth daily Pt only  taking as needed for leg swelling (Patient not taking: Reported on 3/7/2024), Disp: , Rfl:     ibuprofen (MOTRIN) 600 mg tablet, Take 600 mg by mouth every 6 (six) hours as needed for moderate pain As needed, Disp: , Rfl:     methocarbamol (ROBAXIN) 500 mg tablet, Take 1 tablet (500 mg total) by mouth every 6 (six) hours for 14 days (Patient taking differently: Take 500 mg by mouth every 6 (six) hours As needed), Disp: 56 tablet, Rfl: 0    methotrexate 2.5 mg tablet, Four 2.5 tabs one time a week ( Every Saturday), Disp: , Rfl:     montelukast (SINGULAIR) 10 mg tablet, Take by mouth daily at bedtime as needed, Disp: , Rfl:      naloxone (NARCAN) 4 mg/0.1 mL nasal spray, Administer 1 spray into a nostril. If no response after 2-3 minutes, give another dose in the other nostril using a new spray. (Patient not taking: Reported on 4/14/2023), Disp: 1 each, Rfl: 1    neomycin-polymyxin-hydrocortisone (CORTISPORIN) otic solution, Administer 3 drops into the left ear every 6 (six) hours (Patient not taking: Reported on 1/29/2024), Disp: , Rfl:     nicotine (NICODERM CQ) 14 mg/24hr TD 24 hr patch, Place 1 patch on the skin every 24 hours (Patient not taking: Reported on 6/2/2023), Disp: 28 patch, Rfl: 3    omeprazole (PriLOSEC) 40 MG capsule, Take 40 mg by mouth daily, Disp: , Rfl:     predniSONE 10 mg tablet, Take 5 tabs po x 2 days; 4 tabs po x 2 days; 3 tabs po x 1 day; 2 tabs po x 1 day. 1 tab po x 1 day. (Patient not taking: Reported on 1/29/2024), Disp: 24 tablet, Rfl: 0    predniSONE 10 mg tablet, Take by mouth 2 (two) times a day with meals For 10 days (Patient not taking: Reported on 4/1/2024), Disp: , Rfl:     predniSONE 10 mg tablet, Take 5 tabs po x 2 days; 4 tabs po x 2 days; 3 tabs po x 1 day; 2 tabs po x 1 day. 1 tab po x 1 day. (Patient not taking: Reported on 4/1/2024), Disp: 24 tablet, Rfl: 0    semaglutide, 0.25 or 0.5 mg/dose, (Ozempic) 2 mg/3 mL injection pen, Inject 0.5 mg under the skin Once a week (Patient not taking: Reported on 1/29/2024), Disp: , Rfl:     sodium chloride, PF, 0.9 %, Inject 10 mL into a catheter in a vein 3 (three) times a week Wound irrigation. (Patient not taking: Reported on 1/29/2024), Disp: 100 mL, Rfl: 10    Vitamin D, Ergocalciferol, 10148 units CAPS, Take by mouth once a week, Disp: , Rfl:     Review of Systems   Constitutional:  Negative for chills and fever.   Respiratory:          Recent urgent care visit for bronchitis    Gastrointestinal:  Negative for abdominal pain, constipation, diarrhea, nausea and vomiting.   Skin:  Positive for wound (open surgical wound abdomen).    Psychiatric/Behavioral:  The patient is not nervous/anxious.          Objective:  /64   Pulse 80   Temp (!) 96.9 °F (36.1 °C)   Resp 18   LMP 11/23/2022 (Exact Date) Comment: partial  Pain Score: 0-No pain     Physical Exam  Vitals reviewed.   Constitutional:       General: She is not in acute distress.     Appearance: She is morbidly obese. She is not ill-appearing, toxic-appearing or diaphoretic.      Comments: Very pleasant, no acute distress   HENT:      Head: Normocephalic and atraumatic.   Cardiovascular:      Rate and Rhythm: Normal rate.   Pulmonary:      Effort: Pulmonary effort is normal. No respiratory distress.   Abdominal:          Comments: Open surgical wound of abdomen secondary to dehiscence. Very small opening remains with small-moderate serous drainage. No odor appreciated. Cavity with mostly epithelial tissue. Periwound with scar tissue.      Skin:     Findings: Wound present.   Neurological:      Mental Status: She is alert and oriented to person, place, and time.   Psychiatric:         Mood and Affect: Mood normal.         Behavior: Behavior normal.         Wound 07/10/23 Abdomen (Active)   Wound Description Yellow;Pink;White 06/03/24 0947   Alexa-wound Assessment Pink;Scar Tissue 06/03/24 0947   Wound Length (cm) 0.2 cm 06/03/24 0947   Wound Width (cm) 0.2 cm 06/03/24 0947   Wound Depth (cm) 0.9 cm 06/03/24 0947   Wound Surface Area (cm^2) 0.04 cm^2 06/03/24 0947   Wound Volume (cm^3) 0.036 cm^3 06/03/24 0947   Calculated Wound Volume (cm^3) 0.04 cm^3 06/03/24 0947   Change in Wound Size % 99.91 06/03/24 0947   Drainage Amount Small 06/03/24 0947   Drainage Description Yellow;Brown 06/03/24 0947   Treatments Irrigation with NSS 06/03/24 0947   Wound packed? No 06/03/24 0947   Patient Tolerance Tolerated well 06/03/24 0947   Dressing Status Intact;Old drainage 06/03/24 0947          Results from last 6 Months   Lab Units 12/18/23  1030   WOUND CULTURE  3+ Growth of Pasteurella  "multocida*  2+ Growth of Escherichia coli*  3+ Growth of           Wound Instructions:  Orders Placed This Encounter   Procedures    Wound cleansing and dressings Abdomen     Abdomen Wound      Remove abdominal dressing, wash with Normal Saline, pat dry prior to applying new dressing      Apply Zinc oxide to periwound.  Apply Medihoney to the wound.   Cover with dry dressing and tape.     Change every other day and as needed for excessive drainage.   if you notice increased odor or see greenish drainage, you may resume using the Dakin's soak with plain packing strips. Do not use the endoform am if you use the Dakins.        Follow up in 3 weeks.     Standing Status:   Future     Standing Expiration Date:   6/10/2024       Salma Garcia PA-C      Portions of the record may have been created with voice recognition software. Occasional wrong word or \"sound alike\" substitutions may have occurred due to the inherent limitations of voice recognition software. Read the chart carefully and recognize, using context, where substitutions have occurred.      "

## 2024-06-03 NOTE — PATIENT INSTRUCTIONS
Orders Placed This Encounter   Procedures    Wound cleansing and dressings Abdomen     Abdomen Wound      Remove abdominal dressing, wash with Normal Saline, pat dry prior to applying new dressing      Apply Zinc oxide to periwound.  Apply Medihoney to the wound.   Cover with dry dressing and tape.     Change every other day and as needed for excessive drainage.   if you notice increased odor or see greenish drainage, you may resume using the Dakin's soak with plain packing strips. Do not use the endoform am if you use the Dakins.        Follow up in 3 weeks.     Standing Status:   Future     Standing Expiration Date:   6/10/2024

## 2024-06-24 ENCOUNTER — OFFICE VISIT (OUTPATIENT)
Facility: HOSPITAL | Age: 44
End: 2024-06-24
Payer: COMMERCIAL

## 2024-06-24 VITALS
DIASTOLIC BLOOD PRESSURE: 62 MMHG | HEART RATE: 78 BPM | TEMPERATURE: 96.7 F | SYSTOLIC BLOOD PRESSURE: 116 MMHG | RESPIRATION RATE: 18 BRPM

## 2024-06-24 DIAGNOSIS — Z87.19 S/P HERNIA REPAIR: ICD-10-CM

## 2024-06-24 DIAGNOSIS — F17.200 TOBACCO USE DISORDER: ICD-10-CM

## 2024-06-24 DIAGNOSIS — T81.31XS SURGICAL WOUND DEHISCENCE, SEQUELA: ICD-10-CM

## 2024-06-24 DIAGNOSIS — Z98.890 S/P HERNIA REPAIR: ICD-10-CM

## 2024-06-24 DIAGNOSIS — S31.109D OPEN WOUND OF ABDOMEN, SUBSEQUENT ENCOUNTER: Primary | ICD-10-CM

## 2024-06-24 DIAGNOSIS — Z90.710 S/P HYSTERECTOMY: ICD-10-CM

## 2024-06-24 PROCEDURE — 99213 OFFICE O/P EST LOW 20 MIN: CPT | Performed by: STUDENT IN AN ORGANIZED HEALTH CARE EDUCATION/TRAINING PROGRAM

## 2024-06-24 PROCEDURE — G0463 HOSPITAL OUTPT CLINIC VISIT: HCPCS | Performed by: STUDENT IN AN ORGANIZED HEALTH CARE EDUCATION/TRAINING PROGRAM

## 2024-06-24 NOTE — PATIENT INSTRUCTIONS
Orders Placed This Encounter   Procedures    Wound cleansing and dressings Abdomen     Abdomen Wound      Remove abdominal dressing, wash with Normal Saline, pat dry prior to applying new dressing      Cover with dry dressing and tape.   If any drainage is observed, please continue using medihoney  Change every other day and as needed for excessive drainage.       if you notice increased odor or see greenish drainage, you may resume using the Dakin's soak with plain packing strips. Do not use the endoform am if you use the Dakins.        Follow up in 1 week.     Standing Status:   Future     Standing Expiration Date:   7/1/2024

## 2024-06-24 NOTE — PROGRESS NOTES
"Patient ID: Maria R Webb is a 44 y.o. female Date of Birth 1980       Chief Complaint   Patient presents with    Follow Up Wound Care Visit     ABD       Allergies:  Patient has no known allergies.    Diagnosis:   Diagnosis ICD-10-CM Associated Orders   1. Open wound of abdomen, subsequent encounter  S31.109D Wound cleansing and dressings Abdomen      2. Surgical wound dehiscence, sequela  T81.31XS       3. S/P hysterectomy  Z90.710       4. S/P hernia repair  Z98.890     Z87.19       5. Tobacco use disorder  F17.200            Assessment  & Plan:    F/u surgical wound of abdomen s/p herniation post hysterectomy and dehiscence post hernia repair. There continues to be an indentation of the skin however appears to be fully epithelialized. When probed with tissue there is no drainage that appears on the tissue. Possibly fully healed at this point? There is brock colored \"drainage\" on abdominal pad which may represent the Medihoney dressing alone and not true drainage. Periwound with scar tissue without erythema to indicate soft tissue infection (photo appears erythematous which is not consistent with in-person evaluation).   Would like pt to keep site covered with ABD pad alone this week to see if there is any further drainage from the site. Should she experience drainage recommend returning to use of Medihoney. If there is no drainage on the pad this week then will re-check pt next week to confirm healing. Once healed she may restart Humira again. Hold off one additional week to confirm healing at next appointment.   Instructed to monitor for any changes including redness or swelling surrounding the wound, increased drainage or pain as well as fevers or chills.    F/u in one week. Instructed to call if any questions or concerns arise in meantime.           Subjective:   3/11/2024: Maria R is a 43-year-old female who is presenting to wound center today for follow-up of abdominal wound s/p hysterectomy " "complicated by fascial dehiscence and abdominal herniation that required incisional hernia repair w/ mesh.  Has been following with wound center since January '23.  Per wound center visit on 2/5/2024 \"saw Dr. Conroy, general surgery since prior visit whom did not feel as though any further medication/imaging/intervention was necessary at this time and would like pt to continue with local wound care and follow up with him in 3 months.\" At last wound center visit, was changed to Dakins soaked packing after she was experiencing green drainage.  She was instructed to resume silver gel coated packing once the green drainage resolved.  She reports that she has been utilizing Dakin soaked packing over the past week and had not switched to green drainage though this seemed to resolve a few days ago. Of note, is to have recently started Ozempic for weight reduction.  Currently on Z-Dallin and prednisone for bronchitis.  She denies fever and/or chills.  Continues with cough and shortness of breath but no acute worsening.    03/18/24: Pt presents for f/u surgical abdominal wound dehiscence s/p hysterectomy complicated by fascial dehiscence and abdominal herniation requiring incision hernia repair with mesh complicated by further dehiscence. Pt notes that Dr. Lopez was able to surgically debride a piece of remaining mesh at her visit last week and opted to continue with Dakin soaked packing giving significant improvement in the wound with its use. No nausea, vomiting, severe abdominal pain, change in bowel habits nor fevers/chills. Continuing to smoke about the same amount.     04/08/24: Pt presents for f/u surgical abdominal wound dehiscence. Since her previous visit she has had f/u with Dr. Conroy, general surgery. No further f/u with general surgery was required, pt is to continue with wound care but may f/u with surgery if needed. Pt reports she noticed a slight increase in the odor related to the drainage the other day so " she used Dakins and it went away. She ran out of the AMD packing therefore returned to use of the silver gel coated packing. Is feeling well. Has started Ozempic and has noticed a 3 lb weight loss already. Smoking about the same amount. No fevers, chills.     04/15/24: Pt presents for wound related to surgical site dehiscence of the abdomen. Has been using endoform am packing into the remaining opening. Has not had a return of green or foul smelling drainage since her previous visit. Offers no complaints today. No significant changes in medical history since prior visit.     04/29/24: Pt presents for f/u wound related to surgical site dehiscence of the abdomen. Is using endoform am but has noticed a more obvious odor since switching from use of silver gel to the endoform. Drainage is tan in color. Aside from the odor of the drainage offers no complaints today.     05/06/24: Pt presents for f/u wound related to surgical site dehiscence of the abdomen. Has been using gentamicin ointment; packing is no longer staying in well. The odor from the drainage has improved from the prior visit. Offers no complaints today.     05/20/24: Pt presents for f/u wound related to surgical site dehiscence of the abdomen. She has continued with gentamicin ointment three times daily. The wound continues to drain, she has not noticed a return of odor or odd coloration to the drainage. Is continuing to keep the site covered. Denies abdominal pain, N/V, change in bowel movements, fevers, chils.     06/03/24: Pt presents for f/u wound secondary to surgical site dehiscence of the abdomen. Currently Medihoney is being used. ABD is being used to cover the site. There continues to be small-moderate drainage without any foul smell or green discoloration of the drainage. Pt offers no complaints today.     06/24/24: Pt presents for f/u wound secondary to surgical site dehiscence of the abdomen. She is currently using Medihoney. She believes there  is less drainage. Current drainage on the pad is honey-colored therefore it is difficult to tell if it is truly drainage or the Medihoney being placed into the wound. No significant changes since past visit. She is inquiring if she can start Humira again for her RA; was on hold d/t wound.           The following portions of the patient's history were reviewed and updated as appropriate:   Patient Active Problem List   Diagnosis    Tobacco use disorder    COVID-19    Obesity, morbid, BMI 50 or higher (HCC)    Bulky or enlarged uterus    Pelvic pain    Preoperative exam for gynecologic surgery    HTN (hypertension)    Gastroesophageal reflux disease    Asthma    Obstructive sleep apnea syndrome    S/P hysterectomy    Postoperative anemia    Rheumatoid arthritis involving multiple sites with positive rheumatoid factor (HCC)    Encounter for postoperative care    Open wound of abdomen    Surgical wound, non healing    Cigarette nicotine dependence without complication     Past Medical History:   Diagnosis Date    Abdominal wall abscess at site of surgical wound 2023    Abnormal uterine bleeding due to intramural leiomyoma 2022    Arthritis     Rheumatoid    Asthma     Breathing difficulty     only occured during inverted robotic hysterectomy    Cellulitis of drainage site following surgery 2023    CPAP (continuous positive airway pressure) dependence     GERD (gastroesophageal reflux disease)     Hypertension     Obese     Pneumonia 2007    Sleep apnea     Uterine fibroid     LTH today 12/15/2022    Wound dehiscence, surgical 2022     Past Surgical History:   Procedure Laterality Date    ABDOMINAL WASHOUT      post  with wound vac     SECTION      had hematoma removed after the surgery    FOREARM SURGERY Right     repair of fracture with plating    INCISIONAL HERNIA REPAIR  2022    Procedure: REPAIR  RECURRENT HERNIA INCISIONAL WITH MESH;  Surgeon: Donna Lau MD;   Location: [facility];  Service: Gynecology    IR DRAINAGE TUBE CHECK WITH SCLEROSIS  2/3/2023    IR DRAINAGE TUBE CHECK WITH SCLEROSIS  3/7/2023    IR DRAINAGE TUBE CHECK/CHANGE/REPOSITION/REINSERTION/UPSIZE  1/23/2023    IR DRAINAGE TUBE CHECK/CHANGE/REPOSITION/REINSERTION/UPSIZE  1/27/2023    IR DRAINAGE TUBE CHECK/CHANGE/REPOSITION/REINSERTION/UPSIZE  2/13/2023    IR DRAINAGE TUBE CHECK/CHANGE/REPOSITION/REINSERTION/UPSIZE  2/27/2023    IR DRAINAGE TUBE PLACEMENT  1/9/2023    LAPAROTOMY N/A 12/20/2022    Procedure: LAPAROTOMY EXPLORATORY;  Surgeon: Donna Lau MD;  Location: [facility];  Service: Gynecology    MYRINGOTOMY W/ TUBES      two sets as a young child    DC CYSTOURETHROSCOPY N/A 12/15/2022    Procedure: CYSTO W/ ROBOT;  Surgeon: Donna Lau MD;  Location: [facility];  Service: Gynecology    DC LAPS TOTAL HYSTERECT 250 GM/< W/RMVL TUBE/OVARY N/A 12/15/2022    Procedure: (LTH) W/ BILATERAL SALPINGECTOMY  W/ ROBOT COVERTED TO OPEN;  Surgeon: Donna Lau MD;  Location: [facility];  Service: Gynecology    VAC DRESSING APPLICATION N/A 3/17/2023    Procedure: APPLICATION VAC DRESSING ABDOMEN/TRUNK;  Surgeon: Chan Conroy MD;  Location: [facility];  Service: General    WOUND DEBRIDEMENT N/A 3/15/2023    Procedure: DEBRIDEMENT Abdominal WOUND and placement of Wound VAC;  Surgeon: Chan Conroy MD;  Location: [facility];  Service: General    WOUND DEBRIDEMENT N/A 3/17/2023    Procedure: DEBRIDEMENT WOUND (WASH OUT);  Surgeon: Chan Conroy MD;  Location: [facility];  Service: General     Family History   Problem Relation Age of Onset    Multiple sclerosis Mother     Hypertension Father     Hyperlipidemia Father     Cerebral aneurysm Father     Pulmonary embolism Father     Liver disease Brother      Social History     Socioeconomic History    Marital status:      Spouse name: None    Number of children: None    Years of education: None    Highest education level:  None   Occupational History    None   Tobacco Use    Smoking status: Every Day     Current packs/day: 0.50     Average packs/day: 0.5 packs/day for 30.5 years (15.2 ttl pk-yrs)     Types: Cigarettes     Start date: 1995    Smokeless tobacco: Never   Vaping Use    Vaping status: Never Used   Substance and Sexual Activity    Alcohol use: Not Currently     Alcohol/week: 1.0 standard drink of alcohol     Types: 1 Standard drinks or equivalent per week     Comment: 3 x monthly    Drug use: Never    Sexual activity: Yes     Partners: Male     Birth control/protection: Male Sterilization   Other Topics Concern    None   Social History Narrative    None     Social Determinants of Health     Financial Resource Strain: Not on file   Food Insecurity: No Food Insecurity (3/16/2023)    Hunger Vital Sign     Worried About Running Out of Food in the Last Year: Never true     Ran Out of Food in the Last Year: Never true   Transportation Needs: No Transportation Needs (3/16/2023)    PRAPARE - Transportation     Lack of Transportation (Medical): No     Lack of Transportation (Non-Medical): No   Physical Activity: Not on file   Stress: Not on file   Social Connections: Not on file   Intimate Partner Violence: Not on file   Housing Stability: Low Risk  (3/16/2023)    Housing Stability Vital Sign     Unable to Pay for Housing in the Last Year: No     Number of Places Lived in the Last Year: 1     Unstable Housing in the Last Year: No       Current Outpatient Medications:     albuterol (2.5 mg/3 mL) 0.083 % nebulizer solution, Take 2.5 mg by nebulization every 4 (four) hours as needed for shortness of breath or wheezing, Disp: , Rfl:     albuterol (ProAir HFA) 90 mcg/act inhaler, Inhale 2 puffs every 6 (six) hours as needed for wheezing or shortness of breath, Disp: 8.5 g, Rfl: 0    albuterol (PROVENTIL HFA,VENTOLIN HFA) 90 mcg/act inhaler, Inhale 2 puffs every 6 (six) hours as needed for wheezing, Disp: 8 g, Rfl: 2    amoxicillin  (AMOXIL) 500 mg capsule, , Disp: , Rfl:     Arthritis Pain Relief 650 MG CR tablet, take 1 tablet by mouth every 8 hours if needed for mild pain (Patient not taking: Reported on 3/7/2024), Disp: , Rfl:     aspirin 325 mg tablet, Take 325 mg by mouth daily. Indications: as needed (Patient not taking: Reported on 3/7/2024), Disp: , Rfl:     chlorhexidine (HIBICLENS) 4 % external liquid, Apply 1 Application topically daily as needed for wound care Can use to clean wound during VAC changes. (Patient not taking: Reported on 1/29/2024), Disp: 473 mL, Rfl: 5    clonazePAM (KlonoPIN) 0.5 mg tablet, Take 0.5 mg by mouth 2 (two) times a day as needed (Patient not taking: Reported on 1/29/2024), Disp: , Rfl:     cyclobenzaprine (FLEXERIL) 5 mg tablet, Take 1 tablet (5 mg total) by mouth 3 (three) times a day as needed for muscle spasms (Patient taking differently: Take 5 mg by mouth 3 (three) times a day as needed for muscle spasms As needed), Disp: 60 tablet, Rfl: 0    dulaglutide (Trulicity) 0.75 MG/0.5ML injection, Inject 0.75 mg under the skin Once a week (Patient not taking: Reported on 1/29/2024), Disp: , Rfl:     escitalopram (LEXAPRO) 10 mg tablet, Take 10 mg by mouth daily, Disp: , Rfl:     escitalopram (LEXAPRO) 5 mg tablet, Take 5 mg by mouth daily, Disp: , Rfl:     fluticasone (FLONASE) 50 mcg/act nasal spray, 2 sprays into each nostril daily, Disp: 16 g, Rfl: 4    Fluticasone-Salmeterol (Advair) 250-50 mcg/dose inhaler, , Disp: , Rfl:     Fluticasone-Salmeterol (Advair) 500-50 mcg/dose inhaler, Inhale 1 puff 2 (two) times a day, Disp: , Rfl:     folic acid (FOLVITE) 1 mg tablet, Take by mouth daily, Disp: , Rfl:     gabapentin (NEURONTIN) 100 mg capsule, Take 1 capsule (100 mg total) by mouth 3 (three) times a day (Patient not taking: Reported on 3/7/2024), Disp: 90 capsule, Rfl: 0    gabapentin (NEURONTIN) 300 mg capsule, Take 300 mg by mouth 3 (three) times a day, Disp: , Rfl:     gentamicin (GARAMYCIN) 0.1 %  ointment, Apply topically 3 (three) times a day for 7 days To packing and place into open wound, Disp: 30 g, Rfl: 1    hydrochlorothiazide (HYDRODIURIL) 12.5 mg tablet, Take 12.5 mg by mouth daily Pt only  taking as needed for leg swelling (Patient not taking: Reported on 3/7/2024), Disp: , Rfl:     ibuprofen (MOTRIN) 600 mg tablet, Take 600 mg by mouth every 6 (six) hours as needed for moderate pain As needed, Disp: , Rfl:     methocarbamol (ROBAXIN) 500 mg tablet, Take 1 tablet (500 mg total) by mouth every 6 (six) hours for 14 days (Patient taking differently: Take 500 mg by mouth every 6 (six) hours As needed), Disp: 56 tablet, Rfl: 0    methotrexate 2.5 mg tablet, Four 2.5 tabs one time a week ( Every Saturday), Disp: , Rfl:     montelukast (SINGULAIR) 10 mg tablet, Take by mouth daily at bedtime as needed, Disp: , Rfl:     naloxone (NARCAN) 4 mg/0.1 mL nasal spray, Administer 1 spray into a nostril. If no response after 2-3 minutes, give another dose in the other nostril using a new spray. (Patient not taking: Reported on 4/14/2023), Disp: 1 each, Rfl: 1    neomycin-polymyxin-hydrocortisone (CORTISPORIN) otic solution, Administer 3 drops into the left ear every 6 (six) hours (Patient not taking: Reported on 1/29/2024), Disp: , Rfl:     nicotine (NICODERM CQ) 14 mg/24hr TD 24 hr patch, Place 1 patch on the skin every 24 hours (Patient not taking: Reported on 6/2/2023), Disp: 28 patch, Rfl: 3    omeprazole (PriLOSEC) 40 MG capsule, Take 40 mg by mouth daily, Disp: , Rfl:     predniSONE 10 mg tablet, Take 5 tabs po x 2 days; 4 tabs po x 2 days; 3 tabs po x 1 day; 2 tabs po x 1 day. 1 tab po x 1 day. (Patient not taking: Reported on 1/29/2024), Disp: 24 tablet, Rfl: 0    predniSONE 10 mg tablet, Take by mouth 2 (two) times a day with meals For 10 days (Patient not taking: Reported on 4/1/2024), Disp: , Rfl:     predniSONE 10 mg tablet, Take 5 tabs po x 2 days; 4 tabs po x 2 days; 3 tabs po x 1 day; 2 tabs po x 1  "day. 1 tab po x 1 day. (Patient not taking: Reported on 4/1/2024), Disp: 24 tablet, Rfl: 0    semaglutide, 0.25 or 0.5 mg/dose, (Ozempic) 2 mg/3 mL injection pen, Inject 0.5 mg under the skin Once a week (Patient not taking: Reported on 1/29/2024), Disp: , Rfl:     sodium chloride, PF, 0.9 %, Inject 10 mL into a catheter in a vein 3 (three) times a week Wound irrigation. (Patient not taking: Reported on 1/29/2024), Disp: 100 mL, Rfl: 10    Vitamin D, Ergocalciferol, 53543 units CAPS, Take by mouth once a week, Disp: , Rfl:     Review of Systems   Constitutional:  Negative for chills and fever.   Respiratory:          Recent urgent care visit for bronchitis    Gastrointestinal:  Negative for abdominal pain, constipation, diarrhea, nausea and vomiting.   Skin:  Positive for wound (open surgical wound abdomen).   Psychiatric/Behavioral:  The patient is not nervous/anxious.          Objective:  /62   Pulse 78   Temp (!) 96.7 °F (35.9 °C)   Resp 18   LMP 11/23/2022 (Exact Date) Comment: partial  Pain Score: 0-No pain     Physical Exam  Vitals reviewed.   Constitutional:       General: She is not in acute distress.     Appearance: She is morbidly obese. She is not ill-appearing, toxic-appearing or diaphoretic.      Comments: Very pleasant, no acute distress   HENT:      Head: Normocephalic and atraumatic.   Cardiovascular:      Rate and Rhythm: Normal rate.   Pulmonary:      Effort: Pulmonary effort is normal. No respiratory distress.   Abdominal:          Comments: surgical wound of abdomen s/p herniation post hysterectomy and dehiscence post hernia repair. There continues to be an indentation of the skin however appears to be fully epithelialized. When probed with tissue there is no drainage that appears on the tissue. Possibly fully healed at this point? There is brock colored \"drainage\" on abdominal pad which may represent the Medihoney dressing alone and not true drainage. Periwound with scar tissue " without erythema to indicate soft tissue infection (photo appears erythematous which is not consistent with in-person evaluation).      Skin:     Findings: Wound present.   Neurological:      Mental Status: She is alert and oriented to person, place, and time.   Psychiatric:         Mood and Affect: Mood normal.         Behavior: Behavior normal.         Wound 07/10/23 Abdomen (Active)   Wound Image Images linked 06/24/24 0831   Wound Description Pink;Epithelialization 06/24/24 0832   Alexa-wound Assessment Pink;Scar Tissue;Dry;Intact 06/24/24 0832   Wound Length (cm) 0.1 cm 06/24/24 0832   Wound Width (cm) 0.1 cm 06/24/24 0832   Wound Depth (cm) 0.1 cm 06/24/24 0832   Wound Surface Area (cm^2) 0.01 cm^2 06/24/24 0832   Wound Volume (cm^3) 0.001 cm^3 06/24/24 0832   Calculated Wound Volume (cm^3) 0 cm^3 06/24/24 0832   Change in Wound Size % 100 06/24/24 0832   Drainage Amount Small 06/24/24 0832   Drainage Description Yellow 06/24/24 0832   Non-staged Wound Description Full thickness 06/24/24 0832   Treatments Irrigation with NSS 06/24/24 0832   Wound packed? No 06/24/24 0832   Patient Tolerance Tolerated well 06/24/24 0832   Dressing Status Intact;Old drainage 06/24/24 0832                     Wound Instructions:  Orders Placed This Encounter   Procedures    Wound cleansing and dressings Abdomen     Abdomen Wound      Remove abdominal dressing, wash with Normal Saline, pat dry prior to applying new dressing      Cover with dry dressing and tape.   If any drainage is observed, please continue using medihoney  Change every other day and as needed for excessive drainage.       if you notice increased odor or see greenish drainage, you may resume using the Dakin's soak with plain packing strips. Do not use the endoform am if you use the Dakins.        Follow up in 1 week.     Standing Status:   Future     Standing Expiration Date:   7/1/2024       Salma Garcia PA-C      Portions of the record may have been created  "with voice recognition software. Occasional wrong word or \"sound alike\" substitutions may have occurred due to the inherent limitations of voice recognition software. Read the chart carefully and recognize, using context, where substitutions have occurred.      "

## 2024-07-01 ENCOUNTER — OFFICE VISIT (OUTPATIENT)
Facility: HOSPITAL | Age: 44
End: 2024-07-01
Payer: COMMERCIAL

## 2024-07-01 VITALS
DIASTOLIC BLOOD PRESSURE: 62 MMHG | HEART RATE: 86 BPM | RESPIRATION RATE: 18 BRPM | TEMPERATURE: 96.7 F | SYSTOLIC BLOOD PRESSURE: 116 MMHG

## 2024-07-01 DIAGNOSIS — S31.109D OPEN WOUND OF ABDOMEN, SUBSEQUENT ENCOUNTER: Primary | ICD-10-CM

## 2024-07-01 DIAGNOSIS — Z90.710 S/P HYSTERECTOMY: ICD-10-CM

## 2024-07-01 DIAGNOSIS — T81.31XS SURGICAL WOUND DEHISCENCE, SEQUELA: ICD-10-CM

## 2024-07-01 DIAGNOSIS — Z87.19 S/P HERNIA REPAIR: ICD-10-CM

## 2024-07-01 DIAGNOSIS — F17.200 TOBACCO USE DISORDER: ICD-10-CM

## 2024-07-01 DIAGNOSIS — E66.01 OBESITY, CLASS III, BMI 40-49.9 (MORBID OBESITY) (HCC): ICD-10-CM

## 2024-07-01 DIAGNOSIS — Z98.890 S/P HERNIA REPAIR: ICD-10-CM

## 2024-07-01 PROCEDURE — G0463 HOSPITAL OUTPT CLINIC VISIT: HCPCS | Performed by: STUDENT IN AN ORGANIZED HEALTH CARE EDUCATION/TRAINING PROGRAM

## 2024-07-01 PROCEDURE — 99214 OFFICE O/P EST MOD 30 MIN: CPT | Performed by: STUDENT IN AN ORGANIZED HEALTH CARE EDUCATION/TRAINING PROGRAM

## 2024-07-01 PROCEDURE — 99213 OFFICE O/P EST LOW 20 MIN: CPT | Performed by: STUDENT IN AN ORGANIZED HEALTH CARE EDUCATION/TRAINING PROGRAM

## 2024-07-01 NOTE — PROGRESS NOTES
Wound Procedure Treatment Abdomen    Performed by: Gudelia Combs RN  Authorized by: Salma Garcia PA-C    Associated wounds:   Wound 07/10/23 Abdomen  Wound cleansed with:  Wound aggrssively cleansed with NSS and gauze  Applied primary dressing:  Packing and Other  Applied secondary dressing:  ABD  Dressing secured with:  Tape  Wound Procedure Treatment Other (comment) (multiple open areas in periwound, fissures likely from tension) Medial;Lower Abdomen    Performed by: Gudelia Combs RN  Authorized by: Salma Garcia PA-C    Associated wounds:   Wound 07/01/24 Other (comment) Abdomen Medial;Lower  Wound cleansed with:  Wound aggrssively cleansed with NSS and gauze  Applied Topical: Medihoney gel    Applied secondary dressing:  ABD  Dressing secured with:  Tape    IODOFORM PACKING TO ABDOMINAL WOUND

## 2024-07-01 NOTE — PATIENT INSTRUCTIONS
Orders Placed This Encounter   Procedures    Wound cleansing and dressings Abdomen     Abdomen Wound     Remove abdominal dressing, wash with Normal Saline, pat dry prior to applying new dressing     Gently pack Iodoform packing strip and leave a tail out.  Cover with ABD pad and secure with tape   Change every day and as needed for excessive drainage.          Follow up in 1 week.     Standing Status:   Future     Standing Expiration Date:   7/8/2024    Wound cleansing and dressings MASD (multiple open areas in periwound) Medial;Lower Abdomen     Wash your hands with soap and water.  Remove old dressing, discard into plastic bag and place in trash.    Cleanse the wound with unscented soap (such as plain white Dove soap) and warm water  prior to applying a clean dressing. Do not use tissue or cotton balls.   Do not scrub the wound. Pat dry using gauze.    Shower yes - Ok to remove old wound dressing, shower hair and body first. Let soapy water pass by wound. No scrubbing with loofahs or washcloths. Pat dry with clean gauze and redress the wound as written:       Apply Medihoney to the multiple scattered open wounds.  Cover with ABD pad  Secure with tape      Change dressing daily.     Standing Status:   Future     Standing Expiration Date:   7/8/2024

## 2024-07-01 NOTE — PROGRESS NOTES
Patient ID: Maria R Webb is a 44 y.o. female Date of Birth 1980       Chief Complaint   Patient presents with    Follow Up Wound Care Visit     Pt reported some odor and slight drainage. Used medihoney this past week       Allergies:  Patient has no known allergies.    Diagnosis:   Diagnosis ICD-10-CM Associated Orders   1. Open wound of abdomen, subsequent encounter  S31.109D Wound cleansing and dressings Abdomen     Wound cleansing and dressings MASD (multiple open areas in periwound) Medial;Lower Abdomen     Wound Procedure Treatment Abdomen     Wound Procedure Treatment Other (comment) (multiple open areas in periwound, fissures likely from tension) Medial;Lower Abdomen      2. Surgical wound dehiscence, sequela  T81.31XS Wound Procedure Treatment Abdomen      3. S/P hysterectomy  Z90.710 Wound Procedure Treatment Abdomen      4. S/P hernia repair  Z98.890 Wound Procedure Treatment Abdomen    Z87.19       5. Tobacco use disorder  F17.200 Wound Procedure Treatment Abdomen     Wound Procedure Treatment Other (comment) (multiple open areas in periwound, fissures likely from tension) Medial;Lower Abdomen      6. Obesity, Class III, BMI 40-49.9 (morbid obesity) ([facility])  E66.01 Wound Procedure Treatment Abdomen     Wound Procedure Treatment Other (comment) (multiple open areas in periwound, fissures likely from tension) Medial;Lower Abdomen           Assessment  & Plan:    F/u surgical wound of abdomen s/p herniation post hysterectomy and dehiscence post hernia repair. There is moderate bloody drainage on the overlying dressing. Wound measurements have increased in depth, now measuring close to 3 cm. Difficult to assess tissue of wound bed d/t small diameter wound and bloody drainage. Periwound with scar tissue, there is now has several areas of fissured skin with fibrinous ulcer bases. No diffuse erythema.   Return to packing the abdominal wound. Iodoform packing with overlying ABD. Change daily.   Medihoney to  "all areas of fissured skin of the periwound.   Reduce activity levels if possible, fissured skin of periwound and further re-opening of abdominal wound likely related to increased activity and tension on skin.   Hold off on restarting Humira until next f/u.   Attempt smoking reduction/cessation.   Instructed to monitor for any changes including redness or swelling surrounding the wound, increased drainage or pain as well as fevers or chills.  If there are copius amounts of bloody drainage would recommend evaluation in ED.   F/u in one week. Instructed to call if any questions or concerns arise in meantime.            Subjective:   3/11/2024: Maria R is a 43-year-old female who is presenting to wound center today for follow-up of abdominal wound s/p hysterectomy complicated by fascial dehiscence and abdominal herniation that required incisional hernia repair w/ mesh.  Has been following with wound center since January '23.  Per wound center visit on 2/5/2024 \"saw Dr. Conroy, general surgery since prior visit whom did not feel as though any further medication/imaging/intervention was necessary at this time and would like pt to continue with local wound care and follow up with him in 3 months.\" At last wound center visit, was changed to Dakins soaked packing after she was experiencing green drainage.  She was instructed to resume silver gel coated packing once the green drainage resolved.  She reports that she has been utilizing Dakin soaked packing over the past week and had not switched to green drainage though this seemed to resolve a few days ago. Of note, is to have recently started Ozempic for weight reduction.  Currently on Z-Dallin and prednisone for bronchitis.  She denies fever and/or chills.  Continues with cough and shortness of breath but no acute worsening.    03/18/24: Pt presents for f/u surgical abdominal wound dehiscence s/p hysterectomy complicated by fascial dehiscence and abdominal herniation " requiring incision hernia repair with mesh complicated by further dehiscence. Pt notes that Dr. Lopez was able to surgically debride a piece of remaining mesh at her visit last week and opted to continue with Dakin soaked packing giving significant improvement in the wound with its use. No nausea, vomiting, severe abdominal pain, change in bowel habits nor fevers/chills. Continuing to smoke about the same amount.     04/08/24: Pt presents for f/u surgical abdominal wound dehiscence. Since her previous visit she has had f/u with Dr. Conroy, general surgery. No further f/u with general surgery was required, pt is to continue with wound care but may f/u with surgery if needed. Pt reports she noticed a slight increase in the odor related to the drainage the other day so she used Dakins and it went away. She ran out of the AMD packing therefore returned to use of the silver gel coated packing. Is feeling well. Has started Ozempic and has noticed a 3 lb weight loss already. Smoking about the same amount. No fevers, chills.     04/15/24: Pt presents for wound related to surgical site dehiscence of the abdomen. Has been using endoform am packing into the remaining opening. Has not had a return of green or foul smelling drainage since her previous visit. Offers no complaints today. No significant changes in medical history since prior visit.     04/29/24: Pt presents for f/u wound related to surgical site dehiscence of the abdomen. Is using endoform am but has noticed a more obvious odor since switching from use of silver gel to the endoform. Drainage is tan in color. Aside from the odor of the drainage offers no complaints today.     05/06/24: Pt presents for f/u wound related to surgical site dehiscence of the abdomen. Has been using gentamicin ointment; packing is no longer staying in well. The odor from the drainage has improved from the prior visit. Offers no complaints today.     05/20/24: Pt presents for f/u wound  related to surgical site dehiscence of the abdomen. She has continued with gentamicin ointment three times daily. The wound continues to drain, she has not noticed a return of odor or odd coloration to the drainage. Is continuing to keep the site covered. Denies abdominal pain, N/V, change in bowel movements, fevers, chils.     06/03/24: Pt presents for f/u wound secondary to surgical site dehiscence of the abdomen. Currently Medihoney is being used. ABD is being used to cover the site. There continues to be small-moderate drainage without any foul smell or green discoloration of the drainage. Pt offers no complaints today.     06/24/24: Pt presents for f/u wound secondary to surgical site dehiscence of the abdomen. She is currently using Medihoney. She believes there is less drainage. Current drainage on the pad is honey-colored therefore it is difficult to tell if it is truly drainage or the Medihoney being placed into the wound. No significant changes since past visit. She is inquiring if she can start Humira again for her RA; was on hold d/t wound.     07/01/24: Patient presents for follow-up of wound secondary to surgical site dehiscence of the abdomen.  Midweek patient began to notice an odor again to her abdominal wound therefore she resumed use of Medihoney to the wound bed and zinc to the periwound.  Today when the dressing was taken off there was a significant amount of blood on the overlying dressing.  Patient does not report noticing any significant bleeding as of last night.  She does state that her activity level was significantly increased from her typical levels this past week due to being short staffed at work as well as doing projects around the house.  Has not noticed any significant changes in terms of pain, change in bowel movements, fevers or chills.          The following portions of the patient's history were reviewed and updated as appropriate:   Patient Active Problem List   Diagnosis     Tobacco use disorder    COVID-19    Obesity, morbid, BMI 50 or higher (HCC)    Bulky or enlarged uterus    Pelvic pain    Preoperative exam for gynecologic surgery    HTN (hypertension)    Gastroesophageal reflux disease    Asthma    Obstructive sleep apnea syndrome    S/P hysterectomy    Postoperative anemia    Rheumatoid arthritis involving multiple sites with positive rheumatoid factor (HCC)    Encounter for postoperative care    Open wound of abdomen    Surgical wound, non healing    Cigarette nicotine dependence without complication     Past Medical History:   Diagnosis Date    Abdominal wall abscess at site of surgical wound 2023    Abnormal uterine bleeding due to intramural leiomyoma 2022    Arthritis     Rheumatoid    Asthma     Breathing difficulty     only occured during inverted robotic hysterectomy    Cellulitis of drainage site following surgery 2023    CPAP (continuous positive airway pressure) dependence     GERD (gastroesophageal reflux disease)     Hypertension     Obese     Pneumonia 2007    Sleep apnea     Uterine fibroid     LTH today 12/15/2022    Wound dehiscence, surgical 2022     Past Surgical History:   Procedure Laterality Date    ABDOMINAL WASHOUT      post  with wound vac     SECTION      had hematoma removed after the surgery    FOREARM SURGERY Right     repair of fracture with plating    INCISIONAL HERNIA REPAIR  2022    Procedure: REPAIR  RECURRENT HERNIA INCISIONAL WITH MESH;  Surgeon: Donna Lau MD;  Location: [facility];  Service: Gynecology    IR DRAINAGE TUBE CHECK WITH SCLEROSIS  2/3/2023    IR DRAINAGE TUBE CHECK WITH SCLEROSIS  3/7/2023    IR DRAINAGE TUBE CHECK/CHANGE/REPOSITION/REINSERTION/UPSIZE  2023    IR DRAINAGE TUBE CHECK/CHANGE/REPOSITION/REINSERTION/UPSIZE  2023    IR DRAINAGE TUBE CHECK/CHANGE/REPOSITION/REINSERTION/UPSIZE  2023    IR DRAINAGE TUBE CHECK/CHANGE/REPOSITION/REINSERTION/UPSIZE   2/27/2023    IR DRAINAGE TUBE PLACEMENT  1/9/2023    LAPAROTOMY N/A 12/20/2022    Procedure: LAPAROTOMY EXPLORATORY;  Surgeon: Donna Lau MD;  Location: [facility];  Service: Gynecology    MYRINGOTOMY W/ TUBES      two sets as a young child    ID CYSTOURETHROSCOPY N/A 12/15/2022    Procedure: CYSTO W/ ROBOT;  Surgeon: Donna Lua MD;  Location: [facility];  Service: Gynecology    ID LAPS TOTAL HYSTERECT 250 GM/< W/RMVL TUBE/OVARY N/A 12/15/2022    Procedure: (LTH) W/ BILATERAL SALPINGECTOMY  W/ ROBOT COVERTED TO OPEN;  Surgeon: Donna Lau MD;  Location: [facility];  Service: Gynecology    VAC DRESSING APPLICATION N/A 3/17/2023    Procedure: APPLICATION VAC DRESSING ABDOMEN/TRUNK;  Surgeon: Chan Conroy MD;  Location: [facility];  Service: General    WOUND DEBRIDEMENT N/A 3/15/2023    Procedure: DEBRIDEMENT Abdominal WOUND and placement of Wound VAC;  Surgeon: Chan Conroy MD;  Location: [facility];  Service: General    WOUND DEBRIDEMENT N/A 3/17/2023    Procedure: DEBRIDEMENT WOUND (WASH OUT);  Surgeon: Chan Conroy MD;  Location: [facility];  Service: General     Family History   Problem Relation Age of Onset    Multiple sclerosis Mother     Hypertension Father     Hyperlipidemia Father     Cerebral aneurysm Father     Pulmonary embolism Father     Liver disease Brother      Social History     Socioeconomic History    Marital status:      Spouse name: None    Number of children: None    Years of education: None    Highest education level: None   Occupational History    None   Tobacco Use    Smoking status: Every Day     Current packs/day: 0.50     Average packs/day: 0.5 packs/day for 30.5 years (15.2 ttl pk-yrs)     Types: Cigarettes     Start date: 1995    Smokeless tobacco: Never   Vaping Use    Vaping status: Never Used   Substance and Sexual Activity    Alcohol use: Not Currently     Alcohol/week: 1.0 standard drink of alcohol     Types: 1 Standard drinks or  equivalent per week     Comment: 3 x monthly    Drug use: Never    Sexual activity: Yes     Partners: Male     Birth control/protection: Male Sterilization   Other Topics Concern    None   Social History Narrative    None     Social Determinants of Health     Financial Resource Strain: Not on file   Food Insecurity: No Food Insecurity (3/16/2023)    Hunger Vital Sign     Worried About Running Out of Food in the Last Year: Never true     Ran Out of Food in the Last Year: Never true   Transportation Needs: No Transportation Needs (3/16/2023)    PRAPARE - Transportation     Lack of Transportation (Medical): No     Lack of Transportation (Non-Medical): No   Physical Activity: Not on file   Stress: Not on file   Social Connections: Not on file   Intimate Partner Violence: Not on file   Housing Stability: Low Risk  (3/16/2023)    Housing Stability Vital Sign     Unable to Pay for Housing in the Last Year: No     Number of Places Lived in the Last Year: 1     Unstable Housing in the Last Year: No       Current Outpatient Medications:     albuterol (2.5 mg/3 mL) 0.083 % nebulizer solution, Take 2.5 mg by nebulization every 4 (four) hours as needed for shortness of breath or wheezing, Disp: , Rfl:     albuterol (ProAir HFA) 90 mcg/act inhaler, Inhale 2 puffs every 6 (six) hours as needed for wheezing or shortness of breath, Disp: 8.5 g, Rfl: 0    albuterol (PROVENTIL HFA,VENTOLIN HFA) 90 mcg/act inhaler, Inhale 2 puffs every 6 (six) hours as needed for wheezing, Disp: 8 g, Rfl: 2    amoxicillin (AMOXIL) 500 mg capsule, , Disp: , Rfl:     Arthritis Pain Relief 650 MG CR tablet, take 1 tablet by mouth every 8 hours if needed for mild pain (Patient not taking: Reported on 3/7/2024), Disp: , Rfl:     aspirin 325 mg tablet, Take 325 mg by mouth daily. Indications: as needed (Patient not taking: Reported on 3/7/2024), Disp: , Rfl:     chlorhexidine (HIBICLENS) 4 % external liquid, Apply 1 Application topically daily as needed for  wound care Can use to clean wound during VAC changes. (Patient not taking: Reported on 1/29/2024), Disp: 473 mL, Rfl: 5    clonazePAM (KlonoPIN) 0.5 mg tablet, Take 0.5 mg by mouth 2 (two) times a day as needed (Patient not taking: Reported on 1/29/2024), Disp: , Rfl:     cyclobenzaprine (FLEXERIL) 5 mg tablet, Take 1 tablet (5 mg total) by mouth 3 (three) times a day as needed for muscle spasms (Patient taking differently: Take 5 mg by mouth 3 (three) times a day as needed for muscle spasms As needed), Disp: 60 tablet, Rfl: 0    dulaglutide (Trulicity) 0.75 MG/0.5ML injection, Inject 0.75 mg under the skin Once a week (Patient not taking: Reported on 1/29/2024), Disp: , Rfl:     escitalopram (LEXAPRO) 10 mg tablet, Take 10 mg by mouth daily, Disp: , Rfl:     escitalopram (LEXAPRO) 5 mg tablet, Take 5 mg by mouth daily, Disp: , Rfl:     fluticasone (FLONASE) 50 mcg/act nasal spray, 2 sprays into each nostril daily, Disp: 16 g, Rfl: 4    Fluticasone-Salmeterol (Advair) 250-50 mcg/dose inhaler, , Disp: , Rfl:     Fluticasone-Salmeterol (Advair) 500-50 mcg/dose inhaler, Inhale 1 puff 2 (two) times a day, Disp: , Rfl:     folic acid (FOLVITE) 1 mg tablet, Take by mouth daily, Disp: , Rfl:     gabapentin (NEURONTIN) 100 mg capsule, Take 1 capsule (100 mg total) by mouth 3 (three) times a day (Patient not taking: Reported on 3/7/2024), Disp: 90 capsule, Rfl: 0    gabapentin (NEURONTIN) 300 mg capsule, Take 300 mg by mouth 3 (three) times a day, Disp: , Rfl:     gentamicin (GARAMYCIN) 0.1 % ointment, Apply topically 3 (three) times a day for 7 days To packing and place into open wound, Disp: 30 g, Rfl: 1    hydrochlorothiazide (HYDRODIURIL) 12.5 mg tablet, Take 12.5 mg by mouth daily Pt only  taking as needed for leg swelling (Patient not taking: Reported on 3/7/2024), Disp: , Rfl:     ibuprofen (MOTRIN) 600 mg tablet, Take 600 mg by mouth every 6 (six) hours as needed for moderate pain As needed, Disp: , Rfl:      methocarbamol (ROBAXIN) 500 mg tablet, Take 1 tablet (500 mg total) by mouth every 6 (six) hours for 14 days (Patient taking differently: Take 500 mg by mouth every 6 (six) hours As needed), Disp: 56 tablet, Rfl: 0    methotrexate 2.5 mg tablet, Four 2.5 tabs one time a week ( Every Saturday), Disp: , Rfl:     montelukast (SINGULAIR) 10 mg tablet, Take by mouth daily at bedtime as needed, Disp: , Rfl:     naloxone (NARCAN) 4 mg/0.1 mL nasal spray, Administer 1 spray into a nostril. If no response after 2-3 minutes, give another dose in the other nostril using a new spray. (Patient not taking: Reported on 4/14/2023), Disp: 1 each, Rfl: 1    neomycin-polymyxin-hydrocortisone (CORTISPORIN) otic solution, Administer 3 drops into the left ear every 6 (six) hours (Patient not taking: Reported on 1/29/2024), Disp: , Rfl:     nicotine (NICODERM CQ) 14 mg/24hr TD 24 hr patch, Place 1 patch on the skin every 24 hours (Patient not taking: Reported on 6/2/2023), Disp: 28 patch, Rfl: 3    omeprazole (PriLOSEC) 40 MG capsule, Take 40 mg by mouth daily, Disp: , Rfl:     predniSONE 10 mg tablet, Take 5 tabs po x 2 days; 4 tabs po x 2 days; 3 tabs po x 1 day; 2 tabs po x 1 day. 1 tab po x 1 day. (Patient not taking: Reported on 1/29/2024), Disp: 24 tablet, Rfl: 0    predniSONE 10 mg tablet, Take by mouth 2 (two) times a day with meals For 10 days (Patient not taking: Reported on 4/1/2024), Disp: , Rfl:     predniSONE 10 mg tablet, Take 5 tabs po x 2 days; 4 tabs po x 2 days; 3 tabs po x 1 day; 2 tabs po x 1 day. 1 tab po x 1 day. (Patient not taking: Reported on 4/1/2024), Disp: 24 tablet, Rfl: 0    semaglutide, 0.25 or 0.5 mg/dose, (Ozempic) 2 mg/3 mL injection pen, Inject 0.5 mg under the skin Once a week (Patient not taking: Reported on 1/29/2024), Disp: , Rfl:     sodium chloride, PF, 0.9 %, Inject 10 mL into a catheter in a vein 3 (three) times a week Wound irrigation. (Patient not taking: Reported on 1/29/2024), Disp: 100 mL,  Rfl: 10    Vitamin D, Ergocalciferol, 86567 units CAPS, Take by mouth once a week, Disp: , Rfl:     Review of Systems   Constitutional:  Negative for chills and fever.   Respiratory:          Recent urgent care visit for bronchitis    Gastrointestinal:  Negative for abdominal pain, constipation, diarrhea, nausea and vomiting.   Skin:  Positive for wound (open surgical wound abdomen).   Psychiatric/Behavioral:  The patient is not nervous/anxious.          Objective:  /62   Pulse 86   Temp (!) 96.7 °F (35.9 °C)   Resp 18   LMP 11/23/2022 (Exact Date) Comment: partial  Pain Score: 0-No pain     Physical Exam  Vitals reviewed.   Constitutional:       General: She is not in acute distress.     Appearance: She is morbidly obese. She is not ill-appearing, toxic-appearing or diaphoretic.      Comments: Very pleasant, no acute distress   HENT:      Head: Normocephalic and atraumatic.   Cardiovascular:      Rate and Rhythm: Normal rate.   Pulmonary:      Effort: Pulmonary effort is normal. No respiratory distress.   Abdominal:          Comments: Surgical wound of abdomen s/p herniation post hysterectomy and dehiscence post hernia repair. There is moderate bloody drainage on the overlying dressing. Wound measurements have increased in depth, now measuring close to 3 cm. Difficult to assess tissue of wound bed d/t small diameter wound and bloody drainage. Periwound with scar tissue, there is now has several areas of fissured skin with fibrinous ulcer bases. No diffuse erythema.      Skin:     Findings: Wound present.   Neurological:      Mental Status: She is alert and oriented to person, place, and time.   Psychiatric:         Mood and Affect: Mood normal.         Behavior: Behavior normal.               Wound 07/10/23 Abdomen (Active)   Wound Image    07/01/24 1004   Wound Description Pink;Epithelialization;Bleeding 07/01/24 1008   Alexa-wound Assessment Pink;Scar Tissue;Fragile;Denuded 07/01/24 1008   Wound Length  (cm) 0.3 cm 07/01/24 1008   Wound Width (cm) 0.2 cm 07/01/24 1008   Wound Depth (cm) 3 cm 07/01/24 1008   Wound Surface Area (cm^2) 0.06 cm^2 07/01/24 1008   Wound Volume (cm^3) 0.18 cm^3 07/01/24 1008   Calculated Wound Volume (cm^3) 0.18 cm^3 07/01/24 1008   Change in Wound Size % 99.58 07/01/24 1008   Tunneling 1 2 cm 03/04/24 0822   Tunneling 1 in depth located at 7 o'clock 03/04/24 0822   Number of underminings 1 02/05/24 0821   Undermining 1 2.5 02/05/24 0821   Undermining 1 is depth extending from 6-7 02/05/24 0821   Drainage Amount Large 07/01/24 1008   Drainage Description Bloody;Serosanguineous 07/01/24 1008   Non-staged Wound Description Full thickness 07/01/24 1008   Treatments Irrigation with NSS 07/01/24 1008   Dressing Wound V.A.C. 07/31/23 0859   Wound packed? No 07/01/24 1021   Packing- # removed 1 05/06/24 0829   Packing- # inserted 2 07/24/23 0832   Dressing Changed New 07/24/23 0832   Patient Tolerance Tolerated well 07/01/24 1008   Dressing Status Intact;Old drainage 07/01/24 1008       Wound 07/01/24 Other (comment) Abdomen Medial;Lower (Active)   Wound Image   07/01/24 1021   Wound Description Pink;Pale;Yellow 07/01/24 1021   Alexa-wound Assessment Fragile;Denuded;Pink 07/01/24 1021   Wound Length (cm) 1.5 cm 07/01/24 1021   Wound Width (cm) 1.5 cm 07/01/24 1021   Wound Depth (cm) 0.1 cm 07/01/24 1021   Wound Surface Area (cm^2) 2.25 cm^2 07/01/24 1021   Wound Volume (cm^3) 0.225 cm^3 07/01/24 1021   Calculated Wound Volume (cm^3) 0.23 cm^3 07/01/24 1021   Drainage Amount Moderate 07/01/24 1021   Drainage Description Bloody 07/01/24 1021   Non-staged Wound Description Full thickness 07/01/24 1021   Patient Tolerance Tolerated well 07/01/24 1021   Dressing Status Intact;Old drainage 07/01/24 1021                                             Wound Instructions:  Orders Placed This Encounter   Procedures    Wound cleansing and dressings Abdomen     Abdomen Wound     Remove abdominal dressing, wash  "with Normal Saline, pat dry prior to applying new dressing     Gently pack Iodoform packing strip and leave a tail out.  Cover with ABD pad and secure with tape   Change every day and as needed for excessive drainage.          Follow up in 1 week.     Standing Status:   Future     Standing Expiration Date:   7/8/2024    Wound cleansing and dressings MASD (multiple open areas in periwound) Medial;Lower Abdomen     Wash your hands with soap and water.  Remove old dressing, discard into plastic bag and place in trash.    Cleanse the wound with unscented soap (such as plain white Dove soap) and warm water  prior to applying a clean dressing. Do not use tissue or cotton balls.   Do not scrub the wound. Pat dry using gauze.    Shower yes - Ok to remove old wound dressing, shower hair and body first. Let soapy water pass by wound. No scrubbing with loofahs or washcloths. Pat dry with clean gauze and redress the wound as written:       Apply Medihoney to the multiple scattered open wounds.  Cover with ABD pad  Secure with tape      Change dressing daily.     Standing Status:   Future     Standing Expiration Date:   7/8/2024    Wound Procedure Treatment Abdomen     This order was created via procedure documentation    Wound Procedure Treatment Other (comment) (multiple open areas in periwound, fissures likely from tension) Medial;Lower Abdomen     This order was created via procedure documentation       Cally Garcia, PA-C    Portions of the record may have been created with voice recognition software. Occasional wrong word or \"sound alike\" substitutions may have occurred due to the inherent limitations of voice recognition software. Read the chart carefully and recognize, using context, where substitutions have occurred.      "

## 2024-07-08 ENCOUNTER — OFFICE VISIT (OUTPATIENT)
Facility: HOSPITAL | Age: 44
End: 2024-07-08
Payer: COMMERCIAL

## 2024-07-08 DIAGNOSIS — Z87.19 S/P HERNIA REPAIR: ICD-10-CM

## 2024-07-08 DIAGNOSIS — S31.109D OPEN WOUND OF ABDOMEN, SUBSEQUENT ENCOUNTER: Primary | ICD-10-CM

## 2024-07-08 DIAGNOSIS — T81.31XS SURGICAL WOUND DEHISCENCE, SEQUELA: ICD-10-CM

## 2024-07-08 DIAGNOSIS — Z90.710 S/P HYSTERECTOMY: ICD-10-CM

## 2024-07-08 DIAGNOSIS — Z98.890 S/P HERNIA REPAIR: ICD-10-CM

## 2024-07-08 DIAGNOSIS — F17.200 TOBACCO USE DISORDER: ICD-10-CM

## 2024-07-08 PROCEDURE — G0463 HOSPITAL OUTPT CLINIC VISIT: HCPCS | Performed by: STUDENT IN AN ORGANIZED HEALTH CARE EDUCATION/TRAINING PROGRAM

## 2024-07-08 PROCEDURE — 99213 OFFICE O/P EST LOW 20 MIN: CPT | Performed by: STUDENT IN AN ORGANIZED HEALTH CARE EDUCATION/TRAINING PROGRAM

## 2024-07-08 NOTE — PROGRESS NOTES
Patient ID: Maria R Webb is a 44 y.o. female Date of Birth 1980       Chief Complaint   Patient presents with    Follow Up Wound Care Visit     ABD WOUND. Dressing last changed yesterday. Reducing heavy activity. Iodoform packing.        Allergies:  Patient has no known allergies.    Diagnosis:   Diagnosis ICD-10-CM Associated Orders   1. Open wound of abdomen, subsequent encounter  S31.109D Wound cleansing and dressings Abdomen      2. Surgical wound dehiscence, sequela  T81.31XS       3. S/P hysterectomy  Z90.710       4. S/P hernia repair  Z98.890     Z87.19       5. Tobacco use disorder  F17.200            Assessment  & Plan:    F/u surgical wound of abdomen s/p herniation post hysterectomy and dehiscence post hernia repair. Improved from previous visit. Probing 1 cm which is improved from previous 3 cm. No further bloody drainage is present. Drainage is small and serous. Periwound is much improved with healed fissured areas and no irritation or erythema of periwound.   Continue with iodoform packing with abd and change daily. If packing is beginning to fall out may change to using Medihoney daily to the open wound instead.   No need for further Medihoney to periwound given no fissures or wounds today however if any fissures occur may return to daily application.   Reasonable to restart Humira following next wound care visit as long as continues to improve and no signs of infection are present at next visit.   Instructed to monitor for any changes including redness or swelling surrounding the wound, increased drainage or pain as well as fevers or chills.    F/u in one week. Instructed to call if any questions or concerns arise in meantime.            Subjective:   3/11/2024: Maria R is a 43-year-old female who is presenting to wound center today for follow-up of abdominal wound s/p hysterectomy complicated by fascial dehiscence and abdominal herniation that required incisional hernia repair w/ mesh.  Has  "been following with wound center since January '23.  Per wound center visit on 2/5/2024 \"saw Dr. Conroy, general surgery since prior visit whom did not feel as though any further medication/imaging/intervention was necessary at this time and would like pt to continue with local wound care and follow up with him in 3 months.\" At last wound center visit, was changed to Dakins soaked packing after she was experiencing green drainage.  She was instructed to resume silver gel coated packing once the green drainage resolved.  She reports that she has been utilizing Dakin soaked packing over the past week and had not switched to green drainage though this seemed to resolve a few days ago. Of note, is to have recently started Ozempic for weight reduction.  Currently on Z-Dallin and prednisone for bronchitis.  She denies fever and/or chills.  Continues with cough and shortness of breath but no acute worsening.    03/18/24: Pt presents for f/u surgical abdominal wound dehiscence s/p hysterectomy complicated by fascial dehiscence and abdominal herniation requiring incision hernia repair with mesh complicated by further dehiscence. Pt notes that Dr. Lopez was able to surgically debride a piece of remaining mesh at her visit last week and opted to continue with Dakin soaked packing giving significant improvement in the wound with its use. No nausea, vomiting, severe abdominal pain, change in bowel habits nor fevers/chills. Continuing to smoke about the same amount.     04/08/24: Pt presents for f/u surgical abdominal wound dehiscence. Since her previous visit she has had f/u with Dr. Conroy, general surgery. No further f/u with general surgery was required, pt is to continue with wound care but may f/u with surgery if needed. Pt reports she noticed a slight increase in the odor related to the drainage the other day so she used Dakins and it went away. She ran out of the AMD packing therefore returned to use of the silver gel " coated packing. Is feeling well. Has started Ozempic and has noticed a 3 lb weight loss already. Smoking about the same amount. No fevers, chills.     04/15/24: Pt presents for wound related to surgical site dehiscence of the abdomen. Has been using endoform am packing into the remaining opening. Has not had a return of green or foul smelling drainage since her previous visit. Offers no complaints today. No significant changes in medical history since prior visit.     04/29/24: Pt presents for f/u wound related to surgical site dehiscence of the abdomen. Is using endoform am but has noticed a more obvious odor since switching from use of silver gel to the endoform. Drainage is tan in color. Aside from the odor of the drainage offers no complaints today.     05/06/24: Pt presents for f/u wound related to surgical site dehiscence of the abdomen. Has been using gentamicin ointment; packing is no longer staying in well. The odor from the drainage has improved from the prior visit. Offers no complaints today.     05/20/24: Pt presents for f/u wound related to surgical site dehiscence of the abdomen. She has continued with gentamicin ointment three times daily. The wound continues to drain, she has not noticed a return of odor or odd coloration to the drainage. Is continuing to keep the site covered. Denies abdominal pain, N/V, change in bowel movements, fevers, chils.     06/03/24: Pt presents for f/u wound secondary to surgical site dehiscence of the abdomen. Currently Medihoney is being used. ABD is being used to cover the site. There continues to be small-moderate drainage without any foul smell or green discoloration of the drainage. Pt offers no complaints today.     06/24/24: Pt presents for f/u wound secondary to surgical site dehiscence of the abdomen. She is currently using Medihoney. She believes there is less drainage. Current drainage on the pad is honey-colored therefore it is difficult to tell if it is  truly drainage or the Medihoney being placed into the wound. No significant changes since past visit. She is inquiring if she can start Humira again for her RA; was on hold d/t wound.     07/01/24: Patient presents for follow-up of wound secondary to surgical site dehiscence of the abdomen.  Midweek patient began to notice an odor again to her abdominal wound therefore she resumed use of Medihoney to the wound bed and zinc to the periwound.  Today when the dressing was taken off there was a significant amount of blood on the overlying dressing.  Patient does not report noticing any significant bleeding as of last night.  She does state that her activity level was significantly increased from her typical levels this past week due to being short staffed at work as well as doing projects around the house.  Has not noticed any significant changes in terms of pain, change in bowel movements, fevers or chills.      07/08/24: Pt presents for f/u wound secondary to surgical site dehiscence of the abdomen. She has been packing the wound with Iodoform packing and using medihoney to the cracks of periwound skin. Had a brief return of green drainage however this resolved quickly. Saw rheumatology since her previous visit and is going to be reinitiated on Humira following pre-approval process as long as she gets clearance from wound care perspective.           The following portions of the patient's history were reviewed and updated as appropriate:   Patient Active Problem List   Diagnosis    Tobacco use disorder    COVID-19    Obesity, morbid, BMI 50 or higher (HCC)    Bulky or enlarged uterus    Pelvic pain    Preoperative exam for gynecologic surgery    HTN (hypertension)    Gastroesophageal reflux disease    Asthma    Obstructive sleep apnea syndrome    S/P hysterectomy    Postoperative anemia    Rheumatoid arthritis involving multiple sites with positive rheumatoid factor (HCC)    Encounter for postoperative care    Open  wound of abdomen    Surgical wound, non healing    Cigarette nicotine dependence without complication     Past Medical History:   Diagnosis Date    Abdominal wall abscess at site of surgical wound 2023    Abnormal uterine bleeding due to intramural leiomyoma 2022    Arthritis     Rheumatoid    Asthma     Breathing difficulty     only occured during inverted robotic hysterectomy    Cellulitis of drainage site following surgery 2023    CPAP (continuous positive airway pressure) dependence     GERD (gastroesophageal reflux disease)     Hypertension     Obese     Pneumonia 2007    Sleep apnea     Uterine fibroid     LTH today 12/15/2022    Wound dehiscence, surgical 2022     Past Surgical History:   Procedure Laterality Date    ABDOMINAL WASHOUT      post  with wound vac     SECTION      had hematoma removed after the surgery    FOREARM SURGERY Right     repair of fracture with plating    INCISIONAL HERNIA REPAIR  2022    Procedure: REPAIR  RECURRENT HERNIA INCISIONAL WITH MESH;  Surgeon: Donna Lau MD;  Location: [facility];  Service: Gynecology    IR DRAINAGE TUBE CHECK WITH SCLEROSIS  2/3/2023    IR DRAINAGE TUBE CHECK WITH SCLEROSIS  3/7/2023    IR DRAINAGE TUBE CHECK/CHANGE/REPOSITION/REINSERTION/UPSIZE  2023    IR DRAINAGE TUBE CHECK/CHANGE/REPOSITION/REINSERTION/UPSIZE  2023    IR DRAINAGE TUBE CHECK/CHANGE/REPOSITION/REINSERTION/UPSIZE  2023    IR DRAINAGE TUBE CHECK/CHANGE/REPOSITION/REINSERTION/UPSIZE  2023    IR DRAINAGE TUBE PLACEMENT  2023    LAPAROTOMY N/A 2022    Procedure: LAPAROTOMY EXPLORATORY;  Surgeon: Donna Lau MD;  Location: [facility];  Service: Gynecology    MYRINGOTOMY W/ TUBES      two sets as a young child    MT CYSTOURETHROSCOPY N/A 12/15/2022    Procedure: CYSTO W/ ROBOT;  Surgeon: Donna Lau MD;  Location: [facility];  Service: Gynecology    MT LAPS TOTAL HYSTERECT 250 GM/< W/RMVL  TUBE/OVARY N/A 12/15/2022    Procedure: (LTH) W/ BILATERAL SALPINGECTOMY  W/ ROBOT COVERTED TO OPEN;  Surgeon: Donna Lau MD;  Location: [facility];  Service: Gynecology    VAC DRESSING APPLICATION N/A 3/17/2023    Procedure: APPLICATION VAC DRESSING ABDOMEN/TRUNK;  Surgeon: Chan Conroy MD;  Location: [facility];  Service: General    WOUND DEBRIDEMENT N/A 3/15/2023    Procedure: DEBRIDEMENT Abdominal WOUND and placement of Wound VAC;  Surgeon: Chan Conroy MD;  Location: [facility];  Service: General    WOUND DEBRIDEMENT N/A 3/17/2023    Procedure: DEBRIDEMENT WOUND (WASH OUT);  Surgeon: Chan Conroy MD;  Location: [facility];  Service: General     Family History   Problem Relation Age of Onset    Multiple sclerosis Mother     Hypertension Father     Hyperlipidemia Father     Cerebral aneurysm Father     Pulmonary embolism Father     Liver disease Brother      Social History     Socioeconomic History    Marital status:      Spouse name: Not on file    Number of children: Not on file    Years of education: Not on file    Highest education level: Not on file   Occupational History    Not on file   Tobacco Use    Smoking status: Every Day     Current packs/day: 0.50     Average packs/day: 0.5 packs/day for 30.5 years (15.3 ttl pk-yrs)     Types: Cigarettes     Start date: 1995    Smokeless tobacco: Never   Vaping Use    Vaping status: Never Used   Substance and Sexual Activity    Alcohol use: Not Currently     Alcohol/week: 1.0 standard drink of alcohol     Types: 1 Standard drinks or equivalent per week     Comment: 3 x monthly    Drug use: Never    Sexual activity: Yes     Partners: Male     Birth control/protection: Male Sterilization   Other Topics Concern    Not on file   Social History Narrative    Not on file     Social Determinants of Health     Financial Resource Strain: Not on file   Food Insecurity: No Food Insecurity (3/16/2023)    Hunger Vital Sign     Worried About  Running Out of Food in the Last Year: Never true     Ran Out of Food in the Last Year: Never true   Transportation Needs: No Transportation Needs (3/16/2023)    PRAPARE - Transportation     Lack of Transportation (Medical): No     Lack of Transportation (Non-Medical): No   Physical Activity: Not on file   Stress: Not on file   Social Connections: Not on file   Intimate Partner Violence: Not on file   Housing Stability: Low Risk  (3/16/2023)    Housing Stability Vital Sign     Unable to Pay for Housing in the Last Year: No     Number of Places Lived in the Last Year: 1     Unstable Housing in the Last Year: No       Current Outpatient Medications:     albuterol (2.5 mg/3 mL) 0.083 % nebulizer solution, Take 2.5 mg by nebulization every 4 (four) hours as needed for shortness of breath or wheezing, Disp: , Rfl:     albuterol (ProAir HFA) 90 mcg/act inhaler, Inhale 2 puffs every 6 (six) hours as needed for wheezing or shortness of breath, Disp: 8.5 g, Rfl: 0    albuterol (PROVENTIL HFA,VENTOLIN HFA) 90 mcg/act inhaler, Inhale 2 puffs every 6 (six) hours as needed for wheezing, Disp: 8 g, Rfl: 2    amoxicillin (AMOXIL) 500 mg capsule, , Disp: , Rfl:     Arthritis Pain Relief 650 MG CR tablet, take 1 tablet by mouth every 8 hours if needed for mild pain (Patient not taking: Reported on 3/7/2024), Disp: , Rfl:     aspirin 325 mg tablet, Take 325 mg by mouth daily. Indications: as needed (Patient not taking: Reported on 3/7/2024), Disp: , Rfl:     chlorhexidine (HIBICLENS) 4 % external liquid, Apply 1 Application topically daily as needed for wound care Can use to clean wound during VAC changes. (Patient not taking: Reported on 1/29/2024), Disp: 473 mL, Rfl: 5    clonazePAM (KlonoPIN) 0.5 mg tablet, Take 0.5 mg by mouth 2 (two) times a day as needed (Patient not taking: Reported on 1/29/2024), Disp: , Rfl:     cyclobenzaprine (FLEXERIL) 5 mg tablet, Take 1 tablet (5 mg total) by mouth 3 (three) times a day as needed for  muscle spasms (Patient taking differently: Take 5 mg by mouth 3 (three) times a day as needed for muscle spasms As needed), Disp: 60 tablet, Rfl: 0    dulaglutide (Trulicity) 0.75 MG/0.5ML injection, Inject 0.75 mg under the skin Once a week (Patient not taking: Reported on 1/29/2024), Disp: , Rfl:     escitalopram (LEXAPRO) 10 mg tablet, Take 10 mg by mouth daily, Disp: , Rfl:     escitalopram (LEXAPRO) 5 mg tablet, Take 5 mg by mouth daily, Disp: , Rfl:     fluticasone (FLONASE) 50 mcg/act nasal spray, 2 sprays into each nostril daily, Disp: 16 g, Rfl: 4    Fluticasone-Salmeterol (Advair) 250-50 mcg/dose inhaler, , Disp: , Rfl:     Fluticasone-Salmeterol (Advair) 500-50 mcg/dose inhaler, Inhale 1 puff 2 (two) times a day, Disp: , Rfl:     folic acid (FOLVITE) 1 mg tablet, Take by mouth daily, Disp: , Rfl:     gabapentin (NEURONTIN) 100 mg capsule, Take 1 capsule (100 mg total) by mouth 3 (three) times a day (Patient not taking: Reported on 3/7/2024), Disp: 90 capsule, Rfl: 0    gabapentin (NEURONTIN) 300 mg capsule, Take 300 mg by mouth 3 (three) times a day, Disp: , Rfl:     gentamicin (GARAMYCIN) 0.1 % ointment, Apply topically 3 (three) times a day for 7 days To packing and place into open wound, Disp: 30 g, Rfl: 1    hydrochlorothiazide (HYDRODIURIL) 12.5 mg tablet, Take 12.5 mg by mouth daily Pt only  taking as needed for leg swelling (Patient not taking: Reported on 3/7/2024), Disp: , Rfl:     ibuprofen (MOTRIN) 600 mg tablet, Take 600 mg by mouth every 6 (six) hours as needed for moderate pain As needed, Disp: , Rfl:     methocarbamol (ROBAXIN) 500 mg tablet, Take 1 tablet (500 mg total) by mouth every 6 (six) hours for 14 days (Patient taking differently: Take 500 mg by mouth every 6 (six) hours As needed), Disp: 56 tablet, Rfl: 0    methotrexate 2.5 mg tablet, Four 2.5 tabs one time a week ( Every Saturday), Disp: , Rfl:     montelukast (SINGULAIR) 10 mg tablet, Take by mouth daily at bedtime as needed,  Disp: , Rfl:     naloxone (NARCAN) 4 mg/0.1 mL nasal spray, Administer 1 spray into a nostril. If no response after 2-3 minutes, give another dose in the other nostril using a new spray. (Patient not taking: Reported on 4/14/2023), Disp: 1 each, Rfl: 1    neomycin-polymyxin-hydrocortisone (CORTISPORIN) otic solution, Administer 3 drops into the left ear every 6 (six) hours (Patient not taking: Reported on 1/29/2024), Disp: , Rfl:     nicotine (NICODERM CQ) 14 mg/24hr TD 24 hr patch, Place 1 patch on the skin every 24 hours (Patient not taking: Reported on 6/2/2023), Disp: 28 patch, Rfl: 3    omeprazole (PriLOSEC) 40 MG capsule, Take 40 mg by mouth daily, Disp: , Rfl:     predniSONE 10 mg tablet, Take 5 tabs po x 2 days; 4 tabs po x 2 days; 3 tabs po x 1 day; 2 tabs po x 1 day. 1 tab po x 1 day. (Patient not taking: Reported on 1/29/2024), Disp: 24 tablet, Rfl: 0    predniSONE 10 mg tablet, Take by mouth 2 (two) times a day with meals For 10 days (Patient not taking: Reported on 4/1/2024), Disp: , Rfl:     predniSONE 10 mg tablet, Take 5 tabs po x 2 days; 4 tabs po x 2 days; 3 tabs po x 1 day; 2 tabs po x 1 day. 1 tab po x 1 day. (Patient not taking: Reported on 4/1/2024), Disp: 24 tablet, Rfl: 0    semaglutide, 0.25 or 0.5 mg/dose, (Ozempic) 2 mg/3 mL injection pen, Inject 0.5 mg under the skin Once a week (Patient not taking: Reported on 1/29/2024), Disp: , Rfl:     sodium chloride, PF, 0.9 %, Inject 10 mL into a catheter in a vein 3 (three) times a week Wound irrigation. (Patient not taking: Reported on 1/29/2024), Disp: 100 mL, Rfl: 10    Vitamin D, Ergocalciferol, 17610 units CAPS, Take by mouth once a week, Disp: , Rfl:     Review of Systems   Constitutional:  Negative for chills and fever.   Respiratory:          Recent urgent care visit for bronchitis    Gastrointestinal:  Negative for abdominal pain, constipation, diarrhea, nausea and vomiting.   Skin:  Positive for wound (open surgical wound abdomen).    Psychiatric/Behavioral:  The patient is not nervous/anxious.          Objective:  LMP 11/23/2022 (Exact Date) Comment: partial        Physical Exam  Vitals reviewed.   Constitutional:       General: She is not in acute distress.     Appearance: She is morbidly obese. She is not ill-appearing, toxic-appearing or diaphoretic.      Comments: Very pleasant, no acute distress   HENT:      Head: Normocephalic and atraumatic.   Cardiovascular:      Rate and Rhythm: Normal rate.   Pulmonary:      Effort: Pulmonary effort is normal. No respiratory distress.   Abdominal:          Comments: Surgical wound of abdomen s/p herniation post hysterectomy and dehiscence post hernia repair. Improved from previous visit. Probing 1 cm which is improved from previous 3 cm. No further bloody drainage is present. Drainage is small and serous. Periwound is much improved with healed fissured areas and no irritation or erythema of periwound.      Skin:     Findings: Wound present.   Neurological:      Mental Status: She is alert and oriented to person, place, and time.   Psychiatric:         Mood and Affect: Mood normal.         Behavior: Behavior normal.         Wound 07/10/23 Abdomen (Active)   Wound Image   07/08/24 0846   Wound Description Pink;Epithelialization 07/08/24 0847   Alexa-wound Assessment Pink;Scar Tissue;Fragile;Denuded 07/08/24 0847   Wound Length (cm) 0.3 cm 07/08/24 0847   Wound Width (cm) 0.2 cm 07/08/24 0847   Wound Depth (cm) 1 cm 07/08/24 0847   Wound Surface Area (cm^2) 0.06 cm^2 07/08/24 0847   Wound Volume (cm^3) 0.06 cm^3 07/08/24 0847   Calculated Wound Volume (cm^3) 0.06 cm^3 07/08/24 0847   Change in Wound Size % 99.86 07/08/24 0847   Tunneling 1 2 cm 03/04/24 0822   Tunneling 1 in depth located at 7 o'clock 03/04/24 0822   Number of underminings 1 02/05/24 0821   Undermining 1 2.5 02/05/24 0821   Undermining 1 is depth extending from 6-7 02/05/24 0821   Drainage Amount Large 07/08/24 0847   Drainage  Description Bloody;Serosanguineous 07/08/24 0847   Non-staged Wound Description Full thickness 07/08/24 0847   Treatments Irrigation with NSS 07/08/24 0847   Dressing Wound V.A.C. 07/31/23 0859   Wound packed? No 07/01/24 1021   Packing- # removed 1 05/06/24 0829   Packing- # inserted 2 07/24/23 0832   Dressing Changed New 07/24/23 0832   Patient Tolerance Tolerated well 07/01/24 1008   Dressing Status Intact;Old drainage 07/08/24 0847       Wound 07/01/24 Other (comment) Abdomen Medial;Lower (Active)   Wound Image   07/08/24 0845   Wound Description Clean;Dry;Intact 07/08/24 0851   Alexa-wound Assessment Intact;Pink 07/08/24 0851   Wound Length (cm) 0 cm 07/08/24 0851   Wound Width (cm) 0 cm 07/08/24 0851   Wound Depth (cm) 0 cm 07/08/24 0851   Wound Surface Area (cm^2) 0 cm^2 07/08/24 0851   Wound Volume (cm^3) 0 cm^3 07/08/24 0851   Calculated Wound Volume (cm^3) 0 cm^3 07/08/24 0851   Change in Wound Size % 100 07/08/24 0851   Drainage Amount None 07/08/24 0851   Drainage Description EILEEN 07/08/24 0851   Non-staged Wound Description Not applicable 07/08/24 0851   Patient Tolerance Tolerated well 07/08/24 0851   Dressing Status Clean;Dry;Intact 07/08/24 0851             Wound Instructions:  Orders Placed This Encounter   Procedures    Wound cleansing and dressings Abdomen     Surrounding skin around abdominal wound is healed!      Abdomen Wound     Remove abdominal dressing, wash with Normal Saline, pat dry prior to applying new dressing     Gently pack Iodoform packing strip and leave a tail out.  Cover with ABD pad and secure with tape  Change every day and as needed for excessive drainage.         Follow up in 2 weeks.      Please call the [facility] Monday through Friday between the hours of 8:00 AM and 4:30 PM at [phone] if you have any questions, if you experience any major changes in your wound(s) or for any signs or symptoms of infection such as fever; changes in the redness, swelling,  "drainage, or odor of your wound. After hours, weekends or holidays please contact your primary care physician or go to the hospital emergency room.          Standing Status:   Future     Standing Expiration Date:   7/15/2024       Salma Garcia PA-C      Portions of the record may have been created with voice recognition software. Occasional wrong word or \"sound alike\" substitutions may have occurred due to the inherent limitations of voice recognition software. Read the chart carefully and recognize, using context, where substitutions have occurred.      "

## 2024-07-08 NOTE — PATIENT INSTRUCTIONS
Orders Placed This Encounter   Procedures    Wound cleansing and dressings Abdomen     Surrounding skin around abdominal wound is healed!      Abdomen Wound     Remove abdominal dressing, wash with Normal Saline, pat dry prior to applying new dressing     Gently pack Iodoform packing strip and leave a tail out.  Cover with ABD pad and secure with tape  Change every day and as needed for excessive drainage.         Follow up in 2 weeks.      Please call the [facility]aling Center Monday through Friday between the hours of 8:00 AM and 4:30 PM at [phone] if you have any questions, if you experience any major changes in your wound(s) or for any signs or symptoms of infection such as fever; changes in the redness, swelling, drainage, or odor of your wound. After hours, weekends or holidays please contact your primary care physician or go to the hospital emergency room.          Standing Status:   Future     Standing Expiration Date:   7/15/2024

## 2024-07-08 NOTE — PROGRESS NOTES
"Wound Procedure Treatment Abdomen    Performed by: Gudelia Combs RN  Authorized by: Salma Garcia PA-C    Associated wounds:   Wound 07/10/23 Abdomen  Wound cleansed with:  Wound aggrssively cleansed with NSS and gauze  Applied primary dressing:  Packing  Applied secondary dressing:  ABD  Dressing secured with:  Tape    IODOFORM 1/4\" PACKING  "

## 2024-07-22 ENCOUNTER — OFFICE VISIT (OUTPATIENT)
Facility: HOSPITAL | Age: 44
End: 2024-07-22
Payer: COMMERCIAL

## 2024-07-22 VITALS
DIASTOLIC BLOOD PRESSURE: 62 MMHG | RESPIRATION RATE: 16 BRPM | SYSTOLIC BLOOD PRESSURE: 122 MMHG | HEART RATE: 76 BPM | TEMPERATURE: 95 F

## 2024-07-22 DIAGNOSIS — F17.200 TOBACCO USE DISORDER: ICD-10-CM

## 2024-07-22 DIAGNOSIS — Z87.19 S/P HERNIA REPAIR: ICD-10-CM

## 2024-07-22 DIAGNOSIS — Z98.890 S/P HERNIA REPAIR: ICD-10-CM

## 2024-07-22 DIAGNOSIS — Z90.710 S/P HYSTERECTOMY: ICD-10-CM

## 2024-07-22 DIAGNOSIS — T81.31XS SURGICAL WOUND DEHISCENCE, SEQUELA: ICD-10-CM

## 2024-07-22 DIAGNOSIS — B36.9 FUNGAL DERMATITIS: ICD-10-CM

## 2024-07-22 DIAGNOSIS — T81.89XD NON-HEALING SURGICAL WOUND, SUBSEQUENT ENCOUNTER: Primary | ICD-10-CM

## 2024-07-22 PROCEDURE — 99214 OFFICE O/P EST MOD 30 MIN: CPT | Performed by: STUDENT IN AN ORGANIZED HEALTH CARE EDUCATION/TRAINING PROGRAM

## 2024-07-22 PROCEDURE — G0463 HOSPITAL OUTPT CLINIC VISIT: HCPCS | Performed by: STUDENT IN AN ORGANIZED HEALTH CARE EDUCATION/TRAINING PROGRAM

## 2024-07-22 PROCEDURE — 99213 OFFICE O/P EST LOW 20 MIN: CPT | Performed by: STUDENT IN AN ORGANIZED HEALTH CARE EDUCATION/TRAINING PROGRAM

## 2024-07-22 RX ORDER — NYSTATIN 100000 [USP'U]/G
POWDER TOPICAL 2 TIMES DAILY
Qty: 30 G | Refills: 1 | Status: SHIPPED | OUTPATIENT
Start: 2024-07-22 | End: 2024-08-05

## 2024-07-22 NOTE — PATIENT INSTRUCTIONS
Orders Placed This Encounter   Procedures    Wound cleansing and dressings Abdomen     Abdominal Wound:    Wash your hands with soap and water.  Remove old dressing, discard into plastic bag and place in trash.    Cleanse the wound with unscented soap (such as plain white Dove soap) and warm water  prior to applying a clean dressing. Do not use tissue or cotton balls.   Do not scrub the wound. Pat dry using gauze.     Shower yes - Ok to remove old wound dressing, shower hair and body first. Let soapy water pass by wound. No scrubbing with loofahs or washcloths. Pat dry with clean gauze and redress the wound as written:     Apply Nystatin powder mixed with Zinc--50/50 to the lisseth wound rash 1-2 x daily  Apply Medihoney to the opening  Cover with ABD pad  Secure with tape    Change dressing daily    Follow up at the [facility] in 1 week     Standing Status:   Future     Standing Expiration Date:   7/29/2024

## 2024-07-22 NOTE — PROGRESS NOTES
Wound Procedure Treatment Abdomen    Performed by: Qi Tripp RN  Authorized by: Salma Garcia PA-C    Associated wounds:   Wound 07/10/23 Abdomen  Wound cleansed with:  NSS  Applied to periwound:  Zinc oxide paste and Antifungal (powder/cream)  Applied Topical: Medihoney gel    Applied secondary dressing:  ABD  Dressing secured with:  Tape

## 2024-07-22 NOTE — PROGRESS NOTES
"Patient ID: Maria R Webb is a 44 y.o. female Date of Birth 1980       Chief Complaint   Patient presents with    Follow Up Wound Care Visit     Abdominal wound       Allergies:  Patient has no known allergies.    Diagnosis:   Diagnosis ICD-10-CM Associated Orders   1. Non-healing surgical wound, subsequent encounter  T81.89XD Wound cleansing and dressings Abdomen     Wound Procedure Treatment Abdomen      2. Fungal dermatitis  B36.9 nystatin (MYCOSTATIN) powder      3. Surgical wound dehiscence, sequela  T81.31XS       4. S/P hysterectomy  Z90.710       5. S/P hernia repair  Z98.890     Z87.19       6. Tobacco use disorder  F17.200            Assessment  & Plan:    F/u surgical wound of abdomen s/p herniation post hysterectomy and dehiscence post hernia repair. Depth continues to improve. Is now measuring 0.6cm compared to prior 1 cm. Drainage is serous and small in amount. Periwound with scattered fissures of the skin and erythematous rash with advancing edges consistent with fungal dermatitis.   Medihoney to wound bed. Zinc barrier cream with Nystatin to periwound applied twice daily. If home it is reasonable to allow the skin open to air for periods of time to assist with fungal infection associated with moisture.   Instructed to monitor for any changes including redness or swelling surrounding the wound, increased drainage or pain as well as fevers or chills.    F/u in one week. Instructed to call if any questions or concerns arise in meantime.            Subjective:   3/11/2024: Maria R is a 43-year-old female who is presenting to wound center today for follow-up of abdominal wound s/p hysterectomy complicated by fascial dehiscence and abdominal herniation that required incisional hernia repair w/ mesh.  Has been following with wound center since January '23.  Per wound center visit on 2/5/2024 \"saw Dr. Conroy, general surgery since prior visit whom did not feel as though any further " "medication/imaging/intervention was necessary at this time and would like pt to continue with local wound care and follow up with him in 3 months.\" At last wound center visit, was changed to Dakins soaked packing after she was experiencing green drainage.  She was instructed to resume silver gel coated packing once the green drainage resolved.  She reports that she has been utilizing Dakin soaked packing over the past week and had not switched to green drainage though this seemed to resolve a few days ago. Of note, is to have recently started Ozempic for weight reduction.  Currently on Z-Dallin and prednisone for bronchitis.  She denies fever and/or chills.  Continues with cough and shortness of breath but no acute worsening.    03/18/24: Pt presents for f/u surgical abdominal wound dehiscence s/p hysterectomy complicated by fascial dehiscence and abdominal herniation requiring incision hernia repair with mesh complicated by further dehiscence. Pt notes that Dr. Lopez was able to surgically debride a piece of remaining mesh at her visit last week and opted to continue with Dakin soaked packing giving significant improvement in the wound with its use. No nausea, vomiting, severe abdominal pain, change in bowel habits nor fevers/chills. Continuing to smoke about the same amount.     04/08/24: Pt presents for f/u surgical abdominal wound dehiscence. Since her previous visit she has had f/u with Dr. Conroy, general surgery. No further f/u with general surgery was required, pt is to continue with wound care but may f/u with surgery if needed. Pt reports she noticed a slight increase in the odor related to the drainage the other day so she used Dakins and it went away. She ran out of the AMD packing therefore returned to use of the silver gel coated packing. Is feeling well. Has started Ozempic and has noticed a 3 lb weight loss already. Smoking about the same amount. No fevers, chills.     04/15/24: Pt presents for wound " related to surgical site dehiscence of the abdomen. Has been using endoform am packing into the remaining opening. Has not had a return of green or foul smelling drainage since her previous visit. Offers no complaints today. No significant changes in medical history since prior visit.     04/29/24: Pt presents for f/u wound related to surgical site dehiscence of the abdomen. Is using endoform am but has noticed a more obvious odor since switching from use of silver gel to the endoform. Drainage is tan in color. Aside from the odor of the drainage offers no complaints today.     05/06/24: Pt presents for f/u wound related to surgical site dehiscence of the abdomen. Has been using gentamicin ointment; packing is no longer staying in well. The odor from the drainage has improved from the prior visit. Offers no complaints today.     05/20/24: Pt presents for f/u wound related to surgical site dehiscence of the abdomen. She has continued with gentamicin ointment three times daily. The wound continues to drain, she has not noticed a return of odor or odd coloration to the drainage. Is continuing to keep the site covered. Denies abdominal pain, N/V, change in bowel movements, fevers, chils.     06/03/24: Pt presents for f/u wound secondary to surgical site dehiscence of the abdomen. Currently Medihoney is being used. ABD is being used to cover the site. There continues to be small-moderate drainage without any foul smell or green discoloration of the drainage. Pt offers no complaints today.     06/24/24: Pt presents for f/u wound secondary to surgical site dehiscence of the abdomen. She is currently using Medihoney. She believes there is less drainage. Current drainage on the pad is honey-colored therefore it is difficult to tell if it is truly drainage or the Medihoney being placed into the wound. No significant changes since past visit. She is inquiring if she can start Humira again for her RA; was on hold d/t wound.      07/01/24: Patient presents for follow-up of wound secondary to surgical site dehiscence of the abdomen.  Midweek patient began to notice an odor again to her abdominal wound therefore she resumed use of Medihoney to the wound bed and zinc to the periwound.  Today when the dressing was taken off there was a significant amount of blood on the overlying dressing.  Patient does not report noticing any significant bleeding as of last night.  She does state that her activity level was significantly increased from her typical levels this past week due to being short staffed at work as well as doing projects around the house.  Has not noticed any significant changes in terms of pain, change in bowel movements, fevers or chills.      07/08/24: Pt presents for f/u wound secondary to surgical site dehiscence of the abdomen. She has been packing the wound with Iodoform packing and using medihoney to the cracks of periwound skin. Had a brief return of green drainage however this resolved quickly. Saw rheumatology since her previous visit and is going to be reinitiated on Humira following pre-approval process as long as she gets clearance from wound care perspective.      07/22/24: Pt presents for f/u wound secondary to surgical site dehiscence of the abdomen. Has been using Iodoform packing however it is barely fitting and is not staying in place well. She reports being more sweaty this week working on some painting in her house and has noticed a rash of her surrounding skin. She tired to apply Medihoney since that has calmed things down in the past however even with application of Medihoney her surrounding skin still appears red/angry.           The following portions of the patient's history were reviewed and updated as appropriate:   Patient Active Problem List   Diagnosis    Tobacco use disorder    COVID-19    Obesity, morbid, BMI 50 or higher (HCC)    Bulky or enlarged uterus    Pelvic pain    Preoperative exam for  gynecologic surgery    HTN (hypertension)    Gastroesophageal reflux disease    Asthma    Obstructive sleep apnea syndrome    S/P hysterectomy    Postoperative anemia    Rheumatoid arthritis involving multiple sites with positive rheumatoid factor (HCC)    Encounter for postoperative care    Open wound of abdomen    Surgical wound, non healing    Cigarette nicotine dependence without complication     Past Medical History:   Diagnosis Date    Abdominal wall abscess at site of surgical wound 2023    Abnormal uterine bleeding due to intramural leiomyoma 2022    Arthritis     Rheumatoid    Asthma     Breathing difficulty     only occured during inverted robotic hysterectomy    Cellulitis of drainage site following surgery 2023    CPAP (continuous positive airway pressure) dependence     GERD (gastroesophageal reflux disease)     Hypertension     Obese     Pneumonia     Sleep apnea     Uterine fibroid     LTH today 12/15/2022    Wound dehiscence, surgical 2022     Past Surgical History:   Procedure Laterality Date    ABDOMINAL WASHOUT      post  with wound vac     SECTION      had hematoma removed after the surgery    FOREARM SURGERY Right     repair of fracture with plating    INCISIONAL HERNIA REPAIR  2022    Procedure: REPAIR  RECURRENT HERNIA INCISIONAL WITH MESH;  Surgeon: Donna Lau MD;  Location: [facility];  Service: Gynecology    IR DRAINAGE TUBE CHECK WITH SCLEROSIS  2/3/2023    IR DRAINAGE TUBE CHECK WITH SCLEROSIS  3/7/2023    IR DRAINAGE TUBE CHECK/CHANGE/REPOSITION/REINSERTION/UPSIZE  2023    IR DRAINAGE TUBE CHECK/CHANGE/REPOSITION/REINSERTION/UPSIZE  2023    IR DRAINAGE TUBE CHECK/CHANGE/REPOSITION/REINSERTION/UPSIZE  2023    IR DRAINAGE TUBE CHECK/CHANGE/REPOSITION/REINSERTION/UPSIZE  2023    IR DRAINAGE TUBE PLACEMENT  2023    LAPAROTOMY N/A 2022    Procedure: LAPAROTOMY EXPLORATORY;  Surgeon: Donna Lau MD;   Location: [facility];  Service: Gynecology    MYRINGOTOMY W/ TUBES      two sets as a young child    MD CYSTOURETHROSCOPY N/A 12/15/2022    Procedure: CYSTO W/ ROBOT;  Surgeon: Donna Lau MD;  [facility]: [facility];  Service: Gynecology    MD LAPS TOTAL HYSTERECT 250 GM/< W/RMVL TUBE/OVARY N/A 12/15/2022    Procedure: (LTH) W/ BILATERAL SALPINGECTOMY  W/ ROBOT COVERTED TO OPEN;  Surgeon: Donna Lau MD;  Location: [facility];  Service: Gynecology    VAC DRESSING APPLICATION N/A 3/17/2023    Procedure: APPLICATION VAC DRESSING ABDOMEN/TRUNK;  Surgeon: Chan Conroy MD;  Location: [facility];  Service: General    WOUND DEBRIDEMENT N/A 3/15/2023    Procedure: DEBRIDEMENT Abdominal WOUND and placement of Wound VAC;  Surgeon: Chan Conroy MD;  Location: [facility];  Service: General    WOUND DEBRIDEMENT N/A 3/17/2023    Procedure: DEBRIDEMENT WOUND (WASH OUT);  Surgeon: Chan Conroy MD;  Location: [facility];  Service: General     Family History   Problem Relation Age of Onset    Multiple sclerosis Mother     Hypertension Father     Hyperlipidemia Father     Cerebral aneurysm Father     Pulmonary embolism Father     Liver disease Brother      Social History     Socioeconomic History    Marital status:      Spouse name: Not on file    Number of children: Not on file    Years of education: Not on file    Highest education level: Not on file   Occupational History    Not on file   Tobacco Use    Smoking status: Every Day     Current packs/day: 0.50     Average packs/day: 0.5 packs/day for 30.6 years (15.3 ttl pk-yrs)     Types: Cigarettes     Start date: 1995    Smokeless tobacco: Never   Vaping Use    Vaping status: Never Used   Substance and Sexual Activity    Alcohol use: Not Currently     Alcohol/week: 1.0 standard drink of alcohol     Types: 1 Standard drinks or equivalent per week     Comment: 3 x monthly    Drug use: Never    Sexual activity: Yes     Partners: Male     Birth  control/protection: Male Sterilization   Other Topics Concern    Not on file   Social History Narrative    Not on file     Social Determinants of Health     Financial Resource Strain: Not on file   Food Insecurity: No Food Insecurity (3/16/2023)    Hunger Vital Sign     Worried About Running Out of Food in the Last Year: Never true     Ran Out of Food in the Last Year: Never true   Transportation Needs: No Transportation Needs (3/16/2023)    PRAPARE - Transportation     Lack of Transportation (Medical): No     Lack of Transportation (Non-Medical): No   Physical Activity: Not on file   Stress: Not on file   Social Connections: Not on file   Intimate Partner Violence: Not on file   Housing Stability: Low Risk  (3/16/2023)    Housing Stability Vital Sign     Unable to Pay for Housing in the Last Year: No     Number of Places Lived in the Last Year: 1     Unstable Housing in the Last Year: No       Current Outpatient Medications:     nystatin (MYCOSTATIN) powder, Apply topically 2 (two) times a day for 14 days To periwound mixed with barrier cream, Disp: 30 g, Rfl: 1    albuterol (2.5 mg/3 mL) 0.083 % nebulizer solution, Take 2.5 mg by nebulization every 4 (four) hours as needed for shortness of breath or wheezing, Disp: , Rfl:     albuterol (ProAir HFA) 90 mcg/act inhaler, Inhale 2 puffs every 6 (six) hours as needed for wheezing or shortness of breath, Disp: 8.5 g, Rfl: 0    albuterol (PROVENTIL HFA,VENTOLIN HFA) 90 mcg/act inhaler, Inhale 2 puffs every 6 (six) hours as needed for wheezing, Disp: 8 g, Rfl: 2    amoxicillin (AMOXIL) 500 mg capsule, , Disp: , Rfl:     Arthritis Pain Relief 650 MG CR tablet, take 1 tablet by mouth every 8 hours if needed for mild pain (Patient not taking: Reported on 3/7/2024), Disp: , Rfl:     aspirin 325 mg tablet, Take 325 mg by mouth daily. Indications: as needed (Patient not taking: Reported on 3/7/2024), Disp: , Rfl:     chlorhexidine (HIBICLENS) 4 % external liquid, Apply 1  Application topically daily as needed for wound care Can use to clean wound during VAC changes. (Patient not taking: Reported on 1/29/2024), Disp: 473 mL, Rfl: 5    clonazePAM (KlonoPIN) 0.5 mg tablet, Take 0.5 mg by mouth 2 (two) times a day as needed (Patient not taking: Reported on 1/29/2024), Disp: , Rfl:     cyclobenzaprine (FLEXERIL) 5 mg tablet, Take 1 tablet (5 mg total) by mouth 3 (three) times a day as needed for muscle spasms (Patient taking differently: Take 5 mg by mouth 3 (three) times a day as needed for muscle spasms As needed), Disp: 60 tablet, Rfl: 0    dulaglutide (Trulicity) 0.75 MG/0.5ML injection, Inject 0.75 mg under the skin Once a week (Patient not taking: Reported on 1/29/2024), Disp: , Rfl:     escitalopram (LEXAPRO) 10 mg tablet, Take 10 mg by mouth daily, Disp: , Rfl:     escitalopram (LEXAPRO) 5 mg tablet, Take 5 mg by mouth daily, Disp: , Rfl:     fluticasone (FLONASE) 50 mcg/act nasal spray, 2 sprays into each nostril daily, Disp: 16 g, Rfl: 4    Fluticasone-Salmeterol (Advair) 250-50 mcg/dose inhaler, , Disp: , Rfl:     Fluticasone-Salmeterol (Advair) 500-50 mcg/dose inhaler, Inhale 1 puff 2 (two) times a day, Disp: , Rfl:     folic acid (FOLVITE) 1 mg tablet, Take by mouth daily, Disp: , Rfl:     gabapentin (NEURONTIN) 100 mg capsule, Take 1 capsule (100 mg total) by mouth 3 (three) times a day (Patient not taking: Reported on 3/7/2024), Disp: 90 capsule, Rfl: 0    gabapentin (NEURONTIN) 300 mg capsule, Take 300 mg by mouth 3 (three) times a day, Disp: , Rfl:     gentamicin (GARAMYCIN) 0.1 % ointment, Apply topically 3 (three) times a day for 7 days To packing and place into open wound, Disp: 30 g, Rfl: 1    hydrochlorothiazide (HYDRODIURIL) 12.5 mg tablet, Take 12.5 mg by mouth daily Pt only  taking as needed for leg swelling (Patient not taking: Reported on 3/7/2024), Disp: , Rfl:     ibuprofen (MOTRIN) 600 mg tablet, Take 600 mg by mouth every 6 (six) hours as needed for moderate  pain As needed, Disp: , Rfl:     methocarbamol (ROBAXIN) 500 mg tablet, Take 1 tablet (500 mg total) by mouth every 6 (six) hours for 14 days (Patient taking differently: Take 500 mg by mouth every 6 (six) hours As needed), Disp: 56 tablet, Rfl: 0    methotrexate 2.5 mg tablet, Four 2.5 tabs one time a week ( Every Saturday), Disp: , Rfl:     montelukast (SINGULAIR) 10 mg tablet, Take by mouth daily at bedtime as needed, Disp: , Rfl:     naloxone (NARCAN) 4 mg/0.1 mL nasal spray, Administer 1 spray into a nostril. If no response after 2-3 minutes, give another dose in the other nostril using a new spray. (Patient not taking: Reported on 4/14/2023), Disp: 1 each, Rfl: 1    neomycin-polymyxin-hydrocortisone (CORTISPORIN) otic solution, Administer 3 drops into the left ear every 6 (six) hours (Patient not taking: Reported on 1/29/2024), Disp: , Rfl:     nicotine (NICODERM CQ) 14 mg/24hr TD 24 hr patch, Place 1 patch on the skin every 24 hours (Patient not taking: Reported on 6/2/2023), Disp: 28 patch, Rfl: 3    omeprazole (PriLOSEC) 40 MG capsule, Take 40 mg by mouth daily, Disp: , Rfl:     predniSONE 10 mg tablet, Take 5 tabs po x 2 days; 4 tabs po x 2 days; 3 tabs po x 1 day; 2 tabs po x 1 day. 1 tab po x 1 day. (Patient not taking: Reported on 1/29/2024), Disp: 24 tablet, Rfl: 0    predniSONE 10 mg tablet, Take by mouth 2 (two) times a day with meals For 10 days (Patient not taking: Reported on 4/1/2024), Disp: , Rfl:     predniSONE 10 mg tablet, Take 5 tabs po x 2 days; 4 tabs po x 2 days; 3 tabs po x 1 day; 2 tabs po x 1 day. 1 tab po x 1 day. (Patient not taking: Reported on 4/1/2024), Disp: 24 tablet, Rfl: 0    semaglutide, 0.25 or 0.5 mg/dose, (Ozempic) 2 mg/3 mL injection pen, Inject 0.5 mg under the skin Once a week (Patient not taking: Reported on 1/29/2024), Disp: , Rfl:     sodium chloride, PF, 0.9 %, Inject 10 mL into a catheter in a vein 3 (three) times a week Wound irrigation. (Patient not taking:  Reported on 1/29/2024), Disp: 100 mL, Rfl: 10    Vitamin D, Ergocalciferol, 05227 units CAPS, Take by mouth once a week, Disp: , Rfl:     Review of Systems   Constitutional:  Negative for chills and fever.   Respiratory:          Recent urgent care visit for bronchitis    Gastrointestinal:  Negative for abdominal pain, constipation, diarrhea, nausea and vomiting.   Skin:  Positive for rash (periwound) and wound (open surgical wound abdomen).   Psychiatric/Behavioral:  The patient is not nervous/anxious.          Objective:  /62   Pulse 76   Temp (!) 95 °F (35 °C)   Resp 16   LMP 11/23/2022 (Exact Date) Comment: partial  Pain Score: 0-No pain     Physical Exam  Vitals reviewed.   Constitutional:       General: She is not in acute distress.     Appearance: She is morbidly obese. She is not ill-appearing, toxic-appearing or diaphoretic.      Comments: Very pleasant, no acute distress   HENT:      Head: Normocephalic and atraumatic.   Cardiovascular:      Rate and Rhythm: Normal rate.   Pulmonary:      Effort: Pulmonary effort is normal. No respiratory distress.   Abdominal:          Comments: Wound of central abdomen continues to improve in terms of depth. Is now measuring 0.6cm compared to prior 1 cm. Drainage is serous and small in amount. Periwound with scattered fissures of the skin and erythematous rash with advancing edges consistent with fungal dermatitis.      Skin:     Findings: Wound present.   Neurological:      Mental Status: She is alert and oriented to person, place, and time.   Psychiatric:         Mood and Affect: Mood normal.         Behavior: Behavior normal.         Wound 07/10/23 Abdomen (Active)   Wound Image Images linked 07/22/24 0909   Wound Description Pink 07/22/24 0912   Alexa-wound Assessment Rash;Fragile;Scar Tissue;Excoriated 07/22/24 0912   Wound Length (cm) 0.5 cm 07/22/24 0912   Wound Width (cm) 0.2 cm 07/22/24 0912   Wound Depth (cm) 0.6 cm 07/22/24 0912   Wound Surface Area  "(cm^2) 0.1 cm^2 07/22/24 0912   Wound Volume (cm^3) 0.06 cm^3 07/22/24 0912   Calculated Wound Volume (cm^3) 0.06 cm^3 07/22/24 0912   Change in Wound Size % 99.86 07/22/24 0912   Drainage Amount Moderate 07/22/24 0912   Drainage Description Serosanguineous 07/22/24 0912   Non-staged Wound Description Full thickness 07/22/24 0912   Treatments Cleansed 07/22/24 0912   Dressing Status Intact 07/22/24 0912                   Wound Instructions:  Orders Placed This Encounter   Procedures    Wound cleansing and dressings Abdomen     Abdominal Wound:    Wash your hands with soap and water.  Remove old dressing, discard into plastic bag and place in trash.    Cleanse the wound with unscented soap (such as plain white Dove soap) and warm water  prior to applying a clean dressing. Do not use tissue or cotton balls.   Do not scrub the wound. Pat dry using gauze.     Shower yes - Ok to remove old wound dressing, shower hair and body first. Let soapy water pass by wound. No scrubbing with loofahs or washcloths. Pat dry with clean gauze and redress the wound as written:     Apply Nystatin powder mixed with Zinc--50/50 to the lisseth wound rash 1-2 x daily  Apply Medihoney to the opening  Cover with ABD pad  Secure with tape    Change dressing daily    Follow up at the [facility] in 1 week     Standing Status:   Future     Standing Expiration Date:   7/29/2024    Wound Procedure Treatment Abdomen     This order was created via procedure documentation       Cally Garcia, PA-C    Portions of the record may have been created with voice recognition software. Occasional wrong word or \"sound alike\" substitutions may have occurred due to the inherent limitations of voice recognition software. Read the chart carefully and recognize, using context, where substitutions have occurred.    "

## 2024-07-29 ENCOUNTER — OFFICE VISIT (OUTPATIENT)
Facility: HOSPITAL | Age: 44
End: 2024-07-29
Payer: COMMERCIAL

## 2024-07-29 VITALS
RESPIRATION RATE: 18 BRPM | DIASTOLIC BLOOD PRESSURE: 70 MMHG | TEMPERATURE: 96.7 F | HEART RATE: 66 BPM | SYSTOLIC BLOOD PRESSURE: 110 MMHG

## 2024-07-29 DIAGNOSIS — B36.9 FUNGAL DERMATITIS: ICD-10-CM

## 2024-07-29 DIAGNOSIS — T81.89XD NON-HEALING SURGICAL WOUND, SUBSEQUENT ENCOUNTER: Primary | ICD-10-CM

## 2024-07-29 DIAGNOSIS — Z87.19 S/P HERNIA REPAIR: ICD-10-CM

## 2024-07-29 DIAGNOSIS — F17.200 TOBACCO USE DISORDER: ICD-10-CM

## 2024-07-29 DIAGNOSIS — Z98.890 S/P HERNIA REPAIR: ICD-10-CM

## 2024-07-29 DIAGNOSIS — Z90.710 S/P HYSTERECTOMY: ICD-10-CM

## 2024-07-29 PROCEDURE — 97597 DBRDMT OPN WND 1ST 20 CM/<: CPT | Performed by: STUDENT IN AN ORGANIZED HEALTH CARE EDUCATION/TRAINING PROGRAM

## 2024-07-29 NOTE — PROGRESS NOTES
Wound Procedure Treatment Abdomen    Performed by: Brenna Og RN  Authorized by: Salma Garcia PA-C    Associated wounds:   Wound 07/10/23 Abdomen  Wound cleansed with:  NSS  Applied to periwound:  Zinc oxide paste and Antifungal (powder/cream)  Applied primary dressing:  Other  Applied secondary dressing:  ABD  Dressing secured with:  Tape  Comments:  Medihoney

## 2024-07-29 NOTE — PATIENT INSTRUCTIONS
Orders Placed This Encounter   Procedures    Wound cleansing and dressings     Wound cleansing and dressings Abdomen       Abdominal Wound:     Wash your hands with soap and water.  Remove old dressing, discard into plastic bag and place in trash.    Cleanse the wound with unscented soap (such as plain white Dove soap) and warm water  prior to applying a clean dressing. Do not use tissue or cotton balls.   Do not scrub the wound. Pat dry using gauze.     Shower yes - Ok to remove old wound dressing, shower hair and body first. Let soapy water pass by wound. No scrubbing with loofahs or washcloths. Pat dry with clean gauze and redress the wound as written:     Apply Nystatin powder mixed with Zinc--50/50 to the lisseth wound rash 1-2 x daily  Apply Medihoney to the opening  Cover with ABD pad  Secure with tape     Change dressing daily     Follow up at the [facility] in 1 week     Standing Status:   Future     Standing Expiration Date:   8/5/2024

## 2024-07-29 NOTE — PROGRESS NOTES
"Patient ID: Maria R Webb is a 44 y.o. female Date of Birth 1980       Chief Complaint   Patient presents with    Follow Up Wound Care Visit     Abd wound       Allergies:  Patient has no known allergies.    Diagnosis:   Diagnosis ICD-10-CM Associated Orders   1. Non-healing surgical wound, subsequent encounter  T81.89XD Wound cleansing and dressings     Debridement Abdomen      2. Fungal dermatitis  B36.9       3. S/P hernia repair  Z98.890     Z87.19       4. S/P hysterectomy  Z90.710       5. Tobacco use disorder  F17.200            Assessment  & Plan:    F/u surgical wound of abdomen s/p herniation post hysterectomy and dehiscence post hernia repair. Continues to have a small diameter opening, there is fibrinous debris present. It is difficult to assess the type of tissue within the wound bed given the small diameter opening. Drainage is small. Periwound fungal dermatitis with significant improvement.   Selective debridement, as below.   Medihoney to wound bed. Zinc barrier cream with Nystatin to periwound applied twice daily.   Instructed to monitor for any changes including redness or swelling surrounding the wound, increased drainage or pain as well as fevers or chills.    Avoid starting Humira until cleared from wound perspective.   Would benefit from smoking cessation.  F/u in one week. Instructed to call if any questions or concerns arise in meantime.            Subjective:   3/11/2024: Maria R is a 43-year-old female who is presenting to wound center today for follow-up of abdominal wound s/p hysterectomy complicated by fascial dehiscence and abdominal herniation that required incisional hernia repair w/ mesh.  Has been following with wound center since January '23.  Per wound center visit on 2/5/2024 \"saw Dr. Conroy, general surgery since prior visit whom did not feel as though any further medication/imaging/intervention was necessary at this time and would like pt to continue with local wound care " "and follow up with him in 3 months.\" At last wound center visit, was changed to Dakins soaked packing after she was experiencing green drainage.  She was instructed to resume silver gel coated packing once the green drainage resolved.  She reports that she has been utilizing Dakin soaked packing over the past week and had not switched to green drainage though this seemed to resolve a few days ago. Of note, is to have recently started Ozempic for weight reduction.  Currently on Z-Dallin and prednisone for bronchitis.  She denies fever and/or chills.  Continues with cough and shortness of breath but no acute worsening.    03/18/24: Pt presents for f/u surgical abdominal wound dehiscence s/p hysterectomy complicated by fascial dehiscence and abdominal herniation requiring incision hernia repair with mesh complicated by further dehiscence. Pt notes that Dr. Lopez was able to surgically debride a piece of remaining mesh at her visit last week and opted to continue with Dakin soaked packing giving significant improvement in the wound with its use. No nausea, vomiting, severe abdominal pain, change in bowel habits nor fevers/chills. Continuing to smoke about the same amount.     04/08/24: Pt presents for f/u surgical abdominal wound dehiscence. Since her previous visit she has had f/u with Dr. Conroy, general surgery. No further f/u with general surgery was required, pt is to continue with wound care but may f/u with surgery if needed. Pt reports she noticed a slight increase in the odor related to the drainage the other day so she used Dakins and it went away. She ran out of the AMD packing therefore returned to use of the silver gel coated packing. Is feeling well. Has started Ozempic and has noticed a 3 lb weight loss already. Smoking about the same amount. No fevers, chills.     04/15/24: Pt presents for wound related to surgical site dehiscence of the abdomen. Has been using endoform am packing into the remaining " opening. Has not had a return of green or foul smelling drainage since her previous visit. Offers no complaints today. No significant changes in medical history since prior visit.     04/29/24: Pt presents for f/u wound related to surgical site dehiscence of the abdomen. Is using endoform am but has noticed a more obvious odor since switching from use of silver gel to the endoform. Drainage is tan in color. Aside from the odor of the drainage offers no complaints today.     05/06/24: Pt presents for f/u wound related to surgical site dehiscence of the abdomen. Has been using gentamicin ointment; packing is no longer staying in well. The odor from the drainage has improved from the prior visit. Offers no complaints today.     05/20/24: Pt presents for f/u wound related to surgical site dehiscence of the abdomen. She has continued with gentamicin ointment three times daily. The wound continues to drain, she has not noticed a return of odor or odd coloration to the drainage. Is continuing to keep the site covered. Denies abdominal pain, N/V, change in bowel movements, fevers, chils.     06/03/24: Pt presents for f/u wound secondary to surgical site dehiscence of the abdomen. Currently Medihoney is being used. ABD is being used to cover the site. There continues to be small-moderate drainage without any foul smell or green discoloration of the drainage. Pt offers no complaints today.     06/24/24: Pt presents for f/u wound secondary to surgical site dehiscence of the abdomen. She is currently using Medihoney. She believes there is less drainage. Current drainage on the pad is honey-colored therefore it is difficult to tell if it is truly drainage or the Medihoney being placed into the wound. No significant changes since past visit. She is inquiring if she can start Humira again for her RA; was on hold d/t wound.     07/01/24: Patient presents for follow-up of wound secondary to surgical site dehiscence of the abdomen.   Midweek patient began to notice an odor again to her abdominal wound therefore she resumed use of Medihoney to the wound bed and zinc to the periwound.  Today when the dressing was taken off there was a significant amount of blood on the overlying dressing.  Patient does not report noticing any significant bleeding as of last night.  She does state that her activity level was significantly increased from her typical levels this past week due to being short staffed at work as well as doing projects around the house.  Has not noticed any significant changes in terms of pain, change in bowel movements, fevers or chills.      07/08/24: Pt presents for f/u wound secondary to surgical site dehiscence of the abdomen. She has been packing the wound with Iodoform packing and using medihoney to the cracks of periwound skin. Had a brief return of green drainage however this resolved quickly. Saw rheumatology since her previous visit and is going to be reinitiated on Humira following pre-approval process as long as she gets clearance from wound care perspective.      07/22/24: Pt presents for f/u wound secondary to surgical site dehiscence of the abdomen. Has been using Iodoform packing however it is barely fitting and is not staying in place well. She reports being more sweaty this week working on some painting in her house and has noticed a rash of her surrounding skin. She tired to apply Medihoney since that has calmed things down in the past however even with application of Medihoney her surrounding skin still appears red/angry.       07/29/24: Pt presents for f/u wound secondary to surgical site dehiscence of the abdomen. Nystatin and zinc is being used to periwound which has significantly improved the skin around the wound. She has not started Humira yet, is still awaiting insurance approval.            The following portions of the patient's history were reviewed and updated as appropriate:   Patient Active Problem List    Diagnosis    Tobacco use disorder    COVID-19    Obesity, morbid, BMI 50 or higher (HCC)    Bulky or enlarged uterus    Pelvic pain    Preoperative exam for gynecologic surgery    HTN (hypertension)    Gastroesophageal reflux disease    Asthma    Obstructive sleep apnea syndrome    S/P hysterectomy    Postoperative anemia    Rheumatoid arthritis involving multiple sites with positive rheumatoid factor (HCC)    Encounter for postoperative care    Open wound of abdomen    Surgical wound, non healing    Cigarette nicotine dependence without complication     Past Medical History:   Diagnosis Date    Abdominal wall abscess at site of surgical wound 2023    Abnormal uterine bleeding due to intramural leiomyoma 2022    Arthritis     Rheumatoid    Asthma     Breathing difficulty     only occured during inverted robotic hysterectomy    Cellulitis of drainage site following surgery 2023    CPAP (continuous positive airway pressure) dependence     GERD (gastroesophageal reflux disease)     Hypertension     Obese     Pneumonia 2007    Sleep apnea     Uterine fibroid     LTH today 12/15/2022    Wound dehiscence, surgical 2022     Past Surgical History:   Procedure Laterality Date    ABDOMINAL WASHOUT      post  with wound vac     SECTION      had hematoma removed after the surgery    FOREARM SURGERY Right     repair of fracture with plating    INCISIONAL HERNIA REPAIR  2022    Procedure: REPAIR  RECURRENT HERNIA INCISIONAL WITH MESH;  Surgeon: Donna Lau MD;  Location: [facility];  Service: Gynecology    IR DRAINAGE TUBE CHECK WITH SCLEROSIS  2/3/2023    IR DRAINAGE TUBE CHECK WITH SCLEROSIS  3/7/2023    IR DRAINAGE TUBE CHECK/CHANGE/REPOSITION/REINSERTION/UPSIZE  2023    IR DRAINAGE TUBE CHECK/CHANGE/REPOSITION/REINSERTION/UPSIZE  2023    IR DRAINAGE TUBE CHECK/CHANGE/REPOSITION/REINSERTION/UPSIZE  2023    IR DRAINAGE TUBE  CHECK/CHANGE/REPOSITION/REINSERTION/UPSIZE  2/27/2023    IR DRAINAGE TUBE PLACEMENT  1/9/2023    LAPAROTOMY N/A 12/20/2022    Procedure: LAPAROTOMY EXPLORATORY;  Surgeon: Donna Lau MD;  Location: [facility];  Service: Gynecology    MYRINGOTOMY W/ TUBES      two sets as a young child    AK CYSTOURETHROSCOPY N/A 12/15/2022    Procedure: CYSTO W/ ROBOT;  Surgeon: Donna Lau MD;  Location: [facility];  Service: Gynecology    AK LAPS TOTAL HYSTERECT 250 GM/< W/RMVL TUBE/OVARY N/A 12/15/2022    Procedure: (LTH) W/ BILATERAL SALPINGECTOMY  W/ ROBOT COVERTED TO OPEN;  Surgeon: Donna Lau MD;  Location: [facility];  Service: Gynecology    VAC DRESSING APPLICATION N/A 3/17/2023    Procedure: APPLICATION VAC DRESSING ABDOMEN/TRUNK;  Surgeon: Chan Conroy MD;  Location: [facility];  Service: General    WOUND DEBRIDEMENT N/A 3/15/2023    Procedure: DEBRIDEMENT Abdominal WOUND and placement of Wound VAC;  Surgeon: Chan Conroy MD;  Location: [facility];  Service: General    WOUND DEBRIDEMENT N/A 3/17/2023    Procedure: DEBRIDEMENT WOUND (WASH OUT);  Surgeon: Chan Conroy MD;  Location: [facility];  Service: General     Family History   Problem Relation Age of Onset    Multiple sclerosis Mother     Hypertension Father     Hyperlipidemia Father     Cerebral aneurysm Father     Pulmonary embolism Father     Liver disease Brother      Social History     Socioeconomic History    Marital status:      Spouse name: None    Number of children: None    Years of education: None    Highest education level: None   Occupational History    None   Tobacco Use    Smoking status: Every Day     Current packs/day: 0.50     Average packs/day: 0.5 packs/day for 30.6 years (15.3 ttl pk-yrs)     Types: Cigarettes     Start date: 1995    Smokeless tobacco: Never   Vaping Use    Vaping status: Never Used   Substance and Sexual Activity    Alcohol use: Not Currently     Alcohol/week: 1.0 standard drink  of alcohol     Types: 1 Standard drinks or equivalent per week     Comment: 3 x monthly    Drug use: Never    Sexual activity: Yes     Partners: Male     Birth control/protection: Male Sterilization   Other Topics Concern    None   Social History Narrative    None     Social Determinants of Health     Financial Resource Strain: Not on file   Food Insecurity: No Food Insecurity (3/16/2023)    Hunger Vital Sign     Worried About Running Out of Food in the Last Year: Never true     Ran Out of Food in the Last Year: Never true   Transportation Needs: No Transportation Needs (3/16/2023)    PRAPARE - Transportation     Lack of Transportation (Medical): No     Lack of Transportation (Non-Medical): No   Physical Activity: Not on file   Stress: Not on file   Social Connections: Not on file   Intimate Partner Violence: Not on file   Housing Stability: Low Risk  (3/16/2023)    Housing Stability Vital Sign     Unable to Pay for Housing in the Last Year: No     Number of Places Lived in the Last Year: 1     Unstable Housing in the Last Year: No       Current Outpatient Medications:     albuterol (2.5 mg/3 mL) 0.083 % nebulizer solution, Take 2.5 mg by nebulization every 4 (four) hours as needed for shortness of breath or wheezing, Disp: , Rfl:     albuterol (ProAir HFA) 90 mcg/act inhaler, Inhale 2 puffs every 6 (six) hours as needed for wheezing or shortness of breath, Disp: 8.5 g, Rfl: 0    albuterol (PROVENTIL HFA,VENTOLIN HFA) 90 mcg/act inhaler, Inhale 2 puffs every 6 (six) hours as needed for wheezing, Disp: 8 g, Rfl: 2    amoxicillin (AMOXIL) 500 mg capsule, , Disp: , Rfl:     Arthritis Pain Relief 650 MG CR tablet, take 1 tablet by mouth every 8 hours if needed for mild pain (Patient not taking: Reported on 3/7/2024), Disp: , Rfl:     aspirin 325 mg tablet, Take 325 mg by mouth daily. Indications: as needed (Patient not taking: Reported on 3/7/2024), Disp: , Rfl:     chlorhexidine (HIBICLENS) 4 % external liquid, Apply 1  Application topically daily as needed for wound care Can use to clean wound during VAC changes. (Patient not taking: Reported on 1/29/2024), Disp: 473 mL, Rfl: 5    clonazePAM (KlonoPIN) 0.5 mg tablet, Take 0.5 mg by mouth 2 (two) times a day as needed (Patient not taking: Reported on 1/29/2024), Disp: , Rfl:     cyclobenzaprine (FLEXERIL) 5 mg tablet, Take 1 tablet (5 mg total) by mouth 3 (three) times a day as needed for muscle spasms (Patient taking differently: Take 5 mg by mouth 3 (three) times a day as needed for muscle spasms As needed), Disp: 60 tablet, Rfl: 0    dulaglutide (Trulicity) 0.75 MG/0.5ML injection, Inject 0.75 mg under the skin Once a week (Patient not taking: Reported on 1/29/2024), Disp: , Rfl:     escitalopram (LEXAPRO) 10 mg tablet, Take 10 mg by mouth daily, Disp: , Rfl:     escitalopram (LEXAPRO) 5 mg tablet, Take 5 mg by mouth daily, Disp: , Rfl:     fluticasone (FLONASE) 50 mcg/act nasal spray, 2 sprays into each nostril daily, Disp: 16 g, Rfl: 4    Fluticasone-Salmeterol (Advair) 250-50 mcg/dose inhaler, , Disp: , Rfl:     Fluticasone-Salmeterol (Advair) 500-50 mcg/dose inhaler, Inhale 1 puff 2 (two) times a day, Disp: , Rfl:     folic acid (FOLVITE) 1 mg tablet, Take by mouth daily, Disp: , Rfl:     gabapentin (NEURONTIN) 100 mg capsule, Take 1 capsule (100 mg total) by mouth 3 (three) times a day (Patient not taking: Reported on 3/7/2024), Disp: 90 capsule, Rfl: 0    gabapentin (NEURONTIN) 300 mg capsule, Take 300 mg by mouth 3 (three) times a day, Disp: , Rfl:     gentamicin (GARAMYCIN) 0.1 % ointment, Apply topically 3 (three) times a day for 7 days To packing and place into open wound, Disp: 30 g, Rfl: 1    hydrochlorothiazide (HYDRODIURIL) 12.5 mg tablet, Take 12.5 mg by mouth daily Pt only  taking as needed for leg swelling (Patient not taking: Reported on 3/7/2024), Disp: , Rfl:     ibuprofen (MOTRIN) 600 mg tablet, Take 600 mg by mouth every 6 (six) hours as needed for moderate  pain As needed, Disp: , Rfl:     methocarbamol (ROBAXIN) 500 mg tablet, Take 1 tablet (500 mg total) by mouth every 6 (six) hours for 14 days (Patient taking differently: Take 500 mg by mouth every 6 (six) hours As needed), Disp: 56 tablet, Rfl: 0    methotrexate 2.5 mg tablet, Four 2.5 tabs one time a week ( Every Saturday), Disp: , Rfl:     montelukast (SINGULAIR) 10 mg tablet, Take by mouth daily at bedtime as needed, Disp: , Rfl:     naloxone (NARCAN) 4 mg/0.1 mL nasal spray, Administer 1 spray into a nostril. If no response after 2-3 minutes, give another dose in the other nostril using a new spray. (Patient not taking: Reported on 4/14/2023), Disp: 1 each, Rfl: 1    neomycin-polymyxin-hydrocortisone (CORTISPORIN) otic solution, Administer 3 drops into the left ear every 6 (six) hours (Patient not taking: Reported on 1/29/2024), Disp: , Rfl:     nicotine (NICODERM CQ) 14 mg/24hr TD 24 hr patch, Place 1 patch on the skin every 24 hours (Patient not taking: Reported on 6/2/2023), Disp: 28 patch, Rfl: 3    nystatin (MYCOSTATIN) powder, Apply topically 2 (two) times a day for 14 days To periwound mixed with barrier cream, Disp: 30 g, Rfl: 1    omeprazole (PriLOSEC) 40 MG capsule, Take 40 mg by mouth daily, Disp: , Rfl:     predniSONE 10 mg tablet, Take 5 tabs po x 2 days; 4 tabs po x 2 days; 3 tabs po x 1 day; 2 tabs po x 1 day. 1 tab po x 1 day. (Patient not taking: Reported on 1/29/2024), Disp: 24 tablet, Rfl: 0    predniSONE 10 mg tablet, Take by mouth 2 (two) times a day with meals For 10 days (Patient not taking: Reported on 4/1/2024), Disp: , Rfl:     predniSONE 10 mg tablet, Take 5 tabs po x 2 days; 4 tabs po x 2 days; 3 tabs po x 1 day; 2 tabs po x 1 day. 1 tab po x 1 day. (Patient not taking: Reported on 4/1/2024), Disp: 24 tablet, Rfl: 0    semaglutide, 0.25 or 0.5 mg/dose, (Ozempic) 2 mg/3 mL injection pen, Inject 0.5 mg under the skin Once a week (Patient not taking: Reported on 1/29/2024), Disp: , Rfl:      sodium chloride, PF, 0.9 %, Inject 10 mL into a catheter in a vein 3 (three) times a week Wound irrigation. (Patient not taking: Reported on 1/29/2024), Disp: 100 mL, Rfl: 10    Vitamin D, Ergocalciferol, 98401 units CAPS, Take by mouth once a week, Disp: , Rfl:     Review of Systems   Constitutional:  Negative for chills and fever.   Respiratory:          Recent urgent care visit for bronchitis    Gastrointestinal:  Negative for abdominal pain, constipation, diarrhea, nausea and vomiting.   Skin:  Positive for rash (periwound) and wound (open surgical wound abdomen).   Psychiatric/Behavioral:  The patient is not nervous/anxious.          Objective:  /70   Pulse 66   Temp (!) 96.7 °F (35.9 °C)   Resp 18   LMP 11/23/2022 (Exact Date) Comment: partial  Pain Score: 0-No pain     Physical Exam  Vitals reviewed.   Constitutional:       General: She is not in acute distress.     Appearance: She is morbidly obese. She is not ill-appearing, toxic-appearing or diaphoretic.      Comments: Very pleasant, no acute distress   HENT:      Head: Normocephalic and atraumatic.   Cardiovascular:      Rate and Rhythm: Normal rate.   Pulmonary:      Effort: Pulmonary effort is normal. No respiratory distress.   Abdominal:          Comments: Wound of central abdomen with fibrinous debris present. It is difficult to assess the type of tissue within the wound bed given the small diameter opening. Drainage is small. Periwound fungal dermatitis with significant improvement.        Skin:     Findings: Wound present.   Neurological:      Mental Status: She is alert and oriented to person, place, and time.   Psychiatric:         Mood and Affect: Mood normal.         Behavior: Behavior normal.                Wound 07/10/23 Abdomen (Active)   Wound Image   07/29/24 1001   Wound Description [place] 07/29/24 1002   Alexa-wound Assessment Rash;Fragile;Scar Tissue;Excoriated 07/29/24 1002   Wound Length (cm) 0.5 cm 07/29/24 1002   Wound  "Width (cm) 0.2 cm 07/29/24 1002   Wound Depth (cm) 0.5 cm 07/29/24 1002   Wound Surface Area (cm^2) 0.1 cm^2 07/29/24 1002   Wound Volume (cm^3) 0.05 cm^3 07/29/24 1002   Calculated Wound Volume (cm^3) 0.05 cm^3 07/29/24 1002   Change in Wound Size % 99.88 07/29/24 1002   Tunneling 1 2 cm 03/04/24 0822   Tunneling 1 in depth located at 7 o'clock 03/04/24 0822   Number of underminings 1 02/05/24 0821   Undermining 1 2.5 02/05/24 0821   Undermining 1 is depth extending from 6-7 02/05/24 0821   Drainage Amount Moderate 07/29/24 1002   Drainage Description Serosanguineous 07/29/24 1002   Non-staged Wound Description Full thickness 07/29/24 1002   Treatments Cleansed 07/22/24 0912   Dressing Wound V.A.C. 07/31/23 0859   Wound packed? No 07/01/24 1021   Packing- # removed 1 05/06/24 0829   Packing- # inserted 2 07/24/23 0832   Dressing Changed New 07/24/23 0832   Patient Tolerance Tolerated well 07/01/24 1008   Dressing Status Intact 07/22/24 0912             Debridement   Wound 07/10/23 Abdomen    Universal Protocol:  Consent: Verbal consent obtained.  Consent given by: patient  Time out: Immediately prior to procedure a \"time out\" was called to verify the correct patient, procedure, equipment, support staff and site/side marked as required.  Patient understanding: patient states understanding of the procedure being performed  Patient identity confirmed: verbally with patient    Debridement Details  Performed by: PA  Debridement type: selective  Pain control: lidocaine 4%      Post-debridement measurements  Length (cm): 0.5  Width (cm): 0.2  Depth (cm): 0.5  Percent debrided: 100%  Surface Area (cm^2): 0.1  Area Debrided (cm^2): 0.1  Volume (cm^3): 0.05    Devitalized tissue debrided: fibrin  Instrument(s) utilized: curette  Bleeding: small  Hemostasis obtained with: pressure  Procedural pain (0-10): 0  Post-procedural pain: 0   Response to treatment: procedure was tolerated well                   Wound " "Instructions:  Orders Placed This Encounter   Procedures    Wound cleansing and dressings     Wound cleansing and dressings Abdomen       Abdominal Wound:     Wash your hands with soap and water.  Remove old dressing, discard into plastic bag and place in trash.    Cleanse the wound with unscented soap (such as plain white Dove soap) and warm water  prior to applying a clean dressing. Do not use tissue or cotton balls.   Do not scrub the wound. Pat dry using gauze.     Shower yes - Ok to remove old wound dressing, shower hair and body first. Let soapy water pass by wound. No scrubbing with loofahs or washcloths. Pat dry with clean gauze and redress the wound as written:     Apply Nystatin powder mixed with Zinc--50/50 to the lisseth wound rash 1-2 x daily  Apply Medihoney to the opening  Cover with ABD pad  Secure with tape     Change dressing daily     Follow up at the [facility] in 1 week     Standing Status:   Future     Standing Expiration Date:   8/5/2024    Wound Procedure Treatment Abdomen     This order was created via procedure documentation    Debridement Abdomen     This order was created via procedure documentation       Cally Garcia, PA-C        Portions of the record may have been created with voice recognition software. Occasional wrong word or \"sound alike\" substitutions may have occurred due to the inherent limitations of voice recognition software. Read the chart carefully and recognize, using context, where substitutions have occurred.      "

## 2024-08-05 ENCOUNTER — OFFICE VISIT (OUTPATIENT)
Facility: HOSPITAL | Age: 44
End: 2024-08-05
Payer: COMMERCIAL

## 2024-08-05 VITALS
SYSTOLIC BLOOD PRESSURE: 134 MMHG | TEMPERATURE: 96.9 F | HEART RATE: 74 BPM | RESPIRATION RATE: 18 BRPM | DIASTOLIC BLOOD PRESSURE: 66 MMHG

## 2024-08-05 DIAGNOSIS — T81.89XD NON-HEALING SURGICAL WOUND, SUBSEQUENT ENCOUNTER: Primary | ICD-10-CM

## 2024-08-05 DIAGNOSIS — Z90.710 S/P HYSTERECTOMY: ICD-10-CM

## 2024-08-05 DIAGNOSIS — M05.79 RHEUMATOID ARTHRITIS INVOLVING MULTIPLE SITES WITH POSITIVE RHEUMATOID FACTOR (HCC): ICD-10-CM

## 2024-08-05 DIAGNOSIS — F17.200 TOBACCO USE DISORDER: ICD-10-CM

## 2024-08-05 DIAGNOSIS — Z98.890 S/P HERNIA REPAIR: ICD-10-CM

## 2024-08-05 DIAGNOSIS — Z87.19 S/P HERNIA REPAIR: ICD-10-CM

## 2024-08-05 PROCEDURE — 99213 OFFICE O/P EST LOW 20 MIN: CPT | Performed by: STUDENT IN AN ORGANIZED HEALTH CARE EDUCATION/TRAINING PROGRAM

## 2024-08-05 PROCEDURE — G0463 HOSPITAL OUTPT CLINIC VISIT: HCPCS | Performed by: STUDENT IN AN ORGANIZED HEALTH CARE EDUCATION/TRAINING PROGRAM

## 2024-08-05 NOTE — PATIENT INSTRUCTIONS
Orders Placed This Encounter   Procedures    Wound cleansing and dressings Abdomen     Abdominal Wound:     Wash your hands with soap and water.  Remove old dressing, discard into plastic bag and place in trash.    Cleanse the wound with unscented soap (such as plain white Dove soap) and warm water  prior to applying a clean dressing. Do not use tissue or cotton balls.   Do not scrub the wound. Pat dry using gauze.     Shower yes - Ok to remove old wound dressing, shower hair and body first. Let soapy water pass by wound. No scrubbing with loofahs or washcloths. Pat dry with clean gauze and redress the wound as written:     Apply Medihoney nickel thickness  to the open wound.  And apply medihoney to small excoriated area on periwound area.  Cover with ABD pad  Secure with medfix tape     Change dressing daily     Standing Status:   Future     Standing Expiration Date:   8/19/2024

## 2024-08-05 NOTE — PROGRESS NOTES
Wound Procedure Treatment Abdomen    Performed by: Jennifer Jimenez RN  Authorized by: Salma Garcia PA-C    Associated wounds:   Wound 07/10/23 Abdomen  Wound cleansed with:  NSS  Applied Topical: Medihoney gel    Applied secondary dressing:  ABD  Dressing secured with:  Tape  Comments:  Medfix tape

## 2024-08-05 NOTE — PROGRESS NOTES
Patient ID: Maria R Webb is a 44 y.o. female Date of Birth 1980       Chief Complaint   Patient presents with    Follow Up Wound Care Visit     Abdominal wound          Allergies:  Patient has no known allergies.    Diagnosis:   Diagnosis ICD-10-CM Associated Orders   1. Non-healing surgical wound, subsequent encounter  T81.89XD Wound cleansing and dressings Abdomen     Wound Procedure Treatment Abdomen      2. S/P hernia repair  Z98.890     Z87.19       3. S/P hysterectomy  Z90.710       4. Tobacco use disorder  F17.200       5. Rheumatoid arthritis involving multiple sites with positive rheumatoid factor (HCC)  M05.79            Assessment  & Plan:    F/u surgical wound of abdomen s/p herniation post hysterectomy and dehiscence post hernia repair. Continues to have a small diameter opening, appears mostly epithelialized. No obvious necrotic tissue or further mesh exposure. Visualization of cavity is limited given small diameter opening. Drainage is small. Periwound fungal dermatitis has resolved. Small area of excoriation of periwound skin with minor drainage.  Medihoney to wound bed and excoriated area of periwound. May d/c nystatin and zinc to periwound given resolution of fungal dermatitis. Change daily. Keep covered.   Instructed to monitor for any changes including redness or swelling surrounding the wound, increased drainage or pain as well as fevers or chills.    Would benefit from smoking cessation.   Will discuss restarting Humira with supervising physician given pt is in significant discomfort with her RA.   F/u in 2 weeks. Instructed to call if any questions or concerns arise in meantime.            Subjective:   3/11/2024: Maria R is a 43-year-old female who is presenting to wound center today for follow-up of abdominal wound s/p hysterectomy complicated by fascial dehiscence and abdominal herniation that required incisional hernia repair w/ mesh.  Has been following with wound center since  "January '23.  Per wound center visit on 2/5/2024 \"saw Dr. Conroy, general surgery since prior visit whom did not feel as though any further medication/imaging/intervention was necessary at this time and would like pt to continue with local wound care and follow up with him in 3 months.\" At last wound center visit, was changed to Dakins soaked packing after she was experiencing green drainage.  She was instructed to resume silver gel coated packing once the green drainage resolved.  She reports that she has been utilizing Dakin soaked packing over the past week and had not switched to green drainage though this seemed to resolve a few days ago. Of note, is to have recently started Ozempic for weight reduction.  Currently on Z-Dallin and prednisone for bronchitis.  She denies fever and/or chills.  Continues with cough and shortness of breath but no acute worsening.    03/18/24: Pt presents for f/u surgical abdominal wound dehiscence s/p hysterectomy complicated by fascial dehiscence and abdominal herniation requiring incision hernia repair with mesh complicated by further dehiscence. Pt notes that Dr. Lopez was able to surgically debride a piece of remaining mesh at her visit last week and opted to continue with Dakin soaked packing giving significant improvement in the wound with its use. No nausea, vomiting, severe abdominal pain, change in bowel habits nor fevers/chills. Continuing to smoke about the same amount.     04/08/24: Pt presents for f/u surgical abdominal wound dehiscence. Since her previous visit she has had f/u with Dr. Conroy, general surgery. No further f/u with general surgery was required, pt is to continue with wound care but may f/u with surgery if needed. Pt reports she noticed a slight increase in the odor related to the drainage the other day so she used Dakins and it went away. She ran out of the AMD packing therefore returned to use of the silver gel coated packing. Is feeling well. Has " started Ozempic and has noticed a 3 lb weight loss already. Smoking about the same amount. No fevers, chills.     04/15/24: Pt presents for wound related to surgical site dehiscence of the abdomen. Has been using endoform am packing into the remaining opening. Has not had a return of green or foul smelling drainage since her previous visit. Offers no complaints today. No significant changes in medical history since prior visit.     04/29/24: Pt presents for f/u wound related to surgical site dehiscence of the abdomen. Is using endoform am but has noticed a more obvious odor since switching from use of silver gel to the endoform. Drainage is tan in color. Aside from the odor of the drainage offers no complaints today.     05/06/24: Pt presents for f/u wound related to surgical site dehiscence of the abdomen. Has been using gentamicin ointment; packing is no longer staying in well. The odor from the drainage has improved from the prior visit. Offers no complaints today.     05/20/24: Pt presents for f/u wound related to surgical site dehiscence of the abdomen. She has continued with gentamicin ointment three times daily. The wound continues to drain, she has not noticed a return of odor or odd coloration to the drainage. Is continuing to keep the site covered. Denies abdominal pain, N/V, change in bowel movements, fevers, chils.     06/03/24: Pt presents for f/u wound secondary to surgical site dehiscence of the abdomen. Currently Medihoney is being used. ABD is being used to cover the site. There continues to be small-moderate drainage without any foul smell or green discoloration of the drainage. Pt offers no complaints today.     06/24/24: Pt presents for f/u wound secondary to surgical site dehiscence of the abdomen. She is currently using Medihoney. She believes there is less drainage. Current drainage on the pad is honey-colored therefore it is difficult to tell if it is truly drainage or the Medihoney being  placed into the wound. No significant changes since past visit. She is inquiring if she can start Humira again for her RA; was on hold d/t wound.     07/01/24: Patient presents for follow-up of wound secondary to surgical site dehiscence of the abdomen.  Midweek patient began to notice an odor again to her abdominal wound therefore she resumed use of Medihoney to the wound bed and zinc to the periwound.  Today when the dressing was taken off there was a significant amount of blood on the overlying dressing.  Patient does not report noticing any significant bleeding as of last night.  She does state that her activity level was significantly increased from her typical levels this past week due to being short staffed at work as well as doing projects around the house.  Has not noticed any significant changes in terms of pain, change in bowel movements, fevers or chills.      07/08/24: Pt presents for f/u wound secondary to surgical site dehiscence of the abdomen. She has been packing the wound with Iodoform packing and using medihoney to the cracks of periwound skin. Had a brief return of green drainage however this resolved quickly. Saw rheumatology since her previous visit and is going to be reinitiated on Humira following pre-approval process as long as she gets clearance from wound care perspective.      07/22/24: Pt presents for f/u wound secondary to surgical site dehiscence of the abdomen. Has been using Iodoform packing however it is barely fitting and is not staying in place well. She reports being more sweaty this week working on some painting in her house and has noticed a rash of her surrounding skin. She tired to apply Medihoney since that has calmed things down in the past however even with application of Medihoney her surrounding skin still appears red/angry.       07/29/24: Pt presents for f/u wound secondary to surgical site dehiscence of the abdomen. Nystatin and zinc is being used to periwound which  has significantly improved the skin around the wound. She has not started Humira yet, is still awaiting insurance approval.      24: Patient presents for follow-up abdominal wound secondary to surgical site dehiscence and exposed mesh. She trialed not using the Medihoney to see if the wound was still draining and had a small amount of drainage coming from the wound still. Reports she was approved for Humira and is wondering if it would be appropriate to start this again with her open wound not fully healed yet.           The following portions of the patient's history were reviewed and updated as appropriate:   Patient Active Problem List   Diagnosis    Tobacco use disorder    COVID-19    Obesity, morbid, BMI 50 or higher (HCC)    Bulky or enlarged uterus    Pelvic pain    Preoperative exam for gynecologic surgery    HTN (hypertension)    Gastroesophageal reflux disease    Asthma    Obstructive sleep apnea syndrome    S/P hysterectomy    Postoperative anemia    Rheumatoid arthritis involving multiple sites with positive rheumatoid factor (HCC)    Encounter for postoperative care    Open wound of abdomen    Surgical wound, non healing    Cigarette nicotine dependence without complication     Past Medical History:   Diagnosis Date    Abdominal wall abscess at site of surgical wound 2023    Abnormal uterine bleeding due to intramural leiomyoma 2022    Arthritis     Rheumatoid    Asthma     Breathing difficulty     only occured during inverted robotic hysterectomy    Cellulitis of drainage site following surgery 2023    CPAP (continuous positive airway pressure) dependence     GERD (gastroesophageal reflux disease)     Hypertension     Obese     Pneumonia 2007    Sleep apnea     Uterine fibroid     LTH today 12/15/2022    Wound dehiscence, surgical 2022     Past Surgical History:   Procedure Laterality Date    ABDOMINAL WASHOUT      post  with wound vac     SECTION      had  hematoma removed after the surgery    FOREARM SURGERY Right     repair of fracture with plating    INCISIONAL HERNIA REPAIR  12/20/2022    Procedure: REPAIR  RECURRENT HERNIA INCISIONAL WITH MESH;  Surgeon: Donna Lau MD;  Location: [facility];  Service: Gynecology    IR DRAINAGE TUBE CHECK WITH SCLEROSIS  2/3/2023    IR DRAINAGE TUBE CHECK WITH SCLEROSIS  3/7/2023    IR DRAINAGE TUBE CHECK/CHANGE/REPOSITION/REINSERTION/UPSIZE  1/23/2023    IR DRAINAGE TUBE CHECK/CHANGE/REPOSITION/REINSERTION/UPSIZE  1/27/2023    IR DRAINAGE TUBE CHECK/CHANGE/REPOSITION/REINSERTION/UPSIZE  2/13/2023    IR DRAINAGE TUBE CHECK/CHANGE/REPOSITION/REINSERTION/UPSIZE  2/27/2023    IR DRAINAGE TUBE PLACEMENT  1/9/2023    LAPAROTOMY N/A 12/20/2022    Procedure: LAPAROTOMY EXPLORATORY;  Surgeon: Donna Lau MD;  Location: [facility];  Service: Gynecology    MYRINGOTOMY W/ TUBES      two sets as a young child    OK CYSTOURETHROSCOPY N/A 12/15/2022    Procedure: CYSTO W/ ROBOT;  Surgeon: Donna Lau MD;  Location: [facility];  Service: Gynecology    OK LAPS TOTAL HYSTERECT 250 GM/< W/RMVL TUBE/OVARY N/A 12/15/2022    Procedure: (LTH) W/ BILATERAL SALPINGECTOMY  W/ ROBOT COVERTED TO OPEN;  Surgeon: Donna Lau MD;  Location: [facility];  Service: Gynecology    VAC DRESSING APPLICATION N/A 3/17/2023    Procedure: APPLICATION VAC DRESSING ABDOMEN/TRUNK;  Surgeon: Chan Conroy MD;  Location: [facility];  Service: General    WOUND DEBRIDEMENT N/A 3/15/2023    Procedure: DEBRIDEMENT Abdominal WOUND and placement of Wound VAC;  Surgeon: Chan Conroy MD;  Location: [facility];  Service: General    WOUND DEBRIDEMENT N/A 3/17/2023    Procedure: DEBRIDEMENT WOUND (WASH OUT);  Surgeon: Chan Conroy MD;  Location: [facility];  Service: General     Family History   Problem Relation Age of Onset    Multiple sclerosis Mother     Hypertension Father     Hyperlipidemia Father     Cerebral aneurysm Father      Pulmonary embolism Father     Liver disease Brother      Social History     Socioeconomic History    Marital status:      Spouse name: None    Number of children: None    Years of education: None    Highest education level: None   Occupational History    None   Tobacco Use    Smoking status: Every Day     Current packs/day: 0.50     Average packs/day: 0.5 packs/day for 30.6 years (15.3 ttl pk-yrs)     Types: Cigarettes     Start date: 1995    Smokeless tobacco: Never   Vaping Use    Vaping status: Never Used   Substance and Sexual Activity    Alcohol use: Not Currently     Alcohol/week: 1.0 standard drink of alcohol     Types: 1 Standard drinks or equivalent per week     Comment: 3 x monthly    Drug use: Never    Sexual activity: Yes     Partners: Male     Birth control/protection: Male Sterilization   Other Topics Concern    None   Social History Narrative    None     Social Determinants of Health     Financial Resource Strain: Not on file   Food Insecurity: No Food Insecurity (3/16/2023)    Hunger Vital Sign     Worried About Running Out of Food in the Last Year: Never true     Ran Out of Food in the Last Year: Never true   Transportation Needs: No Transportation Needs (3/16/2023)    PRAPARE - Transportation     Lack of Transportation (Medical): No     Lack of Transportation (Non-Medical): No   Physical Activity: Not on file   Stress: Not on file   Social Connections: Not on file   Intimate Partner Violence: Not on file   Housing Stability: Low Risk  (3/16/2023)    Housing Stability Vital Sign     Unable to Pay for Housing in the Last Year: No     Number of Places Lived in the Last Year: 1     Unstable Housing in the Last Year: No       Current Outpatient Medications:     albuterol (2.5 mg/3 mL) 0.083 % nebulizer solution, Take 2.5 mg by nebulization every 4 (four) hours as needed for shortness of breath or wheezing, Disp: , Rfl:     albuterol (ProAir HFA) 90 mcg/act inhaler, Inhale 2 puffs every 6 (six)  hours as needed for wheezing or shortness of breath, Disp: 8.5 g, Rfl: 0    albuterol (PROVENTIL HFA,VENTOLIN HFA) 90 mcg/act inhaler, Inhale 2 puffs every 6 (six) hours as needed for wheezing, Disp: 8 g, Rfl: 2    amoxicillin (AMOXIL) 500 mg capsule, , Disp: , Rfl:     Arthritis Pain Relief 650 MG CR tablet, take 1 tablet by mouth every 8 hours if needed for mild pain (Patient not taking: Reported on 3/7/2024), Disp: , Rfl:     aspirin 325 mg tablet, Take 325 mg by mouth daily. Indications: as needed (Patient not taking: Reported on 3/7/2024), Disp: , Rfl:     chlorhexidine (HIBICLENS) 4 % external liquid, Apply 1 Application topically daily as needed for wound care Can use to clean wound during VAC changes. (Patient not taking: Reported on 1/29/2024), Disp: 473 mL, Rfl: 5    clonazePAM (KlonoPIN) 0.5 mg tablet, Take 0.5 mg by mouth 2 (two) times a day as needed (Patient not taking: Reported on 1/29/2024), Disp: , Rfl:     cyclobenzaprine (FLEXERIL) 5 mg tablet, Take 1 tablet (5 mg total) by mouth 3 (three) times a day as needed for muscle spasms (Patient taking differently: Take 5 mg by mouth 3 (three) times a day as needed for muscle spasms As needed), Disp: 60 tablet, Rfl: 0    dulaglutide (Trulicity) 0.75 MG/0.5ML injection, Inject 0.75 mg under the skin Once a week (Patient not taking: Reported on 1/29/2024), Disp: , Rfl:     escitalopram (LEXAPRO) 10 mg tablet, Take 10 mg by mouth daily, Disp: , Rfl:     escitalopram (LEXAPRO) 5 mg tablet, Take 5 mg by mouth daily, Disp: , Rfl:     fluticasone (FLONASE) 50 mcg/act nasal spray, 2 sprays into each nostril daily, Disp: 16 g, Rfl: 4    Fluticasone-Salmeterol (Advair) 250-50 mcg/dose inhaler, , Disp: , Rfl:     Fluticasone-Salmeterol (Advair) 500-50 mcg/dose inhaler, Inhale 1 puff 2 (two) times a day, Disp: , Rfl:     folic acid (FOLVITE) 1 mg tablet, Take by mouth daily, Disp: , Rfl:     gabapentin (NEURONTIN) 100 mg capsule, Take 1 capsule (100 mg total) by  mouth 3 (three) times a day (Patient not taking: Reported on 3/7/2024), Disp: 90 capsule, Rfl: 0    gabapentin (NEURONTIN) 300 mg capsule, Take 300 mg by mouth 3 (three) times a day, Disp: , Rfl:     gentamicin (GARAMYCIN) 0.1 % ointment, Apply topically 3 (three) times a day for 7 days To packing and place into open wound, Disp: 30 g, Rfl: 1    hydrochlorothiazide (HYDRODIURIL) 12.5 mg tablet, Take 12.5 mg by mouth daily Pt only  taking as needed for leg swelling (Patient not taking: Reported on 3/7/2024), Disp: , Rfl:     ibuprofen (MOTRIN) 600 mg tablet, Take 600 mg by mouth every 6 (six) hours as needed for moderate pain As needed, Disp: , Rfl:     methocarbamol (ROBAXIN) 500 mg tablet, Take 1 tablet (500 mg total) by mouth every 6 (six) hours for 14 days (Patient taking differently: Take 500 mg by mouth every 6 (six) hours As needed), Disp: 56 tablet, Rfl: 0    methotrexate 2.5 mg tablet, Four 2.5 tabs one time a week ( Every Saturday), Disp: , Rfl:     montelukast (SINGULAIR) 10 mg tablet, Take by mouth daily at bedtime as needed, Disp: , Rfl:     naloxone (NARCAN) 4 mg/0.1 mL nasal spray, Administer 1 spray into a nostril. If no response after 2-3 minutes, give another dose in the other nostril using a new spray. (Patient not taking: Reported on 4/14/2023), Disp: 1 each, Rfl: 1    neomycin-polymyxin-hydrocortisone (CORTISPORIN) otic solution, Administer 3 drops into the left ear every 6 (six) hours (Patient not taking: Reported on 1/29/2024), Disp: , Rfl:     nicotine (NICODERM CQ) 14 mg/24hr TD 24 hr patch, Place 1 patch on the skin every 24 hours (Patient not taking: Reported on 6/2/2023), Disp: 28 patch, Rfl: 3    nystatin (MYCOSTATIN) powder, Apply topically 2 (two) times a day for 14 days To periwound mixed with barrier cream, Disp: 30 g, Rfl: 1    omeprazole (PriLOSEC) 40 MG capsule, Take 40 mg by mouth daily, Disp: , Rfl:     predniSONE 10 mg tablet, Take 5 tabs po x 2 days; 4 tabs po x 2 days; 3 tabs  po x 1 day; 2 tabs po x 1 day. 1 tab po x 1 day. (Patient not taking: Reported on 1/29/2024), Disp: 24 tablet, Rfl: 0    predniSONE 10 mg tablet, Take by mouth 2 (two) times a day with meals For 10 days (Patient not taking: Reported on 4/1/2024), Disp: , Rfl:     predniSONE 10 mg tablet, Take 5 tabs po x 2 days; 4 tabs po x 2 days; 3 tabs po x 1 day; 2 tabs po x 1 day. 1 tab po x 1 day. (Patient not taking: Reported on 4/1/2024), Disp: 24 tablet, Rfl: 0    semaglutide, 0.25 or 0.5 mg/dose, (Ozempic) 2 mg/3 mL injection pen, Inject 0.5 mg under the skin Once a week (Patient not taking: Reported on 1/29/2024), Disp: , Rfl:     sodium chloride, PF, 0.9 %, Inject 10 mL into a catheter in a vein 3 (three) times a week Wound irrigation. (Patient not taking: Reported on 1/29/2024), Disp: 100 mL, Rfl: 10    Vitamin D, Ergocalciferol, 81210 units CAPS, Take by mouth once a week, Disp: , Rfl:     Review of Systems   Constitutional:  Negative for chills and fever.   Respiratory:          Recent urgent care visit for bronchitis    Gastrointestinal:  Negative for abdominal pain, constipation, diarrhea, nausea and vomiting.   Skin:  Positive for wound (open surgical wound abdomen). Negative for rash.   Psychiatric/Behavioral:  The patient is not nervous/anxious.          Objective:  /66   Pulse 74   Temp (!) 96.9 °F (36.1 °C)   Resp 18   LMP 11/23/2022 (Exact Date) Comment: partial  Pain Score: 0-No pain     Physical Exam  Vitals reviewed.   Constitutional:       General: She is not in acute distress.     Appearance: She is morbidly obese. She is not ill-appearing, toxic-appearing or diaphoretic.      Comments: Very pleasant, no acute distress   HENT:      Head: Normocephalic and atraumatic.   Cardiovascular:      Rate and Rhythm: Normal rate.   Pulmonary:      Effort: Pulmonary effort is normal. No respiratory distress.   Abdominal:          Comments: Midline abdomen continues to have a small diameter opening, appears  mostly epithelialized. No obvious necrotic tissue or further mesh exposure. Visualization of cavity is limited given small diameter opening. Drainage is small. Periwound fungal dermatitis has resolved. Small area of excoriation of periwound skin with minor drainage.   Skin:     Findings: Wound present.   Neurological:      Mental Status: She is alert and oriented to person, place, and time.   Psychiatric:         Mood and Affect: Mood normal.         Behavior: Behavior normal.                  Wound 07/10/23 Abdomen (Active)   Wound Image   08/05/24 1000   Wound Description [place] 08/05/24 1008   Alexa-wound Assessment Scar Tissue;Excoriated 08/05/24 1008   Wound Length (cm) 0.5 cm 08/05/24 1008   Wound Width (cm) 0.2 cm 08/05/24 1008   Wound Depth (cm) 0.5 cm 08/05/24 1008   Wound Surface Area (cm^2) 0.1 cm^2 08/05/24 1008   Wound Volume (cm^3) 0.05 cm^3 08/05/24 1008   Calculated Wound Volume (cm^3) 0.05 cm^3 08/05/24 1008   Change in Wound Size % 99.88 08/05/24 1008   Tunneling 1 2 cm 03/04/24 0822   Tunneling 1 in depth located at 7 o'clock 03/04/24 0822   Number of underminings 1 02/05/24 0821   Undermining 1 2.5 02/05/24 0821   Undermining 1 is depth extending from 6-7 02/05/24 0821   Drainage Amount Moderate 08/05/24 1008   Drainage Description Serosanguineous 08/05/24 1008   Non-staged Wound Description Full thickness 08/05/24 1008   Treatments Irrigation with NSS 08/05/24 1008   Dressing Wound V.A.C. 07/31/23 0859   Wound packed? No 07/01/24 1021   Packing- # removed 1 05/06/24 0829   Packing- # inserted 2 07/24/23 0832   Dressing Changed New 07/24/23 0832   Patient Tolerance Tolerated well 07/01/24 1008   Dressing Status Intact 08/05/24 1008                   Wound Instructions:  Orders Placed This Encounter   Procedures    Wound cleansing and dressings Abdomen     Abdominal Wound:     Wash your hands with soap and water.  Remove old dressing, discard into plastic bag and place in trash.    Cleanse the wound  "with unscented soap (such as plain white Dove soap) and warm water  prior to applying a clean dressing. Do not use tissue or cotton balls.   Do not scrub the wound. Pat dry using gauze.     Shower yes - Ok to remove old wound dressing, shower hair and body first. Let soapy water pass by wound. No scrubbing with loofahs or washcloths. Pat dry with clean gauze and redress the wound as written:     Apply Medihoney nickel thickness  to the open wound.  And apply medihoney to small excoriated area on periwound area.  Cover with ABD pad  Secure with medfix tape     Change dressing daily     Standing Status:   Future     Standing Expiration Date:   8/19/2024    Wound Procedure Treatment Abdomen     This order was created via procedure documentation       Cally Garcia, PA-C      Portions of the record may have been created with voice recognition software. Occasional wrong word or \"sound alike\" substitutions may have occurred due to the inherent limitations of voice recognition software. Read the chart carefully and recognize, using context, where substitutions have occurred.    "

## 2024-08-19 ENCOUNTER — OFFICE VISIT (OUTPATIENT)
Facility: HOSPITAL | Age: 44
End: 2024-08-19
Payer: COMMERCIAL

## 2024-08-19 VITALS
HEART RATE: 88 BPM | TEMPERATURE: 97.1 F | RESPIRATION RATE: 18 BRPM | DIASTOLIC BLOOD PRESSURE: 60 MMHG | SYSTOLIC BLOOD PRESSURE: 120 MMHG

## 2024-08-19 DIAGNOSIS — T81.89XD NON-HEALING SURGICAL WOUND, SUBSEQUENT ENCOUNTER: Primary | ICD-10-CM

## 2024-08-19 DIAGNOSIS — B36.9 FUNGAL DERMATITIS: ICD-10-CM

## 2024-08-19 DIAGNOSIS — Z90.710 S/P HYSTERECTOMY: ICD-10-CM

## 2024-08-19 DIAGNOSIS — Z87.19 S/P HERNIA REPAIR: ICD-10-CM

## 2024-08-19 DIAGNOSIS — Z98.890 S/P HERNIA REPAIR: ICD-10-CM

## 2024-08-19 DIAGNOSIS — F17.200 TOBACCO USE DISORDER: ICD-10-CM

## 2024-08-19 PROCEDURE — 99213 OFFICE O/P EST LOW 20 MIN: CPT | Performed by: STUDENT IN AN ORGANIZED HEALTH CARE EDUCATION/TRAINING PROGRAM

## 2024-08-19 PROCEDURE — 99214 OFFICE O/P EST MOD 30 MIN: CPT | Performed by: STUDENT IN AN ORGANIZED HEALTH CARE EDUCATION/TRAINING PROGRAM

## 2024-08-19 PROCEDURE — G0463 HOSPITAL OUTPT CLINIC VISIT: HCPCS | Performed by: STUDENT IN AN ORGANIZED HEALTH CARE EDUCATION/TRAINING PROGRAM

## 2024-08-19 RX ORDER — CLOTRIMAZOLE 1 %
CREAM (GRAM) TOPICAL 2 TIMES DAILY
Qty: 28 G | Refills: 1 | Status: SHIPPED | OUTPATIENT
Start: 2024-08-19 | End: 2024-09-02

## 2024-08-19 NOTE — PROGRESS NOTES
Wound Procedure Treatment Abdomen    Performed by: Qi Tripp RN  Authorized by: Salma Garcia PA-C    Associated wounds:   Wound 07/10/23 Abdomen  Wound cleansed with:  NSS  Applied to periwound:  Antifungal (powder/cream)  Applied Topical: Betadine    Applied primary dressing:  Calcium alginate  Applied secondary dressing:  Gauze and ABD  Dressing secured with:  Tape

## 2024-08-19 NOTE — PATIENT INSTRUCTIONS
Orders Placed This Encounter   Procedures    Wound cleansing and dressings Abdomen     Abdominal Wound:     Wash your hands with soap and water.  Remove old dressing, discard into plastic bag and place in trash.    Cleanse the wound with unscented soap (such as plain white Dove soap) and warm water  prior to applying a clean dressing. Do not use tissue or cotton balls.   Do not scrub the wound. Pat dry using gauze.     Shower yes - Ok to remove old wound dressing, shower hair and body first. Let soapy water pass by wound. No scrubbing with loofahs or washcloths. Pat dry with clean gauze and redress the wound as written:     Apply Anitfungal Cream to rashy area of the lisseth wound  Apply Betadine then Alginate to the open wound.  Cover with fluffed gauze then ABD pad  Secure with medfix tape     Change dressing daily     Standing Status:   Future     Standing Expiration Date:   8/26/2024

## 2024-08-19 NOTE — PROGRESS NOTES
Patient ID: Maria R Webb is a 44 y.o. female Date of Birth 1980       No chief complaint on file.      Allergies:  Patient has no known allergies.    Diagnosis:   Diagnosis ICD-10-CM Associated Orders   1. Non-healing surgical wound, subsequent encounter  T81.89XD Wound cleansing and dressings Abdomen     Wound Procedure Treatment Abdomen      2. Fungal dermatitis  B36.9 clotrimazole (LOTRIMIN) 1 % cream      3. S/P hysterectomy  Z90.710       4. Tobacco use disorder  F17.200       5. S/P hernia repair  Z98.890     Z87.19            Assessment  & Plan:    F/u surgical wound of abdomen s/p hysterectomy with dehiscence resulting in herniation and further wound dehiscence post hernia repair. Continues to have a small diameter sinus tract to skin that is difficult to fully assess, appears mostly epithelialized. Depth is improved and measuring 0.2 cm compared to previous 0.5 cm. Moderate drainage expressed with pressure to skin surrounding the sinus tract. Periwound with return of fungal dermatitis.   Betadine to sinus tract. Meglisorb calcium alginate overlying sinus opening for better drainage control. Clotrimazole cream to fungal rash. Change dressing daily.   Would benefit from smoking cessation.   Pt inquiring about further abdominal imaging. Further imaging would likely not be of significant benefit at this time however would like pt to discuss necessity of abdominal imaging with general surgery. Recommend consultation with Dr. Swift at [facility] wound care center for professional opinion should the wound remain open.   F/u in one week. Instructed to call if any questions or concerns arise in meantime.            Subjective:   3/11/2024: Maria R is a 43-year-old female who is presenting to wound center today for follow-up of abdominal wound s/p hysterectomy complicated by fascial dehiscence and abdominal herniation that required incisional hernia repair w/ mesh.  Has been following with wound center since  "January '23.  Per wound center visit on 2/5/2024 \"saw Dr. Conroy, general surgery since prior visit whom did not feel as though any further medication/imaging/intervention was necessary at this time and would like pt to continue with local wound care and follow up with him in 3 months.\" At last wound center visit, was changed to Dakins soaked packing after she was experiencing green drainage.  She was instructed to resume silver gel coated packing once the green drainage resolved.  She reports that she has been utilizing Dakin soaked packing over the past week and had not switched to green drainage though this seemed to resolve a few days ago. Of note, is to have recently started Ozempic for weight reduction.  Currently on Z-Dallin and prednisone for bronchitis.  She denies fever and/or chills.  Continues with cough and shortness of breath but no acute worsening.    03/18/24: Pt presents for f/u surgical abdominal wound dehiscence s/p hysterectomy complicated by fascial dehiscence and abdominal herniation requiring incision hernia repair with mesh complicated by further dehiscence. Pt notes that Dr. Lopez was able to surgically debride a piece of remaining mesh at her visit last week and opted to continue with Dakin soaked packing giving significant improvement in the wound with its use. No nausea, vomiting, severe abdominal pain, change in bowel habits nor fevers/chills. Continuing to smoke about the same amount.     04/08/24: Pt presents for f/u surgical abdominal wound dehiscence. Since her previous visit she has had f/u with Dr. Conroy, general surgery. No further f/u with general surgery was required, pt is to continue with wound care but may f/u with surgery if needed. Pt reports she noticed a slight increase in the odor related to the drainage the other day so she used Dakins and it went away. She ran out of the AMD packing therefore returned to use of the silver gel coated packing. Is feeling well. Has " started Ozempic and has noticed a 3 lb weight loss already. Smoking about the same amount. No fevers, chills.     04/15/24: Pt presents for wound related to surgical site dehiscence of the abdomen. Has been using endoform am packing into the remaining opening. Has not had a return of green or foul smelling drainage since her previous visit. Offers no complaints today. No significant changes in medical history since prior visit.     04/29/24: Pt presents for f/u wound related to surgical site dehiscence of the abdomen. Is using endoform am but has noticed a more obvious odor since switching from use of silver gel to the endoform. Drainage is tan in color. Aside from the odor of the drainage offers no complaints today.     05/06/24: Pt presents for f/u wound related to surgical site dehiscence of the abdomen. Has been using gentamicin ointment; packing is no longer staying in well. The odor from the drainage has improved from the prior visit. Offers no complaints today.     05/20/24: Pt presents for f/u wound related to surgical site dehiscence of the abdomen. She has continued with gentamicin ointment three times daily. The wound continues to drain, she has not noticed a return of odor or odd coloration to the drainage. Is continuing to keep the site covered. Denies abdominal pain, N/V, change in bowel movements, fevers, chils.     06/03/24: Pt presents for f/u wound secondary to surgical site dehiscence of the abdomen. Currently Medihoney is being used. ABD is being used to cover the site. There continues to be small-moderate drainage without any foul smell or green discoloration of the drainage. Pt offers no complaints today.     06/24/24: Pt presents for f/u wound secondary to surgical site dehiscence of the abdomen. She is currently using Medihoney. She believes there is less drainage. Current drainage on the pad is honey-colored therefore it is difficult to tell if it is truly drainage or the Medihoney being  placed into the wound. No significant changes since past visit. She is inquiring if she can start Humira again for her RA; was on hold d/t wound.     07/01/24: Patient presents for follow-up of wound secondary to surgical site dehiscence of the abdomen.  Midweek patient began to notice an odor again to her abdominal wound therefore she resumed use of Medihoney to the wound bed and zinc to the periwound.  Today when the dressing was taken off there was a significant amount of blood on the overlying dressing.  Patient does not report noticing any significant bleeding as of last night.  She does state that her activity level was significantly increased from her typical levels this past week due to being short staffed at work as well as doing projects around the house.  Has not noticed any significant changes in terms of pain, change in bowel movements, fevers or chills.      07/08/24: Pt presents for f/u wound secondary to surgical site dehiscence of the abdomen. She has been packing the wound with Iodoform packing and using medihoney to the cracks of periwound skin. Had a brief return of green drainage however this resolved quickly. Saw rheumatology since her previous visit and is going to be reinitiated on Humira following pre-approval process as long as she gets clearance from wound care perspective.      07/22/24: Pt presents for f/u wound secondary to surgical site dehiscence of the abdomen. Has been using Iodoform packing however it is barely fitting and is not staying in place well. She reports being more sweaty this week working on some painting in her house and has noticed a rash of her surrounding skin. She tired to apply Medihoney since that has calmed things down in the past however even with application of Medihoney her surrounding skin still appears red/angry.       07/29/24: Pt presents for f/u wound secondary to surgical site dehiscence of the abdomen. Nystatin and zinc is being used to periwound which  has significantly improved the skin around the wound. She has not started Humira yet, is still awaiting insurance approval.      08/05/24: Patient presents for follow-up abdominal wound secondary to surgical site dehiscence and exposed mesh. She trialed not using the Medihoney to see if the wound was still draining and had a small amount of drainage coming from the wound still. Reports she was approved for Humira and is wondering if it would be appropriate to start this again with her open wound not fully healed yet.     08/19/24: Pt presents for f/u abdominal wound secondary to surgical site dehiscence (hysterectomy complicated by fascial dehiscence and abdominal herniation requiring incision hernia repair with mesh complicated by further dehiscence). She notes that since her previous visit she had another episode of significant bleeding from her abdominal. It resolved after a few hours. She reports wearing a waterproof bandage instead of using the ABD leading to a return of a rash on her abdomen. Is wondering if additional abdominal imaging is necessary.           The following portions of the patient's history were reviewed and updated as appropriate:   Patient Active Problem List   Diagnosis    Tobacco use disorder    COVID-19    Obesity, morbid, BMI 50 or higher (HCC)    Bulky or enlarged uterus    Pelvic pain    Preoperative exam for gynecologic surgery    HTN (hypertension)    Gastroesophageal reflux disease    Asthma    Obstructive sleep apnea syndrome    S/P hysterectomy    Postoperative anemia    Rheumatoid arthritis involving multiple sites with positive rheumatoid factor (HCC)    Encounter for postoperative care    Open wound of abdomen    Surgical wound, non healing    Cigarette nicotine dependence without complication     Past Medical History:   Diagnosis Date    Abdominal wall abscess at site of surgical wound 1/9/2023    Abnormal uterine bleeding due to intramural leiomyoma 08/16/2022    Arthritis      Rheumatoid    Asthma     Breathing difficulty     only occured during inverted robotic hysterectomy    Cellulitis of drainage site following surgery 2023    CPAP (continuous positive airway pressure) dependence     GERD (gastroesophageal reflux disease)     Hypertension     Obese     Pneumonia 2007    Sleep apnea     Uterine fibroid     LTH today 12/15/2022    Wound dehiscence, surgical 2022     Past Surgical History:   Procedure Laterality Date    ABDOMINAL WASHOUT      post  with wound vac     SECTION      had hematoma removed after the surgery    FOREARM SURGERY Right     repair of fracture with plating    INCISIONAL HERNIA REPAIR  2022    Procedure: REPAIR  RECURRENT HERNIA INCISIONAL WITH MESH;  Surgeon: Donna Lau MD;  Location: [facility];  Service: Gynecology    IR DRAINAGE TUBE CHECK WITH SCLEROSIS  2/3/2023    IR DRAINAGE TUBE CHECK WITH SCLEROSIS  3/7/2023    IR DRAINAGE TUBE CHECK/CHANGE/REPOSITION/REINSERTION/UPSIZE  2023    IR DRAINAGE TUBE CHECK/CHANGE/REPOSITION/REINSERTION/UPSIZE  2023    IR DRAINAGE TUBE CHECK/CHANGE/REPOSITION/REINSERTION/UPSIZE  2023    IR DRAINAGE TUBE CHECK/CHANGE/REPOSITION/REINSERTION/UPSIZE  2023    IR DRAINAGE TUBE PLACEMENT  2023    LAPAROTOMY N/A 2022    Procedure: LAPAROTOMY EXPLORATORY;  Surgeon: Donna Lau MD;  Location: [facility];  Service: Gynecology    MYRINGOTOMY W/ TUBES      two sets as a young child    IL CYSTOURETHROSCOPY N/A 12/15/2022    Procedure: CYSTO W/ ROBOT;  Surgeon: Donna Lau MD;  Location: [facility];  Service: Gynecology    IL LAPS TOTAL HYSTERECT 250 GM/< W/RMVL TUBE/OVARY N/A 12/15/2022    Procedure: (LTH) W/ BILATERAL SALPINGECTOMY  W/ ROBOT COVERTED TO OPEN;  Surgeon: Donna Lau MD;  Location: [facility];  Service: Gynecology    VAC DRESSING APPLICATION N/A 3/17/2023    Procedure: APPLICATION VAC DRESSING ABDOMEN/TRUNK;  Surgeon: Chan  MD Rafiq;  Location: [facility];  Service: General    WOUND DEBRIDEMENT N/A 3/15/2023    Procedure: DEBRIDEMENT Abdominal WOUND and placement of Wound VAC;  Surgeon: Chan Conroy MD;  Location: [facility];  Service: General    WOUND DEBRIDEMENT N/A 3/17/2023    Procedure: DEBRIDEMENT WOUND (WASH OUT);  Surgeon: Chan Conroy MD;  Location: [facility];  Service: General     Family History   Problem Relation Age of Onset    Multiple sclerosis Mother     Hypertension Father     Hyperlipidemia Father     Cerebral aneurysm Father     Pulmonary embolism Father     Liver disease Brother      Social History     Socioeconomic History    Marital status:      Spouse name: Not on file    Number of children: Not on file    Years of education: Not on file    Highest education level: Not on file   Occupational History    Not on file   Tobacco Use    Smoking status: Every Day     Current packs/day: 0.50     Average packs/day: 0.5 packs/day for 30.6 years (15.3 ttl pk-yrs)     Types: Cigarettes     Start date: 1995    Smokeless tobacco: Never   Vaping Use    Vaping status: Never Used   Substance and Sexual Activity    Alcohol use: Not Currently     Alcohol/week: 1.0 standard drink of alcohol     Types: 1 Standard drinks or equivalent per week     Comment: 3 x monthly    Drug use: Never    Sexual activity: Yes     Partners: Male     Birth control/protection: Male Sterilization   Other Topics Concern    Not on file   Social History Narrative    Not on file     Social Determinants of Health     Financial Resource Strain: Not on file   Food Insecurity: No Food Insecurity (3/16/2023)    Hunger Vital Sign     Worried About Running Out of Food in the Last Year: Never true     Ran Out of Food in the Last Year: Never true   Transportation Needs: No Transportation Needs (3/16/2023)    PRAPARE - Transportation     Lack of Transportation (Medical): No     Lack of Transportation (Non-Medical): No   Physical Activity: Not on  file   Stress: Not on file   Social Connections: Not on file   Intimate Partner Violence: Not on file   Housing Stability: Low Risk  (3/16/2023)    Housing Stability Vital Sign     Unable to Pay for Housing in the Last Year: No     Number of Places Lived in the Last Year: 1     Unstable Housing in the Last Year: No       Current Outpatient Medications:     clotrimazole (LOTRIMIN) 1 % cream, Apply topically 2 (two) times a day for 14 days To rash of abdomen, Disp: 28 g, Rfl: 1    albuterol (2.5 mg/3 mL) 0.083 % nebulizer solution, Take 2.5 mg by nebulization every 4 (four) hours as needed for shortness of breath or wheezing, Disp: , Rfl:     albuterol (ProAir HFA) 90 mcg/act inhaler, Inhale 2 puffs every 6 (six) hours as needed for wheezing or shortness of breath, Disp: 8.5 g, Rfl: 0    albuterol (PROVENTIL HFA,VENTOLIN HFA) 90 mcg/act inhaler, Inhale 2 puffs every 6 (six) hours as needed for wheezing, Disp: 8 g, Rfl: 2    amoxicillin (AMOXIL) 500 mg capsule, , Disp: , Rfl:     Arthritis Pain Relief 650 MG CR tablet, take 1 tablet by mouth every 8 hours if needed for mild pain (Patient not taking: Reported on 3/7/2024), Disp: , Rfl:     aspirin 325 mg tablet, Take 325 mg by mouth daily. Indications: as needed (Patient not taking: Reported on 3/7/2024), Disp: , Rfl:     chlorhexidine (HIBICLENS) 4 % external liquid, Apply 1 Application topically daily as needed for wound care Can use to clean wound during VAC changes. (Patient not taking: Reported on 1/29/2024), Disp: 473 mL, Rfl: 5    clonazePAM (KlonoPIN) 0.5 mg tablet, Take 0.5 mg by mouth 2 (two) times a day as needed (Patient not taking: Reported on 1/29/2024), Disp: , Rfl:     cyclobenzaprine (FLEXERIL) 5 mg tablet, Take 1 tablet (5 mg total) by mouth 3 (three) times a day as needed for muscle spasms (Patient taking differently: Take 5 mg by mouth 3 (three) times a day as needed for muscle spasms As needed), Disp: 60 tablet, Rfl: 0    dulaglutide (Trulicity)  0.75 MG/0.5ML injection, Inject 0.75 mg under the skin Once a week (Patient not taking: Reported on 1/29/2024), Disp: , Rfl:     escitalopram (LEXAPRO) 10 mg tablet, Take 10 mg by mouth daily, Disp: , Rfl:     escitalopram (LEXAPRO) 5 mg tablet, Take 5 mg by mouth daily, Disp: , Rfl:     fluticasone (FLONASE) 50 mcg/act nasal spray, 2 sprays into each nostril daily, Disp: 16 g, Rfl: 4    Fluticasone-Salmeterol (Advair) 250-50 mcg/dose inhaler, , Disp: , Rfl:     Fluticasone-Salmeterol (Advair) 500-50 mcg/dose inhaler, Inhale 1 puff 2 (two) times a day, Disp: , Rfl:     folic acid (FOLVITE) 1 mg tablet, Take by mouth daily, Disp: , Rfl:     gabapentin (NEURONTIN) 100 mg capsule, Take 1 capsule (100 mg total) by mouth 3 (three) times a day (Patient not taking: Reported on 3/7/2024), Disp: 90 capsule, Rfl: 0    gabapentin (NEURONTIN) 300 mg capsule, Take 300 mg by mouth 3 (three) times a day, Disp: , Rfl:     gentamicin (GARAMYCIN) 0.1 % ointment, Apply topically 3 (three) times a day for 7 days To packing and place into open wound, Disp: 30 g, Rfl: 1    hydrochlorothiazide (HYDRODIURIL) 12.5 mg tablet, Take 12.5 mg by mouth daily Pt only  taking as needed for leg swelling (Patient not taking: Reported on 3/7/2024), Disp: , Rfl:     ibuprofen (MOTRIN) 600 mg tablet, Take 600 mg by mouth every 6 (six) hours as needed for moderate pain As needed, Disp: , Rfl:     methocarbamol (ROBAXIN) 500 mg tablet, Take 1 tablet (500 mg total) by mouth every 6 (six) hours for 14 days (Patient taking differently: Take 500 mg by mouth every 6 (six) hours As needed), Disp: 56 tablet, Rfl: 0    methotrexate 2.5 mg tablet, Four 2.5 tabs one time a week ( Every Saturday), Disp: , Rfl:     montelukast (SINGULAIR) 10 mg tablet, Take by mouth daily at bedtime as needed, Disp: , Rfl:     naloxone (NARCAN) 4 mg/0.1 mL nasal spray, Administer 1 spray into a nostril. If no response after 2-3 minutes, give another dose in the other nostril using a  new spray. (Patient not taking: Reported on 4/14/2023), Disp: 1 each, Rfl: 1    neomycin-polymyxin-hydrocortisone (CORTISPORIN) otic solution, Administer 3 drops into the left ear every 6 (six) hours (Patient not taking: Reported on 1/29/2024), Disp: , Rfl:     nicotine (NICODERM CQ) 14 mg/24hr TD 24 hr patch, Place 1 patch on the skin every 24 hours (Patient not taking: Reported on 6/2/2023), Disp: 28 patch, Rfl: 3    nystatin (MYCOSTATIN) powder, Apply topically 2 (two) times a day for 14 days To periwound mixed with barrier cream, Disp: 30 g, Rfl: 1    omeprazole (PriLOSEC) 40 MG capsule, Take 40 mg by mouth daily, Disp: , Rfl:     predniSONE 10 mg tablet, Take 5 tabs po x 2 days; 4 tabs po x 2 days; 3 tabs po x 1 day; 2 tabs po x 1 day. 1 tab po x 1 day. (Patient not taking: Reported on 1/29/2024), Disp: 24 tablet, Rfl: 0    predniSONE 10 mg tablet, Take by mouth 2 (two) times a day with meals For 10 days (Patient not taking: Reported on 4/1/2024), Disp: , Rfl:     predniSONE 10 mg tablet, Take 5 tabs po x 2 days; 4 tabs po x 2 days; 3 tabs po x 1 day; 2 tabs po x 1 day. 1 tab po x 1 day. (Patient not taking: Reported on 4/1/2024), Disp: 24 tablet, Rfl: 0    semaglutide, 0.25 or 0.5 mg/dose, (Ozempic) 2 mg/3 mL injection pen, Inject 0.5 mg under the skin Once a week (Patient not taking: Reported on 1/29/2024), Disp: , Rfl:     sodium chloride, PF, 0.9 %, Inject 10 mL into a catheter in a vein 3 (three) times a week Wound irrigation. (Patient not taking: Reported on 1/29/2024), Disp: 100 mL, Rfl: 10    Vitamin D, Ergocalciferol, 14209 units CAPS, Take by mouth once a week, Disp: , Rfl:     Review of Systems   Constitutional:  Negative for chills and fever.   Respiratory:          Recent urgent care visit for bronchitis    Gastrointestinal:  Negative for abdominal pain, constipation, diarrhea, nausea and vomiting.   Skin:  Positive for rash and wound (open surgical wound abdomen).   Psychiatric/Behavioral:  The  patient is not nervous/anxious.          Objective:  /60   Pulse 88   Temp (!) 97.1 °F (36.2 °C)   Resp 18   LMP 11/23/2022 (Exact Date) Comment: partial  Pain Score: 0-No pain     Physical Exam  Vitals reviewed.   Constitutional:       General: She is not in acute distress.     Appearance: She is morbidly obese. She is not ill-appearing, toxic-appearing or diaphoretic.      Comments: Very pleasant, no acute distress   HENT:      Head: Normocephalic and atraumatic.   Cardiovascular:      Rate and Rhythm: Normal rate.   Pulmonary:      Effort: Pulmonary effort is normal. No respiratory distress.   Abdominal:          Comments: Midline abdomen continues to have a small diameter sinus tract to skin that is difficult to fully assess, appears mostly epithelialized. Depth is improved and measuring 0.2 cm compared to previous 0.5 cm. Moderate drainage expressed with pressure to skin surrounding the sinus tract. Periwound with return of fungal dermatitis.      Skin:     Findings: Wound present.   Neurological:      Mental Status: She is alert and oriented to person, place, and time.   Psychiatric:         Mood and Affect: Mood normal.         Behavior: Behavior normal.                Wound 07/10/23 Abdomen (Active)   Wound Image   08/19/24 0851   Wound Description Other (Comment);Pink 08/19/24 0854   Alexa-wound Assessment Rash;Scar Tissue;Fragile 08/19/24 0854   Wound Length (cm) 0.4 cm 08/19/24 0854   Wound Width (cm) 0.1 cm 08/19/24 0854   Wound Depth (cm) 0.2 cm 08/19/24 0854   Wound Surface Area (cm^2) 0.04 cm^2 08/19/24 0854   Wound Volume (cm^3) 0.008 cm^3 08/19/24 0854   Calculated Wound Volume (cm^3) 0.01 cm^3 08/19/24 0854   Change in Wound Size % 99.98 08/19/24 0854   Tunneling 1 2 cm 03/04/24 0822   Tunneling 1 in depth located at 7 o'clock 03/04/24 0822   Number of underminings 1 02/05/24 0821   Undermining 1 2.5 02/05/24 0821   Undermining 1 is depth extending from 6-7 02/05/24 0821   Drainage Amount  "Moderate 08/19/24 0854   Drainage Description Serosanguineous 08/19/24 0854   Non-staged Wound Description Full thickness 08/19/24 0854   Treatments Cleansed 08/19/24 0854   Dressing Wound V.A.C. 07/31/23 0859   Wound packed? No 07/01/24 1021   Packing- # removed 1 05/06/24 0829   Packing- # inserted 2 07/24/23 0832   Dressing Changed New 07/24/23 0832   Patient Tolerance Tolerated well 07/01/24 1008   Dressing Status Intact 08/19/24 0854                   Wound Instructions:  Orders Placed This Encounter   Procedures    Wound cleansing and dressings Abdomen     Abdominal Wound:     Wash your hands with soap and water.  Remove old dressing, discard into plastic bag and place in trash.    Cleanse the wound with unscented soap (such as plain white Dove soap) and warm water  prior to applying a clean dressing. Do not use tissue or cotton balls.   Do not scrub the wound. Pat dry using gauze.     Shower yes - Ok to remove old wound dressing, shower hair and body first. Let soapy water pass by wound. No scrubbing with loofahs or washcloths. Pat dry with clean gauze and redress the wound as written:     Apply Anitfungal Cream to rashy area of the lisseth wound  Apply Betadine then Alginate to the open wound.  Cover with fluffed gauze then ABD pad  Secure with medfix tape     Change dressing daily     Standing Status:   Future     Standing Expiration Date:   8/26/2024    Wound Procedure Treatment Abdomen     This order was created via procedure documentation       Cally Garcia, PA-C      Portions of the record may have been created with voice recognition software. Occasional wrong word or \"sound alike\" substitutions may have occurred due to the inherent limitations of voice recognition software. Read the chart carefully and recognize, using context, where substitutions have occurred.    "

## 2024-08-26 ENCOUNTER — OFFICE VISIT (OUTPATIENT)
Facility: HOSPITAL | Age: 44
End: 2024-08-26
Payer: COMMERCIAL

## 2024-08-26 VITALS
TEMPERATURE: 96.5 F | SYSTOLIC BLOOD PRESSURE: 124 MMHG | HEART RATE: 75 BPM | DIASTOLIC BLOOD PRESSURE: 58 MMHG | RESPIRATION RATE: 16 BRPM

## 2024-08-26 DIAGNOSIS — Z90.710 S/P HYSTERECTOMY: ICD-10-CM

## 2024-08-26 DIAGNOSIS — E66.01 OBESITY, CLASS III, BMI 40-49.9 (MORBID OBESITY) (HCC): ICD-10-CM

## 2024-08-26 DIAGNOSIS — F17.200 TOBACCO USE DISORDER: ICD-10-CM

## 2024-08-26 DIAGNOSIS — B36.9 FUNGAL DERMATITIS: ICD-10-CM

## 2024-08-26 DIAGNOSIS — T81.89XD NON-HEALING SURGICAL WOUND, SUBSEQUENT ENCOUNTER: Primary | ICD-10-CM

## 2024-08-26 DIAGNOSIS — Z87.19 S/P HERNIA REPAIR: ICD-10-CM

## 2024-08-26 DIAGNOSIS — Z98.890 S/P HERNIA REPAIR: ICD-10-CM

## 2024-08-26 PROCEDURE — G0463 HOSPITAL OUTPT CLINIC VISIT: HCPCS

## 2024-08-26 PROCEDURE — 99212 OFFICE O/P EST SF 10 MIN: CPT

## 2024-08-26 PROCEDURE — 99214 OFFICE O/P EST MOD 30 MIN: CPT

## 2024-08-26 NOTE — PATIENT INSTRUCTIONS
Orders Placed This Encounter   Procedures    Wound cleansing and dressings Abdomen     Wound cleansing and dressings Abdomen         Wash your hands with soap and water.  Remove old dressing, discard into plastic bag and place in trash.    Cleanse the wound with unscented soap (such as plain white Dove soap) and warm water  prior to applying a clean dressing. Do not use tissue or cotton balls.   Do not scrub the wound. Pat dry using gauze.     Shower yes - Ok to remove old wound dressing, shower hair and body first. Let soapy water pass by wound. No scrubbing with loofahs or washcloths. Pat dry with clean gauze and redress the wound as written:     Apply Anitfungal Cream to rashy area of the lisseth wound  Lightly pack wound with dakin's soaked packing cover with ABD secure with Medifix tape    Once green color drainage is gone   Apply Betadine then Alginate to the open wound.  Cover with fluffed gauze then ABD pad  Secure with medfix tape     Change dressing daily    Follow up in 2 week     Standing Status:   Future     Standing Expiration Date:   9/2/2024

## 2024-08-26 NOTE — PROGRESS NOTES
Patient ID: Maria R Webb is a 44 y.o. female Date of Birth 1980       Chief Complaint   Patient presents with    Follow Up Wound Care Visit     Abdominal wound       Allergies:  Patient has no known allergies.    Diagnosis:      Diagnosis ICD-10-CM Associated Orders   1. Non-healing surgical wound, subsequent encounter  T81.89XD Wound cleansing and dressings Abdomen      2. Fungal dermatitis  B36.9 Wound cleansing and dressings Abdomen      3. Obesity, Class III, BMI 40-49.9 (morbid obesity) ([facility])  E66.01       4. S/P hysterectomy  Z90.710       5. Tobacco use disorder  F17.200       6. S/P hernia repair  Z98.890     Z87.19               Assessment & Plan:  Mid abdominal full-thickness surgical wound.  Wound with increased depth and noted hue of blue-green drainage on previous dressing.  Will recommend to continue with Dakin's moistened plain packing to the wound daily.  Discussed with patient to continue this until blue-green drainage stops, at that time patient can resume regiment of Betadine and silver alginate.  Patient reports that she has enough Dakin solution to last her to her next follow-up appointment, declined needing refill.  Wound is not showing any s/s of clinical infection.   Would benefit from smoking cessation and weight loss to help with wound healing  Follow-up in 2 weeks. Call sooner with questions or concerns or any signs of infection such as redness, swelling, increased/purulent drainage, fever or chills, and increased pain.   Patient verbalized understanding and agrees with plan of care.           Subjective:   8/26/2024: Patient presents to wound care center for follow-up visit of surgical abdominal wound.  Patient states that she recently noted small amount of blue-green drainage from wound.  Patient states when this happens that she has been instructed to pack the wound with Dakin's moistened plain packing daily until the discoloration to the drainage stops. Patient has been applying  betadine to the wound and then silver alginate to the wound daily prior to restarting the dakin's.  Patient denies any episodes of bleeding.  Denies increased pain and state drainage amount has been the same, just noted change in color.  No fever or chills. Patient reports that she is unable to follow at the wound center any other day by Monday due to her work schedule.         The following portions of the patient's history were reviewed and updated as appropriate:   Patient Active Problem List   Diagnosis    Tobacco use disorder    COVID-19    Obesity, morbid, BMI 50 or higher (HCC)    Bulky or enlarged uterus    Pelvic pain    Preoperative exam for gynecologic surgery    HTN (hypertension)    Gastroesophageal reflux disease    Asthma    Obstructive sleep apnea syndrome    S/P hysterectomy    Postoperative anemia    Rheumatoid arthritis involving multiple sites with positive rheumatoid factor (HCC)    Encounter for postoperative care    Open wound of abdomen    Surgical wound, non healing    Cigarette nicotine dependence without complication     Past Medical History:   Diagnosis Date    Abdominal wall abscess at site of surgical wound 2023    Abnormal uterine bleeding due to intramural leiomyoma 2022    Arthritis     Rheumatoid    Asthma     Breathing difficulty     only occured during inverted robotic hysterectomy    Cellulitis of drainage site following surgery 2023    CPAP (continuous positive airway pressure) dependence     GERD (gastroesophageal reflux disease)     Hypertension     Obese     Pneumonia 2007    Sleep apnea     Uterine fibroid     LTH today 12/15/2022    Wound dehiscence, surgical 2022     Past Surgical History:   Procedure Laterality Date    ABDOMINAL WASHOUT      post  with wound vac     SECTION      had hematoma removed after the surgery    FOREARM SURGERY Right     repair of fracture with plating    INCISIONAL HERNIA REPAIR  2022    Procedure:  REPAIR  RECURRENT HERNIA INCISIONAL WITH MESH;  Surgeon: Donna Lau MD;  Location: [facility];  Service: Gynecology    IR DRAINAGE TUBE CHECK WITH SCLEROSIS  2/3/2023    IR DRAINAGE TUBE CHECK WITH SCLEROSIS  3/7/2023    IR DRAINAGE TUBE CHECK/CHANGE/REPOSITION/REINSERTION/UPSIZE  1/23/2023    IR DRAINAGE TUBE CHECK/CHANGE/REPOSITION/REINSERTION/UPSIZE  1/27/2023    IR DRAINAGE TUBE CHECK/CHANGE/REPOSITION/REINSERTION/UPSIZE  2/13/2023    IR DRAINAGE TUBE CHECK/CHANGE/REPOSITION/REINSERTION/UPSIZE  2/27/2023    IR DRAINAGE TUBE PLACEMENT  1/9/2023    LAPAROTOMY N/A 12/20/2022    Procedure: LAPAROTOMY EXPLORATORY;  Surgeon: Donna Lau MD;  Location: [facility];  Service: Gynecology    MYRINGOTOMY W/ TUBES      two sets as a young child    AK CYSTOURETHROSCOPY N/A 12/15/2022    Procedure: CYSTO W/ ROBOT;  Surgeon: Donna Lau MD;  [facility];  Service: Gynecology    AK LAPS TOTAL HYSTERECT 250 GM/< W/RMVL TUBE/OVARY N/A 12/15/2022    Procedure: (LTH) W/ BILATERAL SALPINGECTOMY  W/ ROBOT COVERTED TO OPEN;  Surgeon: Donna Lau MD;  Location[facility];  Service: Gynecology    VAC DRESSING APPLICATION N/A 3/17/2023    Procedure: APPLICATION VAC DRESSING ABDOMEN/TRUNK;  Surgeon: Chan Conroy MD;  Location: [facility];  Service: General    WOUND DEBRIDEMENT N/A 3/15/2023    Procedure: DEBRIDEMENT Abdominal WOUND and placement of Wound VAC;  Surgeon: Chan Conroy MD;  [facility];  Service: General    WOUND DEBRIDEMENT N/A 3/17/2023    Procedure: DEBRIDEMENT WOUND (WASH OUT);  Surgeon: Chan Conroy MD;  [facility];  Service: General     Family History   Problem Relation Age of Onset    Multiple sclerosis Mother     Hypertension Father     Hyperlipidemia Father     Cerebral aneurysm Father     Pulmonary embolism Father     Liver disease Brother       Social History     Socioeconomic History    Marital status:      Spouse name: Not on  file    Number of children: Not on file    Years of education: Not on file    Highest education level: Not on file   Occupational History    Not on file   Tobacco Use    Smoking status: Every Day     Current packs/day: 0.50     Average packs/day: 0.5 packs/day for 30.7 years (15.3 ttl pk-yrs)     Types: Cigarettes     Start date: 1995    Smokeless tobacco: Never   Vaping Use    Vaping status: Never Used   Substance and Sexual Activity    Alcohol use: Not Currently     Alcohol/week: 1.0 standard drink of alcohol     Types: 1 Standard drinks or equivalent per week     Comment: 3 x monthly    Drug use: Never    Sexual activity: Yes     Partners: Male     Birth control/protection: Male Sterilization   Other Topics Concern    Not on file   Social History Narrative    Not on file     Social Determinants of Health     Financial Resource Strain: Not on file   Food Insecurity: No Food Insecurity (3/16/2023)    Hunger Vital Sign     Worried About Running Out of Food in the Last Year: Never true     Ran Out of Food in the Last Year: Never true   Transportation Needs: No Transportation Needs (3/16/2023)    PRAPARE - Transportation     Lack of Transportation (Medical): No     Lack of Transportation (Non-Medical): No   Physical Activity: Not on file   Stress: Not on file   Social Connections: Not on file   Intimate Partner Violence: Not on file   Housing Stability: Low Risk  (3/16/2023)    Housing Stability Vital Sign     Unable to Pay for Housing in the Last Year: No     Number of Places Lived in the Last Year: 1     Unstable Housing in the Last Year: No        Current Outpatient Medications:     albuterol (2.5 mg/3 mL) 0.083 % nebulizer solution, Take 2.5 mg by nebulization every 4 (four) hours as needed for shortness of breath or wheezing, Disp: , Rfl:     albuterol (ProAir HFA) 90 mcg/act inhaler, Inhale 2 puffs every 6 (six) hours as needed for wheezing or shortness of breath, Disp: 8.5 g, Rfl: 0    albuterol (PROVENTIL  HFA,VENTOLIN HFA) 90 mcg/act inhaler, Inhale 2 puffs every 6 (six) hours as needed for wheezing, Disp: 8 g, Rfl: 2    amoxicillin (AMOXIL) 500 mg capsule, , Disp: , Rfl:     Arthritis Pain Relief 650 MG CR tablet, take 1 tablet by mouth every 8 hours if needed for mild pain (Patient not taking: Reported on 3/7/2024), Disp: , Rfl:     aspirin 325 mg tablet, Take 325 mg by mouth daily. Indications: as needed (Patient not taking: Reported on 3/7/2024), Disp: , Rfl:     chlorhexidine (HIBICLENS) 4 % external liquid, Apply 1 Application topically daily as needed for wound care Can use to clean wound during VAC changes. (Patient not taking: Reported on 1/29/2024), Disp: 473 mL, Rfl: 5    clonazePAM (KlonoPIN) 0.5 mg tablet, Take 0.5 mg by mouth 2 (two) times a day as needed (Patient not taking: Reported on 1/29/2024), Disp: , Rfl:     clotrimazole (LOTRIMIN) 1 % cream, Apply topically 2 (two) times a day for 14 days To rash of abdomen, Disp: 28 g, Rfl: 1    cyclobenzaprine (FLEXERIL) 5 mg tablet, Take 1 tablet (5 mg total) by mouth 3 (three) times a day as needed for muscle spasms (Patient taking differently: Take 5 mg by mouth 3 (three) times a day as needed for muscle spasms As needed), Disp: 60 tablet, Rfl: 0    dulaglutide (Trulicity) 0.75 MG/0.5ML injection, Inject 0.75 mg under the skin Once a week (Patient not taking: Reported on 1/29/2024), Disp: , Rfl:     escitalopram (LEXAPRO) 10 mg tablet, Take 10 mg by mouth daily, Disp: , Rfl:     escitalopram (LEXAPRO) 5 mg tablet, Take 5 mg by mouth daily, Disp: , Rfl:     fluticasone (FLONASE) 50 mcg/act nasal spray, 2 sprays into each nostril daily, Disp: 16 g, Rfl: 4    Fluticasone-Salmeterol (Advair) 250-50 mcg/dose inhaler, , Disp: , Rfl:     Fluticasone-Salmeterol (Advair) 500-50 mcg/dose inhaler, Inhale 1 puff 2 (two) times a day, Disp: , Rfl:     folic acid (FOLVITE) 1 mg tablet, Take by mouth daily, Disp: , Rfl:     gabapentin (NEURONTIN) 100 mg capsule, Take 1  capsule (100 mg total) by mouth 3 (three) times a day (Patient not taking: Reported on 3/7/2024), Disp: 90 capsule, Rfl: 0    gabapentin (NEURONTIN) 300 mg capsule, Take 300 mg by mouth 3 (three) times a day, Disp: , Rfl:     gentamicin (GARAMYCIN) 0.1 % ointment, Apply topically 3 (three) times a day for 7 days To packing and place into open wound, Disp: 30 g, Rfl: 1    hydrochlorothiazide (HYDRODIURIL) 12.5 mg tablet, Take 12.5 mg by mouth daily Pt only  taking as needed for leg swelling (Patient not taking: Reported on 3/7/2024), Disp: , Rfl:     ibuprofen (MOTRIN) 600 mg tablet, Take 600 mg by mouth every 6 (six) hours as needed for moderate pain As needed, Disp: , Rfl:     methocarbamol (ROBAXIN) 500 mg tablet, Take 1 tablet (500 mg total) by mouth every 6 (six) hours for 14 days (Patient taking differently: Take 500 mg by mouth every 6 (six) hours As needed), Disp: 56 tablet, Rfl: 0    methotrexate 2.5 mg tablet, Four 2.5 tabs one time a week ( Every Saturday), Disp: , Rfl:     montelukast (SINGULAIR) 10 mg tablet, Take by mouth daily at bedtime as needed, Disp: , Rfl:     naloxone (NARCAN) 4 mg/0.1 mL nasal spray, Administer 1 spray into a nostril. If no response after 2-3 minutes, give another dose in the other nostril using a new spray. (Patient not taking: Reported on 4/14/2023), Disp: 1 each, Rfl: 1    neomycin-polymyxin-hydrocortisone (CORTISPORIN) otic solution, Administer 3 drops into the left ear every 6 (six) hours (Patient not taking: Reported on 1/29/2024), Disp: , Rfl:     nicotine (NICODERM CQ) 14 mg/24hr TD 24 hr patch, Place 1 patch on the skin every 24 hours (Patient not taking: Reported on 6/2/2023), Disp: 28 patch, Rfl: 3    nystatin (MYCOSTATIN) powder, Apply topically 2 (two) times a day for 14 days To periwound mixed with barrier cream, Disp: 30 g, Rfl: 1    omeprazole (PriLOSEC) 40 MG capsule, Take 40 mg by mouth daily, Disp: , Rfl:     predniSONE 10 mg tablet, Take 5 tabs po x 2 days; 4  tabs po x 2 days; 3 tabs po x 1 day; 2 tabs po x 1 day. 1 tab po x 1 day. (Patient not taking: Reported on 1/29/2024), Disp: 24 tablet, Rfl: 0    predniSONE 10 mg tablet, Take by mouth 2 (two) times a day with meals For 10 days (Patient not taking: Reported on 4/1/2024), Disp: , Rfl:     predniSONE 10 mg tablet, Take 5 tabs po x 2 days; 4 tabs po x 2 days; 3 tabs po x 1 day; 2 tabs po x 1 day. 1 tab po x 1 day. (Patient not taking: Reported on 4/1/2024), Disp: 24 tablet, Rfl: 0    semaglutide, 0.25 or 0.5 mg/dose, (Ozempic) 2 mg/3 mL injection pen, Inject 0.5 mg under the skin Once a week (Patient not taking: Reported on 1/29/2024), Disp: , Rfl:     sodium chloride, PF, 0.9 %, Inject 10 mL into a catheter in a vein 3 (three) times a week Wound irrigation. (Patient not taking: Reported on 1/29/2024), Disp: 100 mL, Rfl: 10    Vitamin D, Ergocalciferol, 26091 units CAPS, Take by mouth once a week, Disp: , Rfl:     Review of Systems   Constitutional:  Negative for chills and fever.   HENT:  Negative for congestion and sneezing.    Respiratory:  Negative for cough and shortness of breath.    Gastrointestinal:  Negative for abdominal pain.   Skin:  Positive for rash and wound.        Abdomen   Psychiatric/Behavioral:  Negative for agitation.        Objective:  /58   Pulse 75   Temp (!) 96.5 °F (35.8 °C)   Resp 16   LMP 11/23/2022 (Exact Date) Comment: partial  Pain Score: 0-No pain     Physical Exam  Constitutional:       General: She is awake. She is not in acute distress.     Appearance: She is morbidly obese. She is not ill-appearing, toxic-appearing or diaphoretic.   HENT:      Head: Normocephalic and atraumatic.      Right Ear: External ear normal.      Left Ear: External ear normal.   Eyes:      Conjunctiva/sclera: Conjunctivae normal.   Pulmonary:      Effort: Pulmonary effort is normal. No respiratory distress.   Abdominal:      General: A surgical scar is present.      Palpations: Abdomen is soft.       Tenderness: There is no abdominal tenderness.   Skin:     General: Skin is warm and dry.      Findings: Rash and wound present. No erythema.      Comments: Mid abdomen with small open full-thickness wound.  Visible wound bed with moist pink tissue.  Wound with increased depth on exam today.  Wound is difficult to examine due to small opening, however wound does not probe to any underlying structure when examined with cotton tip applicator.  No tunneling or undermining present.  No purulence on exam. No induration or fluctuance.  Refer to flowsheet for additional wound assessment.  Mild fungal rash to periwound area.   Neurological:      Mental Status: She is alert and oriented to person, place, and time.      Gait: Gait normal.   Psychiatric:         Mood and Affect: Mood normal.         Behavior: Behavior normal. Behavior is cooperative.         Wound 07/10/23 Abdomen (Active)   Wound Image   08/26/24 0934   Wound Description Other (Comment);Pink 08/26/24 0939   Alexa-wound Assessment Rash;Scar Tissue;Fragile 08/26/24 0939   Wound Length (cm) 0.2 cm 08/26/24 0939   Wound Width (cm) 0.2 cm 08/26/24 0939   Wound Depth (cm) 0.7 cm 08/26/24 0939   Wound Surface Area (cm^2) 0.04 cm^2 08/26/24 0939   Wound Volume (cm^3) 0.028 cm^3 08/26/24 0939   Calculated Wound Volume (cm^3) 0.03 cm^3 08/26/24 0939   Change in Wound Size % 99.93 08/26/24 0939   Tunneling 1 2 cm 03/04/24 0822   Tunneling 1 in depth located at 7 o'clock 03/04/24 0822   Number of underminings 1 02/05/24 0821   Undermining 1 2.5 02/05/24 0821   Undermining 1 is depth extending from 6-7 02/05/24 0821   Drainage Amount Moderate 08/26/24 0939   Drainage Description Serosanguineous;Green 08/26/24 0939   Non-staged Wound Description Full thickness 08/26/24 0939   Treatments Cleansed 08/19/24 0854   Dressing Wound V.A.C. 07/31/23 0859   Wound packed? No 07/01/24 1021   Packing- # removed 1 05/06/24 0829   Packing- # inserted 2 07/24/23 3703   Dressing Changed New  "07/24/23 0832   Patient Tolerance Tolerated well 07/01/24 1008   Dressing Status Intact 08/19/24 0854                 Lab Results   Component Value Date    HGBA1C 5.7 (H) 12/04/2023       Wound Instructions:  Orders Placed This Encounter   Procedures    Wound cleansing and dressings Abdomen     Wound cleansing and dressings Abdomen         Wash your hands with soap and water.  Remove old dressing, discard into plastic bag and place in trash.    Cleanse the wound with unscented soap (such as plain white Dove soap) and warm water  prior to applying a clean dressing. Do not use tissue or cotton balls.   Do not scrub the wound. Pat dry using gauze.     Shower yes - Ok to remove old wound dressing, shower hair and body first. Let soapy water pass by wound. No scrubbing with loofahs or washcloths. Pat dry with clean gauze and redress the wound as written:     Apply Anitfungal Cream to rashy area of the lisseth wound  Lightly pack wound with dakin's soaked packing cover with ABD secure with Medifix tape    Once green color drainage is gone   Apply Betadine then Alginate to the open wound.  Cover with fluffed gauze then ABD pad  Secure with medfix tape     Change dressing daily    Follow up in 2 week     Standing Status:   Future     Standing Expiration Date:   9/2/2024           GOPI Rincon, GREG, UVALDO    Portions of the record may have been created with voice recognition software. Occasional wrong word or \"sound alike\" substitutions may have occurred due to the inherent limitations of voice recognition software. Read the chart carefully and recognize, using context, where substitutions have occurred.          Total time spent today:    Total time (face-to-face and non-face-to-face) spent on today's visit was 20 minutes. This includes preparation for the visits (i.e. reviewing test results from date recent hospitalizations, ER/Urgent Care/primary care visits and recent consultant office visits), performance of " a medically appropriate history and examination, and orders for medications or testing.

## 2024-08-26 NOTE — PROGRESS NOTES
Wound Procedure Treatment Abdomen    Performed by: Betty Henderson RN  Authorized by: GOPI Sherman    Associated wounds:   Wound 07/10/23 Abdomen  Wound cleansed with:  Dakin's 0.25%  Applied to periwound:  Antifungal (powder/cream)  Applied secondary dressing:  ABD, Gauze and Other  Dressing secured with:  Tape  Comments:  Plain packing       Self

## 2024-09-09 ENCOUNTER — OFFICE VISIT (OUTPATIENT)
Facility: HOSPITAL | Age: 44
End: 2024-09-09
Payer: COMMERCIAL

## 2024-09-09 VITALS
TEMPERATURE: 97.6 F | DIASTOLIC BLOOD PRESSURE: 72 MMHG | HEART RATE: 74 BPM | RESPIRATION RATE: 18 BRPM | SYSTOLIC BLOOD PRESSURE: 132 MMHG

## 2024-09-09 DIAGNOSIS — F17.200 TOBACCO USE DISORDER: ICD-10-CM

## 2024-09-09 DIAGNOSIS — Z90.710 S/P HYSTERECTOMY: ICD-10-CM

## 2024-09-09 DIAGNOSIS — E66.01 OBESITY, CLASS III, BMI 40-49.9 (MORBID OBESITY) (HCC): ICD-10-CM

## 2024-09-09 DIAGNOSIS — T81.89XD NON-HEALING SURGICAL WOUND, SUBSEQUENT ENCOUNTER: Primary | ICD-10-CM

## 2024-09-09 DIAGNOSIS — Z87.19 S/P HERNIA REPAIR: ICD-10-CM

## 2024-09-09 DIAGNOSIS — Z98.890 S/P HERNIA REPAIR: ICD-10-CM

## 2024-09-09 PROCEDURE — G0463 HOSPITAL OUTPT CLINIC VISIT: HCPCS | Performed by: STUDENT IN AN ORGANIZED HEALTH CARE EDUCATION/TRAINING PROGRAM

## 2024-09-09 PROCEDURE — 99212 OFFICE O/P EST SF 10 MIN: CPT | Performed by: STUDENT IN AN ORGANIZED HEALTH CARE EDUCATION/TRAINING PROGRAM

## 2024-09-09 PROCEDURE — 99214 OFFICE O/P EST MOD 30 MIN: CPT | Performed by: STUDENT IN AN ORGANIZED HEALTH CARE EDUCATION/TRAINING PROGRAM

## 2024-09-09 NOTE — PATIENT INSTRUCTIONS
Orders Placed This Encounter   Procedures    Wound cleansing and dressings Abdomen     Abdominal wound                         Wash your hands with soap and water.  Remove old dressing, discard into plastic bag and place in trash.    Cleanse the wound with unscented soap (such as plain white Dove soap) and warm water  prior to applying a clean dressing. Do not use tissue or cotton balls.   Do not scrub the wound. Pat dry using gauze.     Shower yes - Ok to remove old wound dressing, shower hair and body first. Let soapy water pass by wound. No scrubbing with loofahs or washcloths. Pat dry with clean gauze and redress the wound as written:     Apply barrier cream, such as Desitin to rashy area of the lisseth wound  Apply gentamicin ointment to wound 2-3 times a day and cover with ABD secure with Medifix tape     Standing Status:   Future     Standing Expiration Date:   9/16/2024

## 2024-09-09 NOTE — PROGRESS NOTES
Patient ID: Maria R Webb is a 44 y.o. female Date of Birth 1980       Chief Complaint   Patient presents with    Follow Up Wound Care Visit     Abdominal wound       Allergies:  Patient has no known allergies.    Diagnosis:   Diagnosis ICD-10-CM Associated Orders   1. Non-healing surgical wound, subsequent encounter  T81.89XD Wound cleansing and dressings Abdomen     Wound Procedure Treatment Abdomen      2. Obesity, Class III, BMI 40-49.9 (morbid obesity) ([facility])  E66.01       3. S/P hysterectomy  Z90.710       4. Tobacco use disorder  F17.200       5. S/P hernia repair  Z98.890     Z87.19            Assessment  & Plan:    F/u surgical wound of the abdomen s/p hysterectomy with dehiscence resulting in herniation and further wound dehiscence post hernia repair. Continues to have a small diameter sinus tract that remains open, appears to have a very small amount of exposed mesh remaining at the base, cavity is lined with epithelial tissue. Drainage is small. Periwound skin appears dry.   Gentamicin ointment tid to sinus tract given green drainage. Continue with zinc to periwound. May utilize moisturizer to areas of dry skin.   Should there not be resolution of green drainage with use of gentamicin plan to culture and prescribe oral abx at next visit.   Would benefit from smoking cessation.   Hold on starting Humira given concern for bacterial colonization and potential for worsening infection with biologic.   Monitor for fevers, chills, abdominal pain, N/V, change in BM. Notify office should these occur.   F/u in one week. Instructed to call if any questions or concerns arise in meantime.            Subjective:   3/11/2024: Maria R is a 43-year-old female who is presenting to wound center today for follow-up of abdominal wound s/p hysterectomy complicated by fascial dehiscence and abdominal herniation that required incisional hernia repair w/ mesh.  Has been following with wound center since January '23.  Per  "wound center visit on 2/5/2024 \"saw Dr. Conroy, general surgery since prior visit whom did not feel as though any further medication/imaging/intervention was necessary at this time and would like pt to continue with local wound care and follow up with him in 3 months.\" At last wound center visit, was changed to Dakins soaked packing after she was experiencing green drainage.  She was instructed to resume silver gel coated packing once the green drainage resolved.  She reports that she has been utilizing Dakin soaked packing over the past week and had not switched to green drainage though this seemed to resolve a few days ago. Of note, is to have recently started Ozempic for weight reduction.  Currently on Z-Dallin and prednisone for bronchitis.  She denies fever and/or chills.  Continues with cough and shortness of breath but no acute worsening.    03/18/24: Pt presents for f/u surgical abdominal wound dehiscence s/p hysterectomy complicated by fascial dehiscence and abdominal herniation requiring incision hernia repair with mesh complicated by further dehiscence. Pt notes that Dr. Lopez was able to surgically debride a piece of remaining mesh at her visit last week and opted to continue with Dakin soaked packing giving significant improvement in the wound with its use. No nausea, vomiting, severe abdominal pain, change in bowel habits nor fevers/chills. Continuing to smoke about the same amount.     04/08/24: Pt presents for f/u surgical abdominal wound dehiscence. Since her previous visit she has had f/u with Dr. Conroy, general surgery. No further f/u with general surgery was required, pt is to continue with wound care but may f/u with surgery if needed. Pt reports she noticed a slight increase in the odor related to the drainage the other day so she used Dakins and it went away. She ran out of the AMD packing therefore returned to use of the silver gel coated packing. Is feeling well. Has started Ozempic and " has noticed a 3 lb weight loss already. Smoking about the same amount. No fevers, chills.     04/15/24: Pt presents for wound related to surgical site dehiscence of the abdomen. Has been using endoform am packing into the remaining opening. Has not had a return of green or foul smelling drainage since her previous visit. Offers no complaints today. No significant changes in medical history since prior visit.     04/29/24: Pt presents for f/u wound related to surgical site dehiscence of the abdomen. Is using endoform am but has noticed a more obvious odor since switching from use of silver gel to the endoform. Drainage is tan in color. Aside from the odor of the drainage offers no complaints today.     05/06/24: Pt presents for f/u wound related to surgical site dehiscence of the abdomen. Has been using gentamicin ointment; packing is no longer staying in well. The odor from the drainage has improved from the prior visit. Offers no complaints today.     05/20/24: Pt presents for f/u wound related to surgical site dehiscence of the abdomen. She has continued with gentamicin ointment three times daily. The wound continues to drain, she has not noticed a return of odor or odd coloration to the drainage. Is continuing to keep the site covered. Denies abdominal pain, N/V, change in bowel movements, fevers, chils.     06/03/24: Pt presents for f/u wound secondary to surgical site dehiscence of the abdomen. Currently Medihoney is being used. ABD is being used to cover the site. There continues to be small-moderate drainage without any foul smell or green discoloration of the drainage. Pt offers no complaints today.     06/24/24: Pt presents for f/u wound secondary to surgical site dehiscence of the abdomen. She is currently using Medihoney. She believes there is less drainage. Current drainage on the pad is honey-colored therefore it is difficult to tell if it is truly drainage or the Medihoney being placed into the wound.  No significant changes since past visit. She is inquiring if she can start Humira again for her RA; was on hold d/t wound.     07/01/24: Patient presents for follow-up of wound secondary to surgical site dehiscence of the abdomen.  Midweek patient began to notice an odor again to her abdominal wound therefore she resumed use of Medihoney to the wound bed and zinc to the periwound.  Today when the dressing was taken off there was a significant amount of blood on the overlying dressing.  Patient does not report noticing any significant bleeding as of last night.  She does state that her activity level was significantly increased from her typical levels this past week due to being short staffed at work as well as doing projects around the house.  Has not noticed any significant changes in terms of pain, change in bowel movements, fevers or chills.      07/08/24: Pt presents for f/u wound secondary to surgical site dehiscence of the abdomen. She has been packing the wound with Iodoform packing and using medihoney to the cracks of periwound skin. Had a brief return of green drainage however this resolved quickly. Saw rheumatology since her previous visit and is going to be reinitiated on Humira following pre-approval process as long as she gets clearance from wound care perspective.      07/22/24: Pt presents for f/u wound secondary to surgical site dehiscence of the abdomen. Has been using Iodoform packing however it is barely fitting and is not staying in place well. She reports being more sweaty this week working on some painting in her house and has noticed a rash of her surrounding skin. She tired to apply Medihoney since that has calmed things down in the past however even with application of Medihoney her surrounding skin still appears red/angry.       07/29/24: Pt presents for f/u wound secondary to surgical site dehiscence of the abdomen. Nystatin and zinc is being used to periwound which has significantly  "improved the skin around the wound. She has not started Humira yet, is still awaiting insurance approval.      08/05/24: Patient presents for follow-up abdominal wound secondary to surgical site dehiscence and exposed mesh. She trialed not using the Medihoney to see if the wound was still draining and had a small amount of drainage coming from the wound still. Reports she was approved for Humira and is wondering if it would be appropriate to start this again with her open wound not fully healed yet.     08/19/24: Pt presents for f/u abdominal wound secondary to surgical site dehiscence (hysterectomy complicated by fascial dehiscence and abdominal herniation requiring incision hernia repair with mesh complicated by further dehiscence). She notes that since her previous visit she had another episode of significant bleeding from her abdominal. It resolved after a few hours. She reports wearing a waterproof bandage instead of using the ABD leading to a return of a rash on her abdomen. Is wondering if additional abdominal imaging is necessary.       08/26/24: HPI per covering provider, \"Patient presents to wound care center for follow-up visit of surgical abdominal wound.  Patient states that she recently noted small amount of blue-green drainage from wound.  Patient states when this happens that she has been instructed to pack the wound with Dakin's moistened plain packing daily until the discoloration to the drainage stops. Patient has been applying betadine to the wound and then silver alginate to the wound daily prior to restarting the dakin's.  Patient denies any episodes of bleeding.  Denies increased pain and state drainage amount has been the same, just noted change in color.  No fever or chills. Patient reports that she is unable to follow at the wound center any other day by Monday due to her work schedule.\"     09/09/24: Pt presents for f/u chronic abdominal wound following surgical site dehiscence.  She " reports that every time she has tried to stop using the Dakin solution there is a return of green drainage. Has continued with barrier cream to periwound. Has not yet started her Humira d/t infection concerns with her open wound and being on a biologic. No alarm sx, denies fevers, chills, N/V. Is continuing to have regular BM.           The following portions of the patient's history were reviewed and updated as appropriate:   Patient Active Problem List   Diagnosis    Tobacco use disorder    COVID-19    Obesity, morbid, BMI 50 or higher (HCC)    Bulky or enlarged uterus    Pelvic pain    Preoperative exam for gynecologic surgery    HTN (hypertension)    Gastroesophageal reflux disease    Asthma    Obstructive sleep apnea syndrome    S/P hysterectomy    Postoperative anemia    Rheumatoid arthritis involving multiple sites with positive rheumatoid factor (HCC)    Encounter for postoperative care    Open wound of abdomen    Surgical wound, non healing    Cigarette nicotine dependence without complication     Past Medical History:   Diagnosis Date    Abdominal wall abscess at site of surgical wound 2023    Abnormal uterine bleeding due to intramural leiomyoma 2022    Arthritis     Rheumatoid    Asthma     Breathing difficulty     only occured during inverted robotic hysterectomy    Cellulitis of drainage site following surgery 2023    CPAP (continuous positive airway pressure) dependence     GERD (gastroesophageal reflux disease)     Hypertension     Obese     Pneumonia 2007    Sleep apnea     Uterine fibroid     LTH today 12/15/2022    Wound dehiscence, surgical 2022     Past Surgical History:   Procedure Laterality Date    ABDOMINAL WASHOUT      post  with wound vac     SECTION      had hematoma removed after the surgery    FOREARM SURGERY Right     repair of fracture with plating    INCISIONAL HERNIA REPAIR  2022    Procedure: REPAIR  RECURRENT HERNIA INCISIONAL WITH MESH;   Surgeon: Donna Lau MD;  Location: [facility];  Service: Gynecology    IR DRAINAGE TUBE CHECK WITH SCLEROSIS  2/3/2023    IR DRAINAGE TUBE CHECK WITH SCLEROSIS  3/7/2023    IR DRAINAGE TUBE CHECK/CHANGE/REPOSITION/REINSERTION/UPSIZE  1/23/2023    IR DRAINAGE TUBE CHECK/CHANGE/REPOSITION/REINSERTION/UPSIZE  1/27/2023    IR DRAINAGE TUBE CHECK/CHANGE/REPOSITION/REINSERTION/UPSIZE  2/13/2023    IR DRAINAGE TUBE CHECK/CHANGE/REPOSITION/REINSERTION/UPSIZE  2/27/2023    IR DRAINAGE TUBE PLACEMENT  1/9/2023    LAPAROTOMY N/A 12/20/2022    Procedure: LAPAROTOMY EXPLORATORY;  Surgeon: Donna Lau MD;  Location: [facility];  Service: Gynecology    MYRINGOTOMY W/ TUBES      two sets as a young child    MT CYSTOURETHROSCOPY N/A 12/15/2022    Procedure: CYSTO W/ ROBOT;  Surgeon: Donna Lau MD;  Location: [facility];  Service: Gynecology    MT LAPS TOTAL HYSTERECT 250 GM/< W/RMVL TUBE/OVARY N/A 12/15/2022    Procedure: (LTH) W/ BILATERAL SALPINGECTOMY  W/ ROBOT COVERTED TO OPEN;  Surgeon: Donna Lau MD;  Location: [facility];  Service: Gynecology    VAC DRESSING APPLICATION N/A 3/17/2023    Procedure: APPLICATION VAC DRESSING ABDOMEN/TRUNK;  Surgeon: Chan Conroy MD;  Location: [facility];  Service: General    WOUND DEBRIDEMENT N/A 3/15/2023    Procedure: DEBRIDEMENT Abdominal WOUND and placement of Wound VAC;  Surgeon: Chan Conroy MD;  Location: [facility];  Service: General    WOUND DEBRIDEMENT N/A 3/17/2023    Procedure: DEBRIDEMENT WOUND (WASH OUT);  Surgeon: Chan Conroy MD;  Location: [facility];  Service: General     Family History   Problem Relation Age of Onset    Multiple sclerosis Mother     Hypertension Father     Hyperlipidemia Father     Cerebral aneurysm Father     Pulmonary embolism Father     Liver disease Brother      Social History     Socioeconomic History    Marital status:      Spouse name: Not on file    Number of children: Not on file    Years  of education: Not on file    Highest education level: Not on file   Occupational History    Not on file   Tobacco Use    Smoking status: Every Day     Current packs/day: 0.50     Average packs/day: 0.5 packs/day for 30.7 years (15.3 ttl pk-yrs)     Types: Cigarettes     Start date: 1995    Smokeless tobacco: Never   Vaping Use    Vaping status: Never Used   Substance and Sexual Activity    Alcohol use: Not Currently     Alcohol/week: 1.0 standard drink of alcohol     Types: 1 Standard drinks or equivalent per week     Comment: 3 x monthly    Drug use: Never    Sexual activity: Yes     Partners: Male     Birth control/protection: Male Sterilization   Other Topics Concern    Not on file   Social History Narrative    Not on file     Social Determinants of Health     Financial Resource Strain: Not on file   Food Insecurity: No Food Insecurity (3/16/2023)    Hunger Vital Sign     Worried About Running Out of Food in the Last Year: Never true     Ran Out of Food in the Last Year: Never true   Transportation Needs: No Transportation Needs (3/16/2023)    PRAPARE - Transportation     Lack of Transportation (Medical): No     Lack of Transportation (Non-Medical): No   Physical Activity: Not on file   Stress: Not on file   Social Connections: Not on file   Intimate Partner Violence: Not on file   Housing Stability: Low Risk  (3/16/2023)    Housing Stability Vital Sign     Unable to Pay for Housing in the Last Year: No     Number of Places Lived in the Last Year: 1     Unstable Housing in the Last Year: No       Current Outpatient Medications:     albuterol (2.5 mg/3 mL) 0.083 % nebulizer solution, Take 2.5 mg by nebulization every 4 (four) hours as needed for shortness of breath or wheezing, Disp: , Rfl:     albuterol (ProAir HFA) 90 mcg/act inhaler, Inhale 2 puffs every 6 (six) hours as needed for wheezing or shortness of breath, Disp: 8.5 g, Rfl: 0    albuterol (PROVENTIL HFA,VENTOLIN HFA) 90 mcg/act inhaler, Inhale 2 puffs  every 6 (six) hours as needed for wheezing, Disp: 8 g, Rfl: 2    amoxicillin (AMOXIL) 500 mg capsule, , Disp: , Rfl:     Arthritis Pain Relief 650 MG CR tablet, take 1 tablet by mouth every 8 hours if needed for mild pain (Patient not taking: Reported on 3/7/2024), Disp: , Rfl:     aspirin 325 mg tablet, Take 325 mg by mouth daily. Indications: as needed (Patient not taking: Reported on 3/7/2024), Disp: , Rfl:     chlorhexidine (HIBICLENS) 4 % external liquid, Apply 1 Application topically daily as needed for wound care Can use to clean wound during VAC changes. (Patient not taking: Reported on 1/29/2024), Disp: 473 mL, Rfl: 5    clonazePAM (KlonoPIN) 0.5 mg tablet, Take 0.5 mg by mouth 2 (two) times a day as needed (Patient not taking: Reported on 1/29/2024), Disp: , Rfl:     clotrimazole (LOTRIMIN) 1 % cream, Apply topically 2 (two) times a day for 14 days To rash of abdomen, Disp: 28 g, Rfl: 1    cyclobenzaprine (FLEXERIL) 5 mg tablet, Take 1 tablet (5 mg total) by mouth 3 (three) times a day as needed for muscle spasms (Patient taking differently: Take 5 mg by mouth 3 (three) times a day as needed for muscle spasms As needed), Disp: 60 tablet, Rfl: 0    dulaglutide (Trulicity) 0.75 MG/0.5ML injection, Inject 0.75 mg under the skin Once a week (Patient not taking: Reported on 1/29/2024), Disp: , Rfl:     escitalopram (LEXAPRO) 10 mg tablet, Take 10 mg by mouth daily, Disp: , Rfl:     escitalopram (LEXAPRO) 5 mg tablet, Take 5 mg by mouth daily, Disp: , Rfl:     fluticasone (FLONASE) 50 mcg/act nasal spray, 2 sprays into each nostril daily, Disp: 16 g, Rfl: 4    Fluticasone-Salmeterol (Advair) 250-50 mcg/dose inhaler, , Disp: , Rfl:     Fluticasone-Salmeterol (Advair) 500-50 mcg/dose inhaler, Inhale 1 puff 2 (two) times a day, Disp: , Rfl:     folic acid (FOLVITE) 1 mg tablet, Take by mouth daily, Disp: , Rfl:     gabapentin (NEURONTIN) 100 mg capsule, Take 1 capsule (100 mg total) by mouth 3 (three) times a day  (Patient not taking: Reported on 3/7/2024), Disp: 90 capsule, Rfl: 0    gabapentin (NEURONTIN) 300 mg capsule, Take 300 mg by mouth 3 (three) times a day, Disp: , Rfl:     gentamicin (GARAMYCIN) 0.1 % ointment, Apply topically 3 (three) times a day for 7 days To packing and place into open wound, Disp: 30 g, Rfl: 1    hydrochlorothiazide (HYDRODIURIL) 12.5 mg tablet, Take 12.5 mg by mouth daily Pt only  taking as needed for leg swelling (Patient not taking: Reported on 3/7/2024), Disp: , Rfl:     ibuprofen (MOTRIN) 600 mg tablet, Take 600 mg by mouth every 6 (six) hours as needed for moderate pain As needed, Disp: , Rfl:     methocarbamol (ROBAXIN) 500 mg tablet, Take 1 tablet (500 mg total) by mouth every 6 (six) hours for 14 days (Patient taking differently: Take 500 mg by mouth every 6 (six) hours As needed), Disp: 56 tablet, Rfl: 0    methotrexate 2.5 mg tablet, Four 2.5 tabs one time a week ( Every Saturday), Disp: , Rfl:     montelukast (SINGULAIR) 10 mg tablet, Take by mouth daily at bedtime as needed, Disp: , Rfl:     naloxone (NARCAN) 4 mg/0.1 mL nasal spray, Administer 1 spray into a nostril. If no response after 2-3 minutes, give another dose in the other nostril using a new spray. (Patient not taking: Reported on 4/14/2023), Disp: 1 each, Rfl: 1    neomycin-polymyxin-hydrocortisone (CORTISPORIN) otic solution, Administer 3 drops into the left ear every 6 (six) hours (Patient not taking: Reported on 1/29/2024), Disp: , Rfl:     nicotine (NICODERM CQ) 14 mg/24hr TD 24 hr patch, Place 1 patch on the skin every 24 hours (Patient not taking: Reported on 6/2/2023), Disp: 28 patch, Rfl: 3    nystatin (MYCOSTATIN) powder, Apply topically 2 (two) times a day for 14 days To periwound mixed with barrier cream, Disp: 30 g, Rfl: 1    omeprazole (PriLOSEC) 40 MG capsule, Take 40 mg by mouth daily, Disp: , Rfl:     predniSONE 10 mg tablet, Take 5 tabs po x 2 days; 4 tabs po x 2 days; 3 tabs po x 1 day; 2 tabs po x 1  day. 1 tab po x 1 day. (Patient not taking: Reported on 1/29/2024), Disp: 24 tablet, Rfl: 0    predniSONE 10 mg tablet, Take by mouth 2 (two) times a day with meals For 10 days (Patient not taking: Reported on 4/1/2024), Disp: , Rfl:     predniSONE 10 mg tablet, Take 5 tabs po x 2 days; 4 tabs po x 2 days; 3 tabs po x 1 day; 2 tabs po x 1 day. 1 tab po x 1 day. (Patient not taking: Reported on 4/1/2024), Disp: 24 tablet, Rfl: 0    semaglutide, 0.25 or 0.5 mg/dose, (Ozempic) 2 mg/3 mL injection pen, Inject 0.5 mg under the skin Once a week (Patient not taking: Reported on 1/29/2024), Disp: , Rfl:     sodium chloride, PF, 0.9 %, Inject 10 mL into a catheter in a vein 3 (three) times a week Wound irrigation. (Patient not taking: Reported on 1/29/2024), Disp: 100 mL, Rfl: 10    Vitamin D, Ergocalciferol, 58717 units CAPS, Take by mouth once a week, Disp: , Rfl:     Review of Systems   Constitutional:  Negative for chills and fever.   Gastrointestinal:  Negative for abdominal pain, constipation, diarrhea, nausea and vomiting.   Skin:  Positive for wound (open surgical wound abdomen).   Psychiatric/Behavioral:  The patient is not nervous/anxious.          Objective:  /72   Pulse 74   Temp 97.6 °F (36.4 °C)   Resp 18   LMP 11/23/2022 (Exact Date) Comment: partial  Pain Score: 0-No pain     Physical Exam  Vitals reviewed.   Constitutional:       General: She is not in acute distress.     Appearance: She is morbidly obese. She is not ill-appearing, toxic-appearing or diaphoretic.      Comments: Very pleasant, no acute distress   HENT:      Head: Normocephalic and atraumatic.   Cardiovascular:      Rate and Rhythm: Normal rate.   Pulmonary:      Effort: Pulmonary effort is normal. No respiratory distress.   Abdominal:          Comments: Midline abdomen continues to have a small diameter sinus tract to skin that is difficult to fully visualize. Appears to have a very small amount of exposed mesh remaining at the  base, cavity is lined with epithelial tissue. Drainage is small. Periwound skin appears dry.      Skin:     Findings: Wound present.   Neurological:      Mental Status: She is alert and oriented to person, place, and time.   Psychiatric:         Mood and Affect: Mood normal.         Behavior: Behavior normal.         Wound 07/10/23 Abdomen (Active)   Wound Image Images linked 09/09/24 0821   Wound Description Pink;White 09/09/24 0826   Lisseth-wound Assessment Rash;Scar Tissue;Fragile 09/09/24 0826   Wound Length (cm) 0.4 cm 09/09/24 0826   Wound Width (cm) 0.1 cm 09/09/24 0826   Wound Depth (cm) 0.3 cm 09/09/24 0826   Wound Surface Area (cm^2) 0.04 cm^2 09/09/24 0826   Wound Volume (cm^3) 0.012 cm^3 09/09/24 0826   Calculated Wound Volume (cm^3) 0.01 cm^3 09/09/24 0826   Change in Wound Size % 99.98 09/09/24 0826   Drainage Amount Moderate (patient had small amount of drainage on dressing, reports moderate amount prior to dressing change this morning) 09/09/24 0826   Drainage Description Serosanguineous 09/09/24 0826   Non-staged Wound Description Full thickness 09/09/24 0826   Treatments Cleansed 09/09/24 0826   Wound packed? No 09/09/24 0826   Dressing Status Intact 09/09/24 0826               Wound Instructions:  Orders Placed This Encounter   Procedures    Wound cleansing and dressings Abdomen     Abdominal wound                         Wash your hands with soap and water.  Remove old dressing, discard into plastic bag and place in trash.    Cleanse the wound with unscented soap (such as plain white Dove soap) and warm water  prior to applying a clean dressing. Do not use tissue or cotton balls.   Do not scrub the wound. Pat dry using gauze.     Shower yes - Ok to remove old wound dressing, shower hair and body first. Let soapy water pass by wound. No scrubbing with loofahs or washcloths. Pat dry with clean gauze and redress the wound as written:     Apply barrier cream, such as Desitin to rashy area of the lisseth  "wound  Apply gentamicin ointment to wound 2-3 times a day and cover with ABD secure with Medifix tape     Standing Status:   Future     Standing Expiration Date:   9/16/2024    Wound Procedure Treatment Abdomen     This order was created via procedure documentation       Cally Garcia, PA-C    Portions of the record may have been created with voice recognition software. Occasional wrong word or \"sound alike\" substitutions may have occurred due to the inherent limitations of voice recognition software. Read the chart carefully and recognize, using context, where substitutions have occurred.    "

## 2024-09-09 NOTE — PROGRESS NOTES
Wound Procedure Treatment Abdomen    Performed by: Qi Tripp RN  Authorized by: Salma Garcia PA-C    Associated wounds:   Wound 07/10/23 Abdomen  Wound cleansed with:  NSS  Applied to periwound:  Zinc oxide paste  Applied Topical: Hydrogel    Applied secondary dressing:  Gauze and ABD  Dressing secured with:  Tape

## 2024-09-16 ENCOUNTER — OFFICE VISIT (OUTPATIENT)
Facility: HOSPITAL | Age: 44
End: 2024-09-16
Payer: COMMERCIAL

## 2024-09-16 VITALS
HEART RATE: 79 BPM | SYSTOLIC BLOOD PRESSURE: 119 MMHG | RESPIRATION RATE: 16 BRPM | DIASTOLIC BLOOD PRESSURE: 81 MMHG | TEMPERATURE: 97.5 F

## 2024-09-16 DIAGNOSIS — F17.200 TOBACCO USE DISORDER: ICD-10-CM

## 2024-09-16 DIAGNOSIS — M05.79 RHEUMATOID ARTHRITIS INVOLVING MULTIPLE SITES WITH POSITIVE RHEUMATOID FACTOR (HCC): ICD-10-CM

## 2024-09-16 DIAGNOSIS — Z87.19 S/P HERNIA REPAIR: ICD-10-CM

## 2024-09-16 DIAGNOSIS — Z90.710 S/P HYSTERECTOMY: ICD-10-CM

## 2024-09-16 DIAGNOSIS — E66.01 OBESITY, CLASS III, BMI 40-49.9 (MORBID OBESITY) (HCC): ICD-10-CM

## 2024-09-16 DIAGNOSIS — Z98.890 S/P HERNIA REPAIR: ICD-10-CM

## 2024-09-16 DIAGNOSIS — T81.89XD NON-HEALING SURGICAL WOUND, SUBSEQUENT ENCOUNTER: Primary | ICD-10-CM

## 2024-09-16 PROCEDURE — G0463 HOSPITAL OUTPT CLINIC VISIT: HCPCS | Performed by: STUDENT IN AN ORGANIZED HEALTH CARE EDUCATION/TRAINING PROGRAM

## 2024-09-16 PROCEDURE — 99214 OFFICE O/P EST MOD 30 MIN: CPT | Performed by: STUDENT IN AN ORGANIZED HEALTH CARE EDUCATION/TRAINING PROGRAM

## 2024-09-16 PROCEDURE — 99212 OFFICE O/P EST SF 10 MIN: CPT | Performed by: STUDENT IN AN ORGANIZED HEALTH CARE EDUCATION/TRAINING PROGRAM

## 2024-09-16 RX ORDER — CELECOXIB 200 MG/1
200 CAPSULE ORAL 2 TIMES DAILY
COMMUNITY
Start: 2024-09-11 | End: 2025-09-11

## 2024-09-16 NOTE — PATIENT INSTRUCTIONS
Orders Placed This Encounter   Procedures    Wound cleansing and dressings Abdomen     Abdominal wound                         Wash your hands with soap and water.  Remove old dressing, discard into plastic bag and place in trash.    Cleanse the wound with unscented soap (such as plain white Dove soap) and warm water  prior to applying a clean dressing. Do not use tissue or cotton balls.   Do not scrub the wound. Pat dry using gauze.     Shower yes - Ok to remove old wound dressing, shower hair and body first. Let soapy water pass by wound. No scrubbing with loofahs or washcloths. Pat dry with clean gauze and redress the wound as written:     Apply barrier cream, such as Desitin to rashy area of the lisseth wound  Continue gentamicin for 3 MORE DAYS to wound 2-3 times a day   THEN use silvasorb gel daily  Cover with ABD secure with Medifix tape    Quit smoking or try to cut back   As discussed smoking slows the ability for a wound to heal by constricting blood vessels for approximately 30 minutes after each cigarette.     Standing Status:   Future     Standing Expiration Date:   9/23/2024

## 2024-09-16 NOTE — PROGRESS NOTES
Wound Procedure Treatment Abdomen    Performed by: Xin Camacho RN  Authorized by: Salma Garcia PA-C    Associated wounds:   Wound 07/10/23 Abdomen  Wound cleansed with:  NSS  Applied Topical: Normlgel Ag    Applied secondary dressing:  ABD  Dressing secured with:  Tape

## 2024-09-30 ENCOUNTER — OFFICE VISIT (OUTPATIENT)
Facility: HOSPITAL | Age: 44
End: 2024-09-30
Payer: COMMERCIAL

## 2024-09-30 VITALS
HEART RATE: 79 BPM | RESPIRATION RATE: 18 BRPM | SYSTOLIC BLOOD PRESSURE: 121 MMHG | TEMPERATURE: 96.8 F | DIASTOLIC BLOOD PRESSURE: 68 MMHG

## 2024-09-30 DIAGNOSIS — Z98.890 S/P HERNIA REPAIR: ICD-10-CM

## 2024-09-30 DIAGNOSIS — Z87.19 S/P HERNIA REPAIR: ICD-10-CM

## 2024-09-30 DIAGNOSIS — Z90.710 S/P HYSTERECTOMY: ICD-10-CM

## 2024-09-30 DIAGNOSIS — E66.01 OBESITY, CLASS III, BMI 40-49.9 (MORBID OBESITY) (HCC): ICD-10-CM

## 2024-09-30 DIAGNOSIS — F17.200 TOBACCO USE DISORDER: ICD-10-CM

## 2024-09-30 DIAGNOSIS — T81.89XD NON-HEALING SURGICAL WOUND, SUBSEQUENT ENCOUNTER: Primary | ICD-10-CM

## 2024-09-30 PROCEDURE — NC001 PR NO CHARGE: Performed by: STUDENT IN AN ORGANIZED HEALTH CARE EDUCATION/TRAINING PROGRAM

## 2024-09-30 PROCEDURE — 97597 DBRDMT OPN WND 1ST 20 CM/<: CPT | Performed by: STUDENT IN AN ORGANIZED HEALTH CARE EDUCATION/TRAINING PROGRAM

## 2024-09-30 PROCEDURE — 99213 OFFICE O/P EST LOW 20 MIN: CPT | Performed by: STUDENT IN AN ORGANIZED HEALTH CARE EDUCATION/TRAINING PROGRAM

## 2024-09-30 NOTE — PROGRESS NOTES
"Patient ID: Maria R Webb is a 44 y.o. female Date of Birth 1980       Chief Complaint   Patient presents with    Follow Up Wound Care Visit     ABD wound. New [place] for pain       Allergies:  Patient has no known allergies.    Diagnosis:   Diagnosis ICD-10-CM Associated Orders   1. Non-healing surgical wound, subsequent encounter  T81.89XD Wound cleansing and dressings Abdomen     Wound Procedure Treatment Abdomen      2. Obesity, Class III, BMI 40-49.9 (morbid obesity) (Formerly [facility])  E66.01       3. S/P hysterectomy  Z90.710       4. Tobacco use disorder  F17.200       5. S/P hernia repair  Z98.890     Z87.19            Assessment  & Plan:    F/u surgical wound of abdomen related to dehiscence following hysterectomy resulting in abdominal herniation with further dehiscence post hernia repair. Small midline sinus tract remains open. There is exudate build-up present. There is a deeper section of the tract/tunnel present at 7 o'clock. Drainage has increased and is moderate but not green in color. Periwound with mild amounts of irritation from moisture but no rash or significant skin breakdown.   Selective debridement, as below.   Given drainage is moderate would benefit from more absorptive dressing. While trial Hydrofera tunnel dressing to promote closure. Moisten one end with saline, insert into tract with attention towards 7 o'clock then moistened other end with saline until foam has softened. Barrier cream to periwound.  Would benefit from smoking cessation.   F/u in one week. Instructed to call if any questions or concerns arise in meantime.            Subjective:   3/11/2024: Maria R is a 43-year-old female who is presenting to wound center today for follow-up of abdominal wound s/p hysterectomy complicated by fascial dehiscence and abdominal herniation that required incisional hernia repair w/ mesh.  Has been following with wound center since January '23.  Per wound center visit on 2/5/2024 \"saw Dr. Conroy, " "general surgery since prior visit whom did not feel as though any further medication/imaging/intervention was necessary at this time and would like pt to continue with local wound care and follow up with him in 3 months.\" At last wound center visit, was changed to Dakins soaked packing after she was experiencing green drainage.  She was instructed to resume silver gel coated packing once the green drainage resolved.  She reports that she has been utilizing Dakin soaked packing over the past week and had not switched to green drainage though this seemed to resolve a few days ago. Of note, is to have recently started Ozempic for weight reduction.  Currently on Z-Dallin and prednisone for bronchitis.  She denies fever and/or chills.  Continues with cough and shortness of breath but no acute worsening.    03/18/24: Pt presents for f/u surgical abdominal wound dehiscence s/p hysterectomy complicated by fascial dehiscence and abdominal herniation requiring incision hernia repair with mesh complicated by further dehiscence. Pt notes that Dr. Lopez was able to surgically debride a piece of remaining mesh at her visit last week and opted to continue with Dakin soaked packing giving significant improvement in the wound with its use. No nausea, vomiting, severe abdominal pain, change in bowel habits nor fevers/chills. Continuing to smoke about the same amount.     04/08/24: Pt presents for f/u surgical abdominal wound dehiscence. Since her previous visit she has had f/u with Dr. Conroy, general surgery. No further f/u with general surgery was required, pt is to continue with wound care but may f/u with surgery if needed. Pt reports she noticed a slight increase in the odor related to the drainage the other day so she used Dakins and it went away. She ran out of the AMD packing therefore returned to use of the silver gel coated packing. Is feeling well. Has started Ozempic and has noticed a 3 lb weight loss already. Smoking " about the same amount. No fevers, chills.     04/15/24: Pt presents for wound related to surgical site dehiscence of the abdomen. Has been using endoform am packing into the remaining opening. Has not had a return of green or foul smelling drainage since her previous visit. Offers no complaints today. No significant changes in medical history since prior visit.     04/29/24: Pt presents for f/u wound related to surgical site dehiscence of the abdomen. Is using endoform am but has noticed a more obvious odor since switching from use of silver gel to the endoform. Drainage is tan in color. Aside from the odor of the drainage offers no complaints today.     05/06/24: Pt presents for f/u wound related to surgical site dehiscence of the abdomen. Has been using gentamicin ointment; packing is no longer staying in well. The odor from the drainage has improved from the prior visit. Offers no complaints today.     05/20/24: Pt presents for f/u wound related to surgical site dehiscence of the abdomen. She has continued with gentamicin ointment three times daily. The wound continues to drain, she has not noticed a return of odor or odd coloration to the drainage. Is continuing to keep the site covered. Denies abdominal pain, N/V, change in bowel movements, fevers, chils.     06/03/24: Pt presents for f/u wound secondary to surgical site dehiscence of the abdomen. Currently Medihoney is being used. ABD is being used to cover the site. There continues to be small-moderate drainage without any foul smell or green discoloration of the drainage. Pt offers no complaints today.     06/24/24: Pt presents for f/u wound secondary to surgical site dehiscence of the abdomen. She is currently using Medihoney. She believes there is less drainage. Current drainage on the pad is honey-colored therefore it is difficult to tell if it is truly drainage or the Medihoney being placed into the wound. No significant changes since past visit. She is  inquiring if she can start Humira again for her RA; was on hold d/t wound.     07/01/24: Patient presents for follow-up of wound secondary to surgical site dehiscence of the abdomen.  Midweek patient began to notice an odor again to her abdominal wound therefore she resumed use of Medihoney to the wound bed and zinc to the periwound.  Today when the dressing was taken off there was a significant amount of blood on the overlying dressing.  Patient does not report noticing any significant bleeding as of last night.  She does state that her activity level was significantly increased from her typical levels this past week due to being short staffed at work as well as doing projects around the house.  Has not noticed any significant changes in terms of pain, change in bowel movements, fevers or chills.      07/08/24: Pt presents for f/u wound secondary to surgical site dehiscence of the abdomen. She has been packing the wound with Iodoform packing and using medihoney to the cracks of periwound skin. Had a brief return of green drainage however this resolved quickly. Saw rheumatology since her previous visit and is going to be reinitiated on Humira following pre-approval process as long as she gets clearance from wound care perspective.      07/22/24: Pt presents for f/u wound secondary to surgical site dehiscence of the abdomen. Has been using Iodoform packing however it is barely fitting and is not staying in place well. She reports being more sweaty this week working on some painting in her house and has noticed a rash of her surrounding skin. She tired to apply Medihoney since that has calmed things down in the past however even with application of Medihoney her surrounding skin still appears red/angry.       07/29/24: Pt presents for f/u wound secondary to surgical site dehiscence of the abdomen. Nystatin and zinc is being used to periwound which has significantly improved the skin around the wound. She has not  "started Humira yet, is still awaiting insurance approval.      08/05/24: Patient presents for follow-up abdominal wound secondary to surgical site dehiscence and exposed mesh. She trialed not using the Medihoney to see if the wound was still draining and had a small amount of drainage coming from the wound still. Reports she was approved for Humira and is wondering if it would be appropriate to start this again with her open wound not fully healed yet.     08/19/24: Pt presents for f/u abdominal wound secondary to surgical site dehiscence (hysterectomy complicated by fascial dehiscence and abdominal herniation requiring incision hernia repair with mesh complicated by further dehiscence). She notes that since her previous visit she had another episode of significant bleeding from her abdominal. It resolved after a few hours. She reports wearing a waterproof bandage instead of using the ABD leading to a return of a rash on her abdomen. Is wondering if additional abdominal imaging is necessary.       08/26/24: HPI per covering provider, \"Patient presents to wound care center for follow-up visit of surgical abdominal wound.  Patient states that she recently noted small amount of blue-green drainage from wound.  Patient states when this happens that she has been instructed to pack the wound with Dakin's moistened plain packing daily until the discoloration to the drainage stops. Patient has been applying betadine to the wound and then silver alginate to the wound daily prior to restarting the dakin's.  Patient denies any episodes of bleeding.  Denies increased pain and state drainage amount has been the same, just noted change in color.  No fever or chills. Patient reports that she is unable to follow at the wound center any other day by Monday due to her work schedule.\"     09/09/24: Pt presents for f/u chronic abdominal wound following surgical site dehiscence.  She reports that every time she has tried to stop using " the Dakin solution there is a return of green drainage. Has continued with barrier cream to periwound. Has not yet started her Humira d/t infection concerns with her open wound and being on a biologic. No alarm sx, denies fevers, chills, N/V. Is continuing to have regular BM.     09/16/24: Pt presents for f/u chronic draining abdominal wound following surgical site dehiscence. She has been using gentamicin ointment at least twice daily this past week and has noticed an improvement in the color/quality/amount of drainage. Periwound rash has been improving as well. No concerning sx.     09/30/24: Pt presents for f/u chronic draining abdominal wound. Has switched to using silver gel following completion of therapy with gentamicin ointment. Some increased drainage present. Overall no significant changes in PMHx since prior visit.           The following portions of the patient's history were reviewed and updated as appropriate:   Patient Active Problem List   Diagnosis    Tobacco use disorder    COVID-19    Obesity, morbid, BMI 50 or higher (HCC)    Bulky or enlarged uterus    Pelvic pain    Preoperative exam for gynecologic surgery    HTN (hypertension)    Gastroesophageal reflux disease    Asthma    Obstructive sleep apnea syndrome    S/P hysterectomy    Postoperative anemia    Rheumatoid arthritis involving multiple sites with positive rheumatoid factor (HCC)    Encounter for postoperative care    Open wound of abdomen    Surgical wound, non healing    Cigarette nicotine dependence without complication     Past Medical History:   Diagnosis Date    Abdominal wall abscess at site of surgical wound 1/9/2023    Abnormal uterine bleeding due to intramural leiomyoma 08/16/2022    Arthritis     Rheumatoid    Asthma     Breathing difficulty     only occured during inverted robotic hysterectomy    Cellulitis of drainage site following surgery 1/9/2023    CPAP (continuous positive airway pressure) dependence     GERD  (gastroesophageal reflux disease)     Hypertension     Obese     Pneumonia 2007    Sleep apnea     Uterine fibroid     LTH today 12/15/2022    Wound dehiscence, surgical 2022     Past Surgical History:   Procedure Laterality Date    ABDOMINAL WASHOUT      post  with wound vac     SECTION      had hematoma removed after the surgery    FOREARM SURGERY Right     repair of fracture with plating    INCISIONAL HERNIA REPAIR  2022    Procedure: REPAIR  RECURRENT HERNIA INCISIONAL WITH MESH;  Surgeon: Donna Lau MD;  Location: [facility];  Service: Gynecology    IR DRAINAGE TUBE CHECK WITH SCLEROSIS  2/3/2023    IR DRAINAGE TUBE CHECK WITH SCLEROSIS  3/7/2023    IR DRAINAGE TUBE CHECK/CHANGE/REPOSITION/REINSERTION/UPSIZE  2023    IR DRAINAGE TUBE CHECK/CHANGE/REPOSITION/REINSERTION/UPSIZE  2023    IR DRAINAGE TUBE CHECK/CHANGE/REPOSITION/REINSERTION/UPSIZE  2023    IR DRAINAGE TUBE CHECK/CHANGE/REPOSITION/REINSERTION/UPSIZE  2023    IR DRAINAGE TUBE PLACEMENT  2023    LAPAROTOMY N/A 2022    Procedure: LAPAROTOMY EXPLORATORY;  Surgeon: Donna Lau MD;  Location: [facility];  Service: Gynecology    MYRINGOTOMY W/ TUBES      two sets as a young child    NY CYSTOURETHROSCOPY N/A 12/15/2022    Procedure: CYSTO W/ ROBOT;  Surgeon: Donna Lau MD;  [facility]: [facility];  Service: Gynecology    NY LAPS TOTAL HYSTERECT 250 GM/< W/RMVL TUBE/OVARY N/A 12/15/2022    Procedure: (LTH) W/ BILATERAL SALPINGECTOMY  W/ ROBOT COVERTED TO OPEN;  Surgeon: Donna Lau MD;  Location: [facility];  Service: Gynecology    VAC DRESSING APPLICATION N/A 3/17/2023    Procedure: APPLICATION VAC DRESSING ABDOMEN/TRUNK;  Surgeon: Chan Conroy MD;  Location: [facility];  Service: General    WOUND DEBRIDEMENT N/A 3/15/2023    Procedure: DEBRIDEMENT Abdominal WOUND and placement of Wound VAC;  Surgeon: Chan Conroy MD;  Location: [facility];  Service:  General    WOUND DEBRIDEMENT N/A 3/17/2023    Procedure: DEBRIDEMENT WOUND (WASH OUT);  Surgeon: Chan Conroy MD;  Location: [facility];  Service: General     Family History   Problem Relation Age of Onset    Multiple sclerosis Mother     Hypertension Father     Hyperlipidemia Father     Cerebral aneurysm Father     Pulmonary embolism Father     Liver disease Brother      Social History     Socioeconomic History    Marital status:      Spouse name: None    Number of children: None    Years of education: None    Highest education level: None   Occupational History    None   Tobacco Use    Smoking status: Every Day     Current packs/day: 0.50     Average packs/day: 0.5 packs/day for 30.7 years (15.4 ttl pk-yrs)     Types: Cigarettes     Start date: 1995    Smokeless tobacco: Never   Vaping Use    Vaping status: Never Used   Substance and Sexual Activity    Alcohol use: Not Currently     Alcohol/week: 1.0 standard drink of alcohol     Types: 1 Standard drinks or equivalent per week     Comment: 3 x monthly    Drug use: Never    Sexual activity: Yes     Partners: Male     Birth control/protection: Male Sterilization   Other Topics Concern    None   Social History Narrative    None     Social Determinants of Health     Financial Resource Strain: Not on file   Food Insecurity: No Food Insecurity (3/16/2023)    Hunger Vital Sign     Worried About Running Out of Food in the Last Year: Never true     Ran Out of Food in the Last Year: Never true   Transportation Needs: No Transportation Needs (3/16/2023)    PRAPARE - Transportation     Lack of Transportation (Medical): No     Lack of Transportation (Non-Medical): No   Physical Activity: Not on file   Stress: Not on file   Social Connections: Not on file   Intimate Partner Violence: Not on file   Housing Stability: Low Risk  (3/16/2023)    Housing Stability Vital Sign     Unable to Pay for Housing in the Last Year: No     Number of Places Lived in the Last Year: 1      Unstable Housing in the Last Year: No       Current Outpatient Medications:     albuterol (2.5 mg/3 mL) 0.083 % nebulizer solution, Take 2.5 mg by nebulization every 4 (four) hours as needed for shortness of breath or wheezing, Disp: , Rfl:     albuterol (ProAir HFA) 90 mcg/act inhaler, Inhale 2 puffs every 6 (six) hours as needed for wheezing or shortness of breath, Disp: 8.5 g, Rfl: 0    albuterol (PROVENTIL HFA,VENTOLIN HFA) 90 mcg/act inhaler, Inhale 2 puffs every 6 (six) hours as needed for wheezing, Disp: 8 g, Rfl: 2    amoxicillin (AMOXIL) 500 mg capsule, , Disp: , Rfl:     Arthritis Pain Relief 650 MG CR tablet, take 1 tablet by mouth every 8 hours if needed for mild pain (Patient not taking: Reported on 3/7/2024), Disp: , Rfl:     aspirin 325 mg tablet, Take 325 mg by mouth daily. Indications: as needed (Patient not taking: Reported on 3/7/2024), Disp: , Rfl:     celecoxib (CeleBREX) 200 mg capsule, Take 200 mg by mouth 2 (two) times a day, Disp: , Rfl:     chlorhexidine (HIBICLENS) 4 % external liquid, Apply 1 Application topically daily as needed for wound care Can use to clean wound during VAC changes. (Patient not taking: Reported on 1/29/2024), Disp: 473 mL, Rfl: 5    clonazePAM (KlonoPIN) 0.5 mg tablet, Take 0.5 mg by mouth 2 (two) times a day as needed (Patient not taking: Reported on 1/29/2024), Disp: , Rfl:     clotrimazole (LOTRIMIN) 1 % cream, Apply topically 2 (two) times a day for 14 days To rash of abdomen, Disp: 28 g, Rfl: 1    cyclobenzaprine (FLEXERIL) 5 mg tablet, Take 1 tablet (5 mg total) by mouth 3 (three) times a day as needed for muscle spasms (Patient taking differently: Take 5 mg by mouth 3 (three) times a day as needed for muscle spasms As needed), Disp: 60 tablet, Rfl: 0    dulaglutide (Trulicity) 0.75 MG/0.5ML injection, Inject 0.75 mg under the skin Once a week (Patient not taking: Reported on 1/29/2024), Disp: , Rfl:     escitalopram (LEXAPRO) 10 mg tablet, Take 10 mg by  mouth daily, Disp: , Rfl:     escitalopram (LEXAPRO) 5 mg tablet, Take 5 mg by mouth daily, Disp: , Rfl:     fluticasone (FLONASE) 50 mcg/act nasal spray, 2 sprays into each nostril daily, Disp: 16 g, Rfl: 4    Fluticasone-Salmeterol (Advair) 250-50 mcg/dose inhaler, , Disp: , Rfl:     Fluticasone-Salmeterol (Advair) 500-50 mcg/dose inhaler, Inhale 1 puff 2 (two) times a day, Disp: , Rfl:     folic acid (FOLVITE) 1 mg tablet, Take by mouth daily, Disp: , Rfl:     gabapentin (NEURONTIN) 100 mg capsule, Take 1 capsule (100 mg total) by mouth 3 (three) times a day (Patient not taking: Reported on 3/7/2024), Disp: 90 capsule, Rfl: 0    gabapentin (NEURONTIN) 300 mg capsule, Take 300 mg by mouth 3 (three) times a day, Disp: , Rfl:     gentamicin (GARAMYCIN) 0.1 % ointment, Apply topically 3 (three) times a day for 7 days To packing and place into open wound, Disp: 30 g, Rfl: 1    hydrochlorothiazide (HYDRODIURIL) 12.5 mg tablet, Take 12.5 mg by mouth daily Pt only  taking as needed for leg swelling (Patient not taking: Reported on 3/7/2024), Disp: , Rfl:     ibuprofen (MOTRIN) 600 mg tablet, Take 600 mg by mouth every 6 (six) hours as needed for moderate pain As needed, Disp: , Rfl:     methocarbamol (ROBAXIN) 500 mg tablet, Take 1 tablet (500 mg total) by mouth every 6 (six) hours for 14 days (Patient taking differently: Take 500 mg by mouth every 6 (six) hours As needed), Disp: 56 tablet, Rfl: 0    methotrexate 2.5 mg tablet, Four 2.5 tabs one time a week ( Every Saturday), Disp: , Rfl:     montelukast (SINGULAIR) 10 mg tablet, Take by mouth daily at bedtime as needed, Disp: , Rfl:     naloxone (NARCAN) 4 mg/0.1 mL nasal spray, Administer 1 spray into a nostril. If no response after 2-3 minutes, give another dose in the other nostril using a new spray. (Patient not taking: Reported on 4/14/2023), Disp: 1 each, Rfl: 1    neomycin-polymyxin-hydrocortisone (CORTISPORIN) otic solution, Administer 3 drops into the left ear  every 6 (six) hours (Patient not taking: Reported on 1/29/2024), Disp: , Rfl:     nicotine (NICODERM CQ) 14 mg/24hr TD 24 hr patch, Place 1 patch on the skin every 24 hours (Patient not taking: Reported on 6/2/2023), Disp: 28 patch, Rfl: 3    nystatin (MYCOSTATIN) powder, Apply topically 2 (two) times a day for 14 days To periwound mixed with barrier cream, Disp: 30 g, Rfl: 1    omeprazole (PriLOSEC) 40 MG capsule, Take 40 mg by mouth daily, Disp: , Rfl:     predniSONE 10 mg tablet, Take 5 tabs po x 2 days; 4 tabs po x 2 days; 3 tabs po x 1 day; 2 tabs po x 1 day. 1 tab po x 1 day. (Patient not taking: Reported on 1/29/2024), Disp: 24 tablet, Rfl: 0    predniSONE 10 mg tablet, Take by mouth 2 (two) times a day with meals For 10 days (Patient not taking: Reported on 4/1/2024), Disp: , Rfl:     predniSONE 10 mg tablet, Take 5 tabs po x 2 days; 4 tabs po x 2 days; 3 tabs po x 1 day; 2 tabs po x 1 day. 1 tab po x 1 day. (Patient not taking: Reported on 4/1/2024), Disp: 24 tablet, Rfl: 0    semaglutide, 0.25 or 0.5 mg/dose, (Ozempic) 2 mg/3 mL injection pen, Inject 0.5 mg under the skin Once a week (Patient not taking: Reported on 1/29/2024), Disp: , Rfl:     sodium chloride, PF, 0.9 %, Inject 10 mL into a catheter in a vein 3 (three) times a week Wound irrigation. (Patient not taking: Reported on 1/29/2024), Disp: 100 mL, Rfl: 10    Vitamin D, Ergocalciferol, 56008 units CAPS, Take by mouth once a week, Disp: , Rfl:     Review of Systems   Constitutional:  Negative for chills and fever.   Gastrointestinal:  Negative for abdominal pain, constipation, diarrhea, nausea and vomiting.   Skin:  Positive for wound (open surgical wound abdomen).   Psychiatric/Behavioral:  The patient is not nervous/anxious.          Objective:  /68   Pulse 79   Temp (!) 96.8 °F (36 °C)   Resp 18   LMP 11/23/2022 (Exact Date) Comment: partial  Pain Score: 0-No pain     Physical Exam  Vitals reviewed.   Constitutional:       General:  She is not in acute distress.     Appearance: She is morbidly obese.   HENT:      Head: Normocephalic and atraumatic.   Cardiovascular:      Rate and Rhythm: Normal rate.   Pulmonary:      Effort: Pulmonary effort is normal. No respiratory distress.   Abdominal:          Comments: Small midline sinus tract remains open. There is a deeper section of the tract/tunnel present at 7 o'clock. Drainage has increased and is moderate but not green in color. Periwound with mild amounts of irritation from moisture but no rash or significant skin breakdown.    Skin:     Findings: Wound present.   Neurological:      Mental Status: She is alert and oriented to person, place, and time.   Psychiatric:         Mood and Affect: Mood normal.         Behavior: Behavior normal.           Wound 07/10/23 Abdomen (Active)   Wound Image   09/30/24 1444   Wound Description Reid;Pink 09/30/24 1445   Alexa-wound Assessment Scar Tissue;Fragile;Rash 09/30/24 1445   Wound Length (cm) 0.4 cm 09/30/24 1445   Wound Width (cm) 0.3 cm 09/30/24 1445   Wound Depth (cm) 0.5 cm 09/30/24 1445   Wound Surface Area (cm^2) 0.12 cm^2 09/30/24 1445   Wound Volume (cm^3) 0.06 cm^3 09/30/24 1445   Calculated Wound Volume (cm^3) 0.06 cm^3 09/30/24 1445   Change in Wound Size % 99.86 09/30/24 1445   Tunneling 1 2 cm 03/04/24 0822   Tunneling 1 in depth located at 7 o'clock 03/04/24 0822   Number of underminings 1 09/30/24 1445   Undermining 1 1 09/30/24 1445   Undermining 1 is depth extending from 7 o 'clock 09/30/24 1445   Drainage Amount Small 09/30/24 1445   Drainage Description Serosanguineous 09/30/24 1445   Non-staged Wound Description Full thickness 09/30/24 1445   Treatments Irrigation with NSS 09/30/24 1445   Dressing Wound V.A.C. 07/31/23 0859   Wound packed? No 09/09/24 0826   Packing- # removed 1 05/06/24 0829   Packing- # inserted 2 07/24/23 0832   Dressing Changed New 07/24/23 0832   Patient Tolerance Tolerated well 09/30/24 1441   Dressing Status  "Intact;Old drainage 09/30/24 1445                     Wound Instructions:  Orders Placed This Encounter   Procedures    Wound cleansing and dressings Abdomen     Abdomen Wound      Remove abdominal dressing, wash with Normal Saline, pat dry prior to applying new dressing      Apply Normlgel into wound.   Change every other day and as needed for excessive drainage.   if you notice increased odor or see greenish drainage, you may resume using the Dakin's soak with plain packing strips. Do not use the endoform am if you use the Dakins.      Treatments completed as above today at the Wound Center.     Salma discussed with you regarding a thin Hydrofera Blue Classic product that comes in a \"spaghetti- like\" shape. We will attempt to order this for you. Once it is received, you'll need to moisten it with a little bit of saline then it is in gently into the 7 o 'clock undermining.          Follow up in 2 weeks     Standing Status:   Future     Standing Expiration Date:   10/7/2024    Wound Procedure Treatment Abdomen     This order was created via procedure documentation       Cally Garcia, PA-C      Portions of the record may have been created with voice recognition software. Occasional wrong word or \"sound alike\" substitutions may have occurred due to the inherent limitations of voice recognition software. Read the chart carefully and recognize, using context, where substitutions have occurred.    " "Expiration Date:   10/7/2024    Wound Procedure Treatment Abdomen     This order was created via procedure documentation       Cally Garcia, PA-C      Portions of the record may have been created with voice recognition software. Occasional wrong word or \"sound alike\" substitutions may have occurred due to the inherent limitations of voice recognition software. Read the chart carefully and recognize, using context, where substitutions have occurred.    "

## 2024-09-30 NOTE — PROGRESS NOTES
Wound Procedure Treatment Abdomen    Performed by: Gudelia Combs RN  Authorized by: Salma Garcia PA-C    Associated wounds:   Wound 07/10/23 Abdomen  Wound cleansed with:  Wound aggrssively cleansed with NSS and gauze  Applied Topical: Other    Applied secondary dressing:  ABD  Dressing secured with:  Tape  Comments:  Normlgel

## 2024-09-30 NOTE — PATIENT INSTRUCTIONS
"Orders Placed This Encounter   Procedures    Wound cleansing and dressings Abdomen     Abdomen Wound      Remove abdominal dressing, wash with Normal Saline, pat dry prior to applying new dressing      Apply Normlgel into wound.   Change every other day and as needed for excessive drainage.   if you notice increased odor or see greenish drainage, you may resume using the Dakin's soak with plain packing strips. Do not use the endoform am if you use the Dakins.      Treatments completed as above today [facility].     Salma discussed with you regarding a thin Hydrofera Blue Classic product that comes in a \"spaghetti- like\" shape. We will attempt to order this for you. Once it is received, you'll need to moisten it with a little bit of saline then it is in gently into the 7 o 'clock undermining.          Follow up in 2 weeks     Standing Status:   Future     Standing Expiration Date:   10/7/2024      "

## 2024-10-02 ENCOUNTER — DOCUMENTATION (OUTPATIENT)
Facility: HOSPITAL | Age: 44
End: 2024-10-02

## 2024-10-02 NOTE — PROGRESS NOTES
This RN left detailed voicemail for patient regarding hydrofera classic foam.  Original plan for hydrofera blue classic tunnel dressing is not covered by her insurance. [facility] able to send 6x6 square dressing and should be delivered soon.  RN left message on how to use. Callback number left for any questions.

## 2024-10-14 ENCOUNTER — TELEPHONE (OUTPATIENT)
Dept: SURGERY | Facility: CLINIC | Age: 44
End: 2024-10-14

## 2024-10-14 ENCOUNTER — OFFICE VISIT (OUTPATIENT)
Facility: HOSPITAL | Age: 44
End: 2024-10-14
Payer: COMMERCIAL

## 2024-10-14 VITALS
TEMPERATURE: 96.8 F | HEART RATE: 83 BPM | RESPIRATION RATE: 18 BRPM | DIASTOLIC BLOOD PRESSURE: 62 MMHG | SYSTOLIC BLOOD PRESSURE: 131 MMHG

## 2024-10-14 DIAGNOSIS — F17.200 TOBACCO USE DISORDER: ICD-10-CM

## 2024-10-14 DIAGNOSIS — Z87.19 S/P HERNIA REPAIR: ICD-10-CM

## 2024-10-14 DIAGNOSIS — T81.89XD NON-HEALING SURGICAL WOUND, SUBSEQUENT ENCOUNTER: Primary | ICD-10-CM

## 2024-10-14 DIAGNOSIS — Z98.890 S/P HERNIA REPAIR: ICD-10-CM

## 2024-10-14 DIAGNOSIS — Z90.710 S/P HYSTERECTOMY: ICD-10-CM

## 2024-10-14 DIAGNOSIS — E66.01 OBESITY, CLASS III, BMI 40-49.9 (MORBID OBESITY) (HCC): ICD-10-CM

## 2024-10-14 PROCEDURE — G0463 HOSPITAL OUTPT CLINIC VISIT: HCPCS | Performed by: STUDENT IN AN ORGANIZED HEALTH CARE EDUCATION/TRAINING PROGRAM

## 2024-10-14 PROCEDURE — 99213 OFFICE O/P EST LOW 20 MIN: CPT | Performed by: STUDENT IN AN ORGANIZED HEALTH CARE EDUCATION/TRAINING PROGRAM

## 2024-10-14 NOTE — PROGRESS NOTES
Patient ID: Maria R Webb is a 44 y.o. female Date of Birth 1980       Chief Complaint   Patient presents with    Follow Up Wound Care Visit     Abdominal wound       Allergies:  Patient has no known allergies.    Diagnosis:   Diagnosis ICD-10-CM Associated Orders   1. Non-healing surgical wound, subsequent encounter  T81.89XD Wound cleansing and dressings Abdomen     Wound Procedure Treatment Abdomen      2. Obesity, Class III, BMI 40-49.9 (morbid obesity) ([facility])  E66.01       3. S/P hysterectomy  Z90.710       4. Tobacco use disorder  F17.200       5. S/P hernia repair  Z98.890     Z87.19            Assessment  & Plan:    F/u surgical wound of the abdomen related to dehiscence following hysterectomy resulting in abdominal herniation with further dehiscence post hernia repair with exposed mesh. Continues to have a small sinus tract that remains open. There is slight malodor noticed with expressed drainage on examination. Deeper section/tunnel is not evident on examination today, diameter opening is very small limiting assessment. Periwound with irritation, rash, separation.   Betadine applied today given slight malodor to drainage.   Continue with Hyrdoerfa blue cut into strips and inserted into opening. Moisten one end of the strip with saline, insert and then moisten the reset of the foam.   Will discuss bleeding with Dr. Conroy, general surgery, regarding necessity of further imaging. Likely related to increased abdominal pressure from vomiting, coughing. Bleeding has since resolved.   Would benefit from smoking cessation.   F/u in one week. Instructed to call if any questions or concerns arise in meantime.            Subjective:   3/11/2024: Maria R is a 43-year-old female who is presenting to wound center today for follow-up of abdominal wound s/p hysterectomy complicated by fascial dehiscence and abdominal herniation that required incisional hernia repair w/ mesh.  Has been following with wound center  "since January '23.  Per wound center visit on 2/5/2024 \"saw Dr. Conroy, general surgery since prior visit whom did not feel as though any further medication/imaging/intervention was necessary at this time and would like pt to continue with local wound care and follow up with him in 3 months.\" At last wound center visit, was changed to Dakins soaked packing after she was experiencing green drainage.  She was instructed to resume silver gel coated packing once the green drainage resolved.  She reports that she has been utilizing Dakin soaked packing over the past week and had not switched to green drainage though this seemed to resolve a few days ago. Of note, is to have recently started Ozempic for weight reduction.  Currently on Z-Dallin and prednisone for bronchitis.  She denies fever and/or chills.  Continues with cough and shortness of breath but no acute worsening.    03/18/24: Pt presents for f/u surgical abdominal wound dehiscence s/p hysterectomy complicated by fascial dehiscence and abdominal herniation requiring incision hernia repair with mesh complicated by further dehiscence. Pt notes that Dr. Lopez was able to surgically debride a piece of remaining mesh at her visit last week and opted to continue with Dakin soaked packing giving significant improvement in the wound with its use. No nausea, vomiting, severe abdominal pain, change in bowel habits nor fevers/chills. Continuing to smoke about the same amount.     04/08/24: Pt presents for f/u surgical abdominal wound dehiscence. Since her previous visit she has had f/u with Dr. Conroy, general surgery. No further f/u with general surgery was required, pt is to continue with wound care but may f/u with surgery if needed. Pt reports she noticed a slight increase in the odor related to the drainage the other day so she used Dakins and it went away. She ran out of the AMD packing therefore returned to use of the silver gel coated packing. Is feeling well. " Has started Ozempic and has noticed a 3 lb weight loss already. Smoking about the same amount. No fevers, chills.     04/15/24: Pt presents for wound related to surgical site dehiscence of the abdomen. Has been using endoform am packing into the remaining opening. Has not had a return of green or foul smelling drainage since her previous visit. Offers no complaints today. No significant changes in medical history since prior visit.     04/29/24: Pt presents for f/u wound related to surgical site dehiscence of the abdomen. Is using endoform am but has noticed a more obvious odor since switching from use of silver gel to the endoform. Drainage is tan in color. Aside from the odor of the drainage offers no complaints today.     05/06/24: Pt presents for f/u wound related to surgical site dehiscence of the abdomen. Has been using gentamicin ointment; packing is no longer staying in well. The odor from the drainage has improved from the prior visit. Offers no complaints today.     05/20/24: Pt presents for f/u wound related to surgical site dehiscence of the abdomen. She has continued with gentamicin ointment three times daily. The wound continues to drain, she has not noticed a return of odor or odd coloration to the drainage. Is continuing to keep the site covered. Denies abdominal pain, N/V, change in bowel movements, fevers, chils.     06/03/24: Pt presents for f/u wound secondary to surgical site dehiscence of the abdomen. Currently Medihoney is being used. ABD is being used to cover the site. There continues to be small-moderate drainage without any foul smell or green discoloration of the drainage. Pt offers no complaints today.     06/24/24: Pt presents for f/u wound secondary to surgical site dehiscence of the abdomen. She is currently using Medihoney. She believes there is less drainage. Current drainage on the pad is honey-colored therefore it is difficult to tell if it is truly drainage or the Medihoney being  placed into the wound. No significant changes since past visit. She is inquiring if she can start Humira again for her RA; was on hold d/t wound.     07/01/24: Patient presents for follow-up of wound secondary to surgical site dehiscence of the abdomen.  Midweek patient began to notice an odor again to her abdominal wound therefore she resumed use of Medihoney to the wound bed and zinc to the periwound.  Today when the dressing was taken off there was a significant amount of blood on the overlying dressing.  Patient does not report noticing any significant bleeding as of last night.  She does state that her activity level was significantly increased from her typical levels this past week due to being short staffed at work as well as doing projects around the house.  Has not noticed any significant changes in terms of pain, change in bowel movements, fevers or chills.      07/08/24: Pt presents for f/u wound secondary to surgical site dehiscence of the abdomen. She has been packing the wound with Iodoform packing and using medihoney to the cracks of periwound skin. Had a brief return of green drainage however this resolved quickly. Saw rheumatology since her previous visit and is going to be reinitiated on Humira following pre-approval process as long as she gets clearance from wound care perspective.      07/22/24: Pt presents for f/u wound secondary to surgical site dehiscence of the abdomen. Has been using Iodoform packing however it is barely fitting and is not staying in place well. She reports being more sweaty this week working on some painting in her house and has noticed a rash of her surrounding skin. She tired to apply Medihoney since that has calmed things down in the past however even with application of Medihoney her surrounding skin still appears red/angry.       07/29/24: Pt presents for f/u wound secondary to surgical site dehiscence of the abdomen. Nystatin and zinc is being used to periwound which  "has significantly improved the skin around the wound. She has not started Humira yet, is still awaiting insurance approval.      08/05/24: Patient presents for follow-up abdominal wound secondary to surgical site dehiscence and exposed mesh. She trialed not using the Medihoney to see if the wound was still draining and had a small amount of drainage coming from the wound still. Reports she was approved for Humira and is wondering if it would be appropriate to start this again with her open wound not fully healed yet.     08/19/24: Pt presents for f/u abdominal wound secondary to surgical site dehiscence (hysterectomy complicated by fascial dehiscence and abdominal herniation requiring incision hernia repair with mesh complicated by further dehiscence). She notes that since her previous visit she had another episode of significant bleeding from her abdominal. It resolved after a few hours. She reports wearing a waterproof bandage instead of using the ABD leading to a return of a rash on her abdomen. Is wondering if additional abdominal imaging is necessary.       08/26/24: HPI per covering provider, \"Patient presents to wound care center for follow-up visit of surgical abdominal wound.  Patient states that she recently noted small amount of blue-green drainage from wound.  Patient states when this happens that she has been instructed to pack the wound with Dakin's moistened plain packing daily until the discoloration to the drainage stops. Patient has been applying betadine to the wound and then silver alginate to the wound daily prior to restarting the dakin's.  Patient denies any episodes of bleeding.  Denies increased pain and state drainage amount has been the same, just noted change in color.  No fever or chills. Patient reports that she is unable to follow at the wound center any other day by Monday due to her work schedule.\"     09/09/24: Pt presents for f/u chronic abdominal wound following surgical site " dehiscence.  She reports that every time she has tried to stop using the Dakin solution there is a return of green drainage. Has continued with barrier cream to periwound. Has not yet started her Humira d/t infection concerns with her open wound and being on a biologic. No alarm sx, denies fevers, chills, N/V. Is continuing to have regular BM.     09/16/24: Pt presents for f/u chronic draining abdominal wound following surgical site dehiscence. She has been using gentamicin ointment at least twice daily this past week and has noticed an improvement in the color/quality/amount of drainage. Periwound rash has been improving as well. No concerning sx.     09/30/24: Pt presents for f/u chronic draining abdominal wound. Has switched to using silver gel following completion of therapy with gentamicin ointment. Some increased drainage present. Overall no significant changes in PMHx since prior visit.     10/14/24: pt presents for f/u chronic draining abdominal wound. She has been utilizing Hydrofera blue classic foam cut into packing strips since her previous visit. She reports that she had a gastrointestinal virus since her previous visit with coughing and vomiting which caused a significant amount of bloody drainage from her abdominal wound. The bloody drainage has since resolved but it did concern her.           The following portions of the patient's history were reviewed and updated as appropriate:   Patient Active Problem List   Diagnosis    Tobacco use disorder    COVID-19    Obesity, morbid, BMI 50 or higher ([facility])    Bulky or enlarged uterus    Pelvic pain    Preoperative exam for gynecologic surgery    HTN (hypertension)    Gastroesophageal reflux disease    Asthma    Obstructive sleep apnea syndrome    S/P hysterectomy    Postoperative anemia    Rheumatoid arthritis involving multiple sites with positive rheumatoid factor ([facility])    Encounter for postoperative care    Open wound of abdomen    Surgical wound, non  healing    Cigarette nicotine dependence without complication     Past Medical History:   Diagnosis Date    Abdominal wall abscess at site of surgical wound 2023    Abnormal uterine bleeding due to intramural leiomyoma 2022    Arthritis     Rheumatoid    Asthma     Breathing difficulty     only occured during inverted robotic hysterectomy    Cellulitis of drainage site following surgery 2023    CPAP (continuous positive airway pressure) dependence     GERD (gastroesophageal reflux disease)     Hypertension     Obese     Pneumonia 2007    Sleep apnea     Uterine fibroid     LTH today 12/15/2022    Wound dehiscence, surgical 2022     Past Surgical History:   Procedure Laterality Date    ABDOMINAL WASHOUT      post  with wound vac     SECTION      had hematoma removed after the surgery    FOREARM SURGERY Right     repair of fracture with plating    INCISIONAL HERNIA REPAIR  2022    Procedure: REPAIR  RECURRENT HERNIA INCISIONAL WITH MESH;  Surgeon: Donna Lau MD;  Location: [facility];  Service: Gynecology    IR DRAINAGE TUBE CHECK WITH SCLEROSIS  2/3/2023    IR DRAINAGE TUBE CHECK WITH SCLEROSIS  3/7/2023    IR DRAINAGE TUBE CHECK/CHANGE/REPOSITION/REINSERTION/UPSIZE  2023    IR DRAINAGE TUBE CHECK/CHANGE/REPOSITION/REINSERTION/UPSIZE  2023    IR DRAINAGE TUBE CHECK/CHANGE/REPOSITION/REINSERTION/UPSIZE  2023    IR DRAINAGE TUBE CHECK/CHANGE/REPOSITION/REINSERTION/UPSIZE  2023    IR DRAINAGE TUBE PLACEMENT  2023    LAPAROTOMY N/A 2022    Procedure: LAPAROTOMY EXPLORATORY;  Surgeon: Donna Lau MD;  Location: [facility];  Service: Gynecology    MYRINGOTOMY W/ TUBES      two sets as a young child    KY CYSTOURETHROSCOPY N/A 12/15/2022    Procedure: CYSTO W/ ROBOT;  Surgeon: Donna Lau MD;  [facility];  Service: Gynecology    KY LAPS TOTAL HYSTERECT 250 GM/< W/RMVL TUBE/OVARY N/A 12/15/2022    Procedure: (LTH)  W/ BILATERAL SALPINGECTOMY  W/ ROBOT COVERTED TO OPEN;  Surgeon: Donna Lau MD;  Location: [facility];  Service: Gynecology    VAC DRESSING APPLICATION N/A 3/17/2023    Procedure: APPLICATION VAC DRESSING ABDOMEN/TRUNK;  Surgeon: Chan Conroy MD;  Location: [facility];  Service: General    WOUND DEBRIDEMENT N/A 3/15/2023    Procedure: DEBRIDEMENT Abdominal WOUND and placement of Wound VAC;  Surgeon: Chan Conroy MD;  Location: [facility];  Service: General    WOUND DEBRIDEMENT N/A 3/17/2023    Procedure: DEBRIDEMENT WOUND (WASH OUT);  Surgeon: Chan Conroy MD;  Location: [facility];  Service: General     Family History   Problem Relation Age of Onset    Multiple sclerosis Mother     Hypertension Father     Hyperlipidemia Father     Cerebral aneurysm Father     Pulmonary embolism Father     Liver disease Brother      Social History     Socioeconomic History    Marital status:      Spouse name: Not on file    Number of children: Not on file    Years of education: Not on file    Highest education level: Not on file   Occupational History    Not on file   Tobacco Use    Smoking status: Every Day     Current packs/day: 0.50     Average packs/day: 0.5 packs/day for 30.8 years (15.4 ttl pk-yrs)     Types: Cigarettes     Start date: 1995    Smokeless tobacco: Never   Vaping Use    Vaping status: Never Used   Substance and Sexual Activity    Alcohol use: Not Currently     Alcohol/week: 1.0 standard drink of alcohol     Types: 1 Standard drinks or equivalent per week     Comment: 3 x monthly    Drug use: Never    Sexual activity: Yes     Partners: Male     Birth control/protection: Male Sterilization   Other Topics Concern    Not on file   Social History Narrative    Not on file     Social Determinants of Health     Financial Resource Strain: Not on file   Food Insecurity: No Food Insecurity (3/16/2023)    Hunger Vital Sign     Worried About Running Out of Food in the Last Year: Never true      Ran Out of Food in the Last Year: Never true   Transportation Needs: No Transportation Needs (3/16/2023)    PRAPARE - Transportation     Lack of Transportation (Medical): No     Lack of Transportation (Non-Medical): No   Physical Activity: Not on file   Stress: Not on file   Social Connections: Not on file   Intimate Partner Violence: Not on file   Housing Stability: Low Risk  (3/16/2023)    Housing Stability Vital Sign     Unable to Pay for Housing in the Last Year: No     Number of Places Lived in the Last Year: 1     Unstable Housing in the Last Year: No       Current Outpatient Medications:     albuterol (2.5 mg/3 mL) 0.083 % nebulizer solution, Take 2.5 mg by nebulization every 4 (four) hours as needed for shortness of breath or wheezing, Disp: , Rfl:     albuterol (ProAir HFA) 90 mcg/act inhaler, Inhale 2 puffs every 6 (six) hours as needed for wheezing or shortness of breath, Disp: 8.5 g, Rfl: 0    albuterol (PROVENTIL HFA,VENTOLIN HFA) 90 mcg/act inhaler, Inhale 2 puffs every 6 (six) hours as needed for wheezing, Disp: 8 g, Rfl: 2    amoxicillin (AMOXIL) 500 mg capsule, , Disp: , Rfl:     Arthritis Pain Relief 650 MG CR tablet, take 1 tablet by mouth every 8 hours if needed for mild pain (Patient not taking: Reported on 3/7/2024), Disp: , Rfl:     aspirin 325 mg tablet, Take 325 mg by mouth daily. Indications: as needed (Patient not taking: Reported on 3/7/2024), Disp: , Rfl:     celecoxib (CeleBREX) 200 mg capsule, Take 200 mg by mouth 2 (two) times a day, Disp: , Rfl:     chlorhexidine (HIBICLENS) 4 % external liquid, Apply 1 Application topically daily as needed for wound care Can use to clean wound during VAC changes. (Patient not taking: Reported on 1/29/2024), Disp: 473 mL, Rfl: 5    clonazePAM (KlonoPIN) 0.5 mg tablet, Take 0.5 mg by mouth 2 (two) times a day as needed (Patient not taking: Reported on 1/29/2024), Disp: , Rfl:     clotrimazole (LOTRIMIN) 1 % cream, Apply topically 2 (two) times a day  for 14 days To rash of abdomen, Disp: 28 g, Rfl: 1    cyclobenzaprine (FLEXERIL) 5 mg tablet, Take 1 tablet (5 mg total) by mouth 3 (three) times a day as needed for muscle spasms (Patient taking differently: Take 5 mg by mouth 3 (three) times a day as needed for muscle spasms As needed), Disp: 60 tablet, Rfl: 0    dulaglutide (Trulicity) 0.75 MG/0.5ML injection, Inject 0.75 mg under the skin Once a week (Patient not taking: Reported on 1/29/2024), Disp: , Rfl:     escitalopram (LEXAPRO) 10 mg tablet, Take 10 mg by mouth daily, Disp: , Rfl:     escitalopram (LEXAPRO) 5 mg tablet, Take 5 mg by mouth daily, Disp: , Rfl:     fluticasone (FLONASE) 50 mcg/act nasal spray, 2 sprays into each nostril daily, Disp: 16 g, Rfl: 4    Fluticasone-Salmeterol (Advair) 250-50 mcg/dose inhaler, , Disp: , Rfl:     Fluticasone-Salmeterol (Advair) 500-50 mcg/dose inhaler, Inhale 1 puff 2 (two) times a day, Disp: , Rfl:     folic acid (FOLVITE) 1 mg tablet, Take by mouth daily, Disp: , Rfl:     gabapentin (NEURONTIN) 100 mg capsule, Take 1 capsule (100 mg total) by mouth 3 (three) times a day (Patient not taking: Reported on 3/7/2024), Disp: 90 capsule, Rfl: 0    gabapentin (NEURONTIN) 300 mg capsule, Take 300 mg by mouth 3 (three) times a day, Disp: , Rfl:     gentamicin (GARAMYCIN) 0.1 % ointment, Apply topically 3 (three) times a day for 7 days To packing and place into open wound, Disp: 30 g, Rfl: 1    hydrochlorothiazide (HYDRODIURIL) 12.5 mg tablet, Take 12.5 mg by mouth daily Pt only  taking as needed for leg swelling (Patient not taking: Reported on 3/7/2024), Disp: , Rfl:     ibuprofen (MOTRIN) 600 mg tablet, Take 600 mg by mouth every 6 (six) hours as needed for moderate pain As needed, Disp: , Rfl:     methocarbamol (ROBAXIN) 500 mg tablet, Take 1 tablet (500 mg total) by mouth every 6 (six) hours for 14 days (Patient taking differently: Take 500 mg by mouth every 6 (six) hours As needed), Disp: 56 tablet, Rfl: 0     methotrexate 2.5 mg tablet, Four 2.5 tabs one time a week ( Every Saturday), Disp: , Rfl:     montelukast (SINGULAIR) 10 mg tablet, Take by mouth daily at bedtime as needed, Disp: , Rfl:     naloxone (NARCAN) 4 mg/0.1 mL nasal spray, Administer 1 spray into a nostril. If no response after 2-3 minutes, give another dose in the other nostril using a new spray. (Patient not taking: Reported on 4/14/2023), Disp: 1 each, Rfl: 1    neomycin-polymyxin-hydrocortisone (CORTISPORIN) otic solution, Administer 3 drops into the left ear every 6 (six) hours (Patient not taking: Reported on 1/29/2024), Disp: , Rfl:     nicotine (NICODERM CQ) 14 mg/24hr TD 24 hr patch, Place 1 patch on the skin every 24 hours (Patient not taking: Reported on 6/2/2023), Disp: 28 patch, Rfl: 3    nystatin (MYCOSTATIN) powder, Apply topically 2 (two) times a day for 14 days To periwound mixed with barrier cream, Disp: 30 g, Rfl: 1    omeprazole (PriLOSEC) 40 MG capsule, Take 40 mg by mouth daily, Disp: , Rfl:     predniSONE 10 mg tablet, Take 5 tabs po x 2 days; 4 tabs po x 2 days; 3 tabs po x 1 day; 2 tabs po x 1 day. 1 tab po x 1 day. (Patient not taking: Reported on 1/29/2024), Disp: 24 tablet, Rfl: 0    predniSONE 10 mg tablet, Take by mouth 2 (two) times a day with meals For 10 days (Patient not taking: Reported on 4/1/2024), Disp: , Rfl:     predniSONE 10 mg tablet, Take 5 tabs po x 2 days; 4 tabs po x 2 days; 3 tabs po x 1 day; 2 tabs po x 1 day. 1 tab po x 1 day. (Patient not taking: Reported on 4/1/2024), Disp: 24 tablet, Rfl: 0    semaglutide, 0.25 or 0.5 mg/dose, (Ozempic) 2 mg/3 mL injection pen, Inject 0.5 mg under the skin Once a week (Patient not taking: Reported on 1/29/2024), Disp: , Rfl:     sodium chloride, PF, 0.9 %, Inject 10 mL into a catheter in a vein 3 (three) times a week Wound irrigation. (Patient not taking: Reported on 1/29/2024), Disp: 100 mL, Rfl: 10    Vitamin D, Ergocalciferol, 92154 units CAPS, Take by mouth once a  week, Disp: , Rfl:     Review of Systems   Constitutional:  Negative for fever.   Respiratory:  Positive for cough.    Gastrointestinal:  Positive for vomiting. Negative for abdominal pain, constipation and diarrhea.   Skin:  Positive for wound (open surgical wound abdomen).         Objective:  /62   Pulse 83   Temp (!) 96.8 °F (36 °C)   Resp 18   LMP 11/23/2022 (Exact Date) Comment: partial  Pain Score: 0-No pain     Physical Exam  Vitals reviewed.   Constitutional:       General: She is not in acute distress.     Appearance: She is morbidly obese.   HENT:      Head: Normocephalic and atraumatic.   Cardiovascular:      Rate and Rhythm: Normal rate.   Pulmonary:      Effort: Pulmonary effort is normal. No respiratory distress.   Abdominal:          Comments: Surgical wound of the abdomen related to dehiscence following hysterectomy resulting in abdominal herniation with further dehiscence post hernia repair with exposed mesh. Continues to have a small sinus tract that remains open. There is slight malodor noticed with expressed drainage on examination. Deeper section/tunnel is not evident on examination today, diameter opening is very small limiting assessment. Periwound with irritation, rash, separation.       Skin:     Findings: Wound present.   Neurological:      Mental Status: She is alert and oriented to person, place, and time.   Psychiatric:         Mood and Affect: Mood normal.         Behavior: Behavior normal.           Wound 07/10/23 Abdomen (Active)   Wound Image   10/14/24 0835   Wound Description Pink;Yellow 10/14/24 0836   Alexa-wound Assessment Pink;Intact;Scar Tissue 10/14/24 0836   Wound Length (cm) 0.4 cm 10/14/24 0836   Wound Width (cm) 0.2 cm 10/14/24 0836   Wound Depth (cm) 0.5 cm 10/14/24 0836   Wound Surface Area (cm^2) 0.08 cm^2 10/14/24 0836   Wound Volume (cm^3) 0.04 cm^3 10/14/24 0836   Calculated Wound Volume (cm^3) 0.04 cm^3 10/14/24 0836   Change in Wound Size % 99.91 10/14/24  "0836   Tunneling 1 2 cm 03/04/24 0822   Tunneling 1 in depth located at 7 o'clock 03/04/24 0822   Number of underminings 1 09/30/24 1445   Undermining 1 1 09/30/24 1445   Undermining 1 is depth extending from 7 o 'clock 09/30/24 1445   Drainage Amount Moderate 10/14/24 0836   Drainage Description Serosanguineous 10/14/24 0836   Non-staged Wound Description Full thickness 10/14/24 0836   Treatments Irrigation with NSS 09/30/24 1445   Dressing Wound V.A.C. 07/31/23 0859   Wound packed? Yes 10/14/24 0836   Packing- # removed 1 10/14/24 0836   Packing- # inserted 2 07/24/23 0832   Dressing Changed New 07/24/23 0832   Patient Tolerance Tolerated well 09/30/24 1445   Dressing Status Intact;Old drainage 09/30/24 1445                 Wound Instructions:  Orders Placed This Encounter   Procedures    Wound cleansing and dressings Abdomen     Wound cleansing and dressings Abdomen      Remove abdominal dressing, wash with Normal Saline, pat dry prior to applying new dressing      Apply spaghetti like hydrafera blue classic moistened with saline to the base of the wound.   Change every other day and as needed for excessive drainage.   if you notice increased odor or see greenish drainage, you may resume using the Dakin's soak with plain packing strips. Do not use the endoform am if you use the Dakins.        Follow up in 2 weeks     Standing Status:   Future     Standing Expiration Date:   10/21/2024    Wound Procedure Treatment Abdomen     This order was created via procedure documentation       Cally Garcia, PA-C      Portions of the record may have been created with voice recognition software. Occasional wrong word or \"sound alike\" substitutions may have occurred due to the inherent limitations of voice recognition software. Read the chart carefully and recognize, using context, where substitutions have occurred.    "

## 2024-10-14 NOTE — PROGRESS NOTES
Wound Procedure Treatment Abdomen    Performed by: Angeles Perez RN  Authorized by: Salma Garcia PA-C    Associated wounds:   Wound 07/10/23 Abdomen  Wound cleansed with:  NSS  Applied Topical: Betadine    Applied primary dressing:  Calcium alginate  Applied secondary dressing:  ABD  Dressing secured with:  Tape

## 2024-10-14 NOTE — PATIENT INSTRUCTIONS
Orders Placed This Encounter   Procedures    Wound cleansing and dressings Abdomen     Wound cleansing and dressings Abdomen      Remove abdominal dressing, wash with Normal Saline, pat dry prior to applying new dressing      Apply spaghetti like hydrafera blue classic moistened with saline to the base of the wound.   Change every other day and as needed for excessive drainage.   if you notice increased odor or see greenish drainage, you may resume using the Dakin's soak with plain packing strips. Do not use the endoform am if you use the Dakins.        Follow up in 2 weeks     Standing Status:   Future     Standing Expiration Date:   10/21/2024

## 2024-10-14 NOTE — TELEPHONE ENCOUNTER
Left message for the patient to call the office in regards to setting up an appointment. with Dr. Conroy for complicated abdominal wound

## 2024-10-25 RX ORDER — ONDANSETRON 4 MG/1
4 TABLET, FILM COATED ORAL DAILY PRN
COMMUNITY
Start: 2024-05-20

## 2024-10-25 NOTE — PROGRESS NOTES
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    She underwent a wound exploration in 2023 with wound debridement and application of wound VAC.  She had a good early response to that but continues to have a chronic draining sinus.    Patient reports that it does have a significant impact upon her quality of life and has prevented her from receiving maximally optimal therapy for her rheumatoid arthritis.    On physical examination she is well-nourished well-appearing female.  Pleasant competent reliable as a historian.  She has a chronic open sinus at the base of a deep wound in her lower midline.  It is probed.  There is no obvious palpable mesh although undoubtedly there is nidus of this as the etiology of her nonhealing wound.    I have advised the we pursue additional investigations in the form of blood work and a CT of the abdomen and pelvis.    Additionally I will be reaching out to one of my trauma surgery colleagues for a second surgical opinion.    I look forward to seeing her back in the office in 3 to 4 weeks.    Orders:    CBC (Includes Diff/Plt) (Refl); Future    Comprehensive metabolic panel; Future    CT abdomen pelvis w wo contrast; Future      History of Present Illness     Maria R Webb is a 44 y.o. female who presents for a Wound Check of the abd wall. Pt states theres still drainage but no foul smell and she denies any pain. Dr. Gleason from the wound care center wants to know if theres anything else that needs to be done to help with the healing process.  BRIAN Bryant  History obtained from : patient  Review of Systems   Constitutional:  Negative for chills and fever.   HENT:  Negative for ear pain and sore throat.    Eyes:  Negative for pain and visual disturbance.   Respiratory:  Negative for cough and shortness of breath.    Cardiovascular:  Negative for chest pain and palpitations.   Gastrointestinal:  Negative for abdominal pain and vomiting.   Genitourinary:  Negative for dysuria and hematuria.   Musculoskeletal:  Negative for arthralgias and back pain.   Skin:  Negative for color change and rash.   Neurological:  Negative for seizures and syncope.   All other systems reviewed and are negative.    Pertinent Medical History         Medical History Reviewed by provider this encounter:       Past Medical History   Past Medical History:   Diagnosis Date    Abdominal wall abscess at site of surgical wound 2023    Abnormal uterine bleeding due to intramural leiomyoma 2022    Arthritis     Rheumatoid    Asthma     Breathing difficulty     only occured during inverted robotic hysterectomy    Cellulitis of drainage site following surgery 2023    CPAP (continuous positive airway pressure) dependence     GERD (gastroesophageal reflux disease)     Hypertension     Obese     Pneumonia 2007    Sleep apnea     Uterine fibroid     LTH today 12/15/2022    Wound dehiscence, surgical 2022     Past Surgical History:   Procedure Laterality Date    ABDOMINAL WASHOUT      post  with wound vac     SECTION      had hematoma removed after the surgery    FOREARM SURGERY Right     repair of fracture with plating    INCISIONAL HERNIA REPAIR  2022    Procedure: REPAIR  RECURRENT HERNIA INCISIONAL WITH MESH;  Surgeon: Donna Lau MD;  Location: [facility] OR;  Service: Gynecology    IR DRAINAGE TUBE CHECK WITH SCLEROSIS  2/3/2023    IR DRAINAGE TUBE CHECK WITH SCLEROSIS  3/7/2023    IR DRAINAGE TUBE CHECK/CHANGE/REPOSITION/REINSERTION/UPSIZE   1/23/2023    IR DRAINAGE TUBE CHECK/CHANGE/REPOSITION/REINSERTION/UPSIZE  1/27/2023    IR DRAINAGE TUBE CHECK/CHANGE/REPOSITION/REINSERTION/UPSIZE  2/13/2023    IR DRAINAGE TUBE CHECK/CHANGE/REPOSITION/REINSERTION/UPSIZE  2/27/2023    IR DRAINAGE TUBE PLACEMENT  1/9/2023    LAPAROTOMY N/A 12/20/2022    Procedure: LAPAROTOMY EXPLORATORY;  Surgeon: Donna Lau MD;  Location: [facility];  Service: Gynecology    MYRINGOTOMY W/ TUBES      two sets as a young child    MN CYSTOURETHROSCOPY N/A 12/15/2022    Procedure: CYSTO W/ ROBOT;  Surgeon: Donna Lau MD;  Location: [facility];  Service: Gynecology    MN LAPS TOTAL HYSTERECT 250 GM/< W/RMVL TUBE/OVARY N/A 12/15/2022    Procedure: (LTH) W/ BILATERAL SALPINGECTOMY  W/ ROBOT COVERTED TO OPEN;  Surgeon: Donna Lau MD;  Location: [facility];  Service: Gynecology    VAC DRESSING APPLICATION N/A 3/17/2023    Procedure: APPLICATION VAC DRESSING ABDOMEN/TRUNK;  Surgeon: Chan Conroy MD;  Location: [facility];  Service: General    WOUND DEBRIDEMENT N/A 3/15/2023    Procedure: DEBRIDEMENT Abdominal WOUND and placement of Wound VAC;  Surgeon: Chan Conroy MD;  Location: [facility];  Service: General    WOUND DEBRIDEMENT N/A 3/17/2023    Procedure: DEBRIDEMENT WOUND (WASH OUT);  Surgeon: Chan Conroy MD;  Location: [facility];  Service: General     Family History   Problem Relation Age of Onset    Multiple sclerosis Mother     Hypertension Father     Hyperlipidemia Father     Cerebral aneurysm Father     Pulmonary embolism Father     Liver disease Brother      Current Outpatient Medications on File Prior to Visit   Medication Sig Dispense Refill    albuterol (2.5 mg/3 mL) 0.083 % nebulizer solution Take 2.5 mg by nebulization every 4 (four) hours as needed for shortness of breath or wheezing      albuterol (ProAir HFA) 90 mcg/act inhaler Inhale 2 puffs every 6 (six) hours as needed for wheezing or shortness of breath 8.5 g 0    albuterol  (PROVENTIL HFA,VENTOLIN HFA) 90 mcg/act inhaler Inhale 2 puffs every 6 (six) hours as needed for wheezing 8 g 2    celecoxib (CeleBREX) 200 mg capsule Take 200 mg by mouth 2 (two) times a day      escitalopram (LEXAPRO) 10 mg tablet Take 10 mg by mouth daily      escitalopram (LEXAPRO) 5 mg tablet Take 5 mg by mouth daily      Fluticasone-Salmeterol (Advair) 500-50 mcg/dose inhaler Inhale 1 puff 2 (two) times a day      folic acid (FOLVITE) 1 mg tablet Take by mouth daily      gabapentin (NEURONTIN) 300 mg capsule Take 300 mg by mouth 3 (three) times a day      ibuprofen (MOTRIN) 600 mg tablet Take 600 mg by mouth every 6 (six) hours as needed for moderate pain As needed      methotrexate 2.5 mg tablet Four 2.5 tabs one time a week ( Every Saturday)      montelukast (SINGULAIR) 10 mg tablet Take by mouth daily at bedtime as needed      omeprazole (PriLOSEC) 40 MG capsule Take 40 mg by mouth daily      ondansetron (ZOFRAN) 4 mg tablet Take 4 mg by mouth daily as needed      Vitamin D, Ergocalciferol, 90236 units CAPS Take by mouth once a week      amoxicillin (AMOXIL) 500 mg capsule  (Patient not taking: Reported on 1/29/2024)      Arthritis Pain Relief 650 MG CR tablet take 1 tablet by mouth every 8 hours if needed for mild pain (Patient not taking: Reported on 3/7/2024)      aspirin 325 mg tablet Take 325 mg by mouth daily. Indications: as needed (Patient not taking: Reported on 3/7/2024)      chlorhexidine (HIBICLENS) 4 % external liquid Apply 1 Application topically daily as needed for wound care Can use to clean wound during VAC changes. (Patient not taking: Reported on 1/29/2024) 473 mL 5    clonazePAM (KlonoPIN) 0.5 mg tablet Take 0.5 mg by mouth 2 (two) times a day as needed (Patient not taking: Reported on 1/29/2024)      clotrimazole (LOTRIMIN) 1 % cream Apply topically 2 (two) times a day for 14 days To rash of abdomen 28 g 1    cyclobenzaprine (FLEXERIL) 5 mg tablet Take 1 tablet (5 mg total) by mouth 3  (three) times a day as needed for muscle spasms (Patient taking differently: Take 5 mg by mouth 3 (three) times a day as needed for muscle spasms As needed) 60 tablet 0    dulaglutide (Trulicity) 0.75 MG/0.5ML injection Inject 0.75 mg under the skin Once a week (Patient not taking: Reported on 1/29/2024)      fluticasone (FLONASE) 50 mcg/act nasal spray 2 sprays into each nostril daily 16 g 4    Fluticasone-Salmeterol (Advair) 250-50 mcg/dose inhaler  (Patient not taking: Reported on 1/29/2024)      gabapentin (NEURONTIN) 100 mg capsule Take 1 capsule (100 mg total) by mouth 3 (three) times a day (Patient not taking: Reported on 3/7/2024) 90 capsule 0    gentamicin (GARAMYCIN) 0.1 % ointment Apply topically 3 (three) times a day for 7 days To packing and place into open wound 30 g 1    hydrochlorothiazide (HYDRODIURIL) 12.5 mg tablet Take 12.5 mg by mouth daily Pt only  taking as needed for leg swelling (Patient not taking: Reported on 3/7/2024)      methocarbamol (ROBAXIN) 500 mg tablet Take 1 tablet (500 mg total) by mouth every 6 (six) hours for 14 days (Patient taking differently: Take 500 mg by mouth every 6 (six) hours As needed) 56 tablet 0    naloxone (NARCAN) 4 mg/0.1 mL nasal spray Administer 1 spray into a nostril. If no response after 2-3 minutes, give another dose in the other nostril using a new spray. (Patient not taking: Reported on 4/14/2023) 1 each 1    neomycin-polymyxin-hydrocortisone (CORTISPORIN) otic solution Administer 3 drops into the left ear every 6 (six) hours (Patient not taking: Reported on 1/29/2024)      nicotine (NICODERM CQ) 14 mg/24hr TD 24 hr patch Place 1 patch on the skin every 24 hours (Patient not taking: Reported on 6/2/2023) 28 patch 3    nystatin (MYCOSTATIN) powder Apply topically 2 (two) times a day for 14 days To periwound mixed with barrier cream 30 g 1    predniSONE 10 mg tablet Take 5 tabs po x 2 days; 4 tabs po x 2 days; 3 tabs po x 1 day; 2 tabs po x 1 day. 1 tab po  x 1 day. (Patient not taking: Reported on 1/29/2024) 24 tablet 0    predniSONE 10 mg tablet Take by mouth 2 (two) times a day with meals For 10 days (Patient not taking: Reported on 4/1/2024)      predniSONE 10 mg tablet Take 5 tabs po x 2 days; 4 tabs po x 2 days; 3 tabs po x 1 day; 2 tabs po x 1 day. 1 tab po x 1 day. (Patient not taking: Reported on 4/1/2024) 24 tablet 0    semaglutide, 0.25 or 0.5 mg/dose, (Ozempic) 2 mg/3 mL injection pen Inject 0.5 mg under the skin Once a week (Patient not taking: Reported on 1/29/2024)      sodium chloride, PF, 0.9 % Inject 10 mL into a catheter in a vein 3 (three) times a week Wound irrigation. (Patient not taking: Reported on 1/29/2024) 100 mL 10     No current facility-administered medications on file prior to visit.   No Known Allergies   Current Outpatient Medications on File Prior to Visit   Medication Sig Dispense Refill    albuterol (2.5 mg/3 mL) 0.083 % nebulizer solution Take 2.5 mg by nebulization every 4 (four) hours as needed for shortness of breath or wheezing      albuterol (ProAir HFA) 90 mcg/act inhaler Inhale 2 puffs every 6 (six) hours as needed for wheezing or shortness of breath 8.5 g 0    albuterol (PROVENTIL HFA,VENTOLIN HFA) 90 mcg/act inhaler Inhale 2 puffs every 6 (six) hours as needed for wheezing 8 g 2    celecoxib (CeleBREX) 200 mg capsule Take 200 mg by mouth 2 (two) times a day      escitalopram (LEXAPRO) 10 mg tablet Take 10 mg by mouth daily      escitalopram (LEXAPRO) 5 mg tablet Take 5 mg by mouth daily      Fluticasone-Salmeterol (Advair) 500-50 mcg/dose inhaler Inhale 1 puff 2 (two) times a day      folic acid (FOLVITE) 1 mg tablet Take by mouth daily      gabapentin (NEURONTIN) 300 mg capsule Take 300 mg by mouth 3 (three) times a day      ibuprofen (MOTRIN) 600 mg tablet Take 600 mg by mouth every 6 (six) hours as needed for moderate pain As needed      methotrexate 2.5 mg tablet Four 2.5 tabs one time a week ( Every Saturday)       montelukast (SINGULAIR) 10 mg tablet Take by mouth daily at bedtime as needed      omeprazole (PriLOSEC) 40 MG capsule Take 40 mg by mouth daily      ondansetron (ZOFRAN) 4 mg tablet Take 4 mg by mouth daily as needed      Vitamin D, Ergocalciferol, 54559 units CAPS Take by mouth once a week      amoxicillin (AMOXIL) 500 mg capsule  (Patient not taking: Reported on 1/29/2024)      Arthritis Pain Relief 650 MG CR tablet take 1 tablet by mouth every 8 hours if needed for mild pain (Patient not taking: Reported on 3/7/2024)      aspirin 325 mg tablet Take 325 mg by mouth daily. Indications: as needed (Patient not taking: Reported on 3/7/2024)      chlorhexidine (HIBICLENS) 4 % external liquid Apply 1 Application topically daily as needed for wound care Can use to clean wound during VAC changes. (Patient not taking: Reported on 1/29/2024) 473 mL 5    clonazePAM (KlonoPIN) 0.5 mg tablet Take 0.5 mg by mouth 2 (two) times a day as needed (Patient not taking: Reported on 1/29/2024)      clotrimazole (LOTRIMIN) 1 % cream Apply topically 2 (two) times a day for 14 days To rash of abdomen 28 g 1    cyclobenzaprine (FLEXERIL) 5 mg tablet Take 1 tablet (5 mg total) by mouth 3 (three) times a day as needed for muscle spasms (Patient taking differently: Take 5 mg by mouth 3 (three) times a day as needed for muscle spasms As needed) 60 tablet 0    dulaglutide (Trulicity) 0.75 MG/0.5ML injection Inject 0.75 mg under the skin Once a week (Patient not taking: Reported on 1/29/2024)      fluticasone (FLONASE) 50 mcg/act nasal spray 2 sprays into each nostril daily 16 g 4    Fluticasone-Salmeterol (Advair) 250-50 mcg/dose inhaler  (Patient not taking: Reported on 1/29/2024)      gabapentin (NEURONTIN) 100 mg capsule Take 1 capsule (100 mg total) by mouth 3 (three) times a day (Patient not taking: Reported on 3/7/2024) 90 capsule 0    gentamicin (GARAMYCIN) 0.1 % ointment Apply topically 3 (three) times a day for 7 days To packing and  place into open wound 30 g 1    hydrochlorothiazide (HYDRODIURIL) 12.5 mg tablet Take 12.5 mg by mouth daily Pt only  taking as needed for leg swelling (Patient not taking: Reported on 3/7/2024)      methocarbamol (ROBAXIN) 500 mg tablet Take 1 tablet (500 mg total) by mouth every 6 (six) hours for 14 days (Patient taking differently: Take 500 mg by mouth every 6 (six) hours As needed) 56 tablet 0    naloxone (NARCAN) 4 mg/0.1 mL nasal spray Administer 1 spray into a nostril. If no response after 2-3 minutes, give another dose in the other nostril using a new spray. (Patient not taking: Reported on 4/14/2023) 1 each 1    neomycin-polymyxin-hydrocortisone (CORTISPORIN) otic solution Administer 3 drops into the left ear every 6 (six) hours (Patient not taking: Reported on 1/29/2024)      nicotine (NICODERM CQ) 14 mg/24hr TD 24 hr patch Place 1 patch on the skin every 24 hours (Patient not taking: Reported on 6/2/2023) 28 patch 3    nystatin (MYCOSTATIN) powder Apply topically 2 (two) times a day for 14 days To periwound mixed with barrier cream 30 g 1    predniSONE 10 mg tablet Take 5 tabs po x 2 days; 4 tabs po x 2 days; 3 tabs po x 1 day; 2 tabs po x 1 day. 1 tab po x 1 day. (Patient not taking: Reported on 1/29/2024) 24 tablet 0    predniSONE 10 mg tablet Take by mouth 2 (two) times a day with meals For 10 days (Patient not taking: Reported on 4/1/2024)      predniSONE 10 mg tablet Take 5 tabs po x 2 days; 4 tabs po x 2 days; 3 tabs po x 1 day; 2 tabs po x 1 day. 1 tab po x 1 day. (Patient not taking: Reported on 4/1/2024) 24 tablet 0    semaglutide, 0.25 or 0.5 mg/dose, (Ozempic) 2 mg/3 mL injection pen Inject 0.5 mg under the skin Once a week (Patient not taking: Reported on 1/29/2024)      sodium chloride, PF, 0.9 % Inject 10 mL into a catheter in a vein 3 (three) times a week Wound irrigation. (Patient not taking: Reported on 1/29/2024) 100 mL 10     No current facility-administered medications on file prior to  "visit.      Social History     Tobacco Use    Smoking status: Every Day     Current packs/day: 0.50     Average packs/day: 0.5 packs/day for 30.8 years (15.4 ttl pk-yrs)     Types: Cigarettes     Start date: 1995    Smokeless tobacco: Never   Vaping Use    Vaping status: Never Used   Substance and Sexual Activity    Alcohol use: Not Currently     Alcohol/week: 1.0 standard drink of alcohol     Types: 1 Standard drinks or equivalent per week     Comment: 3 x monthly    Drug use: Never    Sexual activity: Yes     Partners: Male     Birth control/protection: Male Sterilization         Objective     BP 98/60 (BP Location: Left arm, Patient Position: Sitting, Cuff Size: Large)   Pulse 90   Temp (!) 97.2 °F (36.2 °C) (Temporal)   Resp 16   Ht 5' 5\" (1.651 m)   Wt 133 kg (293 lb)   LMP 11/23/2022 (Exact Date) Comment: partial  SpO2 95%   BMI 48.76 kg/m²     Physical Exam  Vitals and nursing note reviewed.   Constitutional:       General: She is not in acute distress.     Appearance: She is well-developed.   HENT:      Head: Normocephalic and atraumatic.   Eyes:      Conjunctiva/sclera: Conjunctivae normal.   Cardiovascular:      Rate and Rhythm: Normal rate and regular rhythm.      Heart sounds: No murmur heard.  Pulmonary:      Effort: Pulmonary effort is normal. No respiratory distress.      Breath sounds: Normal breath sounds.   Abdominal:      Palpations: Abdomen is soft.      Tenderness: There is no abdominal tenderness.      Comments: Chronic sinus in the lower midline.  5 x 5 mm defect in the skin.  Except only the tip of the Q-tip.  Wound redressed with silver gel and gauze.   Musculoskeletal:         General: No swelling.      Cervical back: Neck supple.   Skin:     General: Skin is warm and dry.      Capillary Refill: Capillary refill takes less than 2 seconds.   Neurological:      Mental Status: She is alert.   Psychiatric:         Mood and Affect: Mood normal.       Administrative Statements   I have " spent a total time of 20 minutes in caring for this patient on the day of the visit/encounter including Prognosis.

## 2024-10-28 ENCOUNTER — OFFICE VISIT (OUTPATIENT)
Dept: SURGERY | Facility: CLINIC | Age: 44
End: 2024-10-28
Payer: COMMERCIAL

## 2024-10-28 VITALS
WEIGHT: 293 LBS | HEART RATE: 90 BPM | TEMPERATURE: 97.2 F | OXYGEN SATURATION: 95 % | SYSTOLIC BLOOD PRESSURE: 98 MMHG | HEIGHT: 65 IN | DIASTOLIC BLOOD PRESSURE: 60 MMHG | RESPIRATION RATE: 16 BRPM | BODY MASS INDEX: 48.82 KG/M2

## 2024-10-28 DIAGNOSIS — S31.109S OPEN WOUND OF ABDOMEN, SEQUELA: Primary | ICD-10-CM

## 2024-10-28 PROCEDURE — 99213 OFFICE O/P EST LOW 20 MIN: CPT | Performed by: SURGERY

## 2024-10-28 NOTE — ASSESSMENT & PLAN NOTE
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    She underwent a wound exploration in March 2023 with wound debridement and application of wound VAC.  She had a good early response to that but continues to have a chronic draining sinus.    Patient reports that it does have a significant impact upon her quality of life and has prevented her from receiving maximally optimal therapy for her rheumatoid arthritis.    On physical examination she is well-nourished well-appearing female.  Pleasant competent reliable as a historian.  She has a chronic open sinus at the base of a deep wound in her lower midline.  It is probed.  There is no obvious palpable mesh although undoubtedly there is nidus of this as the etiology of her nonhealing wound.    I have advised the we pursue additional investigations in the form of blood work and a CT of the abdomen and pelvis.    Additionally I will be reaching out to one of my trauma surgery colleagues for a second surgical opinion.    I look forward to seeing her back in the office in 3 to 4 weeks.    Orders:    CBC (Includes Diff/Plt) (Refl); Future    Comprehensive metabolic panel; Future    CT abdomen pelvis w wo contrast; Future

## 2024-10-31 ENCOUNTER — TELEPHONE (OUTPATIENT)
Dept: SURGERY | Facility: CLINIC | Age: 44
End: 2024-10-31

## 2024-10-31 NOTE — TELEPHONE ENCOUNTER
Sami for pt to call me back to sched appt with Dr. Kimble 1st week November - 2nd sreedhar r/b Dr. Conroy.    mb

## 2024-11-04 ENCOUNTER — OFFICE VISIT (OUTPATIENT)
Facility: HOSPITAL | Age: 44
End: 2024-11-04
Payer: COMMERCIAL

## 2024-11-04 ENCOUNTER — HOSPITAL ENCOUNTER (OUTPATIENT)
Dept: CT IMAGING | Facility: HOSPITAL | Age: 44
Discharge: HOME | End: 2024-11-04
Attending: SURGERY
Payer: COMMERCIAL

## 2024-11-04 VITALS
DIASTOLIC BLOOD PRESSURE: 78 MMHG | SYSTOLIC BLOOD PRESSURE: 117 MMHG | RESPIRATION RATE: 18 BRPM | HEART RATE: 84 BPM | TEMPERATURE: 97.1 F

## 2024-11-04 DIAGNOSIS — S31.109S OPEN WOUND OF ABDOMEN, SEQUELA: ICD-10-CM

## 2024-11-04 DIAGNOSIS — Z98.890 S/P HERNIA REPAIR: ICD-10-CM

## 2024-11-04 DIAGNOSIS — Z87.19 S/P HERNIA REPAIR: ICD-10-CM

## 2024-11-04 DIAGNOSIS — Z90.710 S/P HYSTERECTOMY: ICD-10-CM

## 2024-11-04 DIAGNOSIS — E66.01 OBESITY, CLASS III, BMI 40-49.9 (MORBID OBESITY) (HCC): ICD-10-CM

## 2024-11-04 DIAGNOSIS — F17.200 TOBACCO USE DISORDER: ICD-10-CM

## 2024-11-04 DIAGNOSIS — T81.89XD NON-HEALING SURGICAL WOUND, SUBSEQUENT ENCOUNTER: Primary | ICD-10-CM

## 2024-11-04 PROCEDURE — 99212 OFFICE O/P EST SF 10 MIN: CPT | Performed by: STUDENT IN AN ORGANIZED HEALTH CARE EDUCATION/TRAINING PROGRAM

## 2024-11-04 PROCEDURE — 74177 CT ABD & PELVIS W/CONTRAST: CPT

## 2024-11-04 PROCEDURE — G0463 HOSPITAL OUTPT CLINIC VISIT: HCPCS | Performed by: STUDENT IN AN ORGANIZED HEALTH CARE EDUCATION/TRAINING PROGRAM

## 2024-11-04 PROCEDURE — 99213 OFFICE O/P EST LOW 20 MIN: CPT | Performed by: STUDENT IN AN ORGANIZED HEALTH CARE EDUCATION/TRAINING PROGRAM

## 2024-11-04 RX ADMIN — IOHEXOL 100 ML: 350 INJECTION, SOLUTION INTRAVENOUS at 13:58

## 2024-11-04 NOTE — PATIENT INSTRUCTIONS
Orders Placed This Encounter   Procedures    Wound cleansing and dressings Abdomen     Abdomen        Remove abdominal dressing, wash with Normal Saline, pat dry prior to applying new dressing      Apply Silver Gel to the base of the wound.   Change every day and as needed for excessive drainage.   Cover with gauze/ABD Pad      Follow up in 2 weeks     Standing Status:   Future     Standing Expiration Date:   11/18/2024

## 2024-11-04 NOTE — PROGRESS NOTES
"Patient ID: Maria R Webb is a 44 y.o. female Date of Birth 1980       Chief Complaint   Patient presents with    Follow Up Wound Care Visit     Abdominal wound       Allergies:  Patient has no known allergies.    Diagnosis:   Diagnosis ICD-10-CM Associated Orders   1. Non-healing surgical wound, subsequent encounter  T81.89XD Wound cleansing and dressings Abdomen     Wound Procedure Treatment Abdomen      2. Obesity, Class III, BMI 40-49.9 (morbid obesity) ([facility])  E66.01       3. S/P hysterectomy  Z90.710       4. Tobacco use disorder  F17.200       5. S/P hernia repair  Z98.890     Z87.19            Assessment  & Plan:    F/u surgical wound of the abdomen s/p hysterectomy complicated by fascial dehiscence and repair with Phasic mesh. Small amount of drainage remains from small open tract. Barely visible. No malodor appreciated. Small amount of drainage visible from the remaining opening without periwound erythema or irritation.   Betadine applied to the opening today.   Continue with once to twice daily application of silver gel.   Pt has CT scan scheduled for today as well as additional blood work ordered. Has appropriate f/u with Dr. Conroy scheduled for 2 weeks.   Would benefit from smoking cessation.   F/u in one week. Instructed to call if any questions or concerns arise in meantime.            Subjective:   3/11/2024: Maria R is a 43-year-old female who is presenting to wound center today for follow-up of abdominal wound s/p hysterectomy complicated by fascial dehiscence and abdominal herniation that required incisional hernia repair w/ mesh.  Has been following with wound center since January '23.  Per wound center visit on 2/5/2024 \"saw Dr. Conroy, general surgery since prior visit whom did not feel as though any further medication/imaging/intervention was necessary at this time and would like pt to continue with local wound care and follow up with him in 3 months.\" At last wound center visit, was " changed to Dakins soaked packing after she was experiencing green drainage.  She was instructed to resume silver gel coated packing once the green drainage resolved.  She reports that she has been utilizing Dakin soaked packing over the past week and had not switched to green drainage though this seemed to resolve a few days ago. Of note, is to have recently started Ozempic for weight reduction.  Currently on Z-Dallin and prednisone for bronchitis.  She denies fever and/or chills.  Continues with cough and shortness of breath but no acute worsening.    03/18/24: Pt presents for f/u surgical abdominal wound dehiscence s/p hysterectomy complicated by fascial dehiscence and abdominal herniation requiring incision hernia repair with mesh complicated by further dehiscence. Pt notes that Dr. Lopez was able to surgically debride a piece of remaining mesh at her visit last week and opted to continue with Dakin soaked packing giving significant improvement in the wound with its use. No nausea, vomiting, severe abdominal pain, change in bowel habits nor fevers/chills. Continuing to smoke about the same amount.     04/08/24: Pt presents for f/u surgical abdominal wound dehiscence. Since her previous visit she has had f/u with Dr. Conroy, general surgery. No further f/u with general surgery was required, pt is to continue with wound care but may f/u with surgery if needed. Pt reports she noticed a slight increase in the odor related to the drainage the other day so she used Dakins and it went away. She ran out of the AMD packing therefore returned to use of the silver gel coated packing. Is feeling well. Has started Ozempic and has noticed a 3 lb weight loss already. Smoking about the same amount. No fevers, chills.     04/15/24: Pt presents for wound related to surgical site dehiscence of the abdomen. Has been using endoform am packing into the remaining opening. Has not had a return of green or foul smelling drainage since her  previous visit. Offers no complaints today. No significant changes in medical history since prior visit.     04/29/24: Pt presents for f/u wound related to surgical site dehiscence of the abdomen. Is using endoform am but has noticed a more obvious odor since switching from use of silver gel to the endoform. Drainage is tan in color. Aside from the odor of the drainage offers no complaints today.     05/06/24: Pt presents for f/u wound related to surgical site dehiscence of the abdomen. Has been using gentamicin ointment; packing is no longer staying in well. The odor from the drainage has improved from the prior visit. Offers no complaints today.     05/20/24: Pt presents for f/u wound related to surgical site dehiscence of the abdomen. She has continued with gentamicin ointment three times daily. The wound continues to drain, she has not noticed a return of odor or odd coloration to the drainage. Is continuing to keep the site covered. Denies abdominal pain, N/V, change in bowel movements, fevers, chils.     06/03/24: Pt presents for f/u wound secondary to surgical site dehiscence of the abdomen. Currently Medihoney is being used. ABD is being used to cover the site. There continues to be small-moderate drainage without any foul smell or green discoloration of the drainage. Pt offers no complaints today.     06/24/24: Pt presents for f/u wound secondary to surgical site dehiscence of the abdomen. She is currently using Medihoney. She believes there is less drainage. Current drainage on the pad is honey-colored therefore it is difficult to tell if it is truly drainage or the Medihoney being placed into the wound. No significant changes since past visit. She is inquiring if she can start Humira again for her RA; was on hold d/t wound.     07/01/24: Patient presents for follow-up of wound secondary to surgical site dehiscence of the abdomen.  Midweek patient began to notice an odor again to her abdominal wound  therefore she resumed use of Medihoney to the wound bed and zinc to the periwound.  Today when the dressing was taken off there was a significant amount of blood on the overlying dressing.  Patient does not report noticing any significant bleeding as of last night.  She does state that her activity level was significantly increased from her typical levels this past week due to being short staffed at work as well as doing projects around the house.  Has not noticed any significant changes in terms of pain, change in bowel movements, fevers or chills.      07/08/24: Pt presents for f/u wound secondary to surgical site dehiscence of the abdomen. She has been packing the wound with Iodoform packing and using medihoney to the cracks of periwound skin. Had a brief return of green drainage however this resolved quickly. Saw rheumatology since her previous visit and is going to be reinitiated on Humira following pre-approval process as long as she gets clearance from wound care perspective.      07/22/24: Pt presents for f/u wound secondary to surgical site dehiscence of the abdomen. Has been using Iodoform packing however it is barely fitting and is not staying in place well. She reports being more sweaty this week working on some painting in her house and has noticed a rash of her surrounding skin. She tired to apply Medihoney since that has calmed things down in the past however even with application of Medihoney her surrounding skin still appears red/angry.       07/29/24: Pt presents for f/u wound secondary to surgical site dehiscence of the abdomen. Nystatin and zinc is being used to periwound which has significantly improved the skin around the wound. She has not started Humira yet, is still awaiting insurance approval.      08/05/24: Patient presents for follow-up abdominal wound secondary to surgical site dehiscence and exposed mesh. She trialed not using the Medihoney to see if the wound was still draining and had  "a small amount of drainage coming from the wound still. Reports she was approved for Humira and is wondering if it would be appropriate to start this again with her open wound not fully healed yet.     08/19/24: Pt presents for f/u abdominal wound secondary to surgical site dehiscence (hysterectomy complicated by fascial dehiscence and abdominal herniation requiring incision hernia repair with mesh complicated by further dehiscence). She notes that since her previous visit she had another episode of significant bleeding from her abdominal. It resolved after a few hours. She reports wearing a waterproof bandage instead of using the ABD leading to a return of a rash on her abdomen. Is wondering if additional abdominal imaging is necessary.       08/26/24: HPI per covering provider, \"Patient presents to wound care center for follow-up visit of surgical abdominal wound.  Patient states that she recently noted small amount of blue-green drainage from wound.  Patient states when this happens that she has been instructed to pack the wound with Dakin's moistened plain packing daily until the discoloration to the drainage stops. Patient has been applying betadine to the wound and then silver alginate to the wound daily prior to restarting the dakin's.  Patient denies any episodes of bleeding.  Denies increased pain and state drainage amount has been the same, just noted change in color.  No fever or chills. Patient reports that she is unable to follow at the wound center any other day by Monday due to her work schedule.\"     09/09/24: Pt presents for f/u chronic abdominal wound following surgical site dehiscence.  She reports that every time she has tried to stop using the Dakin solution there is a return of green drainage. Has continued with barrier cream to periwound. Has not yet started her Humira d/t infection concerns with her open wound and being on a biologic. No alarm sx, denies fevers, chills, N/V. Is continuing to " have regular BM.     09/16/24: Pt presents for f/u chronic draining abdominal wound following surgical site dehiscence. She has been using gentamicin ointment at least twice daily this past week and has noticed an improvement in the color/quality/amount of drainage. Periwound rash has been improving as well. No concerning sx.     09/30/24: Pt presents for f/u chronic draining abdominal wound. Has switched to using silver gel following completion of therapy with gentamicin ointment. Some increased drainage present. Overall no significant changes in PMHx since prior visit.     10/14/24: pt presents for f/u chronic draining abdominal wound. She has been utilizing Hydrofera blue classic foam cut into packing strips since her previous visit. She reports that she had a gastrointestinal virus since her previous visit with coughing and vomiting which caused a significant amount of bloody drainage from her abdominal wound. The bloody drainage has since resolved but it did concern her.     11/04/24: Pt presents for f/u chronic draining abdominal wound. Since her previous visit she had a f/u with Dr. Conroy whom is recommending additional bloodwork and CT of the abdomen and pelvis for additional investigation given the remaining sinus tract. Follow results of CT, Dr. Conroy plans to reach out to his trauma surgery colleagues for a second surgical opinion. Pt reports returning to use of silver gel application and is continuing to get a drainage from the remaining sinus tract. She is considering going back on her Humira following the results of her CT scan and bloodwork.               The following portions of the patient's history were reviewed and updated as appropriate:   Patient Active Problem List   Diagnosis    Tobacco use disorder    COVID-19    Obesity, morbid, BMI 50 or higher (HCC)    Bulky or enlarged uterus    Pelvic pain    Preoperative exam for gynecologic surgery    HTN (hypertension)    Gastroesophageal  reflux disease    Asthma    Obstructive sleep apnea syndrome    S/P hysterectomy    Postoperative anemia    Rheumatoid arthritis involving multiple sites with positive rheumatoid factor (HCC)    Encounter for postoperative care    Open wound of abdomen    Surgical wound, non healing    Cigarette nicotine dependence without complication     Past Medical History:   Diagnosis Date    Abdominal wall abscess at site of surgical wound 2023    Abnormal uterine bleeding due to intramural leiomyoma 2022    Arthritis     Rheumatoid    Asthma     Breathing difficulty     only occured during inverted robotic hysterectomy    Cellulitis of drainage site following surgery 2023    CPAP (continuous positive airway pressure) dependence     GERD (gastroesophageal reflux disease)     Hypertension     Obese     Pneumonia 2007    Sleep apnea     Uterine fibroid     LTH today 12/15/2022    Wound dehiscence, surgical 2022     Past Surgical History:   Procedure Laterality Date    ABDOMINAL WASHOUT      post  with wound vac     SECTION      had hematoma removed after the surgery    FOREARM SURGERY Right     repair of fracture with plating    INCISIONAL HERNIA REPAIR  2022    Procedure: REPAIR  RECURRENT HERNIA INCISIONAL WITH MESH;  Surgeon: Donna Lau MD;  Location: [facility];  Service: Gynecology    IR DRAINAGE TUBE CHECK WITH SCLEROSIS  2/3/2023    IR DRAINAGE TUBE CHECK WITH SCLEROSIS  3/7/2023    IR DRAINAGE TUBE CHECK/CHANGE/REPOSITION/REINSERTION/UPSIZE  2023    IR DRAINAGE TUBE CHECK/CHANGE/REPOSITION/REINSERTION/UPSIZE  2023    IR DRAINAGE TUBE CHECK/CHANGE/REPOSITION/REINSERTION/UPSIZE  2023    IR DRAINAGE TUBE CHECK/CHANGE/REPOSITION/REINSERTION/UPSIZE  2023    IR DRAINAGE TUBE PLACEMENT  2023    LAPAROTOMY N/A 2022    Procedure: LAPAROTOMY EXPLORATORY;  Surgeon: Donna Lau MD;  Location: [facility];  Service: Gynecology    MYRINGOTOMY W/  TUBES      two sets as a young child    IA CYSTOURETHROSCOPY N/A 12/15/2022    Procedure: CYSTO W/ ROBOT;  Surgeon: Donna Lau MD;  Location: [facility];  Service: Gynecology    IA LAPS TOTAL HYSTERECT 250 GM/< W/RMVL TUBE/OVARY N/A 12/15/2022    Procedure: (LTH) W/ BILATERAL SALPINGECTOMY  W/ ROBOT COVERTED TO OPEN;  Surgeon: Donna Lau MD;  Location: [facility];  Service: Gynecology    VAC DRESSING APPLICATION N/A 3/17/2023    Procedure: APPLICATION VAC DRESSING ABDOMEN/TRUNK;  Surgeon: Chan Conroy MD;  Location: [facility];  Service: General    WOUND DEBRIDEMENT N/A 3/15/2023    Procedure: DEBRIDEMENT Abdominal WOUND and placement of Wound VAC;  Surgeon: Chan Conroy MD;  Location: [facility];  Service: General    WOUND DEBRIDEMENT N/A 3/17/2023    Procedure: DEBRIDEMENT WOUND (WASH OUT);  Surgeon: Chan Conroy MD;  Location: [facility];  Service: General     Family History   Problem Relation Age of Onset    Multiple sclerosis Mother     Hypertension Father     Hyperlipidemia Father     Cerebral aneurysm Father     Pulmonary embolism Father     Liver disease Brother      Social History     Socioeconomic History    Marital status:      Spouse name: Not on file    Number of children: Not on file    Years of education: Not on file    Highest education level: Not on file   Occupational History    Not on file   Tobacco Use    Smoking status: Every Day     Current packs/day: 0.50     Average packs/day: 0.5 packs/day for 30.8 years (15.4 ttl pk-yrs)     Types: Cigarettes     Start date: 1995    Smokeless tobacco: Never   Vaping Use    Vaping status: Never Used   Substance and Sexual Activity    Alcohol use: Not Currently     Alcohol/week: 1.0 standard drink of alcohol     Types: 1 Standard drinks or equivalent per week     Comment: 3 x monthly    Drug use: Never    Sexual activity: Yes     Partners: Male     Birth control/protection: Male Sterilization   Other Topics Concern     Not on file   Social History Narrative    Not on file     Social Determinants of Health     Financial Resource Strain: Not on file   Food Insecurity: No Food Insecurity (3/16/2023)    Hunger Vital Sign     Worried About Running Out of Food in the Last Year: Never true     Ran Out of Food in the Last Year: Never true   Transportation Needs: No Transportation Needs (3/16/2023)    PRAPARE - Transportation     Lack of Transportation (Medical): No     Lack of Transportation (Non-Medical): No   Physical Activity: Not on file   Stress: Not on file   Social Connections: Not on file   Intimate Partner Violence: Not on file   Housing Stability: Low Risk  (3/16/2023)    Housing Stability Vital Sign     Unable to Pay for Housing in the Last Year: No     Number of Places Lived in the Last Year: 1     Unstable Housing in the Last Year: No       Current Outpatient Medications:     albuterol (2.5 mg/3 mL) 0.083 % nebulizer solution, Take 2.5 mg by nebulization every 4 (four) hours as needed for shortness of breath or wheezing, Disp: , Rfl:     albuterol (ProAir HFA) 90 mcg/act inhaler, Inhale 2 puffs every 6 (six) hours as needed for wheezing or shortness of breath, Disp: 8.5 g, Rfl: 0    albuterol (PROVENTIL HFA,VENTOLIN HFA) 90 mcg/act inhaler, Inhale 2 puffs every 6 (six) hours as needed for wheezing, Disp: 8 g, Rfl: 2    amoxicillin (AMOXIL) 500 mg capsule, , Disp: , Rfl:     Arthritis Pain Relief 650 MG CR tablet, take 1 tablet by mouth every 8 hours if needed for mild pain (Patient not taking: Reported on 3/7/2024), Disp: , Rfl:     aspirin 325 mg tablet, Take 325 mg by mouth daily. Indications: as needed (Patient not taking: Reported on 3/7/2024), Disp: , Rfl:     celecoxib (CeleBREX) 200 mg capsule, Take 200 mg by mouth 2 (two) times a day, Disp: , Rfl:     chlorhexidine (HIBICLENS) 4 % external liquid, Apply 1 Application topically daily as needed for wound care Can use to clean wound during VAC changes. (Patient not  taking: Reported on 1/29/2024), Disp: 473 mL, Rfl: 5    clonazePAM (KlonoPIN) 0.5 mg tablet, Take 0.5 mg by mouth 2 (two) times a day as needed (Patient not taking: Reported on 1/29/2024), Disp: , Rfl:     clotrimazole (LOTRIMIN) 1 % cream, Apply topically 2 (two) times a day for 14 days To rash of abdomen, Disp: 28 g, Rfl: 1    cyclobenzaprine (FLEXERIL) 5 mg tablet, Take 1 tablet (5 mg total) by mouth 3 (three) times a day as needed for muscle spasms (Patient taking differently: Take 5 mg by mouth 3 (three) times a day as needed for muscle spasms As needed), Disp: 60 tablet, Rfl: 0    dulaglutide (Trulicity) 0.75 MG/0.5ML injection, Inject 0.75 mg under the skin Once a week (Patient not taking: Reported on 1/29/2024), Disp: , Rfl:     escitalopram (LEXAPRO) 10 mg tablet, Take 10 mg by mouth daily, Disp: , Rfl:     escitalopram (LEXAPRO) 5 mg tablet, Take 5 mg by mouth daily, Disp: , Rfl:     fluticasone (FLONASE) 50 mcg/act nasal spray, 2 sprays into each nostril daily, Disp: 16 g, Rfl: 4    Fluticasone-Salmeterol (Advair) 250-50 mcg/dose inhaler, , Disp: , Rfl:     Fluticasone-Salmeterol (Advair) 500-50 mcg/dose inhaler, Inhale 1 puff 2 (two) times a day, Disp: , Rfl:     folic acid (FOLVITE) 1 mg tablet, Take by mouth daily, Disp: , Rfl:     gabapentin (NEURONTIN) 100 mg capsule, Take 1 capsule (100 mg total) by mouth 3 (three) times a day (Patient not taking: Reported on 3/7/2024), Disp: 90 capsule, Rfl: 0    gabapentin (NEURONTIN) 300 mg capsule, Take 300 mg by mouth 3 (three) times a day, Disp: , Rfl:     gentamicin (GARAMYCIN) 0.1 % ointment, Apply topically 3 (three) times a day for 7 days To packing and place into open wound, Disp: 30 g, Rfl: 1    hydrochlorothiazide (HYDRODIURIL) 12.5 mg tablet, Take 12.5 mg by mouth daily Pt only  taking as needed for leg swelling (Patient not taking: Reported on 3/7/2024), Disp: , Rfl:     ibuprofen (MOTRIN) 600 mg tablet, Take 600 mg by mouth every 6 (six) hours as  needed for moderate pain As needed, Disp: , Rfl:     methocarbamol (ROBAXIN) 500 mg tablet, Take 1 tablet (500 mg total) by mouth every 6 (six) hours for 14 days (Patient taking differently: Take 500 mg by mouth every 6 (six) hours As needed), Disp: 56 tablet, Rfl: 0    methotrexate 2.5 mg tablet, Four 2.5 tabs one time a week ( Every Saturday), Disp: , Rfl:     montelukast (SINGULAIR) 10 mg tablet, Take by mouth daily at bedtime as needed, Disp: , Rfl:     naloxone (NARCAN) 4 mg/0.1 mL nasal spray, Administer 1 spray into a nostril. If no response after 2-3 minutes, give another dose in the other nostril using a new spray. (Patient not taking: Reported on 4/14/2023), Disp: 1 each, Rfl: 1    neomycin-polymyxin-hydrocortisone (CORTISPORIN) otic solution, Administer 3 drops into the left ear every 6 (six) hours (Patient not taking: Reported on 1/29/2024), Disp: , Rfl:     nicotine (NICODERM CQ) 14 mg/24hr TD 24 hr patch, Place 1 patch on the skin every 24 hours (Patient not taking: Reported on 6/2/2023), Disp: 28 patch, Rfl: 3    nystatin (MYCOSTATIN) powder, Apply topically 2 (two) times a day for 14 days To periwound mixed with barrier cream, Disp: 30 g, Rfl: 1    omeprazole (PriLOSEC) 40 MG capsule, Take 40 mg by mouth daily, Disp: , Rfl:     ondansetron (ZOFRAN) 4 mg tablet, Take 4 mg by mouth daily as needed, Disp: , Rfl:     predniSONE 10 mg tablet, Take 5 tabs po x 2 days; 4 tabs po x 2 days; 3 tabs po x 1 day; 2 tabs po x 1 day. 1 tab po x 1 day. (Patient not taking: Reported on 1/29/2024), Disp: 24 tablet, Rfl: 0    predniSONE 10 mg tablet, Take by mouth 2 (two) times a day with meals For 10 days (Patient not taking: Reported on 4/1/2024), Disp: , Rfl:     predniSONE 10 mg tablet, Take 5 tabs po x 2 days; 4 tabs po x 2 days; 3 tabs po x 1 day; 2 tabs po x 1 day. 1 tab po x 1 day. (Patient not taking: Reported on 4/1/2024), Disp: 24 tablet, Rfl: 0    semaglutide, 0.25 or 0.5 mg/dose, (Ozempic) 2 mg/3 mL  injection pen, Inject 0.5 mg under the skin Once a week (Patient not taking: Reported on 1/29/2024), Disp: , Rfl:     sodium chloride, PF, 0.9 %, Inject 10 mL into a catheter in a vein 3 (three) times a week Wound irrigation. (Patient not taking: Reported on 1/29/2024), Disp: 100 mL, Rfl: 10    Vitamin D, Ergocalciferol, 80601 units CAPS, Take by mouth once a week, Disp: , Rfl:     Review of Systems   Constitutional:  Negative for fever.   Respiratory:  Positive for cough.    Gastrointestinal:  Positive for vomiting. Negative for abdominal pain, constipation and diarrhea.   Musculoskeletal:  Positive for arthralgias.   Skin:  Positive for wound (open surgical wound abdomen).         Objective:  /78   Pulse 84   Temp (!) 97.1 °F (36.2 °C)   Resp 18   LMP 11/23/2022 (Exact Date) Comment: partial  Pain Score: 0-No pain     Physical Exam  Vitals reviewed.   Constitutional:       General: She is not in acute distress.     Appearance: She is morbidly obese.   HENT:      Head: Normocephalic and atraumatic.   Cardiovascular:      Rate and Rhythm: Normal rate.   Pulmonary:      Effort: Pulmonary effort is normal. No respiratory distress.   Abdominal:          Comments: Continues to have a small sinus tract that remains open. Barely visible. No malodor appreciated. Small amount of drainage visible from the remaining opening without periwound erythema or irritation.      Skin:     Findings: Wound present.   Neurological:      Mental Status: She is alert and oriented to person, place, and time.   Psychiatric:         Mood and Affect: Mood normal.         Behavior: Behavior normal.           Wound 07/10/23 Abdomen (Active)   Wound Image   11/04/24 0830   Wound Description White;Other (Comment) 11/04/24 0834   Alexa-wound Assessment Pink;Intact;Scar Tissue 11/04/24 0834   Wound Length (cm) 0.2 cm 11/04/24 0834   Wound Width (cm) 0.1 cm 11/04/24 0834   Wound Depth (cm) 0.1 cm 11/04/24 0834   Wound Surface Area (cm^2) 0.02  "cm^2 11/04/24 0834   Wound Volume (cm^3) 0.002 cm^3 11/04/24 0834   Calculated Wound Volume (cm^3) 0 cm^3 11/04/24 0834   Change in Wound Size % 100 11/04/24 0834   Tunneling 1 2 cm 03/04/24 0822   Tunneling 1 in depth located at 7 o'clock 03/04/24 0822   Number of underminings 1 09/30/24 1445   Undermining 1 1 09/30/24 1445   Undermining 1 is depth extending from 7 o 'clock 09/30/24 1445   Drainage Amount Scant 11/04/24 0834   Drainage Description Serous 11/04/24 0834   Non-staged Wound Description Full thickness 11/04/24 0834   Treatments Cleansed 11/04/24 0834   Dressing Wound V.A.C. 07/31/23 0859   Wound packed? Yes 10/14/24 0836   Packing- # removed 1 10/14/24 0836   Packing- # inserted 2 07/24/23 0832   Dressing Changed New 07/24/23 0832   Patient Tolerance Tolerated well 09/30/24 1445   Dressing Status Intact 11/04/24 0834                         Wound Instructions:  Orders Placed This Encounter   Procedures    Wound cleansing and dressings Abdomen     Abdomen        Remove abdominal dressing, wash with Normal Saline, pat dry prior to applying new dressing      Apply Silver Gel to the base of the wound.   Change every day and as needed for excessive drainage.   Cover with gauze/ABD Pad      Follow up in 2 weeks     Standing Status:   Future     Standing Expiration Date:   11/18/2024    Wound Procedure Treatment Abdomen     This order was created via procedure documentation       Cally Garcia, PA-C      Portions of the record may have been created with voice recognition software. Occasional wrong word or \"sound alike\" substitutions may have occurred due to the inherent limitations of voice recognition software. Read the chart carefully and recognize, using context, where substitutions have occurred.      "

## 2024-11-04 NOTE — PROGRESS NOTES
Wound Procedure Treatment Abdomen    Performed by: Qi Tripp RN  Authorized by: Salma Garcia PA-C    Associated wounds:   Wound 07/10/23 Abdomen  Wound cleansed with:  Soap and water  Applied Topical: Betadine    Applied secondary dressing:  Gauze and ABD  Dressing secured with:  Tape

## 2024-11-08 ENCOUNTER — TELEPHONE (OUTPATIENT)
Dept: SURGERY | Facility: CLINIC | Age: 44
End: 2024-11-08

## 2024-11-08 NOTE — TELEPHONE ENCOUNTER
Called and left a voicemail with contact information to review the results of the patient's CT report.

## 2024-11-13 ENCOUNTER — TELEPHONE (OUTPATIENT)
Dept: SURGERY | Facility: CLINIC | Age: 44
End: 2024-11-13

## 2024-11-13 NOTE — TELEPHONE ENCOUNTER
----- Message from Amos Kimble MD sent at 10/28/2024 12:14 PM EDT -----  Regarding: RE: Second opinion  Yes, no problem.    Serena,  Can you try to get this lady in to see me. Maybe that 1st week of Nov.    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  I did a subsequent wound debridement and Wound VAC application in March 2023.    This would continues to drain and does appear to be having an impact upon her quality of life.    I have ordered blood work and a repeat CT.    I am seeing her back in 3-4 weeks as well.    Thanks,  Josef

## 2024-11-15 NOTE — PROGRESS NOTES
Name: Maria R Webb      : 1980      MRN: [id number]  Encounter Provider: Chan Conroy MD  Encounter Date: 2024   Encounter department: [facility] GENERAL SURGERY [place]  :  Assessment & Plan  Open wound of abdomen, sequela  Patient returns for routine follow-up regarding the chronic lower midline abdominal wound sinus.    Results of blood work and CT imaging reviewed with the patient.    She is clinically stable at the present time with the current wound care regimen that she performs with her .    I do believe that she is a potential candidate for component separation repair with the aid of plastic and reconstructive surgery to definitively close the lower midline wound.    Referrals been made to Dr. Joseph Hernandez of plastic surgery.    I look forward to seeing her back in March at which point we will entertain the indications for surgery.    Orders:    Ambulatory Referral to Plastic Surgery; Future        History of Present Illness     HPI  Maria R Webb is a 44 y.o. female who presents for a follow up after blood work and CT scan to evaluate  her abd wound. Pt states the wound is still open and draining. Theres only a mild foul smell after 24 hours. For wound care: she washes the wound, applies silver gel or medihoney with dressing. No pain or fevers/chills. [email or web address],MA  History obtained from: patient    Review of Systems   Constitutional:  Negative for chills and fever.   HENT:  Negative for ear pain and sore throat.    Eyes:  Negative for pain and visual disturbance.   Respiratory:  Negative for cough and shortness of breath.    Cardiovascular:  Negative for chest pain and palpitations.   Gastrointestinal:  Negative for abdominal pain and vomiting.   Genitourinary:  Negative for dysuria and hematuria.   Musculoskeletal:  Negative for arthralgias and back pain.   Skin:  Negative for color change and rash.   Neurological:  Negative for seizures and syncope.   All other systems  reviewed and are negative.        Medical History Reviewed by provider this encounter:     .  Past Medical History   Past Medical History:   Diagnosis Date    Abdominal wall abscess at site of surgical wound 2023    Abnormal uterine bleeding due to intramural leiomyoma 2022    Arthritis     Rheumatoid    Asthma     Breathing difficulty     only occured during inverted robotic hysterectomy    Cellulitis of drainage site following surgery 2023    CPAP (continuous positive airway pressure) dependence     GERD (gastroesophageal reflux disease)     Hypertension     Obese     Pneumonia 2007    Sleep apnea     Uterine fibroid     LTH today 12/15/2022    Wound dehiscence, surgical 2022     Past Surgical History:   Procedure Laterality Date    ABDOMINAL WASHOUT      post  with wound vac     SECTION      had hematoma removed after the surgery    FOREARM SURGERY Right     repair of fracture with plating    INCISIONAL HERNIA REPAIR  2022    Procedure: REPAIR  RECURRENT HERNIA INCISIONAL WITH MESH;  Surgeon: Donna Lau MD;  Location: [facility];  Service: Gynecology    IR DRAINAGE TUBE CHECK WITH SCLEROSIS  2/3/2023    IR DRAINAGE TUBE CHECK WITH SCLEROSIS  3/7/2023    IR DRAINAGE TUBE CHECK/CHANGE/REPOSITION/REINSERTION/UPSIZE  2023    IR DRAINAGE TUBE CHECK/CHANGE/REPOSITION/REINSERTION/UPSIZE  2023    IR DRAINAGE TUBE CHECK/CHANGE/REPOSITION/REINSERTION/UPSIZE  2023    IR DRAINAGE TUBE CHECK/CHANGE/REPOSITION/REINSERTION/UPSIZE  2023    IR DRAINAGE TUBE PLACEMENT  2023    LAPAROTOMY N/A 2022    Procedure: LAPAROTOMY EXPLORATORY;  Surgeon: Donna Lau MD;  Location: [facility];  Service: Gynecology    MYRINGOTOMY W/ TUBES      two sets as a young child    AR CYSTOURETHROSCOPY N/A 12/15/2022    Procedure: CYSTO W/ ROBOT;  Surgeon: Donna Lau MD;  [facility]: [facility];  Service: Gynecology    AR LAPS TOTAL HYSTERECT 250 GM/<  W/RMVL TUBE/OVARY N/A 12/15/2022    Procedure: (LTH) W/ BILATERAL SALPINGECTOMY  W/ ROBOT COVERTED TO OPEN;  Surgeon: Donna Lua MD;  Location: [facility];  Service: Gynecology    VAC DRESSING APPLICATION N/A 3/17/2023    Procedure: APPLICATION VAC DRESSING ABDOMEN/TRUNK;  Surgeon: Chan Conroy MD;  Location: [facility];  Service: General    WOUND DEBRIDEMENT N/A 3/15/2023    Procedure: DEBRIDEMENT Abdominal WOUND and placement of Wound VAC;  Surgeon: Chan Conroy MD;  Location: [facility];  Service: General    WOUND DEBRIDEMENT N/A 3/17/2023    Procedure: DEBRIDEMENT WOUND (WASH OUT);  Surgeon: Chan Conroy MD;  Location: [facility];  Service: General     Family History   Problem Relation Age of Onset    Multiple sclerosis Mother     Hypertension Father     Hyperlipidemia Father     Cerebral aneurysm Father     Pulmonary embolism Father     Liver disease Brother       reports that she has been smoking cigarettes. She started smoking about 29 years ago. She has a 15.4 pack-year smoking history. She has never used smokeless tobacco. She reports that she does not currently use alcohol after a past usage of about 1.0 standard drink of alcohol per week. She reports that she does not use drugs.  Current Outpatient Medications on File Prior to Visit   Medication Sig Dispense Refill    albuterol (2.5 mg/3 mL) 0.083 % nebulizer solution Take 2.5 mg by nebulization every 4 (four) hours as needed for shortness of breath or wheezing      albuterol (ProAir HFA) 90 mcg/act inhaler Inhale 2 puffs every 6 (six) hours as needed for wheezing or shortness of breath 8.5 g 0    albuterol (PROVENTIL HFA,VENTOLIN HFA) 90 mcg/act inhaler Inhale 2 puffs every 6 (six) hours as needed for wheezing 8 g 2    celecoxib (CeleBREX) 200 mg capsule Take 200 mg by mouth 2 (two) times a day      escitalopram (LEXAPRO) 10 mg tablet Take 10 mg by mouth daily      escitalopram (LEXAPRO) 5 mg tablet Take 5 mg by mouth daily       Fluticasone-Salmeterol (Advair) 500-50 mcg/dose inhaler Inhale 1 puff 2 (two) times a day      folic acid (FOLVITE) 1 mg tablet Take by mouth daily      ibuprofen (MOTRIN) 600 mg tablet Take 600 mg by mouth every 6 (six) hours as needed for moderate pain As needed      methotrexate 2.5 mg tablet Four 2.5 tabs one time a week ( Every Saturday)      montelukast (SINGULAIR) 10 mg tablet Take by mouth daily at bedtime as needed      omeprazole (PriLOSEC) 40 MG capsule Take 40 mg by mouth daily      ondansetron (ZOFRAN) 4 mg tablet Take 4 mg by mouth daily as needed      Vitamin D, Ergocalciferol, 09589 units CAPS Take by mouth once a week      amoxicillin (AMOXIL) 500 mg capsule  (Patient not taking: Reported on 1/29/2024)      Arthritis Pain Relief 650 MG CR tablet take 1 tablet by mouth every 8 hours if needed for mild pain (Patient not taking: Reported on 3/7/2024)      aspirin 325 mg tablet Take 325 mg by mouth daily. Indications: as needed (Patient not taking: Reported on 3/7/2024)      chlorhexidine (HIBICLENS) 4 % external liquid Apply 1 Application topically daily as needed for wound care Can use to clean wound during VAC changes. (Patient not taking: Reported on 1/29/2024) 473 mL 5    clonazePAM (KlonoPIN) 0.5 mg tablet Take 0.5 mg by mouth 2 (two) times a day as needed (Patient not taking: Reported on 1/29/2024)      clotrimazole (LOTRIMIN) 1 % cream Apply topically 2 (two) times a day for 14 days To rash of abdomen 28 g 1    cyclobenzaprine (FLEXERIL) 5 mg tablet Take 1 tablet (5 mg total) by mouth 3 (three) times a day as needed for muscle spasms (Patient taking differently: Take 5 mg by mouth 3 (three) times a day as needed for muscle spasms As needed) 60 tablet 0    dulaglutide (Trulicity) 0.75 MG/0.5ML injection Inject 0.75 mg under the skin Once a week (Patient not taking: Reported on 1/29/2024)      fluticasone (FLONASE) 50 mcg/act nasal spray 2 sprays into each nostril daily 16 g 4     Fluticasone-Salmeterol (Advair) 250-50 mcg/dose inhaler  (Patient not taking: Reported on 1/29/2024)      gabapentin (NEURONTIN) 100 mg capsule Take 1 capsule (100 mg total) by mouth 3 (three) times a day (Patient not taking: Reported on 3/7/2024) 90 capsule 0    gabapentin (NEURONTIN) 300 mg capsule Take 300 mg by mouth 3 (three) times a day      gentamicin (GARAMYCIN) 0.1 % ointment Apply topically 3 (three) times a day for 7 days To packing and place into open wound 30 g 1    hydrochlorothiazide (HYDRODIURIL) 12.5 mg tablet Take 12.5 mg by mouth daily Pt only  taking as needed for leg swelling (Patient not taking: Reported on 3/7/2024)      methocarbamol (ROBAXIN) 500 mg tablet Take 1 tablet (500 mg total) by mouth every 6 (six) hours for 14 days (Patient taking differently: Take 500 mg by mouth every 6 (six) hours As needed) 56 tablet 0    naloxone (NARCAN) 4 mg/0.1 mL nasal spray Administer 1 spray into a nostril. If no response after 2-3 minutes, give another dose in the other nostril using a new spray. (Patient not taking: Reported on 4/14/2023) 1 each 1    neomycin-polymyxin-hydrocortisone (CORTISPORIN) otic solution Administer 3 drops into the left ear every 6 (six) hours (Patient not taking: Reported on 1/29/2024)      nicotine (NICODERM CQ) 14 mg/24hr TD 24 hr patch Place 1 patch on the skin every 24 hours (Patient not taking: Reported on 6/2/2023) 28 patch 3    nystatin (MYCOSTATIN) powder Apply topically 2 (two) times a day for 14 days To periwound mixed with barrier cream 30 g 1    predniSONE 10 mg tablet Take 5 tabs po x 2 days; 4 tabs po x 2 days; 3 tabs po x 1 day; 2 tabs po x 1 day. 1 tab po x 1 day. (Patient not taking: Reported on 1/29/2024) 24 tablet 0    predniSONE 10 mg tablet Take by mouth 2 (two) times a day with meals For 10 days (Patient not taking: Reported on 11/18/2024)      predniSONE 10 mg tablet Take 5 tabs po x 2 days; 4 tabs po x 2 days; 3 tabs po x 1 day; 2 tabs po x 1 day. 1 tab  po x 1 day. (Patient not taking: Reported on 11/18/2024) 24 tablet 0    semaglutide, 0.25 or 0.5 mg/dose, (Ozempic) 2 mg/3 mL injection pen Inject 0.5 mg under the skin Once a week (Patient not taking: Reported on 1/29/2024)      sodium chloride, PF, 0.9 % Inject 10 mL into a catheter in a vein 3 (three) times a week Wound irrigation. (Patient not taking: Reported on 1/29/2024) 100 mL 10     No current facility-administered medications on file prior to visit.   No Known Allergies   Current Outpatient Medications on File Prior to Visit   Medication Sig Dispense Refill    albuterol (2.5 mg/3 mL) 0.083 % nebulizer solution Take 2.5 mg by nebulization every 4 (four) hours as needed for shortness of breath or wheezing      albuterol (ProAir HFA) 90 mcg/act inhaler Inhale 2 puffs every 6 (six) hours as needed for wheezing or shortness of breath 8.5 g 0    albuterol (PROVENTIL HFA,VENTOLIN HFA) 90 mcg/act inhaler Inhale 2 puffs every 6 (six) hours as needed for wheezing 8 g 2    celecoxib (CeleBREX) 200 mg capsule Take 200 mg by mouth 2 (two) times a day      escitalopram (LEXAPRO) 10 mg tablet Take 10 mg by mouth daily      escitalopram (LEXAPRO) 5 mg tablet Take 5 mg by mouth daily      Fluticasone-Salmeterol (Advair) 500-50 mcg/dose inhaler Inhale 1 puff 2 (two) times a day      folic acid (FOLVITE) 1 mg tablet Take by mouth daily      ibuprofen (MOTRIN) 600 mg tablet Take 600 mg by mouth every 6 (six) hours as needed for moderate pain As needed      methotrexate 2.5 mg tablet Four 2.5 tabs one time a week ( Every Saturday)      montelukast (SINGULAIR) 10 mg tablet Take by mouth daily at bedtime as needed      omeprazole (PriLOSEC) 40 MG capsule Take 40 mg by mouth daily      ondansetron (ZOFRAN) 4 mg tablet Take 4 mg by mouth daily as needed      Vitamin D, Ergocalciferol, 10126 units CAPS Take by mouth once a week      amoxicillin (AMOXIL) 500 mg capsule  (Patient not taking: Reported on 1/29/2024)      Arthritis Pain  Relief 650 MG CR tablet take 1 tablet by mouth every 8 hours if needed for mild pain (Patient not taking: Reported on 3/7/2024)      aspirin 325 mg tablet Take 325 mg by mouth daily. Indications: as needed (Patient not taking: Reported on 3/7/2024)      chlorhexidine (HIBICLENS) 4 % external liquid Apply 1 Application topically daily as needed for wound care Can use to clean wound during VAC changes. (Patient not taking: Reported on 1/29/2024) 473 mL 5    clonazePAM (KlonoPIN) 0.5 mg tablet Take 0.5 mg by mouth 2 (two) times a day as needed (Patient not taking: Reported on 1/29/2024)      clotrimazole (LOTRIMIN) 1 % cream Apply topically 2 (two) times a day for 14 days To rash of abdomen 28 g 1    cyclobenzaprine (FLEXERIL) 5 mg tablet Take 1 tablet (5 mg total) by mouth 3 (three) times a day as needed for muscle spasms (Patient taking differently: Take 5 mg by mouth 3 (three) times a day as needed for muscle spasms As needed) 60 tablet 0    dulaglutide (Trulicity) 0.75 MG/0.5ML injection Inject 0.75 mg under the skin Once a week (Patient not taking: Reported on 1/29/2024)      fluticasone (FLONASE) 50 mcg/act nasal spray 2 sprays into each nostril daily 16 g 4    Fluticasone-Salmeterol (Advair) 250-50 mcg/dose inhaler  (Patient not taking: Reported on 1/29/2024)      gabapentin (NEURONTIN) 100 mg capsule Take 1 capsule (100 mg total) by mouth 3 (three) times a day (Patient not taking: Reported on 3/7/2024) 90 capsule 0    gabapentin (NEURONTIN) 300 mg capsule Take 300 mg by mouth 3 (three) times a day      gentamicin (GARAMYCIN) 0.1 % ointment Apply topically 3 (three) times a day for 7 days To packing and place into open wound 30 g 1    hydrochlorothiazide (HYDRODIURIL) 12.5 mg tablet Take 12.5 mg by mouth daily Pt only  taking as needed for leg swelling (Patient not taking: Reported on 3/7/2024)      methocarbamol (ROBAXIN) 500 mg tablet Take 1 tablet (500 mg total) by mouth every 6 (six) hours for 14 days  (Patient taking differently: Take 500 mg by mouth every 6 (six) hours As needed) 56 tablet 0    naloxone (NARCAN) 4 mg/0.1 mL nasal spray Administer 1 spray into a nostril. If no response after 2-3 minutes, give another dose in the other nostril using a new spray. (Patient not taking: Reported on 4/14/2023) 1 each 1    neomycin-polymyxin-hydrocortisone (CORTISPORIN) otic solution Administer 3 drops into the left ear every 6 (six) hours (Patient not taking: Reported on 1/29/2024)      nicotine (NICODERM CQ) 14 mg/24hr TD 24 hr patch Place 1 patch on the skin every 24 hours (Patient not taking: Reported on 6/2/2023) 28 patch 3    nystatin (MYCOSTATIN) powder Apply topically 2 (two) times a day for 14 days To periwound mixed with barrier cream 30 g 1    predniSONE 10 mg tablet Take 5 tabs po x 2 days; 4 tabs po x 2 days; 3 tabs po x 1 day; 2 tabs po x 1 day. 1 tab po x 1 day. (Patient not taking: Reported on 1/29/2024) 24 tablet 0    predniSONE 10 mg tablet Take by mouth 2 (two) times a day with meals For 10 days (Patient not taking: Reported on 11/18/2024)      predniSONE 10 mg tablet Take 5 tabs po x 2 days; 4 tabs po x 2 days; 3 tabs po x 1 day; 2 tabs po x 1 day. 1 tab po x 1 day. (Patient not taking: Reported on 11/18/2024) 24 tablet 0    semaglutide, 0.25 or 0.5 mg/dose, (Ozempic) 2 mg/3 mL injection pen Inject 0.5 mg under the skin Once a week (Patient not taking: Reported on 1/29/2024)      sodium chloride, PF, 0.9 % Inject 10 mL into a catheter in a vein 3 (three) times a week Wound irrigation. (Patient not taking: Reported on 1/29/2024) 100 mL 10     No current facility-administered medications on file prior to visit.      Social History     Tobacco Use    Smoking status: Every Day     Current packs/day: 0.50     Average packs/day: 0.5 packs/day for 30.9 years (15.4 ttl pk-yrs)     Types: Cigarettes     Start date: 1995    Smokeless tobacco: Never   Vaping Use    Vaping status: Never Used   Substance and  "Sexual Activity    Alcohol use: Not Currently     Alcohol/week: 1.0 standard drink of alcohol     Types: 1 Standard drinks or equivalent per week     Comment: 3 x monthly    Drug use: Never    Sexual activity: Yes     Partners: Male     Birth control/protection: Male Sterilization        Objective   /70 (BP Location: Left arm, Patient Position: Sitting, Cuff Size: Large)   Pulse 86   Temp 99.1 °F (37.3 °C) (Temporal)   Ht 5' 5\" (1.651 m)   Wt 132 kg (291 lb)   LMP 11/23/2022 (Exact Date) Comment: partial  SpO2 94%   BMI 48.42 kg/m²      Physical Exam  Vitals and nursing note reviewed.   Constitutional:       General: She is not in acute distress.     Appearance: She is well-developed.   HENT:      Head: Normocephalic and atraumatic.   Eyes:      Conjunctiva/sclera: Conjunctivae normal.   Cardiovascular:      Rate and Rhythm: Normal rate and regular rhythm.      Heart sounds: No murmur heard.  Pulmonary:      Effort: Pulmonary effort is normal. No respiratory distress.      Breath sounds: Normal breath sounds.   Abdominal:      Palpations: Abdomen is soft.      Tenderness: There is no abdominal tenderness.      Comments: Chronic draining sinus not acutely infected lower midline abdominal wall wound.   Musculoskeletal:         General: No swelling.      Cervical back: Neck supple.   Skin:     General: Skin is warm and dry.      Capillary Refill: Capillary refill takes less than 2 seconds.   Neurological:      Mental Status: She is alert.   Psychiatric:         Mood and Affect: Mood normal.         Administrative Statements   I have spent a total time of 20 minutes in caring for this patient on the day of the visit/encounter including Counseling / Coordination of care. Topics discussed with the patient / family include symptom assessment and management.  "

## 2024-11-18 ENCOUNTER — OFFICE VISIT (OUTPATIENT)
Dept: SURGERY | Facility: CLINIC | Age: 44
End: 2024-11-18
Payer: COMMERCIAL

## 2024-11-18 VITALS
BODY MASS INDEX: 48.48 KG/M2 | OXYGEN SATURATION: 94 % | WEIGHT: 291 LBS | HEIGHT: 65 IN | TEMPERATURE: 99.1 F | HEART RATE: 86 BPM | SYSTOLIC BLOOD PRESSURE: 116 MMHG | DIASTOLIC BLOOD PRESSURE: 70 MMHG

## 2024-11-18 DIAGNOSIS — S31.109S OPEN WOUND OF ABDOMEN, SEQUELA: Primary | ICD-10-CM

## 2024-11-18 PROCEDURE — 99213 OFFICE O/P EST LOW 20 MIN: CPT | Performed by: SURGERY

## 2024-11-18 NOTE — ASSESSMENT & PLAN NOTE
Patient returns for routine follow-up regarding the chronic lower midline abdominal wound sinus.    Results of blood work and CT imaging reviewed with the patient.    She is clinically stable at the present time with the current wound care regimen that she performs with her .    I do believe that she is a potential candidate for component separation repair with the aid of plastic and reconstructive surgery to definitively close the lower midline wound.    Referrals been made to Dr. Joseph Hernandez of plastic surgery.    I look forward to seeing her back in March at which point we will entertain the indications for surgery.    Orders:    Ambulatory Referral to Plastic Surgery; Future

## 2024-11-19 ENCOUNTER — TELEPHONE (OUTPATIENT)
Dept: PLASTIC SURGERY | Facility: CLINIC | Age: 44
End: 2024-11-19

## 2024-11-19 NOTE — TELEPHONE ENCOUNTER
Received call from Patient to schedule consult with David for Open wound of abdomen, sequela. Scheduled 2/5/25 at 10:00 am with David In [place] office. Verified insurance, provided [place] address.     Patient verbalized understanding.

## 2024-11-25 ENCOUNTER — HOSPITAL ENCOUNTER (OUTPATIENT)
Dept: MAMMOGRAPHY | Facility: HOSPITAL | Age: 44
Discharge: HOME | End: 2024-11-25
Payer: COMMERCIAL

## 2024-11-25 ENCOUNTER — OFFICE VISIT (OUTPATIENT)
Facility: HOSPITAL | Age: 44
End: 2024-11-25
Payer: COMMERCIAL

## 2024-11-25 VITALS
HEART RATE: 72 BPM | TEMPERATURE: 97.2 F | DIASTOLIC BLOOD PRESSURE: 84 MMHG | SYSTOLIC BLOOD PRESSURE: 128 MMHG | RESPIRATION RATE: 18 BRPM

## 2024-11-25 DIAGNOSIS — T81.89XD NON-HEALING SURGICAL WOUND, SUBSEQUENT ENCOUNTER: Primary | ICD-10-CM

## 2024-11-25 DIAGNOSIS — Z12.31 ENCOUNTER FOR SCREENING MAMMOGRAM FOR MALIGNANT NEOPLASM OF BREAST: ICD-10-CM

## 2024-11-25 DIAGNOSIS — M05.79 RHEUMATOID ARTHRITIS INVOLVING MULTIPLE SITES WITH POSITIVE RHEUMATOID FACTOR (HCC): ICD-10-CM

## 2024-11-25 DIAGNOSIS — Z98.890 S/P HERNIA REPAIR: ICD-10-CM

## 2024-11-25 DIAGNOSIS — Z90.710 S/P HYSTERECTOMY: ICD-10-CM

## 2024-11-25 DIAGNOSIS — E66.01 OBESITY, CLASS III, BMI 40-49.9 (MORBID OBESITY) (HCC): ICD-10-CM

## 2024-11-25 DIAGNOSIS — F17.200 TOBACCO USE DISORDER: ICD-10-CM

## 2024-11-25 DIAGNOSIS — Z87.19 S/P HERNIA REPAIR: ICD-10-CM

## 2024-11-25 DIAGNOSIS — B36.9 FUNGAL DERMATITIS: ICD-10-CM

## 2024-11-25 PROCEDURE — 77063 BREAST TOMOSYNTHESIS BI: CPT

## 2024-11-25 PROCEDURE — 99212 OFFICE O/P EST SF 10 MIN: CPT | Performed by: STUDENT IN AN ORGANIZED HEALTH CARE EDUCATION/TRAINING PROGRAM

## 2024-11-25 PROCEDURE — G0463 HOSPITAL OUTPT CLINIC VISIT: HCPCS | Performed by: STUDENT IN AN ORGANIZED HEALTH CARE EDUCATION/TRAINING PROGRAM

## 2024-11-25 PROCEDURE — 77067 SCR MAMMO BI INCL CAD: CPT

## 2024-11-25 PROCEDURE — 99213 OFFICE O/P EST LOW 20 MIN: CPT | Performed by: STUDENT IN AN ORGANIZED HEALTH CARE EDUCATION/TRAINING PROGRAM

## 2024-11-25 NOTE — PATIENT INSTRUCTIONS
Orders Placed This Encounter   Procedures    Wound cleansing and dressings Abdomen     Wash your hands with soap and water. Remove abdominal dressing, wash with Normal Saline, pat dry prior to applying new dressing      Apply Silver Gel to the base of the wound.   Change every day and as needed for excessive drainage.   Cover with gauze/ABD Pad and tape    Apply nystatin mixed with zinc as needed to the periwound        Watch for signs of infection these include a fever of 100.4°F (38°C) or higher, chills  Signs of wound infection include increasing swelling, redness, warmth, pain around the wound. Foul smell coming from wound  Go to the closest Emergency Room with signs of infection to be evaluated.     Quit Smoking or cut back as much as possible! As discussed smoking slows the ability for a wound to heal by constricting blood vessels for approximately 30 minutes after each cigarette.     Standing Status:   Future     Expected Date:   11/25/2024     Expiration Date:   12/9/2024    Wound Procedure Treatment Abdomen     This order was created via procedure documentation

## 2024-11-25 NOTE — PROGRESS NOTES
Wound Procedure Treatment Abdomen    Performed by: Xin Camacho RN  Authorized by: Salma Garcia PA-C    Associated wounds:   Wound 07/10/23 Abdomen  Wound cleansed with:  NSS  Applied to periwound:  Zinc oxide paste and Antifungal (powder/cream)  Applied Topical: Normlgel Ag    Applied secondary dressing:  ABD  Dressing secured with:  Tape

## 2024-11-27 NOTE — PROGRESS NOTES
"Patient ID: Maria R Webb is a 44 y.o. female Date of Birth 1980       Chief Complaint   Patient presents with    Follow Up Wound Care Visit       Allergies:  Patient has no known allergies.    Diagnosis:   Diagnosis ICD-10-CM Associated Orders   1. Non-healing surgical wound, subsequent encounter  T81.89XD Wound cleansing and dressings Abdomen     Wound Procedure Treatment Abdomen      2. Obesity, Class III, BMI 40-49.9 (morbid obesity) ([facility])  E66.01       3. S/P hysterectomy  Z90.710       4. S/P hernia repair  Z98.890     Z87.19       5. Tobacco use disorder  F17.200       6. Rheumatoid arthritis involving multiple sites with positive rheumatoid factor ([facility])  M05.79       7. Fungal dermatitis  B36.9            Assessment  & Plan:    F/u surgical wound of the midline abdomen s/p hysterectomy complicated by fascial dehiscence and repair with phasix mesh. Continues to have a small open draining tract. Periwound with fungal dermatitis. Overall largely unchanged from previous visits.   Continue with silver gel application and keeping site covered. Change once to daily prn dependent on drainage.   Nystatin and zinc to fungal rash of periwound.   Plan is for reconstructive surgery (component separation repair) to definitively close the wound. Has been referred to plastic surgery for further discussion.   Rheumatology plans to restart Humira.   Would benefit from smoking cessation.   F/u in one week. Instructed to call if any questions or concerns arise in meantime.            Subjective:   3/11/2024: Maria R is a 43-year-old female who is presenting to wound center today for follow-up of abdominal wound s/p hysterectomy complicated by fascial dehiscence and abdominal herniation that required incisional hernia repair w/ mesh.  Has been following with wound center since January '23.  Per wound center visit on 2/5/2024 \"saw Dr. Conroy, general surgery since prior visit whom did not feel as though any further " "medication/imaging/intervention was necessary at this time and would like pt to continue with local wound care and follow up with him in 3 months.\" At last wound center visit, was changed to Dakins soaked packing after she was experiencing green drainage.  She was instructed to resume silver gel coated packing once the green drainage resolved.  She reports that she has been utilizing Dakin soaked packing over the past week and had not switched to green drainage though this seemed to resolve a few days ago. Of note, is to have recently started Ozempic for weight reduction.  Currently on Z-Dallin and prednisone for bronchitis.  She denies fever and/or chills.  Continues with cough and shortness of breath but no acute worsening.    03/18/24: Pt presents for f/u surgical abdominal wound dehiscence s/p hysterectomy complicated by fascial dehiscence and abdominal herniation requiring incision hernia repair with mesh complicated by further dehiscence. Pt notes that Dr. Lopez was able to surgically debride a piece of remaining mesh at her visit last week and opted to continue with Dakin soaked packing giving significant improvement in the wound with its use. No nausea, vomiting, severe abdominal pain, change in bowel habits nor fevers/chills. Continuing to smoke about the same amount.     04/08/24: Pt presents for f/u surgical abdominal wound dehiscence. Since her previous visit she has had f/u with Dr. Conroy, general surgery. No further f/u with general surgery was required, pt is to continue with wound care but may f/u with surgery if needed. Pt reports she noticed a slight increase in the odor related to the drainage the other day so she used Dakins and it went away. She ran out of the AMD packing therefore returned to use of the silver gel coated packing. Is feeling well. Has started Ozempic and has noticed a 3 lb weight loss already. Smoking about the same amount. No fevers, chills.     04/15/24: Pt presents for wound " related to surgical site dehiscence of the abdomen. Has been using endoform am packing into the remaining opening. Has not had a return of green or foul smelling drainage since her previous visit. Offers no complaints today. No significant changes in medical history since prior visit.     04/29/24: Pt presents for f/u wound related to surgical site dehiscence of the abdomen. Is using endoform am but has noticed a more obvious odor since switching from use of silver gel to the endoform. Drainage is tan in color. Aside from the odor of the drainage offers no complaints today.     05/06/24: Pt presents for f/u wound related to surgical site dehiscence of the abdomen. Has been using gentamicin ointment; packing is no longer staying in well. The odor from the drainage has improved from the prior visit. Offers no complaints today.     05/20/24: Pt presents for f/u wound related to surgical site dehiscence of the abdomen. She has continued with gentamicin ointment three times daily. The wound continues to drain, she has not noticed a return of odor or odd coloration to the drainage. Is continuing to keep the site covered. Denies abdominal pain, N/V, change in bowel movements, fevers, chils.     06/03/24: Pt presents for f/u wound secondary to surgical site dehiscence of the abdomen. Currently Medihoney is being used. ABD is being used to cover the site. There continues to be small-moderate drainage without any foul smell or green discoloration of the drainage. Pt offers no complaints today.     06/24/24: Pt presents for f/u wound secondary to surgical site dehiscence of the abdomen. She is currently using Medihoney. She believes there is less drainage. Current drainage on the pad is honey-colored therefore it is difficult to tell if it is truly drainage or the Medihoney being placed into the wound. No significant changes since past visit. She is inquiring if she can start Humira again for her RA; was on hold d/t wound.      07/01/24: Patient presents for follow-up of wound secondary to surgical site dehiscence of the abdomen.  Midweek patient began to notice an odor again to her abdominal wound therefore she resumed use of Medihoney to the wound bed and zinc to the periwound.  Today when the dressing was taken off there was a significant amount of blood on the overlying dressing.  Patient does not report noticing any significant bleeding as of last night.  She does state that her activity level was significantly increased from her typical levels this past week due to being short staffed at work as well as doing projects around the house.  Has not noticed any significant changes in terms of pain, change in bowel movements, fevers or chills.      07/08/24: Pt presents for f/u wound secondary to surgical site dehiscence of the abdomen. She has been packing the wound with Iodoform packing and using medihoney to the cracks of periwound skin. Had a brief return of green drainage however this resolved quickly. Saw rheumatology since her previous visit and is going to be reinitiated on Humira following pre-approval process as long as she gets clearance from wound care perspective.      07/22/24: Pt presents for f/u wound secondary to surgical site dehiscence of the abdomen. Has been using Iodoform packing however it is barely fitting and is not staying in place well. She reports being more sweaty this week working on some painting in her house and has noticed a rash of her surrounding skin. She tired to apply Medihoney since that has calmed things down in the past however even with application of Medihoney her surrounding skin still appears red/angry.       07/29/24: Pt presents for f/u wound secondary to surgical site dehiscence of the abdomen. Nystatin and zinc is being used to periwound which has significantly improved the skin around the wound. She has not started Humira yet, is still awaiting insurance approval.      08/05/24: Patient  "presents for follow-up abdominal wound secondary to surgical site dehiscence and exposed mesh. She trialed not using the Medihoney to see if the wound was still draining and had a small amount of drainage coming from the wound still. Reports she was approved for Humira and is wondering if it would be appropriate to start this again with her open wound not fully healed yet.     08/19/24: Pt presents for f/u abdominal wound secondary to surgical site dehiscence (hysterectomy complicated by fascial dehiscence and abdominal herniation requiring incision hernia repair with mesh complicated by further dehiscence). She notes that since her previous visit she had another episode of significant bleeding from her abdominal. It resolved after a few hours. She reports wearing a waterproof bandage instead of using the ABD leading to a return of a rash on her abdomen. Is wondering if additional abdominal imaging is necessary.       08/26/24: HPI per covering provider, \"Patient presents to wound care center for follow-up visit of surgical abdominal wound.  Patient states that she recently noted small amount of blue-green drainage from wound.  Patient states when this happens that she has been instructed to pack the wound with Dakin's moistened plain packing daily until the discoloration to the drainage stops. Patient has been applying betadine to the wound and then silver alginate to the wound daily prior to restarting the dakin's.  Patient denies any episodes of bleeding.  Denies increased pain and state drainage amount has been the same, just noted change in color.  No fever or chills. Patient reports that she is unable to follow at the wound center any other day by Monday due to her work schedule.\"     09/09/24: Pt presents for f/u chronic abdominal wound following surgical site dehiscence.  She reports that every time she has tried to stop using the Dakin solution there is a return of green drainage. Has continued with barrier " cream to periwound. Has not yet started her Humira d/t infection concerns with her open wound and being on a biologic. No alarm sx, denies fevers, chills, N/V. Is continuing to have regular BM.     09/16/24: Pt presents for f/u chronic draining abdominal wound following surgical site dehiscence. She has been using gentamicin ointment at least twice daily this past week and has noticed an improvement in the color/quality/amount of drainage. Periwound rash has been improving as well. No concerning sx.     09/30/24: Pt presents for f/u chronic draining abdominal wound. Has switched to using silver gel following completion of therapy with gentamicin ointment. Some increased drainage present. Overall no significant changes in PMHx since prior visit.     10/14/24: pt presents for f/u chronic draining abdominal wound. She has been utilizing Hydrofera blue classic foam cut into packing strips since her previous visit. She reports that she had a gastrointestinal virus since her previous visit with coughing and vomiting which caused a significant amount of bloody drainage from her abdominal wound. The bloody drainage has since resolved but it did concern her.     11/04/24: Pt presents for f/u chronic draining abdominal wound. Since her previous visit she had a f/u with Dr. Conroy whom is recommending additional bloodwork and CT of the abdomen and pelvis for additional investigation given the remaining sinus tract. Follow results of CT, Dr. Conroy plans to reach out to his trauma surgery colleagues for a second surgical opinion. Pt reports returning to use of silver gel application and is continuing to get a drainage from the remaining sinus tract. She is considering going back on her Humira following the results of her CT scan and bloodwork.     11/25/24: Pt presents for f/u chronic draining abdominal wound. Since her previous visit she had a f/u with general surgery, Dr. Conroy, at which time it was discussed that she  is a candidate for component separation repair with aid of plastic surgery to definitely close the lower midline wound. She has been referred to plastic surgery for further discussion of a future operation with Dr. Hernandez. In the meantime she is clinical stable and will continue with local wound care. She has plans to restart Humira for her RA.           The following portions of the patient's history were reviewed and updated as appropriate:   Patient Active Problem List   Diagnosis    Tobacco use disorder    COVID-19    Obesity, morbid, BMI 50 or higher (HCC)    Bulky or enlarged uterus    Pelvic pain    Preoperative exam for gynecologic surgery    HTN (hypertension)    Gastroesophageal reflux disease    Asthma    Obstructive sleep apnea syndrome    S/P hysterectomy    Postoperative anemia    Rheumatoid arthritis involving multiple sites with positive rheumatoid factor ([facility]CC)    Encounter for postoperative care    Open wound of abdomen    Surgical wound, non healing    Cigarette nicotine dependence without complication     Past Medical History:   Diagnosis Date    Abdominal wall abscess at site of surgical wound 2023    Abnormal uterine bleeding due to intramural leiomyoma 2022    Arthritis     Rheumatoid    Asthma     Breathing difficulty     only occured during inverted robotic hysterectomy    Cellulitis of drainage site following surgery 2023    CPAP (continuous positive airway pressure) dependence     GERD (gastroesophageal reflux disease)     Hypertension     Obese     Pneumonia 2007    Sleep apnea     Uterine fibroid     LTH today 12/15/2022    Wound dehiscence, surgical 2022     Past Surgical History:   Procedure Laterality Date    ABDOMINAL WASHOUT      post  with wound vac     SECTION      had hematoma removed after the surgery    FOREARM SURGERY Right     repair of fracture with plating    INCISIONAL HERNIA REPAIR  2022    Procedure: REPAIR  RECURRENT HERNIA  INCISIONAL WITH MESH;  Surgeon: Donna Lau MD;  Location: [facility];  Service: Gynecology    IR DRAINAGE TUBE CHECK WITH SCLEROSIS  2/3/2023    IR DRAINAGE TUBE CHECK WITH SCLEROSIS  3/7/2023    IR DRAINAGE TUBE CHECK/CHANGE/REPOSITION/REINSERTION/UPSIZE  1/23/2023    IR DRAINAGE TUBE CHECK/CHANGE/REPOSITION/REINSERTION/UPSIZE  1/27/2023    IR DRAINAGE TUBE CHECK/CHANGE/REPOSITION/REINSERTION/UPSIZE  2/13/2023    IR DRAINAGE TUBE CHECK/CHANGE/REPOSITION/REINSERTION/UPSIZE  2/27/2023    IR DRAINAGE TUBE PLACEMENT  1/9/2023    LAPAROTOMY N/A 12/20/2022    Procedure: LAPAROTOMY EXPLORATORY;  Surgeon: Donna Lau MD;  Location: [facility];  Service: Gynecology    MYRINGOTOMY W/ TUBES      two sets as a young child    MS CYSTOURETHROSCOPY N/A 12/15/2022    Procedure: CYSTO W/ ROBOT;  Surgeon: Donna Lau MD;  Location: [facility];  Service: Gynecology    MS LAPS TOTAL HYSTERECT 250 GM/< W/RMVL TUBE/OVARY N/A 12/15/2022    Procedure: (LTH) W/ BILATERAL SALPINGECTOMY  W/ ROBOT COVERTED TO OPEN;  Surgeon: Donna Lau MD;  Location: [facility];  Service: Gynecology    VAC DRESSING APPLICATION N/A 3/17/2023    Procedure: APPLICATION VAC DRESSING ABDOMEN/TRUNK;  Surgeon: Chan Conroy MD;  Location: [facility];  Service: General    WOUND DEBRIDEMENT N/A 3/15/2023    Procedure: DEBRIDEMENT Abdominal WOUND and placement of Wound VAC;  Surgeon: Chan Conroy MD;  Location: [facility];  Service: General    WOUND DEBRIDEMENT N/A 3/17/2023    Procedure: DEBRIDEMENT WOUND (WASH OUT);  Surgeon: Chan Conroy MD;  Location: [facility];  Service: General     Family History   Problem Relation Age of Onset    Multiple sclerosis Mother     Hypertension Father     Hyperlipidemia Father     Cerebral aneurysm Father     Pulmonary embolism Father     Liver disease Brother      Social History     Socioeconomic History    Marital status:      Spouse name: None    Number of children: None     Years of education: None    Highest education level: None   Occupational History    None   Tobacco Use    Smoking status: Every Day     Current packs/day: 0.50     Average packs/day: 0.5 packs/day for 30.9 years (15.5 ttl pk-yrs)     Types: Cigarettes     Start date: 1995    Smokeless tobacco: Never   Vaping Use    Vaping status: Never Used   Substance and Sexual Activity    Alcohol use: Not Currently     Alcohol/week: 1.0 standard drink of alcohol     Types: 1 Standard drinks or equivalent per week     Comment: 3 x monthly    Drug use: Never    Sexual activity: Yes     Partners: Male     Birth control/protection: Male Sterilization   Other Topics Concern    None   Social History Narrative    None     Social Drivers of Health     Financial Resource Strain: Not on file   Food Insecurity: No Food Insecurity (3/16/2023)    Hunger Vital Sign     Worried About Running Out of Food in the Last Year: Never true     Ran Out of Food in the Last Year: Never true   Transportation Needs: No Transportation Needs (3/16/2023)    PRAPARE - Transportation     Lack of Transportation (Medical): No     Lack of Transportation (Non-Medical): No   Physical Activity: Not on file   Stress: Not on file   Social Connections: Not on file   Intimate Partner Violence: Not on file   Housing Stability: Low Risk  (3/16/2023)    Housing Stability Vital Sign     Unable to Pay for Housing in the Last Year: No     Number of Places Lived in the Last Year: 1     Unstable Housing in the Last Year: No       Current Outpatient Medications:     albuterol (2.5 mg/3 mL) 0.083 % nebulizer solution, Take 2.5 mg by nebulization every 4 (four) hours as needed for shortness of breath or wheezing, Disp: , Rfl:     albuterol (ProAir HFA) 90 mcg/act inhaler, Inhale 2 puffs every 6 (six) hours as needed for wheezing or shortness of breath, Disp: 8.5 g, Rfl: 0    albuterol (PROVENTIL HFA,VENTOLIN HFA) 90 mcg/act inhaler, Inhale 2 puffs every 6 (six) hours as needed  for wheezing, Disp: 8 g, Rfl: 2    amoxicillin (AMOXIL) 500 mg capsule, , Disp: , Rfl:     Arthritis Pain Relief 650 MG CR tablet, take 1 tablet by mouth every 8 hours if needed for mild pain (Patient not taking: Reported on 3/7/2024), Disp: , Rfl:     aspirin 325 mg tablet, Take 325 mg by mouth daily. Indications: as needed (Patient not taking: Reported on 3/7/2024), Disp: , Rfl:     celecoxib (CeleBREX) 200 mg capsule, Take 200 mg by mouth 2 (two) times a day, Disp: , Rfl:     chlorhexidine (HIBICLENS) 4 % external liquid, Apply 1 Application topically daily as needed for wound care Can use to clean wound during VAC changes. (Patient not taking: Reported on 1/29/2024), Disp: 473 mL, Rfl: 5    clonazePAM (KlonoPIN) 0.5 mg tablet, Take 0.5 mg by mouth 2 (two) times a day as needed (Patient not taking: Reported on 1/29/2024), Disp: , Rfl:     clotrimazole (LOTRIMIN) 1 % cream, Apply topically 2 (two) times a day for 14 days To rash of abdomen, Disp: 28 g, Rfl: 1    cyclobenzaprine (FLEXERIL) 5 mg tablet, Take 1 tablet (5 mg total) by mouth 3 (three) times a day as needed for muscle spasms (Patient taking differently: Take 5 mg by mouth 3 (three) times a day as needed for muscle spasms As needed), Disp: 60 tablet, Rfl: 0    dulaglutide (Trulicity) 0.75 MG/0.5ML injection, Inject 0.75 mg under the skin Once a week (Patient not taking: Reported on 1/29/2024), Disp: , Rfl:     escitalopram (LEXAPRO) 10 mg tablet, Take 10 mg by mouth daily, Disp: , Rfl:     escitalopram (LEXAPRO) 5 mg tablet, Take 5 mg by mouth daily, Disp: , Rfl:     fluticasone (FLONASE) 50 mcg/act nasal spray, 2 sprays into each nostril daily, Disp: 16 g, Rfl: 4    Fluticasone-Salmeterol (Advair) 250-50 mcg/dose inhaler, , Disp: , Rfl:     Fluticasone-Salmeterol (Advair) 500-50 mcg/dose inhaler, Inhale 1 puff 2 (two) times a day, Disp: , Rfl:     folic acid (FOLVITE) 1 mg tablet, Take by mouth daily, Disp: , Rfl:     gabapentin (NEURONTIN) 100 mg  capsule, Take 1 capsule (100 mg total) by mouth 3 (three) times a day (Patient not taking: Reported on 3/7/2024), Disp: 90 capsule, Rfl: 0    gabapentin (NEURONTIN) 300 mg capsule, Take 300 mg by mouth 3 (three) times a day, Disp: , Rfl:     gentamicin (GARAMYCIN) 0.1 % ointment, Apply topically 3 (three) times a day for 7 days To packing and place into open wound, Disp: 30 g, Rfl: 1    hydrochlorothiazide (HYDRODIURIL) 12.5 mg tablet, Take 12.5 mg by mouth daily Pt only  taking as needed for leg swelling (Patient not taking: Reported on 3/7/2024), Disp: , Rfl:     ibuprofen (MOTRIN) 600 mg tablet, Take 600 mg by mouth every 6 (six) hours as needed for moderate pain As needed, Disp: , Rfl:     methocarbamol (ROBAXIN) 500 mg tablet, Take 1 tablet (500 mg total) by mouth every 6 (six) hours for 14 days (Patient taking differently: Take 500 mg by mouth every 6 (six) hours As needed), Disp: 56 tablet, Rfl: 0    methotrexate 2.5 mg tablet, Four 2.5 tabs one time a week ( Every Saturday), Disp: , Rfl:     montelukast (SINGULAIR) 10 mg tablet, Take by mouth daily at bedtime as needed, Disp: , Rfl:     naloxone (NARCAN) 4 mg/0.1 mL nasal spray, Administer 1 spray into a nostril. If no response after 2-3 minutes, give another dose in the other nostril using a new spray. (Patient not taking: Reported on 4/14/2023), Disp: 1 each, Rfl: 1    neomycin-polymyxin-hydrocortisone (CORTISPORIN) otic solution, Administer 3 drops into the left ear every 6 (six) hours (Patient not taking: Reported on 1/29/2024), Disp: , Rfl:     nicotine (NICODERM CQ) 14 mg/24hr TD 24 hr patch, Place 1 patch on the skin every 24 hours (Patient not taking: Reported on 6/2/2023), Disp: 28 patch, Rfl: 3    nystatin (MYCOSTATIN) powder, Apply topically 2 (two) times a day for 14 days To periwound mixed with barrier cream, Disp: 30 g, Rfl: 1    omeprazole (PriLOSEC) 40 MG capsule, Take 40 mg by mouth daily, Disp: , Rfl:     ondansetron (ZOFRAN) 4 mg tablet,  Take 4 mg by mouth daily as needed, Disp: , Rfl:     predniSONE 10 mg tablet, Take 5 tabs po x 2 days; 4 tabs po x 2 days; 3 tabs po x 1 day; 2 tabs po x 1 day. 1 tab po x 1 day. (Patient not taking: Reported on 1/29/2024), Disp: 24 tablet, Rfl: 0    predniSONE 10 mg tablet, Take by mouth 2 (two) times a day with meals For 10 days (Patient not taking: Reported on 11/18/2024), Disp: , Rfl:     predniSONE 10 mg tablet, Take 5 tabs po x 2 days; 4 tabs po x 2 days; 3 tabs po x 1 day; 2 tabs po x 1 day. 1 tab po x 1 day. (Patient not taking: Reported on 11/18/2024), Disp: 24 tablet, Rfl: 0    semaglutide, 0.25 or 0.5 mg/dose, (Ozempic) 2 mg/3 mL injection pen, Inject 0.5 mg under the skin Once a week (Patient not taking: Reported on 1/29/2024), Disp: , Rfl:     sodium chloride, PF, 0.9 %, Inject 10 mL into a catheter in a vein 3 (three) times a week Wound irrigation. (Patient not taking: Reported on 1/29/2024), Disp: 100 mL, Rfl: 10    Vitamin D, Ergocalciferol, 22027 units CAPS, Take by mouth once a week, Disp: , Rfl:     Review of Systems   Constitutional:  Negative for fever.   Gastrointestinal:  Negative for abdominal pain, constipation and diarrhea.   Musculoskeletal:  Positive for arthralgias.   Skin:  Positive for wound (open surgical wound abdomen).   Psychiatric/Behavioral:  Negative for agitation.          Objective:  /84   Pulse 72   Temp (!) 97.2 °F (36.2 °C)   Resp 18   LMP 11/23/2022 (Exact Date) Comment: partial        Physical Exam  Vitals reviewed.   Constitutional:       General: She is not in acute distress.     Appearance: She is morbidly obese.   HENT:      Head: Normocephalic and atraumatic.   Cardiovascular:      Rate and Rhythm: Normal rate.   Pulmonary:      Effort: Pulmonary effort is normal. No respiratory distress.   Abdominal:          Comments: Continues to have a small sinus tract that remains open. Barely visible. No malodor appreciated. Small amount of drainage visible from the  remaining opening without periwound erythema or irritation. Fungal dermatitis of periwound is present.      Skin:     Findings: Rash and wound present.   Neurological:      Mental Status: She is alert and oriented to person, place, and time.   Psychiatric:         Mood and Affect: Mood normal.         Behavior: Behavior normal.           Wound 07/10/23 Abdomen (Active)   Wound Image   11/25/24 0946   Wound Description Pink;EILEEN 11/25/24 0949   Alexa-wound Assessment Pink;Intact;Scar Tissue 11/25/24 0949   Wound Length (cm) 0.3 cm 11/25/24 0949   Wound Width (cm) 0.1 cm 11/25/24 0949   Wound Depth (cm) 1.2 cm 11/25/24 0949   Wound Surface Area (cm^2) 0.03 cm^2 11/25/24 0949   Wound Volume (cm^3) 0.036 cm^3 11/25/24 0949   Calculated Wound Volume (cm^3) 0.04 cm^3 11/25/24 0949   Change in Wound Size % 99.91 11/25/24 0949   Tunneling 1 2 cm 03/04/24 0822   Tunneling 1 in depth located at 7 o'clock 03/04/24 0822   Number of underminings 1 09/30/24 1445   Undermining 1 1 09/30/24 1445   Undermining 1 is depth extending from 7 o 'clock 09/30/24 1445   Drainage Amount Small 11/25/24 0949   Drainage Description Serous 11/25/24 0949   Non-staged Wound Description Full thickness 11/25/24 0949   Treatments Cleansed 11/04/24 0834   Dressing Wound V.A.C. 07/31/23 0859   Wound packed? Yes 10/14/24 0836   Packing- # removed 1 10/14/24 0836   Packing- # inserted 2 07/24/23 0832   Dressing Changed New 07/24/23 0832   Patient Tolerance Tolerated well 09/30/24 1445   Dressing Status Intact 11/04/24 0834                       Wound Instructions:  Orders Placed This Encounter   Procedures    Wound cleansing and dressings Abdomen     Wash your hands with soap and water. Remove abdominal dressing, wash with Normal Saline, pat dry prior to applying new dressing      Apply Silver Gel to the base of the wound.   Change every day and as needed for excessive drainage.   Cover with gauze/ABD Pad and tape    Apply nystatin mixed with zinc as  "needed to the periwound        Watch for signs of infection these include a fever of 100.4°F (38°C) or higher, chills  Signs of wound infection include increasing swelling, redness, warmth, pain around the wound. Foul smell coming from wound  Go to the closest Emergency Room with signs of infection to be evaluated.     Quit Smoking or cut back as much as possible! As discussed smoking slows the ability for a wound to heal by constricting blood vessels for approximately 30 minutes after each cigarette.     Standing Status:   Future     Expected Date:   11/25/2024     Expiration Date:   12/9/2024    Wound Procedure Treatment Abdomen     This order was created via procedure documentation       Cally Garcia, PA-C    Portions of the record may have been created with voice recognition software. Occasional wrong word or \"sound alike\" substitutions may have occurred due to the inherent limitations of voice recognition software. Read the chart carefully and recognize, using context, where substitutions have occurred.      "

## 2024-12-30 ENCOUNTER — OFFICE VISIT (OUTPATIENT)
Facility: HOSPITAL | Age: 44
End: 2024-12-30
Payer: COMMERCIAL

## 2024-12-30 VITALS
RESPIRATION RATE: 18 BRPM | TEMPERATURE: 96.2 F | SYSTOLIC BLOOD PRESSURE: 149 MMHG | HEART RATE: 82 BPM | DIASTOLIC BLOOD PRESSURE: 65 MMHG

## 2024-12-30 DIAGNOSIS — Z98.890 S/P HERNIA REPAIR: ICD-10-CM

## 2024-12-30 DIAGNOSIS — F17.200 TOBACCO USE DISORDER: ICD-10-CM

## 2024-12-30 DIAGNOSIS — Z90.710 S/P HYSTERECTOMY: ICD-10-CM

## 2024-12-30 DIAGNOSIS — Z87.19 S/P HERNIA REPAIR: ICD-10-CM

## 2024-12-30 DIAGNOSIS — T81.89XD NON-HEALING SURGICAL WOUND, SUBSEQUENT ENCOUNTER: Primary | ICD-10-CM

## 2024-12-30 DIAGNOSIS — M05.79 RHEUMATOID ARTHRITIS INVOLVING MULTIPLE SITES WITH POSITIVE RHEUMATOID FACTOR (HCC): ICD-10-CM

## 2024-12-30 DIAGNOSIS — E66.01 OBESITY, CLASS III, BMI 40-49.9 (MORBID OBESITY) (HCC): ICD-10-CM

## 2024-12-30 DIAGNOSIS — L73.9 FOLLICULITIS: ICD-10-CM

## 2024-12-30 PROCEDURE — 87205 SMEAR GRAM STAIN: CPT | Performed by: STUDENT IN AN ORGANIZED HEALTH CARE EDUCATION/TRAINING PROGRAM

## 2024-12-30 PROCEDURE — 99213 OFFICE O/P EST LOW 20 MIN: CPT | Performed by: STUDENT IN AN ORGANIZED HEALTH CARE EDUCATION/TRAINING PROGRAM

## 2024-12-30 PROCEDURE — 99214 OFFICE O/P EST MOD 30 MIN: CPT | Performed by: STUDENT IN AN ORGANIZED HEALTH CARE EDUCATION/TRAINING PROGRAM

## 2024-12-30 PROCEDURE — 87070 CULTURE OTHR SPECIMN AEROBIC: CPT | Performed by: STUDENT IN AN ORGANIZED HEALTH CARE EDUCATION/TRAINING PROGRAM

## 2024-12-30 PROCEDURE — G0463 HOSPITAL OUTPT CLINIC VISIT: HCPCS | Performed by: STUDENT IN AN ORGANIZED HEALTH CARE EDUCATION/TRAINING PROGRAM

## 2024-12-30 PROCEDURE — 87186 SC STD MICRODIL/AGAR DIL: CPT | Performed by: STUDENT IN AN ORGANIZED HEALTH CARE EDUCATION/TRAINING PROGRAM

## 2024-12-30 PROCEDURE — 87077 CULTURE AEROBIC IDENTIFY: CPT | Performed by: STUDENT IN AN ORGANIZED HEALTH CARE EDUCATION/TRAINING PROGRAM

## 2024-12-30 RX ORDER — MUPIROCIN 20 MG/G
OINTMENT TOPICAL DAILY
Qty: 30 G | Refills: 1 | Status: SHIPPED | OUTPATIENT
Start: 2024-12-30 | End: 2025-01-13

## 2024-12-30 NOTE — PROGRESS NOTES
Patient ID: Maria R Webb is a 44 y.o. female Date of Birth 1980       Chief Complaint   Patient presents with    Follow Up Wound Care Visit     Abdominal wound       Allergies:  Patient has no known allergies.    Diagnosis:   Diagnosis ICD-10-CM Associated Orders   1. Non-healing surgical wound, subsequent encounter  T81.89XD Wound cleansing and dressings Abdomen     Wound Procedure Treatment Abdomen     Wound culture and Gram stain     mupirocin (BACTROBAN) 2 % ointment     Wound culture and Gram stain      2. Folliculitis  L73.9 mupirocin (BACTROBAN) 2 % ointment      3. S/P hysterectomy  Z90.710       4. S/P hernia repair  Z98.890     Z87.19       5. Tobacco use disorder  F17.200       6. Obesity, Class III, BMI 40-49.9 (morbid obesity) ([facility])  E66.01       7. Rheumatoid arthritis involving multiple sites with positive rheumatoid factor ([facility])  M05.79            Assessment  & Plan:    F/u surgical wound of the midline abdomen s/p hysterectomy complicated by fascial dehiscence and repair with phasix mesh. Continues to have a small open draining tract. Unable to visualize base of wound due to depth.  Not actively draining.  No obvious malodor.  Periwound without diffuse erythema however there are 2 areas with pustules consistent with folliculitis.  Will obtain wound culture given reports of increased malodor since prior visit.  Will prescribe mupirocin for use.  Apply daily to the wound bed as well as any skin with pustules.  Has upcoming appointment with plastic surgery for discussion of revision operation (component separation repair) to definitively close the wound.  Would benefit from smoking cessation.  Monitor for any fevers, chills, malaise, nausea, vomiting, worsening malodor, abdominal pain, surrounding redness.  Notify office or report to the emergency room should these occur.  F/u in 3-4 weeks. Instructed to call if any questions or concerns arise in meantime.            Subjective:   3/11/2024:  "Maria R is a 43-year-old female who is presenting to wound center today for follow-up of abdominal wound s/p hysterectomy complicated by fascial dehiscence and abdominal herniation that required incisional hernia repair w/ mesh.  Has been following with wound center since January '23.  Per wound center visit on 2/5/2024 \"saw Dr. Conroy, general surgery since prior visit whom did not feel as though any further medication/imaging/intervention was necessary at this time and would like pt to continue with local wound care and follow up with him in 3 months.\" At last wound center visit, was changed to Dakins soaked packing after she was experiencing green drainage.  She was instructed to resume silver gel coated packing once the green drainage resolved.  She reports that she has been utilizing Dakin soaked packing over the past week and had not switched to green drainage though this seemed to resolve a few days ago. Of note, is to have recently started Ozempic for weight reduction.  Currently on Z-Dallin and prednisone for bronchitis.  She denies fever and/or chills.  Continues with cough and shortness of breath but no acute worsening.    03/18/24: Pt presents for f/u surgical abdominal wound dehiscence s/p hysterectomy complicated by fascial dehiscence and abdominal herniation requiring incision hernia repair with mesh complicated by further dehiscence. Pt notes that Dr. Lopez was able to surgically debride a piece of remaining mesh at her visit last week and opted to continue with Dakin soaked packing giving significant improvement in the wound with its use. No nausea, vomiting, severe abdominal pain, change in bowel habits nor fevers/chills. Continuing to smoke about the same amount.     04/08/24: Pt presents for f/u surgical abdominal wound dehiscence. Since her previous visit she has had f/u with Dr. Conroy, general surgery. No further f/u with general surgery was required, pt is to continue with wound care but may " f/u with surgery if needed. Pt reports she noticed a slight increase in the odor related to the drainage the other day so she used Dakins and it went away. She ran out of the AMD packing therefore returned to use of the silver gel coated packing. Is feeling well. Has started Ozempic and has noticed a 3 lb weight loss already. Smoking about the same amount. No fevers, chills.     04/15/24: Pt presents for wound related to surgical site dehiscence of the abdomen. Has been using endoform am packing into the remaining opening. Has not had a return of green or foul smelling drainage since her previous visit. Offers no complaints today. No significant changes in medical history since prior visit.     04/29/24: Pt presents for f/u wound related to surgical site dehiscence of the abdomen. Is using endoform am but has noticed a more obvious odor since switching from use of silver gel to the endoform. Drainage is tan in color. Aside from the odor of the drainage offers no complaints today.     05/06/24: Pt presents for f/u wound related to surgical site dehiscence of the abdomen. Has been using gentamicin ointment; packing is no longer staying in well. The odor from the drainage has improved from the prior visit. Offers no complaints today.     05/20/24: Pt presents for f/u wound related to surgical site dehiscence of the abdomen. She has continued with gentamicin ointment three times daily. The wound continues to drain, she has not noticed a return of odor or odd coloration to the drainage. Is continuing to keep the site covered. Denies abdominal pain, N/V, change in bowel movements, fevers, chils.     06/03/24: Pt presents for f/u wound secondary to surgical site dehiscence of the abdomen. Currently Medihoney is being used. ABD is being used to cover the site. There continues to be small-moderate drainage without any foul smell or green discoloration of the drainage. Pt offers no complaints today.     06/24/24: Pt presents  for f/u wound secondary to surgical site dehiscence of the abdomen. She is currently using Medihoney. She believes there is less drainage. Current drainage on the pad is honey-colored therefore it is difficult to tell if it is truly drainage or the Medihoney being placed into the wound. No significant changes since past visit. She is inquiring if she can start Humira again for her RA; was on hold d/t wound.     07/01/24: Patient presents for follow-up of wound secondary to surgical site dehiscence of the abdomen.  Midweek patient began to notice an odor again to her abdominal wound therefore she resumed use of Medihoney to the wound bed and zinc to the periwound.  Today when the dressing was taken off there was a significant amount of blood on the overlying dressing.  Patient does not report noticing any significant bleeding as of last night.  She does state that her activity level was significantly increased from her typical levels this past week due to being short staffed at work as well as doing projects around the house.  Has not noticed any significant changes in terms of pain, change in bowel movements, fevers or chills.      07/08/24: Pt presents for f/u wound secondary to surgical site dehiscence of the abdomen. She has been packing the wound with Iodoform packing and using medihoney to the cracks of periwound skin. Had a brief return of green drainage however this resolved quickly. Saw rheumatology since her previous visit and is going to be reinitiated on Humira following pre-approval process as long as she gets clearance from wound care perspective.      07/22/24: Pt presents for f/u wound secondary to surgical site dehiscence of the abdomen. Has been using Iodoform packing however it is barely fitting and is not staying in place well. She reports being more sweaty this week working on some painting in her house and has noticed a rash of her surrounding skin. She tired to apply Medihoney since that has  "calmed things down in the past however even with application of Medihoney her surrounding skin still appears red/angry.       07/29/24: Pt presents for f/u wound secondary to surgical site dehiscence of the abdomen. Nystatin and zinc is being used to periwound which has significantly improved the skin around the wound. She has not started Humira yet, is still awaiting insurance approval.      08/05/24: Patient presents for follow-up abdominal wound secondary to surgical site dehiscence and exposed mesh. She trialed not using the Medihoney to see if the wound was still draining and had a small amount of drainage coming from the wound still. Reports she was approved for Humira and is wondering if it would be appropriate to start this again with her open wound not fully healed yet.     08/19/24: Pt presents for f/u abdominal wound secondary to surgical site dehiscence (hysterectomy complicated by fascial dehiscence and abdominal herniation requiring incision hernia repair with mesh complicated by further dehiscence). She notes that since her previous visit she had another episode of significant bleeding from her abdominal. It resolved after a few hours. She reports wearing a waterproof bandage instead of using the ABD leading to a return of a rash on her abdomen. Is wondering if additional abdominal imaging is necessary.       08/26/24: HPI per covering provider, \"Patient presents to wound care center for follow-up visit of surgical abdominal wound.  Patient states that she recently noted small amount of blue-green drainage from wound.  Patient states when this happens that she has been instructed to pack the wound with Dakin's moistened plain packing daily until the discoloration to the drainage stops. Patient has been applying betadine to the wound and then silver alginate to the wound daily prior to restarting the dakin's.  Patient denies any episodes of bleeding.  Denies increased pain and state drainage amount has " "been the same, just noted change in color.  No fever or chills. Patient reports that she is unable to follow at the wound center any other day by Monday due to her work schedule.\"     09/09/24: Pt presents for f/u chronic abdominal wound following surgical site dehiscence.  She reports that every time she has tried to stop using the Dakin solution there is a return of green drainage. Has continued with barrier cream to periwound. Has not yet started her Humira d/t infection concerns with her open wound and being on a biologic. No alarm sx, denies fevers, chills, N/V. Is continuing to have regular BM.     09/16/24: Pt presents for f/u chronic draining abdominal wound following surgical site dehiscence. She has been using gentamicin ointment at least twice daily this past week and has noticed an improvement in the color/quality/amount of drainage. Periwound rash has been improving as well. No concerning sx.     09/30/24: Pt presents for f/u chronic draining abdominal wound. Has switched to using silver gel following completion of therapy with gentamicin ointment. Some increased drainage present. Overall no significant changes in PMHx since prior visit.     10/14/24: pt presents for f/u chronic draining abdominal wound. She has been utilizing Hydrofera blue classic foam cut into packing strips since her previous visit. She reports that she had a gastrointestinal virus since her previous visit with coughing and vomiting which caused a significant amount of bloody drainage from her abdominal wound. The bloody drainage has since resolved but it did concern her.     11/04/24: Pt presents for f/u chronic draining abdominal wound. Since her previous visit she had a f/u with Dr. Conroy whom is recommending additional bloodwork and CT of the abdomen and pelvis for additional investigation given the remaining sinus tract. Follow results of CT, Dr. Conroy plans to reach out to his trauma surgery colleagues for a second " surgical opinion. Pt reports returning to use of silver gel application and is continuing to get a drainage from the remaining sinus tract. She is considering going back on her Humira following the results of her CT scan and bloodwork.     11/25/24: Pt presents for f/u chronic draining abdominal wound. Since her previous visit she had a f/u with general surgery, Dr. Conroy, at which time it was discussed that she is a candidate for component separation repair with aid of plastic surgery to definitely close the lower midline wound. She has been referred to plastic surgery for further discussion of a future operation with Dr. Hernandez. In the meantime she is clinical stable and will continue with local wound care. She has plans to restart Humira for her RA.     12/30/24: Pt presents for f/u chronic draining abdominal wound. She report since her previous visit she noticed a worsening malodor to the drainage and used betadine to the wound for a few days which resolved the odor. She has an upcoming appointment with plastic surgery on 02/05/25 to discuss revision operation. Has not yet restarted her Humira which is still in pre-approval process.             The following portions of the patient's history were reviewed and updated as appropriate:   Patient Active Problem List   Diagnosis    Tobacco use disorder    COVID-19    Obesity, morbid, BMI 50 or higher (HCC)    Bulky or enlarged uterus    Pelvic pain    Preoperative exam for gynecologic surgery    HTN (hypertension)    Gastroesophageal reflux disease    Asthma    Obstructive sleep apnea syndrome    S/P hysterectomy    Postoperative anemia    Rheumatoid arthritis involving multiple sites with positive rheumatoid factor ([facility]CC)    Encounter for postoperative care    Open wound of abdomen    Surgical wound, non healing    Cigarette nicotine dependence without complication     Past Medical History:   Diagnosis Date    Abdominal wall abscess at site of surgical wound  2023    Abnormal uterine bleeding due to intramural leiomyoma 2022    Arthritis     Rheumatoid    Asthma     Breathing difficulty     only occured during inverted robotic hysterectomy    Cellulitis of drainage site following surgery 2023    CPAP (continuous positive airway pressure) dependence     GERD (gastroesophageal reflux disease)     Hypertension     Obese     Pneumonia 2007    Sleep apnea     Uterine fibroid     LTH today 12/15/2022    Wound dehiscence, surgical 2022     Past Surgical History:   Procedure Laterality Date    ABDOMINAL WASHOUT      post  with wound vac     SECTION      had hematoma removed after the surgery    FOREARM SURGERY Right     repair of fracture with plating    INCISIONAL HERNIA REPAIR  2022    Procedure: REPAIR  RECURRENT HERNIA INCISIONAL WITH MESH;  Surgeon: Donna Lau MD;  Location: [facility];  Service: Gynecology    IR DRAINAGE TUBE CHECK WITH SCLEROSIS  2/3/2023    IR DRAINAGE TUBE CHECK WITH SCLEROSIS  3/7/2023    IR DRAINAGE TUBE CHECK/CHANGE/REPOSITION/REINSERTION/UPSIZE  2023    IR DRAINAGE TUBE CHECK/CHANGE/REPOSITION/REINSERTION/UPSIZE  2023    IR DRAINAGE TUBE CHECK/CHANGE/REPOSITION/REINSERTION/UPSIZE  2023    IR DRAINAGE TUBE CHECK/CHANGE/REPOSITION/REINSERTION/UPSIZE  2023    IR DRAINAGE TUBE PLACEMENT  2023    LAPAROTOMY N/A 2022    Procedure: LAPAROTOMY EXPLORATORY;  Surgeon: Donna Lau MD;  Location: [facility];  Service: Gynecology    MYRINGOTOMY W/ TUBES      two sets as a young child    NC CYSTOURETHROSCOPY N/A 12/15/2022    Procedure: CYSTO W/ ROBOT;  Surgeon: Donna Lau MD;  Location: [facility];  Service: Gynecology    NC LAPS TOTAL HYSTERECT 250 GM/< W/RMVL TUBE/OVARY N/A 12/15/2022    Procedure: (LTH) W/ BILATERAL SALPINGECTOMY  W/ ROBOT COVERTED TO OPEN;  Surgeon: Donna Lau MD;  Location: [facility];  Service: Gynecology    VAC DRESSING  APPLICATION N/A 3/17/2023    Procedure: APPLICATION VAC DRESSING ABDOMEN/TRUNK;  Surgeon: Chan Conroy MD;  Location: [facility];  Service: General    WOUND DEBRIDEMENT N/A 3/15/2023    Procedure: DEBRIDEMENT Abdominal WOUND and placement of Wound VAC;  Surgeon: Chan Conroy MD;  Location: [facility];  Service: General    WOUND DEBRIDEMENT N/A 3/17/2023    Procedure: DEBRIDEMENT WOUND (WASH OUT);  Surgeon: Chan Conroy MD;  Location: [facility];  Service: General     Family History   Problem Relation Age of Onset    Multiple sclerosis Mother     Hypertension Father     Hyperlipidemia Father     Cerebral aneurysm Father     Pulmonary embolism Father     Liver disease Brother      Social History     Socioeconomic History    Marital status:      Spouse name: Not on file    Number of children: Not on file    Years of education: Not on file    Highest education level: Not on file   Occupational History    Not on file   Tobacco Use    Smoking status: Every Day     Current packs/day: 0.50     Average packs/day: 0.5 packs/day for 31.0 years (15.5 ttl pk-yrs)     Types: Cigarettes     Start date: 1995    Smokeless tobacco: Never   Vaping Use    Vaping status: Never Used   Substance and Sexual Activity    Alcohol use: Not Currently     Alcohol/week: 1.0 standard drink of alcohol     Types: 1 Standard drinks or equivalent per week     Comment: 3 x monthly    Drug use: Never    Sexual activity: Yes     Partners: Male     Birth control/protection: Male Sterilization   Other Topics Concern    Not on file   Social History Narrative    Not on file     Social Drivers of Health     Financial Resource Strain: Not on file   Food Insecurity: No Food Insecurity (3/16/2023)    Hunger Vital Sign     Worried About Running Out of Food in the Last Year: Never true     Ran Out of Food in the Last Year: Never true   Transportation Needs: No Transportation Needs (3/16/2023)    PRAPARE - Transportation     Lack of  Transportation (Medical): No     Lack of Transportation (Non-Medical): No   Physical Activity: Not on file   Stress: Not on file   Social Connections: Not on file   Intimate Partner Violence: Not on file   Housing Stability: Low Risk  (3/16/2023)    Housing Stability Vital Sign     Unable to Pay for Housing in the Last Year: No     Number of Places Lived in the Last Year: 1     Unstable Housing in the Last Year: No       Current Outpatient Medications:     mupirocin (BACTROBAN) 2 % ointment, Apply topically daily for 14 days To wound and areas of folliculitis, Disp: 30 g, Rfl: 1    albuterol (2.5 mg/3 mL) 0.083 % nebulizer solution, Take 2.5 mg by nebulization every 4 (four) hours as needed for shortness of breath or wheezing, Disp: , Rfl:     albuterol (ProAir HFA) 90 mcg/act inhaler, Inhale 2 puffs every 6 (six) hours as needed for wheezing or shortness of breath, Disp: 8.5 g, Rfl: 0    albuterol (PROVENTIL HFA,VENTOLIN HFA) 90 mcg/act inhaler, Inhale 2 puffs every 6 (six) hours as needed for wheezing, Disp: 8 g, Rfl: 2    amoxicillin (AMOXIL) 500 mg capsule, , Disp: , Rfl:     Arthritis Pain Relief 650 MG CR tablet, take 1 tablet by mouth every 8 hours if needed for mild pain (Patient not taking: Reported on 3/7/2024), Disp: , Rfl:     aspirin 325 mg tablet, Take 325 mg by mouth daily. Indications: as needed (Patient not taking: Reported on 3/7/2024), Disp: , Rfl:     celecoxib (CeleBREX) 200 mg capsule, Take 200 mg by mouth 2 (two) times a day, Disp: , Rfl:     chlorhexidine (HIBICLENS) 4 % external liquid, Apply 1 Application topically daily as needed for wound care Can use to clean wound during VAC changes. (Patient not taking: Reported on 1/29/2024), Disp: 473 mL, Rfl: 5    clonazePAM (KlonoPIN) 0.5 mg tablet, Take 0.5 mg by mouth 2 (two) times a day as needed (Patient not taking: Reported on 1/29/2024), Disp: , Rfl:     clotrimazole (LOTRIMIN) 1 % cream, Apply topically 2 (two) times a day for 14 days To  rash of abdomen, Disp: 28 g, Rfl: 1    cyclobenzaprine (FLEXERIL) 5 mg tablet, Take 1 tablet (5 mg total) by mouth 3 (three) times a day as needed for muscle spasms (Patient taking differently: Take 5 mg by mouth 3 (three) times a day as needed for muscle spasms As needed), Disp: 60 tablet, Rfl: 0    dulaglutide (Trulicity) 0.75 MG/0.5ML injection, Inject 0.75 mg under the skin Once a week (Patient not taking: Reported on 1/29/2024), Disp: , Rfl:     escitalopram (LEXAPRO) 10 mg tablet, Take 10 mg by mouth daily, Disp: , Rfl:     escitalopram (LEXAPRO) 5 mg tablet, Take 5 mg by mouth daily, Disp: , Rfl:     fluticasone (FLONASE) 50 mcg/act nasal spray, 2 sprays into each nostril daily, Disp: 16 g, Rfl: 4    Fluticasone-Salmeterol (Advair) 250-50 mcg/dose inhaler, , Disp: , Rfl:     Fluticasone-Salmeterol (Advair) 500-50 mcg/dose inhaler, Inhale 1 puff 2 (two) times a day, Disp: , Rfl:     folic acid (FOLVITE) 1 mg tablet, Take by mouth daily, Disp: , Rfl:     gabapentin (NEURONTIN) 100 mg capsule, Take 1 capsule (100 mg total) by mouth 3 (three) times a day (Patient not taking: Reported on 3/7/2024), Disp: 90 capsule, Rfl: 0    gabapentin (NEURONTIN) 300 mg capsule, Take 300 mg by mouth 3 (three) times a day, Disp: , Rfl:     gentamicin (GARAMYCIN) 0.1 % ointment, Apply topically 3 (three) times a day for 7 days To packing and place into open wound, Disp: 30 g, Rfl: 1    hydrochlorothiazide (HYDRODIURIL) 12.5 mg tablet, Take 12.5 mg by mouth daily Pt only  taking as needed for leg swelling (Patient not taking: Reported on 3/7/2024), Disp: , Rfl:     ibuprofen (MOTRIN) 600 mg tablet, Take 600 mg by mouth every 6 (six) hours as needed for moderate pain As needed, Disp: , Rfl:     methocarbamol (ROBAXIN) 500 mg tablet, Take 1 tablet (500 mg total) by mouth every 6 (six) hours for 14 days (Patient taking differently: Take 500 mg by mouth every 6 (six) hours As needed), Disp: 56 tablet, Rfl: 0    methotrexate 2.5 mg  tablet, Four 2.5 tabs one time a week ( Every Saturday), Disp: , Rfl:     montelukast (SINGULAIR) 10 mg tablet, Take by mouth daily at bedtime as needed, Disp: , Rfl:     naloxone (NARCAN) 4 mg/0.1 mL nasal spray, Administer 1 spray into a nostril. If no response after 2-3 minutes, give another dose in the other nostril using a new spray. (Patient not taking: Reported on 4/14/2023), Disp: 1 each, Rfl: 1    neomycin-polymyxin-hydrocortisone (CORTISPORIN) otic solution, Administer 3 drops into the left ear every 6 (six) hours (Patient not taking: Reported on 1/29/2024), Disp: , Rfl:     nicotine (NICODERM CQ) 14 mg/24hr TD 24 hr patch, Place 1 patch on the skin every 24 hours (Patient not taking: Reported on 6/2/2023), Disp: 28 patch, Rfl: 3    nystatin (MYCOSTATIN) powder, Apply topically 2 (two) times a day for 14 days To periwound mixed with barrier cream, Disp: 30 g, Rfl: 1    omeprazole (PriLOSEC) 40 MG capsule, Take 40 mg by mouth daily, Disp: , Rfl:     ondansetron (ZOFRAN) 4 mg tablet, Take 4 mg by mouth daily as needed, Disp: , Rfl:     predniSONE 10 mg tablet, Take 5 tabs po x 2 days; 4 tabs po x 2 days; 3 tabs po x 1 day; 2 tabs po x 1 day. 1 tab po x 1 day. (Patient not taking: Reported on 1/29/2024), Disp: 24 tablet, Rfl: 0    predniSONE 10 mg tablet, Take by mouth 2 (two) times a day with meals For 10 days (Patient not taking: Reported on 11/18/2024), Disp: , Rfl:     predniSONE 10 mg tablet, Take 5 tabs po x 2 days; 4 tabs po x 2 days; 3 tabs po x 1 day; 2 tabs po x 1 day. 1 tab po x 1 day. (Patient not taking: Reported on 11/18/2024), Disp: 24 tablet, Rfl: 0    semaglutide, 0.25 or 0.5 mg/dose, (Ozempic) 2 mg/3 mL injection pen, Inject 0.5 mg under the skin Once a week (Patient not taking: Reported on 1/29/2024), Disp: , Rfl:     sodium chloride, PF, 0.9 %, Inject 10 mL into a catheter in a vein 3 (three) times a week Wound irrigation. (Patient not taking: Reported on 1/29/2024), Disp: 100 mL, Rfl:  10    Vitamin D, Ergocalciferol, 70610 units CAPS, Take by mouth once a week, Disp: , Rfl:     Review of Systems   Constitutional:  Negative for fever.   Gastrointestinal:  Negative for abdominal pain, constipation and diarrhea.   Musculoskeletal:  Positive for arthralgias.   Skin:  Positive for wound (open surgical wound abdomen).   Psychiatric/Behavioral:  Negative for agitation.          Objective:  /65   Pulse 82   Temp (!) 96.2 °F (35.7 °C)   Resp 18   LMP 11/23/2022 (Exact Date) Comment: partial  Pain Score: 0-No pain     Physical Exam  Vitals reviewed.   Constitutional:       General: She is not in acute distress.     Appearance: She is morbidly obese.   HENT:      Head: Normocephalic and atraumatic.   Cardiovascular:      Rate and Rhythm: Normal rate.   Pulmonary:      Effort: Pulmonary effort is normal. No respiratory distress.   Abdominal:          Comments: Continues to have a small sinus tract that remains open. Barely visible. Unable to visualize base of wound due to depth.  Not actively draining.  No obvious malodor.  Periwound without diffuse erythema however there are 2 areas with pustules consistent with folliculitis.     Skin:     Findings: Rash and wound present. Rash is pustular.   Neurological:      Mental Status: She is alert and oriented to person, place, and time.   Psychiatric:         Mood and Affect: Mood normal.         Behavior: Behavior normal.         Wound 07/10/23 Abdomen (Active)   Wound Image   12/30/24 0931   Wound Description Pink;EILEEN 12/30/24 0928   Alexa-wound Assessment Pink;Intact;Scar Tissue;Rash 12/30/24 0928   Wound Length (cm) 0.3 cm 12/30/24 0928   Wound Width (cm) 0.1 cm 12/30/24 0928   Wound Depth (cm) 1.4 cm 12/30/24 0928   Wound Surface Area (cm^2) 0.03 cm^2 12/30/24 0928   Wound Volume (cm^3) 0.042 cm^3 12/30/24 0928   Calculated Wound Volume (cm^3) 0.04 cm^3 12/30/24 0928   Change in Wound Size % 99.91 12/30/24 0928   Tunneling 1 2 cm 03/04/24 0822    Tunneling 1 in depth located at 7 o'clock 03/04/24 0822   Number of underminings 1 09/30/24 1445   Undermining 1 1 09/30/24 1445   Undermining 1 is depth extending from 7 o 'clock 09/30/24 1445   Drainage Amount Small 12/30/24 0928   Drainage Description Serous;Yellow 12/30/24 0928   Non-staged Wound Description Full thickness 12/30/24 0928   Treatments Cleansed 11/04/24 0834   Dressing Wound V.A.C. 07/31/23 0859   Wound packed? Yes 10/14/24 0836   Packing- # removed 1 10/14/24 0836   Packing- # inserted 2 07/24/23 0832   Dressing Changed New 07/24/23 0832   Patient Tolerance Tolerated well 09/30/24 1445   Dressing Status Intact 11/04/24 0834             Results from last 6 Months   Lab Units 12/30/24  1000   WOUND CULTURE  Culture too young- will reincubate           Wound Instructions:  Orders Placed This Encounter   Procedures    Wound cleansing and dressings Abdomen     Wound cleansing and dressings Abdomen       Wash your hands with soap and water. Remove abdominal dressing, wash with Normal Saline, pat dry prior to applying new dressing      Apply mupirocin to the base of the wound and to pimply/rashy areas periwound  Change every day and as needed for excessive drainage.   Cover with gauze/ABD Pad and tape     Apply nystatin mixed with zinc as needed to the periwound        Watch for signs of infection these include a fever of 100.4°F (38°C) or higher, chills  Signs of wound infection include increasing swelling, redness, warmth, pain around the wound. Foul smell coming from wound  Go to the closest Emergency Room with signs of infection to be evaluated.      Quit Smoking or cut back as much as possible! As discussed smoking slows the ability for a wound to heal by constricting blood vessels for approximately 30 minutes after each cigarette.     Standing Status:   Future     Expiration Date:   1/6/2025    Wound Procedure Treatment Abdomen     This order was created via procedure documentation    Wound  "culture and Gram stain     Standing Status:   Future     Number of Occurrences:   1     Expected Date:   12/30/2024     Expiration Date:   12/30/2025     Release to patient through Mychart:   Umair Garcia PA-C    Portions of the record may have been created with voice recognition software. Occasional wrong word or \"sound alike\" substitutions may have occurred due to the inherent limitations of voice recognition software. Read the chart carefully and recognize, using context, where substitutions have occurred.    " Periurethral tissue Within Defined Limits (Without tenderness, ecchymosis, abrasions, redness, swelling)

## 2024-12-30 NOTE — PATIENT INSTRUCTIONS
" calling with LMP of 10/22/2017. Patient is transferring to [facility] from [facility] due to an insurance change. States she has not had any prenatal care thus far. Was advised by her physician at [facility] to ""just wait until her new insurance took affect\"" before establishing prenatal care. Patient denies history of preeclampsia and diabetes. There is no height or weight on file to calculate BMI. .  Prenatal vitamins encouraged. RX declined. She is taking an OTC prenatal gummy daily. We discussed the following:    Current medications. Prenatal labs discussed and ordered. Reviewed initial visit with the midwife and provider. Patient accepts educational midwifery visit. Patient would like to see the [facility] group for this pregnancy. Reports her last two pregnancies were managed by her PCP. Both . Reports with her first child her water broke at 34 weeks, otherwise uncomplicated. Does have a hx of abnormal paps about 10 years ago. Does have a hx of multiple colposcopies as well as two LEEP procedures in  and . Last pap was May 2017 which she reports was normal.   Patient reports a hx of situational anxiety and PTSD, she currently is not taking any medications. Does report a hx of alcohol abuse, cocaine use, and marijuana use. 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 Patient does have a history of genital herpes, she takes Acyclovir PRN. Patient feels as though she may have a UTI, informed patient that prenatal lab work does include a urine cultures. Will call once results are available. 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     " Orders Placed This Encounter   Procedures    Wound cleansing and dressings Abdomen     Wound cleansing and dressings Abdomen       Wash your hands with soap and water. Remove abdominal dressing, wash with Normal Saline, pat dry prior to applying new dressing      Apply mupirocin to the base of the wound and to pimply/rashy areas periwound  Change every day and as needed for excessive drainage.   Cover with gauze/ABD Pad and tape     Apply nystatin mixed with zinc as needed to the periwound        Watch for signs of infection these include a fever of 100.4°F (38°C) or higher, chills  Signs of wound infection include increasing swelling, redness, warmth, pain around the wound. Foul smell coming from wound  Go to the closest Emergency Room with signs of infection to be evaluated.      Quit Smoking or cut back as much as possible! As discussed smoking slows the ability for a wound to heal by constricting blood vessels for approximately 30 minutes after each cigarette.     Standing Status:   Future     Expiration Date:   1/6/2025

## 2024-12-30 NOTE — PROGRESS NOTES
Wound Procedure Treatment Abdomen    Performed by: Angeles Le RN  Authorized by: Salma Garcia PA-C    Associated wounds:   Wound 07/10/23 Abdomen  Wound cleansed with:  NSS  Applied Topical: Mupirocin ointment    Applied secondary dressing:  ABD  Dressing secured with:  Tape

## 2025-01-02 ENCOUNTER — TELEPHONE (OUTPATIENT)
Facility: HOSPITAL | Age: 45
End: 2025-01-02

## 2025-01-02 DIAGNOSIS — T81.89XD NON-HEALING SURGICAL WOUND, SUBSEQUENT ENCOUNTER: Primary | ICD-10-CM

## 2025-01-02 LAB
BACTERIA WND AEROBE CULT: ABNORMAL
GRAM STN SPEC: ABNORMAL
GRAM STN SPEC: ABNORMAL

## 2025-01-02 PROCEDURE — NC001 PR NO CHARGE: Performed by: STUDENT IN AN ORGANIZED HEALTH CARE EDUCATION/TRAINING PROGRAM

## 2025-01-02 RX ORDER — SODIUM HYPOCHLORITE 2.5 MG/ML
1 SOLUTION TOPICAL ONCE
Qty: 473 ML | Refills: 0 | Status: SHIPPED | OUTPATIENT
Start: 2025-01-02 | End: 2025-01-02

## 2025-01-02 NOTE — TELEPHONE ENCOUNTER
Called pt regarding results of wound culture. There is significant bacterial colonization. Will restart Dakin soak for 10 minutes daily x 1 week to reduce colonization. Given no cellulitis or abdominal pain will hold off on oral antibiotics for time being.

## 2025-01-13 ENCOUNTER — OFFICE VISIT (OUTPATIENT)
Facility: HOSPITAL | Age: 45
End: 2025-01-13
Payer: COMMERCIAL

## 2025-01-13 VITALS
RESPIRATION RATE: 18 BRPM | TEMPERATURE: 98 F | HEART RATE: 76 BPM | SYSTOLIC BLOOD PRESSURE: 130 MMHG | DIASTOLIC BLOOD PRESSURE: 61 MMHG

## 2025-01-13 DIAGNOSIS — T81.89XD NON-HEALING SURGICAL WOUND, SUBSEQUENT ENCOUNTER: Primary | ICD-10-CM

## 2025-01-13 DIAGNOSIS — Z90.710 S/P HYSTERECTOMY: ICD-10-CM

## 2025-01-13 DIAGNOSIS — Z98.890 S/P HERNIA REPAIR: ICD-10-CM

## 2025-01-13 DIAGNOSIS — Z87.19 S/P HERNIA REPAIR: ICD-10-CM

## 2025-01-13 DIAGNOSIS — F17.200 TOBACCO USE DISORDER: ICD-10-CM

## 2025-01-13 PROCEDURE — G0463 HOSPITAL OUTPT CLINIC VISIT: HCPCS | Performed by: STUDENT IN AN ORGANIZED HEALTH CARE EDUCATION/TRAINING PROGRAM

## 2025-01-13 PROCEDURE — 99213 OFFICE O/P EST LOW 20 MIN: CPT | Performed by: STUDENT IN AN ORGANIZED HEALTH CARE EDUCATION/TRAINING PROGRAM

## 2025-01-13 PROCEDURE — 99214 OFFICE O/P EST MOD 30 MIN: CPT | Performed by: STUDENT IN AN ORGANIZED HEALTH CARE EDUCATION/TRAINING PROGRAM

## 2025-01-13 RX ORDER — CIPROFLOXACIN 500 MG/1
500 TABLET, FILM COATED ORAL EVERY 12 HOURS SCHEDULED
Qty: 28 TABLET | Refills: 0 | Status: SHIPPED | OUTPATIENT
Start: 2025-01-13 | End: 2025-01-27

## 2025-01-13 NOTE — PATIENT INSTRUCTIONS
Orders Placed This Encounter   Procedures    Wound cleansing and dressings Abdomen     Wash your hands with soap and water. Remove abdominal dressing, wash with Normal Saline, pat dry prior to applying new dressing      Apply betadine moistened packing into wound, tape a tail of the packing to the outside of the wound  Apply nystatin mixed with zinc as needed to the periwound  Cover with gauze/ABD Pad and tape  Change every day and as needed for excessive drainage.         Watch for signs of infection these include a fever of 100.4°F (38°C) or higher, chills  Signs of wound infection include increasing swelling, redness, warmth, pain around the wound. Foul smell coming from wound  Go to the closest Emergency Room with signs of infection to be evaluated.      Quit Smoking or cut back as much as possible! As discussed smoking slows the ability for a wound to heal by constricting blood vessels for approximately 30 minutes after each cigarette.     Standing Status:   Future     Expected Date:   1/13/2025     Expiration Date:   1/27/2025

## 2025-01-13 NOTE — PROGRESS NOTES
Patient ID: Maria R Webb is a 44 y.o. female Date of Birth 1980       Chief Complaint   Patient presents with    Follow Up Wound Care Visit       Allergies:  Patient has no known allergies.    Diagnosis:   Diagnosis ICD-10-CM Associated Orders   1. Non-healing surgical wound, subsequent encounter  T81.89XD Wound cleansing and dressings Abdomen     Wound Procedure Treatment Abdomen     ciprofloxacin (CIPRO) 500 mg tablet      2. S/P hernia repair  Z98.890 ciprofloxacin (CIPRO) 500 mg tablet    Z87.19       3. S/P hysterectomy  Z90.710 ciprofloxacin (CIPRO) 500 mg tablet      4. Tobacco use disorder  F17.200            Assessment  & Plan:    Follow-up surgical wound of the midline abdomen status post hysterectomy complicated by fascial dehiscence with subsequent abdominal herniation repair with phasic's mesh.  Continues to have midline small open draining sinus tract.  Unable to visualize the base of the wound due to the depth.  Drainage appears serous.  Periwound with scar tissue but no significant erythema.  Betadine coated packing to the abdominal wound.  Change daily.  Discussed case with Dr. Conroy who feels as though it is reasonable to prescribe a course of antibiotic therapy to assist with odor control.  Will prescribe ciprofloxacin based on recent culture results.  Patient is no longer taking prednisone.  Discussed that there is a small chance of tendon rupture therefore would avoid vigorous activity.  Most recent EKG with normal sinus rhythm and QTc within normal range.  Discussed if she experiences any racing heartbeat, heart palpitations, chest pain she is to discontinue use of antibiotics immediately and present to the emergency room.  Has upcoming appointment with plastic surgery for discussion of revision operation (component separation repair) to definitively close the wound.  Would benefit from smoking cessation.  Monitor for any fevers, chills, malaise, nausea, vomiting, worsening malodor,  "abdominal pain, surrounding redness.  Notify office or report to the emergency room should these occur.  F/u in 2 weeks. Instructed to call if any questions or concerns arise in meantime.          Subjective:   3/11/2024: Maria R is a 43-year-old female who is presenting to wound center today for follow-up of abdominal wound s/p hysterectomy complicated by fascial dehiscence and abdominal herniation that required incisional hernia repair w/ mesh.  Has been following with wound center since January '23.  Per wound center visit on 2/5/2024 \"saw Dr. Conroy, general surgery since prior visit whom did not feel as though any further medication/imaging/intervention was necessary at this time and would like pt to continue with local wound care and follow up with him in 3 months.\" At last wound center visit, was changed to Dakins soaked packing after she was experiencing green drainage.  She was instructed to resume silver gel coated packing once the green drainage resolved.  She reports that she has been utilizing Dakin soaked packing over the past week and had not switched to green drainage though this seemed to resolve a few days ago. Of note, is to have recently started Ozempic for weight reduction.  Currently on Z-Dallin and prednisone for bronchitis.  She denies fever and/or chills.  Continues with cough and shortness of breath but no acute worsening.    03/18/24: Pt presents for f/u surgical abdominal wound dehiscence s/p hysterectomy complicated by fascial dehiscence and abdominal herniation requiring incision hernia repair with mesh complicated by further dehiscence. Pt notes that Dr. Lopez was able to surgically debride a piece of remaining mesh at her visit last week and opted to continue with Dakin soaked packing giving significant improvement in the wound with its use. No nausea, vomiting, severe abdominal pain, change in bowel habits nor fevers/chills. Continuing to smoke about the same amount.     04/08/24: Pt " presents for f/u surgical abdominal wound dehiscence. Since her previous visit she has had f/u with Dr. Conroy, general surgery. No further f/u with general surgery was required, pt is to continue with wound care but may f/u with surgery if needed. Pt reports she noticed a slight increase in the odor related to the drainage the other day so she used Dakins and it went away. She ran out of the AMD packing therefore returned to use of the silver gel coated packing. Is feeling well. Has started Ozempic and has noticed a 3 lb weight loss already. Smoking about the same amount. No fevers, chills.     04/15/24: Pt presents for wound related to surgical site dehiscence of the abdomen. Has been using endoform am packing into the remaining opening. Has not had a return of green or foul smelling drainage since her previous visit. Offers no complaints today. No significant changes in medical history since prior visit.     04/29/24: Pt presents for f/u wound related to surgical site dehiscence of the abdomen. Is using endoform am but has noticed a more obvious odor since switching from use of silver gel to the endoform. Drainage is tan in color. Aside from the odor of the drainage offers no complaints today.     05/06/24: Pt presents for f/u wound related to surgical site dehiscence of the abdomen. Has been using gentamicin ointment; packing is no longer staying in well. The odor from the drainage has improved from the prior visit. Offers no complaints today.     05/20/24: Pt presents for f/u wound related to surgical site dehiscence of the abdomen. She has continued with gentamicin ointment three times daily. The wound continues to drain, she has not noticed a return of odor or odd coloration to the drainage. Is continuing to keep the site covered. Denies abdominal pain, N/V, change in bowel movements, fevers, chils.     06/03/24: Pt presents for f/u wound secondary to surgical site dehiscence of the abdomen. Currently  Medihoney is being used. ABD is being used to cover the site. There continues to be small-moderate drainage without any foul smell or green discoloration of the drainage. Pt offers no complaints today.     06/24/24: Pt presents for f/u wound secondary to surgical site dehiscence of the abdomen. She is currently using Medihoney. She believes there is less drainage. Current drainage on the pad is honey-colored therefore it is difficult to tell if it is truly drainage or the Medihoney being placed into the wound. No significant changes since past visit. She is inquiring if she can start Humira again for her RA; was on hold d/t wound.     07/01/24: Patient presents for follow-up of wound secondary to surgical site dehiscence of the abdomen.  Midweek patient began to notice an odor again to her abdominal wound therefore she resumed use of Medihoney to the wound bed and zinc to the periwound.  Today when the dressing was taken off there was a significant amount of blood on the overlying dressing.  Patient does not report noticing any significant bleeding as of last night.  She does state that her activity level was significantly increased from her typical levels this past week due to being short staffed at work as well as doing projects around the house.  Has not noticed any significant changes in terms of pain, change in bowel movements, fevers or chills.      07/08/24: Pt presents for f/u wound secondary to surgical site dehiscence of the abdomen. She has been packing the wound with Iodoform packing and using medihoney to the cracks of periwound skin. Had a brief return of green drainage however this resolved quickly. Saw rheumatology since her previous visit and is going to be reinitiated on Humira following pre-approval process as long as she gets clearance from wound care perspective.      07/22/24: Pt presents for f/u wound secondary to surgical site dehiscence of the abdomen. Has been using Iodoform packing however  "it is barely fitting and is not staying in place well. She reports being more sweaty this week working on some painting in her house and has noticed a rash of her surrounding skin. She tired to apply Medihoney since that has calmed things down in the past however even with application of Medihoney her surrounding skin still appears red/angry.       07/29/24: Pt presents for f/u wound secondary to surgical site dehiscence of the abdomen. Nystatin and zinc is being used to periwound which has significantly improved the skin around the wound. She has not started Humira yet, is still awaiting insurance approval.      08/05/24: Patient presents for follow-up abdominal wound secondary to surgical site dehiscence and exposed mesh. She trialed not using the Medihoney to see if the wound was still draining and had a small amount of drainage coming from the wound still. Reports she was approved for Humira and is wondering if it would be appropriate to start this again with her open wound not fully healed yet.     08/19/24: Pt presents for f/u abdominal wound secondary to surgical site dehiscence (hysterectomy complicated by fascial dehiscence and abdominal herniation requiring incision hernia repair with mesh complicated by further dehiscence). She notes that since her previous visit she had another episode of significant bleeding from her abdominal. It resolved after a few hours. She reports wearing a waterproof bandage instead of using the ABD leading to a return of a rash on her abdomen. Is wondering if additional abdominal imaging is necessary.       08/26/24: HPI per covering provider, \"Patient presents to wound care center for follow-up visit of surgical abdominal wound.  Patient states that she recently noted small amount of blue-green drainage from wound.  Patient states when this happens that she has been instructed to pack the wound with Dakin's moistened plain packing daily until the discoloration to the drainage " "stops. Patient has been applying betadine to the wound and then silver alginate to the wound daily prior to restarting the dakin's.  Patient denies any episodes of bleeding.  Denies increased pain and state drainage amount has been the same, just noted change in color.  No fever or chills. Patient reports that she is unable to follow at the wound center any other day by Monday due to her work schedule.\"     09/09/24: Pt presents for f/u chronic abdominal wound following surgical site dehiscence.  She reports that every time she has tried to stop using the Dakin solution there is a return of green drainage. Has continued with barrier cream to periwound. Has not yet started her Humira d/t infection concerns with her open wound and being on a biologic. No alarm sx, denies fevers, chills, N/V. Is continuing to have regular BM.     09/16/24: Pt presents for f/u chronic draining abdominal wound following surgical site dehiscence. She has been using gentamicin ointment at least twice daily this past week and has noticed an improvement in the color/quality/amount of drainage. Periwound rash has been improving as well. No concerning sx.     09/30/24: Pt presents for f/u chronic draining abdominal wound. Has switched to using silver gel following completion of therapy with gentamicin ointment. Some increased drainage present. Overall no significant changes in PMHx since prior visit.     10/14/24: pt presents for f/u chronic draining abdominal wound. She has been utilizing Hydrofera blue classic foam cut into packing strips since her previous visit. She reports that she had a gastrointestinal virus since her previous visit with coughing and vomiting which caused a significant amount of bloody drainage from her abdominal wound. The bloody drainage has since resolved but it did concern her.     11/04/24: Pt presents for f/u chronic draining abdominal wound. Since her previous visit she had a f/u with Dr. Conroy whom is " recommending additional bloodwork and CT of the abdomen and pelvis for additional investigation given the remaining sinus tract. Follow results of CT, Dr. Conroy plans to reach out to his trauma surgery colleagues for a second surgical opinion. Pt reports returning to use of silver gel application and is continuing to get a drainage from the remaining sinus tract. She is considering going back on her Humira following the results of her CT scan and bloodwork.     11/25/24: Pt presents for f/u chronic draining abdominal wound. Since her previous visit she had a f/u with general surgery, Dr. Conroy, at which time it was discussed that she is a candidate for component separation repair with aid of plastic surgery to definitely close the lower midline wound. She has been referred to plastic surgery for further discussion of a future operation with Dr. Hernandez. In the meantime she is clinical stable and will continue with local wound care. She has plans to restart Humira for her RA.     12/30/24: Pt presents for f/u chronic draining abdominal wound. She report since her previous visit she noticed a worsening malodor to the drainage and used betadine to the wound for a few days which resolved the odor. She has an upcoming appointment with plastic surgery on 02/05/25 to discuss revision operation. Has not yet restarted her Humira which is still in pre-approval process.       01/13/25: Pt presents for continued monitoring of her chronic draining abdominal wound. She has been using the topical Dakin soaks but continues to notice malodor associated with drainage.  Denies fevers, chills, malaise, nausea, vomiting, change in bowel movements.  Abdomen is numb therefore she cannot detect any appreciable change in abdominal pain.            The following portions of the patient's history were reviewed and updated as appropriate:   Patient Active Problem List   Diagnosis    Tobacco use disorder    COVID-19    Obesity, morbid,  BMI 50 or higher (HCC)    Bulky or enlarged uterus    Pelvic pain    Preoperative exam for gynecologic surgery    HTN (hypertension)    Gastroesophageal reflux disease    Asthma    Obstructive sleep apnea syndrome    S/P hysterectomy    Postoperative anemia    Rheumatoid arthritis involving multiple sites with positive rheumatoid factor (HCC)    Encounter for postoperative care    Open wound of abdomen    Surgical wound, non healing    Cigarette nicotine dependence without complication     Past Medical History:   Diagnosis Date    Abdominal wall abscess at site of surgical wound 2023    Abnormal uterine bleeding due to intramural leiomyoma 2022    Arthritis     Rheumatoid    Asthma     Breathing difficulty     only occured during inverted robotic hysterectomy    Cellulitis of drainage site following surgery 2023    CPAP (continuous positive airway pressure) dependence     GERD (gastroesophageal reflux disease)     Hypertension     Obese     Pneumonia 2007    Sleep apnea     Uterine fibroid     LTH today 12/15/2022    Wound dehiscence, surgical 2022     Past Surgical History:   Procedure Laterality Date    ABDOMINAL WASHOUT      post  with wound vac     SECTION      had hematoma removed after the surgery    FOREARM SURGERY Right     repair of fracture with plating    INCISIONAL HERNIA REPAIR  2022    Procedure: REPAIR  RECURRENT HERNIA INCISIONAL WITH MESH;  Surgeon: Donna Lau MD;  Location: [facility];  Service: Gynecology    IR DRAINAGE TUBE CHECK WITH SCLEROSIS  2/3/2023    IR DRAINAGE TUBE CHECK WITH SCLEROSIS  3/7/2023    IR DRAINAGE TUBE CHECK/CHANGE/REPOSITION/REINSERTION/UPSIZE  2023    IR DRAINAGE TUBE CHECK/CHANGE/REPOSITION/REINSERTION/UPSIZE  2023    IR DRAINAGE TUBE CHECK/CHANGE/REPOSITION/REINSERTION/UPSIZE  2023    IR DRAINAGE TUBE CHECK/CHANGE/REPOSITION/REINSERTION/UPSIZE  2023    IR DRAINAGE TUBE PLACEMENT  2023     LAPAROTOMY N/A 12/20/2022    Procedure: LAPAROTOMY EXPLORATORY;  Surgeon: Donna Lau MD;  Location: [facility];  Service: Gynecology    MYRINGOTOMY W/ TUBES      two sets as a young child    KY CYSTOURETHROSCOPY N/A 12/15/2022    Procedure: CYSTO W/ ROBOT;  Surgeon: Donna Lau MD;  Location: [facility];  Service: Gynecology    KY LAPS TOTAL HYSTERECT 250 GM/< W/RMVL TUBE/OVARY N/A 12/15/2022    Procedure: (LTH) W/ BILATERAL SALPINGECTOMY  W/ ROBOT COVERTED TO OPEN;  Surgeon: Donna Lau MD;  Location: [facility];  Service: Gynecology    VAC DRESSING APPLICATION N/A 3/17/2023    Procedure: APPLICATION VAC DRESSING ABDOMEN/TRUNK;  Surgeon: Chan Conroy MD;  Location: [facility];  Service: General    WOUND DEBRIDEMENT N/A 3/15/2023    Procedure: DEBRIDEMENT Abdominal WOUND and placement of Wound VAC;  Surgeon: Chan Conroy MD;  Location: [facility];  Service: General    WOUND DEBRIDEMENT N/A 3/17/2023    Procedure: DEBRIDEMENT WOUND (WASH OUT);  Surgeon: Chan Conroy MD;  Location: [facility];  Service: General     Family History   Problem Relation Age of Onset    Multiple sclerosis Mother     Hypertension Father     Hyperlipidemia Father     Cerebral aneurysm Father     Pulmonary embolism Father     Liver disease Brother      Social History     Socioeconomic History    Marital status:      Spouse name: None    Number of children: None    Years of education: None    Highest education level: None   Occupational History    None   Tobacco Use    Smoking status: Every Day     Current packs/day: 0.50     Average packs/day: 0.5 packs/day for 31.0 years (15.5 ttl pk-yrs)     Types: Cigarettes     Start date: 1995    Smokeless tobacco: Never   Vaping Use    Vaping status: Never Used   Substance and Sexual Activity    Alcohol use: Not Currently     Alcohol/week: 1.0 standard drink of alcohol     Types: 1 Standard drinks or equivalent per week     Comment: 3 x monthly    Drug  use: Never    Sexual activity: Yes     Partners: Male     Birth control/protection: Male Sterilization   Other Topics Concern    None   Social History Narrative    None     Social Drivers of Health     Financial Resource Strain: Not on file   Food Insecurity: No Food Insecurity (3/16/2023)    Hunger Vital Sign     Worried About Running Out of Food in the Last Year: Never true     Ran Out of Food in the Last Year: Never true   Transportation Needs: No Transportation Needs (3/16/2023)    PRAPARE - Transportation     Lack of Transportation (Medical): No     Lack of Transportation (Non-Medical): No   Physical Activity: Not on file   Stress: Not on file   Social Connections: Not on file   Intimate Partner Violence: Not on file   Housing Stability: Low Risk  (3/16/2023)    Housing Stability Vital Sign     Unable to Pay for Housing in the Last Year: No     Number of Places Lived in the Last Year: 1     Unstable Housing in the Last Year: No       Current Outpatient Medications:     ciprofloxacin (CIPRO) 500 mg tablet, Take 1 tablet (500 mg total) by mouth every 12 (twelve) hours for 14 days, Disp: 28 tablet, Rfl: 0    albuterol (2.5 mg/3 mL) 0.083 % nebulizer solution, Take 2.5 mg by nebulization every 4 (four) hours as needed for shortness of breath or wheezing, Disp: , Rfl:     albuterol (ProAir HFA) 90 mcg/act inhaler, Inhale 2 puffs every 6 (six) hours as needed for wheezing or shortness of breath, Disp: 8.5 g, Rfl: 0    albuterol (PROVENTIL HFA,VENTOLIN HFA) 90 mcg/act inhaler, Inhale 2 puffs every 6 (six) hours as needed for wheezing, Disp: 8 g, Rfl: 2    amoxicillin (AMOXIL) 500 mg capsule, , Disp: , Rfl:     Arthritis Pain Relief 650 MG CR tablet, take 1 tablet by mouth every 8 hours if needed for mild pain (Patient not taking: Reported on 3/7/2024), Disp: , Rfl:     aspirin 325 mg tablet, Take 325 mg by mouth daily. Indications: as needed (Patient not taking: Reported on 3/7/2024), Disp: , Rfl:     chlorhexidine  (HIBICLENS) 4 % external liquid, Apply 1 Application topically daily as needed for wound care Can use to clean wound during VAC changes. (Patient not taking: Reported on 1/29/2024), Disp: 473 mL, Rfl: 5    clonazePAM (KlonoPIN) 0.5 mg tablet, Take 0.5 mg by mouth 2 (two) times a day as needed (Patient not taking: Reported on 1/29/2024), Disp: , Rfl:     clotrimazole (LOTRIMIN) 1 % cream, Apply topically 2 (two) times a day for 14 days To rash of abdomen, Disp: 28 g, Rfl: 1    cyclobenzaprine (FLEXERIL) 5 mg tablet, Take 1 tablet (5 mg total) by mouth 3 (three) times a day as needed for muscle spasms (Patient taking differently: Take 5 mg by mouth 3 (three) times a day as needed for muscle spasms As needed), Disp: 60 tablet, Rfl: 0    dulaglutide (Trulicity) 0.75 MG/0.5ML injection, Inject 0.75 mg under the skin Once a week (Patient not taking: Reported on 1/29/2024), Disp: , Rfl:     escitalopram (LEXAPRO) 10 mg tablet, Take 10 mg by mouth daily, Disp: , Rfl:     escitalopram (LEXAPRO) 5 mg tablet, Take 5 mg by mouth daily, Disp: , Rfl:     fluticasone (FLONASE) 50 mcg/act nasal spray, 2 sprays into each nostril daily, Disp: 16 g, Rfl: 4    Fluticasone-Salmeterol (Advair) 250-50 mcg/dose inhaler, , Disp: , Rfl:     Fluticasone-Salmeterol (Advair) 500-50 mcg/dose inhaler, Inhale 1 puff 2 (two) times a day, Disp: , Rfl:     folic acid (FOLVITE) 1 mg tablet, Take by mouth daily, Disp: , Rfl:     gabapentin (NEURONTIN) 100 mg capsule, Take 1 capsule (100 mg total) by mouth 3 (three) times a day (Patient not taking: Reported on 3/7/2024), Disp: 90 capsule, Rfl: 0    gabapentin (NEURONTIN) 300 mg capsule, Take 300 mg by mouth 3 (three) times a day, Disp: , Rfl:     gentamicin (GARAMYCIN) 0.1 % ointment, Apply topically 3 (three) times a day for 7 days To packing and place into open wound, Disp: 30 g, Rfl: 1    hydrochlorothiazide (HYDRODIURIL) 12.5 mg tablet, Take 12.5 mg by mouth daily Pt only  taking as needed for leg  swelling (Patient not taking: Reported on 3/7/2024), Disp: , Rfl:     ibuprofen (MOTRIN) 600 mg tablet, Take 600 mg by mouth every 6 (six) hours as needed for moderate pain As needed, Disp: , Rfl:     methocarbamol (ROBAXIN) 500 mg tablet, Take 1 tablet (500 mg total) by mouth every 6 (six) hours for 14 days (Patient taking differently: Take 500 mg by mouth every 6 (six) hours As needed), Disp: 56 tablet, Rfl: 0    methotrexate 2.5 mg tablet, Four 2.5 tabs one time a week ( Every Saturday), Disp: , Rfl:     montelukast (SINGULAIR) 10 mg tablet, Take by mouth daily at bedtime as needed, Disp: , Rfl:     mupirocin (BACTROBAN) 2 % ointment, Apply topically daily for 14 days To wound and areas of folliculitis, Disp: 30 g, Rfl: 1    naloxone (NARCAN) 4 mg/0.1 mL nasal spray, Administer 1 spray into a nostril. If no response after 2-3 minutes, give another dose in the other nostril using a new spray. (Patient not taking: Reported on 4/14/2023), Disp: 1 each, Rfl: 1    neomycin-polymyxin-hydrocortisone (CORTISPORIN) otic solution, Administer 3 drops into the left ear every 6 (six) hours (Patient not taking: Reported on 1/29/2024), Disp: , Rfl:     nicotine (NICODERM CQ) 14 mg/24hr TD 24 hr patch, Place 1 patch on the skin every 24 hours (Patient not taking: Reported on 6/2/2023), Disp: 28 patch, Rfl: 3    nystatin (MYCOSTATIN) powder, Apply topically 2 (two) times a day for 14 days To periwound mixed with barrier cream, Disp: 30 g, Rfl: 1    omeprazole (PriLOSEC) 40 MG capsule, Take 40 mg by mouth daily, Disp: , Rfl:     ondansetron (ZOFRAN) 4 mg tablet, Take 4 mg by mouth daily as needed, Disp: , Rfl:     semaglutide, 0.25 or 0.5 mg/dose, (Ozempic) 2 mg/3 mL injection pen, Inject 0.5 mg under the skin Once a week (Patient not taking: Reported on 1/29/2024), Disp: , Rfl:     sodium chloride, PF, 0.9 %, Inject 10 mL into a catheter in a vein 3 (three) times a week Wound irrigation. (Patient not taking: Reported on  1/29/2024), Disp: 100 mL, Rfl: 10    Vitamin D, Ergocalciferol, 32190 units CAPS, Take by mouth once a week, Disp: , Rfl:     Review of Systems   Constitutional:  Negative for chills and fever.   Gastrointestinal:  Negative for abdominal pain, constipation, diarrhea, nausea and vomiting.   Musculoskeletal:  Positive for arthralgias.   Skin:  Positive for wound (open surgical wound abdomen).   Psychiatric/Behavioral:  Negative for agitation.          Objective:  /61   Pulse 76   Temp 98 °F (36.7 °C)   Resp 18   LMP 11/23/2022 (Exact Date) Comment: partial        Physical Exam  Vitals reviewed.   Constitutional:       General: She is not in acute distress.     Appearance: She is morbidly obese.   HENT:      Head: Normocephalic and atraumatic.   Cardiovascular:      Rate and Rhythm: Normal rate.   Pulmonary:      Effort: Pulmonary effort is normal. No respiratory distress.   Abdominal:          Comments: Continues to have a small sinus tract that remains open. Barely visible. Unable to visualize base of wound due to depth.  Small amount of drainage is present.  No purulence.  Periwound skin with scar tissue without any significant erythema (photo in media tab appears more erythematous than visual inspection in person).   Skin:     Findings: Rash and wound present. Rash is pustular.   Neurological:      Mental Status: She is alert and oriented to person, place, and time.   Psychiatric:         Mood and Affect: Mood normal.         Behavior: Behavior normal.         Wound 07/10/23 Abdomen (Active)   Wound Image   01/13/25 0823   Wound Description EILEEN 01/13/25 0824   Alexa-wound Assessment Pink;Intact;Scar Tissue;Rash 01/13/25 0824   Wound Length (cm) 0.1 cm 01/13/25 0824   Wound Width (cm) 0.1 cm 01/13/25 0824   Wound Depth (cm) 1.2 cm 01/13/25 0824   Wound Surface Area (cm^2) 0.01 cm^2 01/13/25 0824   Wound Volume (cm^3) 0.012 cm^3 01/13/25 0824   Calculated Wound Volume (cm^3) 0.01 cm^3 01/13/25 0824   Change  in Wound Size % 99.98 01/13/25 0824   Tunneling 1 2 cm 03/04/24 0822   Tunneling 1 in depth located at 7 o'clock 03/04/24 0822   Number of underminings 1 09/30/24 1445   Undermining 1 1 09/30/24 1445   Undermining 1 is depth extending from 7 o 'clock 09/30/24 1445   Drainage Amount Small 01/13/25 0824   Drainage Description Brown;Serosanguineous 01/13/25 0824   Non-staged Wound Description Full thickness 12/30/24 0928   Treatments Cleansed 11/04/24 0834   Dressing Wound V.A.C. 07/31/23 0859   Wound packed? Yes 10/14/24 0836   Packing- # removed 1 10/14/24 0836   Packing- # inserted 2 07/24/23 0832   Dressing Changed New 07/24/23 0832   Patient Tolerance Tolerated well 09/30/24 1445   Dressing Status Intact 11/04/24 0834             Results from last 6 Months   Lab Units 12/30/24  1000   WOUND CULTURE  3+ Growth of Enterococcus faecalis*  2+ Growth of Serratia marcescens*  2+ Growth of Pseudomonas aeruginosa*           Wound Instructions:  Orders Placed This Encounter   Procedures    Wound cleansing and dressings Abdomen     Wash your hands with soap and water. Remove abdominal dressing, wash with Normal Saline, pat dry prior to applying new dressing      Apply betadine moistened packing into wound, tape a tail of the packing to the outside of the wound  Apply nystatin mixed with zinc as needed to the periwound  Cover with gauze/ABD Pad and tape  Change every day and as needed for excessive drainage.         Watch for signs of infection these include a fever of 100.4°F (38°C) or higher, chills  Signs of wound infection include increasing swelling, redness, warmth, pain around the wound. Foul smell coming from wound  Go to the closest Emergency Room with signs of infection to be evaluated.      Quit Smoking or cut back as much as possible! As discussed smoking slows the ability for a wound to heal by constricting blood vessels for approximately 30 minutes after each cigarette.     Standing Status:   Future      "Expected Date:   1/13/2025     Expiration Date:   1/27/2025    Wound Procedure Treatment Abdomen     This order was created via procedure documentation       Cally Garcia, PA-C      Portions of the record may have been created with voice recognition software. Occasional wrong word or \"sound alike\" substitutions may have occurred due to the inherent limitations of voice recognition software. Read the chart carefully and recognize, using context, where substitutions have occurred.      "

## 2025-01-13 NOTE — PROGRESS NOTES
Wound Procedure Treatment Abdomen    Performed by: Xin Camacho RN  Authorized by: Salma Garcia PA-C    Associated wounds:   Wound 07/10/23 Abdomen  Wound cleansed with:  NSS  Applied primary dressing:  Packing  Applied secondary dressing:  ABD  Dressing secured with:  Tape  Comments:  Betadine moistened packing

## 2025-01-27 ENCOUNTER — OFFICE VISIT (OUTPATIENT)
Facility: HOSPITAL | Age: 45
End: 2025-01-27
Payer: COMMERCIAL

## 2025-01-27 VITALS
SYSTOLIC BLOOD PRESSURE: 155 MMHG | DIASTOLIC BLOOD PRESSURE: 70 MMHG | RESPIRATION RATE: 18 BRPM | TEMPERATURE: 96.5 F | HEART RATE: 75 BPM

## 2025-01-27 DIAGNOSIS — Z98.890 S/P HERNIA REPAIR: ICD-10-CM

## 2025-01-27 DIAGNOSIS — Z90.710 S/P HYSTERECTOMY: ICD-10-CM

## 2025-01-27 DIAGNOSIS — T81.89XD NON-HEALING SURGICAL WOUND, SUBSEQUENT ENCOUNTER: Primary | ICD-10-CM

## 2025-01-27 DIAGNOSIS — Z87.19 S/P HERNIA REPAIR: ICD-10-CM

## 2025-01-27 DIAGNOSIS — F17.200 TOBACCO USE DISORDER: ICD-10-CM

## 2025-01-27 PROCEDURE — 99214 OFFICE O/P EST MOD 30 MIN: CPT | Performed by: STUDENT IN AN ORGANIZED HEALTH CARE EDUCATION/TRAINING PROGRAM

## 2025-01-27 PROCEDURE — G0463 HOSPITAL OUTPT CLINIC VISIT: HCPCS | Performed by: STUDENT IN AN ORGANIZED HEALTH CARE EDUCATION/TRAINING PROGRAM

## 2025-01-27 PROCEDURE — 99213 OFFICE O/P EST LOW 20 MIN: CPT | Performed by: STUDENT IN AN ORGANIZED HEALTH CARE EDUCATION/TRAINING PROGRAM

## 2025-01-27 NOTE — PATIENT INSTRUCTIONS
Orders Placed This Encounter   Procedures    Wound cleansing and dressings Abdomen     Wound cleansing and dressings Abdomen   Wash your hands with soap and water. Remove abdominal dressing, wash with Normal Saline, pat dry prior to applying new dressing      Apply medihoney into wound base  Apply nystatin mixed with zinc as needed to the periwound  Cover with gauze/ABD Pad and tape  Change every day and as needed for excessive drainage.        Watch for signs of infection these include a fever of 100.4°F (38°C) or higher, chills  Signs of wound infection include increasing swelling, redness, warmth, pain around the wound. Foul smell coming from wound  Go to the closest Emergency Room with signs of infection to be evaluated.      Quit Smoking or cut back as much as possible! As discussed smoking slows the ability for a wound to heal by constricting blood vessels for approximately 30 minutes after each cigarette.     Standing Status:   Future     Expiration Date:   2/3/2025

## 2025-01-27 NOTE — PROGRESS NOTES
Wound Procedure Treatment Abdomen    Performed by: Angeles Le RN  Authorized by: Salma Garcia PA-C    Associated wounds:   Wound 07/10/23 Abdomen  Wound cleansed with:  NSS  Applied Topical: Medihoney gel    Applied secondary dressing:  ABD  Dressing secured with:  Tape

## 2025-01-27 NOTE — PROGRESS NOTES
"Patient ID: Maria R Webb is a 44 y.o. female Date of Birth 1980       Chief Complaint   Patient presents with    Follow Up Wound Care Visit     Abdomen wound       Allergies:  Patient has no known allergies.    Diagnosis:   Diagnosis ICD-10-CM Associated Orders   1. Non-healing surgical wound, subsequent encounter  T81.89XD Wound cleansing and dressings Abdomen     Wound Procedure Treatment Abdomen      2. S/P hernia repair  Z98.890     Z87.19       3. S/P hysterectomy  Z90.710       4. Tobacco use disorder  F17.200            Assessment  & Plan:    F/u surgical wound of the midline abdomen s/p hysterectomy complicated by fascial dehiscence with subsequent abdominal herniation repair with phasic's mesh. Small midline open draining sinus tract. No appreciable drainage with visualization today, however depth of wound limits visual exam. There are a few scattered areas of partial thickness skin breakdown surrounding the sinus tract.   Medihoney to midline wound and surrounding areas of skin breakdown.   Complete course of Ciprofloxacin as prescribed. Notify office of any SE with use of the medication (racing heart rate, heart palpitations).    Has upcoming appointment with plastic surgery for discussion of revision operation (component separation repair) to definitively close the wound. Suggested discussing Humira with plastic surgery (is awaiting prior authorization but may be best to wait until after her component separation repair).   F/u in 2 weeks. Instructed to call if any questions or concerns arise in meantime.            Subjective:   3/11/2024: Maria R is a 43-year-old female who is presenting to wound center today for follow-up of abdominal wound s/p hysterectomy complicated by fascial dehiscence and abdominal herniation that required incisional hernia repair w/ mesh.  Has been following with wound center since January '23.  Per wound center visit on 2/5/2024 \"saw Dr. Conroy, general surgery since " "prior visit whom did not feel as though any further medication/imaging/intervention was necessary at this time and would like pt to continue with local wound care and follow up with him in 3 months.\" At last wound center visit, was changed to Dakins soaked packing after she was experiencing green drainage.  She was instructed to resume silver gel coated packing once the green drainage resolved.  She reports that she has been utilizing Dakin soaked packing over the past week and had not switched to green drainage though this seemed to resolve a few days ago. Of note, is to have recently started Ozempic for weight reduction.  Currently on Z-Dallin and prednisone for bronchitis.  She denies fever and/or chills.  Continues with cough and shortness of breath but no acute worsening.    03/18/24: Pt presents for f/u surgical abdominal wound dehiscence s/p hysterectomy complicated by fascial dehiscence and abdominal herniation requiring incision hernia repair with mesh complicated by further dehiscence. Pt notes that Dr. Lopez was able to surgically debride a piece of remaining mesh at her visit last week and opted to continue with Dakin soaked packing giving significant improvement in the wound with its use. No nausea, vomiting, severe abdominal pain, change in bowel habits nor fevers/chills. Continuing to smoke about the same amount.     04/08/24: Pt presents for f/u surgical abdominal wound dehiscence. Since her previous visit she has had f/u with Dr. Conroy, general surgery. No further f/u with general surgery was required, pt is to continue with wound care but may f/u with surgery if needed. Pt reports she noticed a slight increase in the odor related to the drainage the other day so she used Dakins and it went away. She ran out of the AMD packing therefore returned to use of the silver gel coated packing. Is feeling well. Has started Ozempic and has noticed a 3 lb weight loss already. Smoking about the same amount. No " fevers, chills.     04/15/24: Pt presents for wound related to surgical site dehiscence of the abdomen. Has been using endoform am packing into the remaining opening. Has not had a return of green or foul smelling drainage since her previous visit. Offers no complaints today. No significant changes in medical history since prior visit.     04/29/24: Pt presents for f/u wound related to surgical site dehiscence of the abdomen. Is using endoform am but has noticed a more obvious odor since switching from use of silver gel to the endoform. Drainage is tan in color. Aside from the odor of the drainage offers no complaints today.     05/06/24: Pt presents for f/u wound related to surgical site dehiscence of the abdomen. Has been using gentamicin ointment; packing is no longer staying in well. The odor from the drainage has improved from the prior visit. Offers no complaints today.     05/20/24: Pt presents for f/u wound related to surgical site dehiscence of the abdomen. She has continued with gentamicin ointment three times daily. The wound continues to drain, she has not noticed a return of odor or odd coloration to the drainage. Is continuing to keep the site covered. Denies abdominal pain, N/V, change in bowel movements, fevers, chils.     06/03/24: Pt presents for f/u wound secondary to surgical site dehiscence of the abdomen. Currently Medihoney is being used. ABD is being used to cover the site. There continues to be small-moderate drainage without any foul smell or green discoloration of the drainage. Pt offers no complaints today.     06/24/24: Pt presents for f/u wound secondary to surgical site dehiscence of the abdomen. She is currently using Medihoney. She believes there is less drainage. Current drainage on the pad is honey-colored therefore it is difficult to tell if it is truly drainage or the Medihoney being placed into the wound. No significant changes since past visit. She is inquiring if she can start  Humira again for her RA; was on hold d/t wound.     07/01/24: Patient presents for follow-up of wound secondary to surgical site dehiscence of the abdomen.  Midweek patient began to notice an odor again to her abdominal wound therefore she resumed use of Medihoney to the wound bed and zinc to the periwound.  Today when the dressing was taken off there was a significant amount of blood on the overlying dressing.  Patient does not report noticing any significant bleeding as of last night.  She does state that her activity level was significantly increased from her typical levels this past week due to being short staffed at work as well as doing projects around the house.  Has not noticed any significant changes in terms of pain, change in bowel movements, fevers or chills.      07/08/24: Pt presents for f/u wound secondary to surgical site dehiscence of the abdomen. She has been packing the wound with Iodoform packing and using medihoney to the cracks of periwound skin. Had a brief return of green drainage however this resolved quickly. Saw rheumatology since her previous visit and is going to be reinitiated on Humira following pre-approval process as long as she gets clearance from wound care perspective.      07/22/24: Pt presents for f/u wound secondary to surgical site dehiscence of the abdomen. Has been using Iodoform packing however it is barely fitting and is not staying in place well. She reports being more sweaty this week working on some painting in her house and has noticed a rash of her surrounding skin. She tired to apply Medihoney since that has calmed things down in the past however even with application of Medihoney her surrounding skin still appears red/angry.       07/29/24: Pt presents for f/u wound secondary to surgical site dehiscence of the abdomen. Nystatin and zinc is being used to periwound which has significantly improved the skin around the wound. She has not started Humira yet, is still  "awaiting insurance approval.      08/05/24: Patient presents for follow-up abdominal wound secondary to surgical site dehiscence and exposed mesh. She trialed not using the Medihoney to see if the wound was still draining and had a small amount of drainage coming from the wound still. Reports she was approved for Humira and is wondering if it would be appropriate to start this again with her open wound not fully healed yet.     08/19/24: Pt presents for f/u abdominal wound secondary to surgical site dehiscence (hysterectomy complicated by fascial dehiscence and abdominal herniation requiring incision hernia repair with mesh complicated by further dehiscence). She notes that since her previous visit she had another episode of significant bleeding from her abdominal. It resolved after a few hours. She reports wearing a waterproof bandage instead of using the ABD leading to a return of a rash on her abdomen. Is wondering if additional abdominal imaging is necessary.       08/26/24: HPI per covering provider, \"Patient presents to wound care center for follow-up visit of surgical abdominal wound.  Patient states that she recently noted small amount of blue-green drainage from wound.  Patient states when this happens that she has been instructed to pack the wound with Dakin's moistened plain packing daily until the discoloration to the drainage stops. Patient has been applying betadine to the wound and then silver alginate to the wound daily prior to restarting the dakin's.  Patient denies any episodes of bleeding.  Denies increased pain and state drainage amount has been the same, just noted change in color.  No fever or chills. Patient reports that she is unable to follow at the wound center any other day by Monday due to her work schedule.\"     09/09/24: Pt presents for f/u chronic abdominal wound following surgical site dehiscence.  She reports that every time she has tried to stop using the Dakin solution there is a " return of green drainage. Has continued with barrier cream to periwound. Has not yet started her Humira d/t infection concerns with her open wound and being on a biologic. No alarm sx, denies fevers, chills, N/V. Is continuing to have regular BM.     09/16/24: Pt presents for f/u chronic draining abdominal wound following surgical site dehiscence. She has been using gentamicin ointment at least twice daily this past week and has noticed an improvement in the color/quality/amount of drainage. Periwound rash has been improving as well. No concerning sx.     09/30/24: Pt presents for f/u chronic draining abdominal wound. Has switched to using silver gel following completion of therapy with gentamicin ointment. Some increased drainage present. Overall no significant changes in PMHx since prior visit.     10/14/24: pt presents for f/u chronic draining abdominal wound. She has been utilizing Hydrofera blue classic foam cut into packing strips since her previous visit. She reports that she had a gastrointestinal virus since her previous visit with coughing and vomiting which caused a significant amount of bloody drainage from her abdominal wound. The bloody drainage has since resolved but it did concern her.     11/04/24: Pt presents for f/u chronic draining abdominal wound. Since her previous visit she had a f/u with Dr. Conroy whom is recommending additional bloodwork and CT of the abdomen and pelvis for additional investigation given the remaining sinus tract. Follow results of CT, Dr. Conroy plans to reach out to his trauma surgery colleagues for a second surgical opinion. Pt reports returning to use of silver gel application and is continuing to get a drainage from the remaining sinus tract. She is considering going back on her Humira following the results of her CT scan and bloodwork.     11/25/24: Pt presents for f/u chronic draining abdominal wound. Since her previous visit she had a f/u with general surgery,  Dr. Conroy, at which time it was discussed that she is a candidate for component separation repair with aid of plastic surgery to definitely close the lower midline wound. She has been referred to plastic surgery for further discussion of a future operation with Dr. Hernandez. In the meantime she is clinical stable and will continue with local wound care. She has plans to restart Humira for her RA.     12/30/24: Pt presents for f/u chronic draining abdominal wound. She report since her previous visit she noticed a worsening malodor to the drainage and used betadine to the wound for a few days which resolved the odor. She has an upcoming appointment with plastic surgery on 02/05/25 to discuss revision operation. Has not yet restarted her Humira which is still in pre-approval process.       01/13/25: Pt presents for continued monitoring of her chronic draining abdominal wound. She has been using the topical Dakin soaks but continues to notice malodor associated with drainage.  Denies fevers, chills, malaise, nausea, vomiting, change in bowel movements.  Abdomen is numb therefore she cannot detect any appreciable change in abdominal pain.    01/28/25: Pt presents for f/u care of chronic draining abdominal wound. She reports she has been taking her Ciprofloxacin as prescribed and has multiple pills left in her course. Denies any SE with taking the medication; has noticed an improvement in the odor since starting the course. Presently is using betadine soaked packing to the wound. Is unsure if she is still getting any significant drainage from the wound versus residual betadine. Has an upcoming appointment with Dr. Hernandez, plastic surgery, to discuss component separation repair and reconstruction on 02/05/25.           The following portions of the patient's history were reviewed and updated as appropriate:   Patient Active Problem List   Diagnosis    Tobacco use disorder    COVID-19    Obesity, morbid, BMI 50 or higher  (HCC)    Bulky or enlarged uterus    Pelvic pain    Preoperative exam for gynecologic surgery    HTN (hypertension)    Gastroesophageal reflux disease    Asthma    Obstructive sleep apnea syndrome    S/P hysterectomy    Postoperative anemia    Rheumatoid arthritis involving multiple sites with positive rheumatoid factor (HCC)    Encounter for postoperative care    Open wound of abdomen    Surgical wound, non healing    Cigarette nicotine dependence without complication     Past Medical History:   Diagnosis Date    Abdominal wall abscess at site of surgical wound 2023    Abnormal uterine bleeding due to intramural leiomyoma 2022    Arthritis     Rheumatoid    Asthma     Breathing difficulty     only occured during inverted robotic hysterectomy    Cellulitis of drainage site following surgery 2023    CPAP (continuous positive airway pressure) dependence     GERD (gastroesophageal reflux disease)     Hypertension     Obese     Pneumonia 2007    Sleep apnea     Uterine fibroid     LTH today 12/15/2022    Wound dehiscence, surgical 2022     Past Surgical History:   Procedure Laterality Date    ABDOMINAL WASHOUT      post  with wound vac     SECTION      had hematoma removed after the surgery    FOREARM SURGERY Right     repair of fracture with plating    INCISIONAL HERNIA REPAIR  2022    Procedure: REPAIR  RECURRENT HERNIA INCISIONAL WITH MESH;  Surgeon: Donna Lau MD;  Location: [facility];  Service: Gynecology    IR DRAINAGE TUBE CHECK WITH SCLEROSIS  2/3/2023    IR DRAINAGE TUBE CHECK WITH SCLEROSIS  3/7/2023    IR DRAINAGE TUBE CHECK/CHANGE/REPOSITION/REINSERTION/UPSIZE  2023    IR DRAINAGE TUBE CHECK/CHANGE/REPOSITION/REINSERTION/UPSIZE  2023    IR DRAINAGE TUBE CHECK/CHANGE/REPOSITION/REINSERTION/UPSIZE  2023    IR DRAINAGE TUBE CHECK/CHANGE/REPOSITION/REINSERTION/UPSIZE  2023    IR DRAINAGE TUBE PLACEMENT  2023    LAPAROTOMY N/A  12/20/2022    Procedure: LAPAROTOMY EXPLORATORY;  Surgeon: Donna Lau MD;  Location: [facility];  Service: Gynecology    MYRINGOTOMY W/ TUBES      two sets as a young child    IA CYSTOURETHROSCOPY N/A 12/15/2022    Procedure: CYSTO W/ ROBOT;  Surgeon: Donna Lau MD;  Location: [facility];  Service: Gynecology    IA LAPS TOTAL HYSTERECT 250 GM/< W/RMVL TUBE/OVARY N/A 12/15/2022    Procedure: (LTH) W/ BILATERAL SALPINGECTOMY  W/ ROBOT COVERTED TO OPEN;  Surgeon: Donna Lau MD;  Location: [facility];  Service: Gynecology    VAC DRESSING APPLICATION N/A 3/17/2023    Procedure: APPLICATION VAC DRESSING ABDOMEN/TRUNK;  Surgeon: Chan Conroy MD;  Location: [facility];  Service: General    WOUND DEBRIDEMENT N/A 3/15/2023    Procedure: DEBRIDEMENT Abdominal WOUND and placement of Wound VAC;  Surgeon: Chan Conroy MD;  Location: [facility];  Service: General    WOUND DEBRIDEMENT N/A 3/17/2023    Procedure: DEBRIDEMENT WOUND (WASH OUT);  Surgeon: Chan Conroy MD;  Location: [facility];  Service: General     Family History   Problem Relation Age of Onset    Multiple sclerosis Mother     Hypertension Father     Hyperlipidemia Father     Cerebral aneurysm Father     Pulmonary embolism Father     Liver disease Brother      Social History     Socioeconomic History    Marital status:      Spouse name: Not on file    Number of children: Not on file    Years of education: Not on file    Highest education level: Not on file   Occupational History    Not on file   Tobacco Use    Smoking status: Every Day     Current packs/day: 0.50     Average packs/day: 0.5 packs/day for 31.1 years (15.5 ttl pk-yrs)     Types: Cigarettes     Start date: 1995    Smokeless tobacco: Never   Vaping Use    Vaping status: Never Used   Substance and Sexual Activity    Alcohol use: Not Currently     Alcohol/week: 1.0 standard drink of alcohol     Types: 1 Standard drinks or equivalent per week     Comment: 3  x monthly    Drug use: Never    Sexual activity: Yes     Partners: Male     Birth control/protection: Male Sterilization   Other Topics Concern    Not on file   Social History Narrative    Not on file     Social Drivers of Health     Financial Resource Strain: Not on file   Food Insecurity: No Food Insecurity (3/16/2023)    Hunger Vital Sign     Worried About Running Out of Food in the Last Year: Never true     Ran Out of Food in the Last Year: Never true   Transportation Needs: No Transportation Needs (3/16/2023)    PRAPARE - Transportation     Lack of Transportation (Medical): No     Lack of Transportation (Non-Medical): No   Physical Activity: Not on file   Stress: Not on file   Social Connections: Not on file   Intimate Partner Violence: Not on file   Housing Stability: Low Risk  (3/16/2023)    Housing Stability Vital Sign     Unable to Pay for Housing in the Last Year: No     Number of Places Lived in the Last Year: 1     Unstable Housing in the Last Year: No       Current Outpatient Medications:     albuterol (2.5 mg/3 mL) 0.083 % nebulizer solution, Take 2.5 mg by nebulization every 4 (four) hours as needed for shortness of breath or wheezing, Disp: , Rfl:     albuterol (ProAir HFA) 90 mcg/act inhaler, Inhale 2 puffs every 6 (six) hours as needed for wheezing or shortness of breath, Disp: 8.5 g, Rfl: 0    albuterol (PROVENTIL HFA,VENTOLIN HFA) 90 mcg/act inhaler, Inhale 2 puffs every 6 (six) hours as needed for wheezing, Disp: 8 g, Rfl: 2    amoxicillin (AMOXIL) 500 mg capsule, , Disp: , Rfl:     Arthritis Pain Relief 650 MG CR tablet, take 1 tablet by mouth every 8 hours if needed for mild pain (Patient not taking: Reported on 3/7/2024), Disp: , Rfl:     aspirin 325 mg tablet, Take 325 mg by mouth daily. Indications: as needed (Patient not taking: Reported on 3/7/2024), Disp: , Rfl:     chlorhexidine (HIBICLENS) 4 % external liquid, Apply 1 Application topically daily as needed for wound care Can use to  clean wound during VAC changes. (Patient not taking: Reported on 1/29/2024), Disp: 473 mL, Rfl: 5    clonazePAM (KlonoPIN) 0.5 mg tablet, Take 0.5 mg by mouth 2 (two) times a day as needed (Patient not taking: Reported on 1/29/2024), Disp: , Rfl:     clotrimazole (LOTRIMIN) 1 % cream, Apply topically 2 (two) times a day for 14 days To rash of abdomen, Disp: 28 g, Rfl: 1    cyclobenzaprine (FLEXERIL) 5 mg tablet, Take 1 tablet (5 mg total) by mouth 3 (three) times a day as needed for muscle spasms (Patient taking differently: Take 5 mg by mouth 3 (three) times a day as needed for muscle spasms As needed), Disp: 60 tablet, Rfl: 0    dulaglutide (Trulicity) 0.75 MG/0.5ML injection, Inject 0.75 mg under the skin Once a week (Patient not taking: Reported on 1/29/2024), Disp: , Rfl:     escitalopram (LEXAPRO) 10 mg tablet, Take 10 mg by mouth daily, Disp: , Rfl:     escitalopram (LEXAPRO) 5 mg tablet, Take 5 mg by mouth daily, Disp: , Rfl:     fluticasone (FLONASE) 50 mcg/act nasal spray, 2 sprays into each nostril daily, Disp: 16 g, Rfl: 4    Fluticasone-Salmeterol (Advair) 250-50 mcg/dose inhaler, , Disp: , Rfl:     Fluticasone-Salmeterol (Advair) 500-50 mcg/dose inhaler, Inhale 1 puff 2 (two) times a day, Disp: , Rfl:     folic acid (FOLVITE) 1 mg tablet, Take by mouth daily, Disp: , Rfl:     gabapentin (NEURONTIN) 100 mg capsule, Take 1 capsule (100 mg total) by mouth 3 (three) times a day (Patient not taking: Reported on 3/7/2024), Disp: 90 capsule, Rfl: 0    gabapentin (NEURONTIN) 300 mg capsule, Take 300 mg by mouth 3 (three) times a day, Disp: , Rfl:     gentamicin (GARAMYCIN) 0.1 % ointment, Apply topically 3 (three) times a day for 7 days To packing and place into open wound, Disp: 30 g, Rfl: 1    hydrochlorothiazide (HYDRODIURIL) 12.5 mg tablet, Take 12.5 mg by mouth daily Pt only  taking as needed for leg swelling (Patient not taking: Reported on 3/7/2024), Disp: , Rfl:     ibuprofen (MOTRIN) 600 mg tablet,  Take 600 mg by mouth every 6 (six) hours as needed for moderate pain As needed, Disp: , Rfl:     methocarbamol (ROBAXIN) 500 mg tablet, Take 1 tablet (500 mg total) by mouth every 6 (six) hours for 14 days (Patient taking differently: Take 500 mg by mouth every 6 (six) hours As needed), Disp: 56 tablet, Rfl: 0    methotrexate 2.5 mg tablet, Four 2.5 tabs one time a week ( Every Saturday), Disp: , Rfl:     montelukast (SINGULAIR) 10 mg tablet, Take by mouth daily at bedtime as needed, Disp: , Rfl:     mupirocin (BACTROBAN) 2 % ointment, Apply topically daily for 14 days To wound and areas of folliculitis, Disp: 30 g, Rfl: 1    naloxone (NARCAN) 4 mg/0.1 mL nasal spray, Administer 1 spray into a nostril. If no response after 2-3 minutes, give another dose in the other nostril using a new spray. (Patient not taking: Reported on 4/14/2023), Disp: 1 each, Rfl: 1    neomycin-polymyxin-hydrocortisone (CORTISPORIN) otic solution, Administer 3 drops into the left ear every 6 (six) hours (Patient not taking: Reported on 1/29/2024), Disp: , Rfl:     nicotine (NICODERM CQ) 14 mg/24hr TD 24 hr patch, Place 1 patch on the skin every 24 hours (Patient not taking: Reported on 6/2/2023), Disp: 28 patch, Rfl: 3    nystatin (MYCOSTATIN) powder, Apply topically 2 (two) times a day for 14 days To periwound mixed with barrier cream, Disp: 30 g, Rfl: 1    omeprazole (PriLOSEC) 40 MG capsule, Take 40 mg by mouth daily, Disp: , Rfl:     ondansetron (ZOFRAN) 4 mg tablet, Take 4 mg by mouth daily as needed, Disp: , Rfl:     semaglutide, 0.25 or 0.5 mg/dose, (Ozempic) 2 mg/3 mL injection pen, Inject 0.5 mg under the skin Once a week (Patient not taking: Reported on 1/29/2024), Disp: , Rfl:     sodium chloride, PF, 0.9 %, Inject 10 mL into a catheter in a vein 3 (three) times a week Wound irrigation. (Patient not taking: Reported on 1/29/2024), Disp: 100 mL, Rfl: 10    Vitamin D, Ergocalciferol, 10353 units CAPS, Take by mouth once a week,  Disp: , Rfl:     Review of Systems   Constitutional:  Negative for chills and fever.   Gastrointestinal:  Negative for abdominal pain, constipation, diarrhea, nausea and vomiting.   Musculoskeletal:  Positive for arthralgias.   Skin:  Positive for wound (open surgical wound abdomen).   Psychiatric/Behavioral:  Negative for agitation.          Objective:  /70   Pulse 75   Temp (!) 96.5 °F (35.8 °C)   Resp 18   LMP 11/23/2022 (Exact Date) Comment: partial  Pain Score: 0-No pain     Physical Exam  Vitals reviewed.   Constitutional:       General: She is not in acute distress.     Appearance: She is morbidly obese.   HENT:      Head: Normocephalic and atraumatic.   Cardiovascular:      Rate and Rhythm: Normal rate.   Pulmonary:      Effort: Pulmonary effort is normal. No respiratory distress.   Abdominal:          Comments: Continues to have a small sinus tract present on midline abdomen. No appreciable drainage with visualization today, however depth of wound limits visual exam. There are a few scattered areas of partial thickness skin breakdown surrounding the sinus tract.      Skin:     Findings: Rash and wound present. Rash is pustular.   Neurological:      Mental Status: She is alert and oriented to person, place, and time.   Psychiatric:         Mood and Affect: Mood normal.         Behavior: Behavior normal.         Wound 07/10/23 Abdomen (Active)   Wound Image   01/27/25 1034   Wound Description Pink 01/27/25 1030   Alexa-wound Assessment Pink;Intact;Scar Tissue;Rash 01/27/25 1030   Wound Length (cm) 0.1 cm 01/27/25 1030   Wound Width (cm) 0.1 cm 01/27/25 1030   Wound Depth (cm) 1.2 cm 01/27/25 1030   Wound Surface Area (cm^2) 0.01 cm^2 01/27/25 1030   Wound Volume (cm^3) 0.012 cm^3 01/27/25 1030   Calculated Wound Volume (cm^3) 0.01 cm^3 01/27/25 1030   Change in Wound Size % 99.98 01/27/25 1030   Tunneling 1 2 cm 03/04/24 0822   Tunneling 1 in depth located at 7 o'clock 03/04/24 0822   Number of  underminings 1 09/30/24 1445   Undermining 1 1 09/30/24 1445   Undermining 1 is depth extending from 7 o 'clock 09/30/24 1445   Drainage Amount Small 01/27/25 1030   Drainage Description Serosanguineous 01/27/25 1030   Non-staged Wound Description Full thickness 01/27/25 1030   Treatments Cleansed 11/04/24 0834   Dressing Wound V.A.C. 07/31/23 0859   Wound packed? Yes 10/14/24 0836   Packing- # removed 1 10/14/24 0836   Packing- # inserted 2 07/24/23 0832   Dressing Changed New 07/24/23 0832   Patient Tolerance Tolerated well 09/30/24 1445   Dressing Status Intact 11/04/24 0834                   Results from last 6 Months   Lab Units 12/30/24  1000   WOUND CULTURE  3+ Growth of Enterococcus faecalis*  2+ Growth of Serratia marcescens*  2+ Growth of Pseudomonas aeruginosa*           Wound Instructions:  Orders Placed This Encounter   Procedures    Wound cleansing and dressings Abdomen     Wound cleansing and dressings Abdomen   Wash your hands with soap and water. Remove abdominal dressing, wash with Normal Saline, pat dry prior to applying new dressing      Apply medihoney into wound base  Apply nystatin mixed with zinc as needed to the periwound  Cover with gauze/ABD Pad and tape  Change every day and as needed for excessive drainage.        Watch for signs of infection these include a fever of 100.4°F (38°C) or higher, chills  Signs of wound infection include increasing swelling, redness, warmth, pain around the wound. Foul smell coming from wound  Go to the closest Emergency Room with signs of infection to be evaluated.      Quit Smoking or cut back as much as possible! As discussed smoking slows the ability for a wound to heal by constricting blood vessels for approximately 30 minutes after each cigarette.     Standing Status:   Future     Expiration Date:   2/3/2025    Wound Procedure Treatment Abdomen     This order was created via procedure documentation       Cally Garcia, PA-C      Portions of the  "record may have been created with voice recognition software. Occasional wrong word or \"sound alike\" substitutions may have occurred due to the inherent limitations of voice recognition software. Read the chart carefully and recognize, using context, where substitutions have occurred.    "

## 2025-02-05 ENCOUNTER — CONSULT (OUTPATIENT)
Dept: PLASTIC SURGERY | Facility: CLINIC | Age: 45
End: 2025-02-05
Payer: COMMERCIAL

## 2025-02-05 VITALS
BODY MASS INDEX: 48.65 KG/M2 | HEIGHT: 65 IN | TEMPERATURE: 98.2 F | DIASTOLIC BLOOD PRESSURE: 82 MMHG | HEART RATE: 94 BPM | OXYGEN SATURATION: 98 % | RESPIRATION RATE: 20 BRPM | WEIGHT: 292 LBS | SYSTOLIC BLOOD PRESSURE: 136 MMHG

## 2025-02-05 DIAGNOSIS — S31.109S OPEN WOUND OF ABDOMEN, SEQUELA: ICD-10-CM

## 2025-02-05 PROCEDURE — 99204 OFFICE O/P NEW MOD 45 MIN: CPT | Performed by: PLASTIC SURGERY

## 2025-02-05 NOTE — PROGRESS NOTES
Assessment & Plan   44-year-old female with chronic draining sinus, excess skin of the abdomen and abdominal wall defect  1.)  Patient would be an excellent candidate for abdominal wall reconstruction with excision of excess skin for repair of the chronic draining sinus and component separation  2.)  Discussed with patient the risks, benefits, and alternatives  3.)  Patient is a smoker, she would need to be nicotine and tobacco free before surgery  3.)  All her questions answered to her satisfaction, she also has a history of rheumatoid arthritis and would likely need to hold her immunosuppressants  4.)  We will coordinate with general surgery regarding timing, however patient needs to absolutely be nicotine tobacco free for 6 weeks prior to surgery.    Discussion--I discussed with the patient my role in her care with plastic surgery to the coordinated in conjunction with general surgery, I discussed with her the nature of the chronic wound, the nature of the defect, and the likelihood that she would need abdominal wall reconstruction.  I discussed with her the nature of component separation, excision of the wound and excess skin and the risks, benefits, alternatives including risk of bleeding, infection, scarring, poor wound healing, damage to surrounding and underlying structures, need for further surgery, need for multiple procedures, seroma, DVT, poor aesthetic result, recurrence, possible need for mesh, we discussed the expected hospital stay, the expected recovery time, I discussed with her overall that she is high risk for wound healing complication given her medical history and comorbidities, I also discussed with her the absolute necessity to remain nicotine and tobacco free for at least 4 to 6 weeks prior to being considered a candidate, I discussed with her that given the nature of the operation, and the nature of her complicated history she is near 100% risk for major surgical complication of performed  while she is smoking.  Patient understands and agrees, all her questions answered to her satisfaction, I discussed with her we will coordinate with general surgery regarding timing, patient states that she is committed to quitting smoking.      CT scan reviewed with chronic draining sinus and surrounding fluid collection    Counseling dominated visit, total counseling time 35 minutes, total visit time 45 minutes including documentation, review of her chart and coordination of care.    Subjective   Patient ID: Maria R Webb is a 44 y.o. female.    Vitals:    02/05/25 0947   BP: 136/82   Pulse: 94   Resp: 20   Temp: 98.2 °F (36.8 °C)   SpO2: 98%     HPI    Patient is a very pleasant 44-year-old female who is here today to discuss possible abdominal wall reconstruction at the behest of general surgery.  Patient has a somewhat complex past surgical history which included a hysterectomy which was complicated by fascial dehiscence, need for hernia repair, she has been under the care of general surgery and has a chronic draining sinus that has stalled and has not had significant improvement.  She is here today to discuss possible component separation skin revision, of note the patient has a history of rheumatoid arthritis, she has been on Humira but has been holding it given the chronic nature of the wound, the patient does not take steroids or blood thinners, she is a chronic smoker.    The following portions of the patient's history were reviewed and updated as appropriate: allergies, current medications, past family history, past medical history, past social history, past surgical history, and problem list.    Review of Systems   Skin:  Positive for wound.     Objective   Physical Exam   Constitutional  She appears well-developed and well-nourished.     Eyes  Pupils are equal, round, and reactive to light. System normal.     General -             Right: Right eye extraocular movements are normal.             Left: Left  eye extraocular movements are normal.       Skin    Patient with excess skin of the abdomen, she has a chronic draining sinus in the midline with surrounding erythema and maceration, no exposed mesh visible     Psychiatric  She has a normal mood and affect. Her behavior is normal. Judgment and thought content normal.     Past Medical History:   Diagnosis Date   • Abdominal wall abscess at site of surgical wound 2023   • Abnormal uterine bleeding due to intramural leiomyoma 2022   • Arthritis     Rheumatoid   • Asthma    • Breathing difficulty     only occured during inverted robotic hysterectomy   • Cellulitis of drainage site following surgery 2023   • CPAP (continuous positive airway pressure) dependence    • GERD (gastroesophageal reflux disease)    • Hypertension    • Obese    • Pneumonia    • Sleep apnea    • Uterine fibroid     LTH today 12/15/2022   • Wound dehiscence, surgical 2022     Past Surgical History:   Procedure Laterality Date   • ABDOMINAL WASHOUT      post  with wound vac   •  SECTION      had hematoma removed after the surgery   • FOREARM SURGERY Right     repair of fracture with plating   • INCISIONAL HERNIA REPAIR  2022    Procedure: REPAIR  RECURRENT HERNIA INCISIONAL WITH MESH;  Surgeon: Donna Lau MD;  Location: [facility];  Service: Gynecology   • IR DRAINAGE TUBE CHECK WITH SCLEROSIS  2/3/2023   • IR DRAINAGE TUBE CHECK WITH SCLEROSIS  3/7/2023   • IR DRAINAGE TUBE CHECK/CHANGE/REPOSITION/REINSERTION/UPSIZE  2023   • IR DRAINAGE TUBE CHECK/CHANGE/REPOSITION/REINSERTION/UPSIZE  2023   • IR DRAINAGE TUBE CHECK/CHANGE/REPOSITION/REINSERTION/UPSIZE  2023   • IR DRAINAGE TUBE CHECK/CHANGE/REPOSITION/REINSERTION/UPSIZE  2023   • IR DRAINAGE TUBE PLACEMENT  2023   • LAPAROTOMY N/A 2022    Procedure: LAPAROTOMY EXPLORATORY;  Surgeon: Donna Lau MD;  Location: [facility];  Service: Gynecology   •  MYRINGOTOMY W/ TUBES      two sets as a young child   • IN CYSTOURETHROSCOPY N/A 12/15/2022    Procedure: CYSTO W/ ROBOT;  Surgeon: Donna Lau MD;  Location: [facility];  Service: Gynecology   • IN LAPS TOTAL HYSTERECT 250 GM/< W/RMVL TUBE/OVARY N/A 12/15/2022    Procedure: (LTH) W/ BILATERAL SALPINGECTOMY  W/ ROBOT COVERTED TO OPEN;  Surgeon: Donna Lau MD;  Location: [facility];  Service: Gynecology   • VAC DRESSING APPLICATION N/A 3/17/2023    Procedure: APPLICATION VAC DRESSING ABDOMEN/TRUNK;  Surgeon: Chan Conroy MD;  Location: [facility];  Service: General   • WOUND DEBRIDEMENT N/A 3/15/2023    Procedure: DEBRIDEMENT Abdominal WOUND and placement of Wound VAC;  Surgeon: Chan Conroy MD;  Location: [facility];  Service: General   • WOUND DEBRIDEMENT N/A 3/17/2023    Procedure: DEBRIDEMENT WOUND (WASH OUT);  Surgeon: Chan Conroy MD;  Location: [facility];  Service: General     Current Outpatient Medications   Medication Instructions   • albuterol (ProAir HFA) 90 mcg/act inhaler 2 puffs, Inhalation, Every 6 hours PRN   • albuterol (PROVENTIL HFA,VENTOLIN HFA) 90 mcg/act inhaler 2 puffs, Inhalation, Every 6 hours PRN   • albuterol 2.5 mg, Every 4 hours PRN   • amoxicillin (AMOXIL) 500 mg capsule    • Arthritis Pain Relief 650 MG CR tablet take 1 tablet by mouth every 8 hours if needed for mild pain   • aspirin 325 mg, Daily   • chlorhexidine (HIBICLENS) 4 % external liquid 1 Application, Topical, Daily PRN, Can use to clean wound during VAC changes.   • clonazePAM (KLONOPIN) 0.5 mg, 2 times daily PRN   • clotrimazole (LOTRIMIN) 1 % cream Topical, 2 times daily, To rash of abdomen   • cyclobenzaprine (FLEXERIL) 5 mg, Oral, 3 times daily PRN   • escitalopram (LEXAPRO) 10 mg, Daily   • escitalopram (LEXAPRO) 5 mg, Daily   • fluticasone (FLONASE) 50 mcg/act nasal spray 2 sprays, Nasal, Daily   • Fluticasone-Salmeterol (Advair) 250-50 mcg/dose inhaler No dose, route, or frequency  recorded.   • Fluticasone-Salmeterol (Advair) 500-50 mcg/dose inhaler 1 puff, 2 times daily   • folic acid (FOLVITE) 1 mg tablet Daily   • gabapentin (NEURONTIN) 100 mg, Oral, 3 times daily   • gabapentin (NEURONTIN) 300 mg, 3 times daily   • gentamicin (GARAMYCIN) 0.1 % ointment Topical, 3 times daily, To packing and place into open wound   • hydroCHLOROthiazide 12.5 mg, Daily   • ibuprofen (MOTRIN) 600 mg, Every 6 hours PRN   • methocarbamol (ROBAXIN) 500 mg, Oral, Every 6 hours scheduled   • methotrexate 2.5 mg tablet Four 2.5 tabs one time a week ( Every Saturday)   • montelukast (SINGULAIR) 10 mg tablet Daily at bedtime PRN   • mupirocin (BACTROBAN) 2 % ointment Topical, Daily, To wound and areas of folliculitis   • naloxone (NARCAN) 4 mg/0.1 mL nasal spray Administer 1 spray into a nostril. If no response after 2-3 minutes, give another dose in the other nostril using a new spray.   • neomycin-polymyxin-hydrocortisone (CORTISPORIN) otic solution 3 drops, Every 6 hours scheduled   • nicotine (NICODERM CQ) 14 mg/24hr TD 24 hr patch 1 patch, Transdermal, Every 24 hours   • nystatin (MYCOSTATIN) powder Topical, 2 times daily, To periwound mixed with barrier cream   • omeprazole (PRILOSEC) 40 mg, Daily   • ondansetron (ZOFRAN) 4 mg, Daily PRN   • semaglutide (0.25 or 0.5 mg/dose) (OZEMPIC) 0.5 mg, Weekly   • sodium chloride, PF, 0.9 % 10 mL, Intravenous, 3 times weekly, Wound irrigation.   • Trulicity 0.75 mg, Weekly   • Vitamin D, Ergocalciferol, 55903 units CAPS Weekly       Social History     Social History Narrative   • Not on file     Social History     Tobacco Use   Smoking Status Every Day   • Current packs/day: 0.50   • Average packs/day: 0.5 packs/day for 31.1 years (15.5 ttl pk-yrs)   • Types: Cigarettes   • Start date: 1995   Smokeless Tobacco Never

## 2025-02-17 ENCOUNTER — OFFICE VISIT (OUTPATIENT)
Facility: HOSPITAL | Age: 45
End: 2025-02-17
Payer: COMMERCIAL

## 2025-02-17 DIAGNOSIS — F17.200 TOBACCO USE DISORDER: ICD-10-CM

## 2025-02-17 DIAGNOSIS — T81.89XD NON-HEALING SURGICAL WOUND, SUBSEQUENT ENCOUNTER: Primary | ICD-10-CM

## 2025-02-17 DIAGNOSIS — Z98.890 S/P HERNIA REPAIR: ICD-10-CM

## 2025-02-17 DIAGNOSIS — Z87.19 S/P HERNIA REPAIR: ICD-10-CM

## 2025-02-17 DIAGNOSIS — Z90.710 S/P HYSTERECTOMY: ICD-10-CM

## 2025-02-17 PROCEDURE — 99213 OFFICE O/P EST LOW 20 MIN: CPT | Performed by: STUDENT IN AN ORGANIZED HEALTH CARE EDUCATION/TRAINING PROGRAM

## 2025-02-17 PROCEDURE — G0463 HOSPITAL OUTPT CLINIC VISIT: HCPCS | Performed by: STUDENT IN AN ORGANIZED HEALTH CARE EDUCATION/TRAINING PROGRAM

## 2025-02-17 NOTE — PROGRESS NOTES
Wound Procedure Treatment Abdomen    Performed by: Gudelia Combs RN  Authorized by: Salma Garcia PA-C    Associated wounds:   Wound 07/10/23 Abdomen  Wound cleansed with:  Wound aggrssively cleansed with NSS and gauze  Applied primary dressing:  Silver and Packing  Applied secondary dressing:  ABD  Dressing secured with:  Tape  Comments:  Aquacel Ag rope

## 2025-02-17 NOTE — PROGRESS NOTES
Patient ID: Maria R Webb is a 44 y.o. female Date of Birth 1980       Chief Complaint   Patient presents with    Follow Up Wound Care Visit     Pt finished abx. Pt reported less odor to wound and changed dressing last night. Plastics appt last week and will decide when to get the surgery as well as quitting smoking.        Allergies:  Patient has no known allergies.    Diagnosis:   Diagnosis ICD-10-CM Associated Orders   1. Non-healing surgical wound, subsequent encounter  T81.89XD Wound cleansing and dressings Abdomen     Wound Procedure Treatment Abdomen      2. S/P hernia repair  Z98.890     Z87.19       3. S/P hysterectomy  Z90.710       4. Tobacco use disorder  F17.200            Assessment  & Plan:    F/u surgical wound of the midline abdomen s/p hysterectomy complicated by fascial dehiscence with subsequent abdominal herniation repair with phasic's mesh. Chronic small midline open draining sinus tract. Depth of wound limits both visual and examination for tunneling. There is heavy bloody drainage visible on the ABD pad today. Periwound without skin breakdown or irritation. No erythema or malodor present.   Aquacel Ag rope to absorb excess bloody drainage.  Once bloody drainage resolves can return to use of Medihoney.  Change daily.  Has plans for abdominal reconstruction with component separation repair.  Met with plastic surgery since previous visit here.  Plastic surgery would like her completely nicotine free for at least 6 weeks prior to operation.  Patient plans to quit.   Monitor for any alarm symptoms such as abdominal pain, nausea, vomiting, change in bowel movements, fevers, chills.  Report to ED should these occur.  F/u in one week. Instructed to call if any questions or concerns arise in meantime.            Subjective:   02/17/25: Pt presents for continued palliative wound care of chronic draining abdominal sinus tract. Since her previous visit she has met with plastic surgery whom discussed  component separation repair, however it was noted that she needs to be completely nicotine free for 4-6 weeks prior to any operation. Pt reports she is planning to quit and will likely go through with the operation this fall. She has been using Medihoney and doing well with it, she did have a heavy amount of bloody drainage on her dressing today which has happened several times in the past. Denies any abdominal pain, nausea, vomiting, change in bowel movements, fevers, chills.          The following portions of the patient's history were reviewed and updated as appropriate:   Patient Active Problem List   Diagnosis    Tobacco use disorder    COVID-19    Obesity, morbid, BMI 50 or higher (HCC)    Bulky or enlarged uterus    Pelvic pain    Preoperative exam for gynecologic surgery    HTN (hypertension)    Gastroesophageal reflux disease    Asthma    Obstructive sleep apnea syndrome    S/P hysterectomy    Postoperative anemia    Rheumatoid arthritis involving multiple sites with positive rheumatoid factor ([facility])    Encounter for postoperative care    Open wound of abdomen    Surgical wound, non healing    Cigarette nicotine dependence without complication     Past Medical History:   Diagnosis Date    Abdominal wall abscess at site of surgical wound 2023    Abnormal uterine bleeding due to intramural leiomyoma 2022    Arthritis     Rheumatoid    Asthma     Breathing difficulty     only occured during inverted robotic hysterectomy    Cellulitis of drainage site following surgery 2023    CPAP (continuous positive airway pressure) dependence     GERD (gastroesophageal reflux disease)     Hypertension     Obese     Pneumonia 2007    Sleep apnea     Uterine fibroid     LTH today 12/15/2022    Wound dehiscence, surgical 2022     Past Surgical History:   Procedure Laterality Date    ABDOMINAL WASHOUT      post  with wound vac     SECTION      had hematoma removed after the surgery    FOREARM  SURGERY Right     repair of fracture with plating    INCISIONAL HERNIA REPAIR  12/20/2022    Procedure: REPAIR  RECURRENT HERNIA INCISIONAL WITH MESH;  Surgeon: Donna Lau MD;  Location: [facility];  Service: Gynecology    IR DRAINAGE TUBE CHECK WITH SCLEROSIS  2/3/2023    IR DRAINAGE TUBE CHECK WITH SCLEROSIS  3/7/2023    IR DRAINAGE TUBE CHECK/CHANGE/REPOSITION/REINSERTION/UPSIZE  1/23/2023    IR DRAINAGE TUBE CHECK/CHANGE/REPOSITION/REINSERTION/UPSIZE  1/27/2023    IR DRAINAGE TUBE CHECK/CHANGE/REPOSITION/REINSERTION/UPSIZE  2/13/2023    IR DRAINAGE TUBE CHECK/CHANGE/REPOSITION/REINSERTION/UPSIZE  2/27/2023    IR DRAINAGE TUBE PLACEMENT  1/9/2023    LAPAROTOMY N/A 12/20/2022    Procedure: LAPAROTOMY EXPLORATORY;  Surgeon: Donna Lau MD;  [facility];  Service: Gynecology    MYRINGOTOMY W/ TUBES      two sets as a young child    AZ CYSTOURETHROSCOPY N/A 12/15/2022    Procedure: CYSTO W/ ROBOT;  Surgeon: Donna Lau MD;  [facility];  Service: Gynecology    AZ LAPS TOTAL HYSTERECT 250 GM/< W/RMVL TUBE/OVARY N/A 12/15/2022    Procedure: (LTH) W/ BILATERAL SALPINGECTOMY  W/ ROBOT COVERTED TO OPEN;  Surgeon: Donna Lau MD;  Location[facility];  Service: Gynecology    VAC DRESSING APPLICATION N/A 3/17/2023    Procedure: APPLICATION VAC DRESSING ABDOMEN/TRUNK;  Surgeon: Chan Conroy MD;  Location: [facility];  Service: General    WOUND DEBRIDEMENT N/A 3/15/2023    Procedure: DEBRIDEMENT Abdominal WOUND and placement of Wound VAC;  Surgeon: Chan Conroy MD;  [facility];  Service: General    WOUND DEBRIDEMENT N/A 3/17/2023    Procedure: DEBRIDEMENT WOUND (WASH OUT);  Surgeon: Chan Conroy MD;  [facility];  Service: General     Family History   Problem Relation Age of Onset    Multiple sclerosis Mother     Hypertension Father     Hyperlipidemia Father     Cerebral aneurysm Father     Pulmonary embolism Father     Liver disease  Brother      Social History     Socioeconomic History    Marital status:      Spouse name: Not on file    Number of children: Not on file    Years of education: Not on file    Highest education level: Not on file   Occupational History    Not on file   Tobacco Use    Smoking status: Every Day     Current packs/day: 0.50     Average packs/day: 0.5 packs/day for 31.1 years (15.6 ttl pk-yrs)     Types: Cigarettes     Start date: 1995    Smokeless tobacco: Never   Vaping Use    Vaping status: Never Used   Substance and Sexual Activity    Alcohol use: Not Currently     Alcohol/week: 1.0 standard drink of alcohol     Types: 1 Standard drinks or equivalent per week     Comment: 3 x monthly    Drug use: Never    Sexual activity: Yes     Partners: Male     Birth control/protection: Male Sterilization   Other Topics Concern    Not on file   Social History Narrative    Not on file     Social Drivers of Health     Financial Resource Strain: Not on file   Food Insecurity: No Food Insecurity (3/16/2023)    Hunger Vital Sign     Worried About Running Out of Food in the Last Year: Never true     Ran Out of Food in the Last Year: Never true   Transportation Needs: No Transportation Needs (3/16/2023)    PRAPARE - Transportation     Lack of Transportation (Medical): No     Lack of Transportation (Non-Medical): No   Physical Activity: Not on file   Stress: Not on file   Social Connections: Not on file   Intimate Partner Violence: Not on file   Housing Stability: Low Risk  (3/16/2023)    Housing Stability Vital Sign     Unable to Pay for Housing in the Last Year: No     Number of Places Lived in the Last Year: 1     Unstable Housing in the Last Year: No       Current Outpatient Medications:     albuterol (2.5 mg/3 mL) 0.083 % nebulizer solution, Take 2.5 mg by nebulization every 4 (four) hours as needed for shortness of breath or wheezing, Disp: , Rfl:     albuterol (ProAir HFA) 90 mcg/act inhaler, Inhale 2 puffs every 6 (six)  hours as needed for wheezing or shortness of breath, Disp: 8.5 g, Rfl: 0    albuterol (PROVENTIL HFA,VENTOLIN HFA) 90 mcg/act inhaler, Inhale 2 puffs every 6 (six) hours as needed for wheezing, Disp: 8 g, Rfl: 2    amoxicillin (AMOXIL) 500 mg capsule, , Disp: , Rfl:     Arthritis Pain Relief 650 MG CR tablet, take 1 tablet by mouth every 8 hours if needed for mild pain (Patient not taking: Reported on 3/7/2024), Disp: , Rfl:     aspirin 325 mg tablet, Take 325 mg by mouth daily. Indications: as needed (Patient not taking: Reported on 3/7/2024), Disp: , Rfl:     chlorhexidine (HIBICLENS) 4 % external liquid, Apply 1 Application topically daily as needed for wound care Can use to clean wound during VAC changes. (Patient not taking: Reported on 1/29/2024), Disp: 473 mL, Rfl: 5    clonazePAM (KlonoPIN) 0.5 mg tablet, Take 0.5 mg by mouth 2 (two) times a day as needed (Patient not taking: Reported on 1/29/2024), Disp: , Rfl:     clotrimazole (LOTRIMIN) 1 % cream, Apply topically 2 (two) times a day for 14 days To rash of abdomen, Disp: 28 g, Rfl: 1    cyclobenzaprine (FLEXERIL) 5 mg tablet, Take 1 tablet (5 mg total) by mouth 3 (three) times a day as needed for muscle spasms (Patient taking differently: Take 5 mg by mouth 3 (three) times a day as needed for muscle spasms As needed), Disp: 60 tablet, Rfl: 0    dulaglutide (Trulicity) 0.75 MG/0.5ML injection, Inject 0.75 mg under the skin Once a week (Patient not taking: Reported on 1/29/2024), Disp: , Rfl:     escitalopram (LEXAPRO) 10 mg tablet, Take 10 mg by mouth daily, Disp: , Rfl:     escitalopram (LEXAPRO) 5 mg tablet, Take 5 mg by mouth daily, Disp: , Rfl:     fluticasone (FLONASE) 50 mcg/act nasal spray, 2 sprays into each nostril daily, Disp: 16 g, Rfl: 4    Fluticasone-Salmeterol (Advair) 250-50 mcg/dose inhaler, , Disp: , Rfl:     Fluticasone-Salmeterol (Advair) 500-50 mcg/dose inhaler, Inhale 1 puff 2 (two) times a day, Disp: , Rfl:     folic acid (FOLVITE) 1  mg tablet, Take by mouth daily, Disp: , Rfl:     gabapentin (NEURONTIN) 100 mg capsule, Take 1 capsule (100 mg total) by mouth 3 (three) times a day (Patient not taking: Reported on 3/7/2024), Disp: 90 capsule, Rfl: 0    gabapentin (NEURONTIN) 300 mg capsule, Take 300 mg by mouth 3 (three) times a day, Disp: , Rfl:     gentamicin (GARAMYCIN) 0.1 % ointment, Apply topically 3 (three) times a day for 7 days To packing and place into open wound, Disp: 30 g, Rfl: 1    hydrochlorothiazide (HYDRODIURIL) 12.5 mg tablet, Take 12.5 mg by mouth daily Pt only  taking as needed for leg swelling (Patient not taking: Reported on 3/7/2024), Disp: , Rfl:     ibuprofen (MOTRIN) 600 mg tablet, Take 600 mg by mouth every 6 (six) hours as needed for moderate pain As needed, Disp: , Rfl:     methocarbamol (ROBAXIN) 500 mg tablet, Take 1 tablet (500 mg total) by mouth every 6 (six) hours for 14 days (Patient taking differently: Take 500 mg by mouth every 6 (six) hours As needed), Disp: 56 tablet, Rfl: 0    methotrexate 2.5 mg tablet, Four 2.5 tabs one time a week ( Every Saturday), Disp: , Rfl:     montelukast (SINGULAIR) 10 mg tablet, Take by mouth daily at bedtime as needed, Disp: , Rfl:     mupirocin (BACTROBAN) 2 % ointment, Apply topically daily for 14 days To wound and areas of folliculitis, Disp: 30 g, Rfl: 1    naloxone (NARCAN) 4 mg/0.1 mL nasal spray, Administer 1 spray into a nostril. If no response after 2-3 minutes, give another dose in the other nostril using a new spray. (Patient not taking: Reported on 4/14/2023), Disp: 1 each, Rfl: 1    neomycin-polymyxin-hydrocortisone (CORTISPORIN) otic solution, Administer 3 drops into the left ear every 6 (six) hours (Patient not taking: Reported on 1/29/2024), Disp: , Rfl:     nicotine (NICODERM CQ) 14 mg/24hr TD 24 hr patch, Place 1 patch on the skin every 24 hours (Patient not taking: Reported on 6/2/2023), Disp: 28 patch, Rfl: 3    nystatin (MYCOSTATIN) powder, Apply topically 2  (two) times a day for 14 days To periwound mixed with barrier cream, Disp: 30 g, Rfl: 1    omeprazole (PriLOSEC) 40 MG capsule, Take 40 mg by mouth daily, Disp: , Rfl:     ondansetron (ZOFRAN) 4 mg tablet, Take 4 mg by mouth daily as needed, Disp: , Rfl:     semaglutide, 0.25 or 0.5 mg/dose, (Ozempic) 2 mg/3 mL injection pen, Inject 0.5 mg under the skin Once a week (Patient not taking: Reported on 1/29/2024), Disp: , Rfl:     sodium chloride, PF, 0.9 %, Inject 10 mL into a catheter in a vein 3 (three) times a week Wound irrigation. (Patient not taking: Reported on 1/29/2024), Disp: 100 mL, Rfl: 10    Vitamin D, Ergocalciferol, 78822 units CAPS, Take by mouth once a week, Disp: , Rfl:     Review of Systems   Constitutional:  Negative for chills and fever.   Gastrointestinal:  Negative for abdominal pain, constipation, diarrhea, nausea and vomiting.   Musculoskeletal:  Positive for arthralgias.   Skin:  Positive for wound (open surgical wound abdomen).   Psychiatric/Behavioral:  Negative for agitation.          Objective:  LMP 11/23/2022 (Exact Date) Comment: partial        Physical Exam  Vitals reviewed.   Constitutional:       General: She is not in acute distress.     Appearance: She is morbidly obese.   HENT:      Head: Normocephalic and atraumatic.   Cardiovascular:      Rate and Rhythm: Normal rate.   Pulmonary:      Effort: Pulmonary effort is normal. No respiratory distress.   Abdominal:          Comments: Continues to have a small sinus tract present on midline abdomen. Depth of wound limits visual exam and exploration of wound with probe. There is heavy bloody drainage visible on the ABD pad today. Periwound without skin breakdown or irritation. No erythema or malodor present.      Skin:     Findings: Wound present. No rash.   Neurological:      Mental Status: She is alert and oriented to person, place, and time.   Psychiatric:         Mood and Affect: Mood normal.         Behavior: Behavior normal.          Wound 07/10/23 Abdomen (Active)   Wound Image   02/17/25 1048   Wound Description Pink 02/17/25 1047   Alexa-wound Assessment Pink;Intact;Scar Tissue 02/17/25 1047   Wound Length (cm) 0.3 cm 02/17/25 1047   Wound Width (cm) 0.2 cm 02/17/25 1047   Wound Depth (cm) 1.2 cm 02/17/25 1047   Wound Surface Area (cm^2) 0.06 cm^2 02/17/25 1047   Wound Volume (cm^3) 0.072 cm^3 02/17/25 1047   Calculated Wound Volume (cm^3) 0.07 cm^3 02/17/25 1047   Change in Wound Size % 99.84 02/17/25 1047   Tunneling 1 2 cm 03/04/24 0822   Tunneling 1 in depth located at 7 o'clock 03/04/24 0822   Number of underminings 1 09/30/24 1445   Undermining 1 1 09/30/24 1445   Undermining 1 is depth extending from 7 o 'clock 09/30/24 1445   Drainage Amount Large 02/17/25 1047   Drainage Description Serosanguineous;Bloody 02/17/25 1047   Non-staged Wound Description Full thickness 02/17/25 1047   Treatments Cleansed 11/04/24 0834   Dressing Wound V.A.C. 07/31/23 0859   Wound packed? Yes 10/14/24 0836   Packing- # removed 1 10/14/24 0836   Packing- # inserted 2 07/24/23 0832   Dressing Changed New 07/24/23 0832   Patient Tolerance Tolerated well 02/17/25 1047   Dressing Status Intact;Old drainage 02/17/25 1047                 Results from last 6 Months   Lab Units 12/30/24  1000   WOUND CULTURE  3+ Growth of Enterococcus faecalis*  2+ Growth of Serratia marcescens*  2+ Growth of Pseudomonas aeruginosa*           Wound Instructions:  Orders Placed This Encounter   Procedures    Wound cleansing and dressings Abdomen     Wound cleansing and dressings Abdomen     Wash your hands with soap and water. Remove abdominal dressing, wash with Normal Saline, pat dry prior to applying new dressing      Apply Aquacel Ag into wound base.  Once the drainage is fully reduced, then you can resume medihoney  Apply nystatin mixed with zinc as needed to the periwound  Cover with gauze/ABD Pad and tape  Change every day and as needed for excessive drainage.        "  Watch for signs of infection these include a fever of 100.4°F (38°C) or higher, chills  Signs of wound infection include increasing swelling, redness, warmth, pain around the wound. Foul smell coming from wound  Go to the closest Emergency Room with signs of infection to be evaluated.      Quit Smoking or cut back as much as possible! As discussed smoking slows the ability for a wound to heal by constricting blood vessels for approximately 30 minutes after each cigarette.     Standing Status:   Future     Expiration Date:   2/24/2025    Wound Procedure Treatment Abdomen     This order was created via procedure documentation       Cally Garcia, PA-C      Portions of the record may have been created with voice recognition software. Occasional wrong word or \"sound alike\" substitutions may have occurred due to the inherent limitations of voice recognition software. Read the chart carefully and recognize, using context, where substitutions have occurred.      "

## 2025-02-17 NOTE — PATIENT INSTRUCTIONS
Orders Placed This Encounter   Procedures    Wound cleansing and dressings Abdomen     Wound cleansing and dressings Abdomen     Wash your hands with soap and water. Remove abdominal dressing, wash with Normal Saline, pat dry prior to applying new dressing      Apply Aquacel Ag into wound base.  Once the drainage is fully reduced, then you can resume medihoney  Apply nystatin mixed with zinc as needed to the periwound  Cover with gauze/ABD Pad and tape  Change every day and as needed for excessive drainage.         Watch for signs of infection these include a fever of 100.4°F (38°C) or higher, chills  Signs of wound infection include increasing swelling, redness, warmth, pain around the wound. Foul smell coming from wound  Go to the closest Emergency Room with signs of infection to be evaluated.      Quit Smoking or cut back as much as possible! As discussed smoking slows the ability for a wound to heal by constricting blood vessels for approximately 30 minutes after each cigarette.     Standing Status:   Future     Expiration Date:   2/24/2025       Name band;

## 2025-03-06 RX ORDER — TIRZEPATIDE 2.5 MG/.5ML
2.5 INJECTION, SOLUTION SUBCUTANEOUS WEEKLY
COMMUNITY
Start: 2025-01-27

## 2025-03-06 RX ORDER — SODIUM HYPOCHLORITE 2.5 MG/ML
SOLUTION TOPICAL
COMMUNITY
Start: 2025-01-02

## 2025-03-06 RX ORDER — ERYTHROMYCIN 5 MG/G
OINTMENT OPHTHALMIC
COMMUNITY
Start: 2025-02-11

## 2025-03-06 NOTE — PROGRESS NOTES
Name: Maria R Webb      : 1980      MRN: [id number]  Encounter Provider: Chan Conroy MD  Encounter Date: 3/10/2025   Encounter department: [facility]AL SURGERY [place]  :  Assessment & Plan  Non-healing surgical wound, sequela    Orders:    Ambulatory Referral to Smoking Cessation Program; Future    Open wound of abdomen, sequela  Patient is a pleasant 44-year-old female with a complex past surgical history significant for fascial wound dehiscence status post hysterectomy and subsequent ventral hernia repair with formation of a chronic draining sinus.    Patient reports being clinically well and stable.    The patient is interested in pursuing definitive treatment by component separation repair with the aid of plastic surgery.    Patient prefers to undergo that procedure in the fall .    On physical examination she is well-appearing.  The surgical wounds clean.  There is a Q-tip sized defect at the base of the wound.  No evidence of active soft tissue infection.    Patient appears clinically stable for proposed surgery in the fall.    She has been reminded that she needs to abstain from tobacco and nicotine for 6 weeks prior to surgery.    I look forward seeing her back in the fall in anticipation of that operation.    Orders:    Ambulatory Referral to Smoking Cessation Program; Future        History of Present Illness   HPI  Maria R Webb is a 44 y.o. female who presents for a wound check of the abd. Pt states she doesn't believe there much drainage. She think the drainage she is seeing is the sliver honey ointment she has been applying to help with healing. For wound care she washes the wound, applies the sliver honey and abd pad. Today she is here for a follow up after having a visit with plastics in feb to schedule a joint case for another repair. Pt denies any redness/irritation, she has occasional soreness. No fevers/chills. [email or web address],MA  History obtained from: patient    Review of  Systems   Constitutional:  Negative for chills and fever.   HENT:  Negative for ear pain and sore throat.    Eyes:  Negative for pain and visual disturbance.   Respiratory:  Negative for cough and shortness of breath.    Cardiovascular:  Negative for chest pain and palpitations.   Gastrointestinal:  Negative for abdominal pain and vomiting.   Genitourinary:  Negative for dysuria and hematuria.   Musculoskeletal:  Negative for arthralgias and back pain.   Skin:  Negative for color change and rash.   Neurological:  Negative for seizures and syncope.   All other systems reviewed and are negative.    Pertinent Medical History           Medical History Reviewed by provider this encounter:     .  Past Medical History   Past Medical History:   Diagnosis Date    Abdominal wall abscess at site of surgical wound 2023    Abnormal uterine bleeding due to intramural leiomyoma 2022    Arthritis     Rheumatoid    Asthma     Breathing difficulty     only occured during inverted robotic hysterectomy    Cellulitis of drainage site following surgery 2023    CPAP (continuous positive airway pressure) dependence     GERD (gastroesophageal reflux disease)     Hypertension     Obese     Pneumonia 2007    Sleep apnea     Uterine fibroid     LTH today 12/15/2022    Wound dehiscence, surgical 2022     Past Surgical History:   Procedure Laterality Date    ABDOMINAL WASHOUT      post  with wound vac     SECTION      had hematoma removed after the surgery    FOREARM SURGERY Right     repair of fracture with plating    INCISIONAL HERNIA REPAIR  2022    Procedure: REPAIR  RECURRENT HERNIA INCISIONAL WITH MESH;  Surgeon: Donna Lau MD;  Location: [facility] OR;  Service: Gynecology    IR DRAINAGE TUBE CHECK WITH SCLEROSIS  2/3/2023    IR DRAINAGE TUBE CHECK WITH SCLEROSIS  3/7/2023    IR DRAINAGE TUBE CHECK/CHANGE/REPOSITION/REINSERTION/UPSIZE  2023    IR DRAINAGE TUBE  CHECK/CHANGE/REPOSITION/REINSERTION/UPSIZE  1/27/2023    IR DRAINAGE TUBE CHECK/CHANGE/REPOSITION/REINSERTION/UPSIZE  2/13/2023    IR DRAINAGE TUBE CHECK/CHANGE/REPOSITION/REINSERTION/UPSIZE  2/27/2023    IR DRAINAGE TUBE PLACEMENT  1/9/2023    LAPAROTOMY N/A 12/20/2022    Procedure: LAPAROTOMY EXPLORATORY;  Surgeon: Donna Lau MD;  Location: [facility];  Service: Gynecology    MYRINGOTOMY W/ TUBES      two sets as a young child    NH CYSTOURETHROSCOPY N/A 12/15/2022    Procedure: CYSTO W/ ROBOT;  Surgeon: Donna Lau MD;  Location: [facility];  Service: Gynecology    NH LAPS TOTAL HYSTERECT 250 GM/< W/RMVL TUBE/OVARY N/A 12/15/2022    Procedure: (LTH) W/ BILATERAL SALPINGECTOMY  W/ ROBOT COVERTED TO OPEN;  Surgeon: Donna Lau MD;  Location: [facility];  Service: Gynecology    VAC DRESSING APPLICATION N/A 3/17/2023    Procedure: APPLICATION VAC DRESSING ABDOMEN/TRUNK;  Surgeon: Chan Conroy MD;  Location: [facility];  Service: General    WOUND DEBRIDEMENT N/A 3/15/2023    Procedure: DEBRIDEMENT Abdominal WOUND and placement of Wound VAC;  Surgeon: Chan Conroy MD;  Location: [facility];  Service: General    WOUND DEBRIDEMENT N/A 3/17/2023    Procedure: DEBRIDEMENT WOUND (WASH OUT);  Surgeon: Chan Conroy MD;  Location: [facility];  Service: General     Family History   Problem Relation Age of Onset    Multiple sclerosis Mother     Hypertension Father     Hyperlipidemia Father     Cerebral aneurysm Father     Pulmonary embolism Father     Liver disease Brother       reports that she has been smoking cigarettes. She started smoking about 30 years ago. She has a 15.6 pack-year smoking history. She has never used smokeless tobacco. She reports that she does not currently use alcohol after a past usage of about 1.0 standard drink of alcohol per week. She reports that she does not use drugs.  Current Outpatient Medications   Medication Instructions    albuterol (ProAir HFA) 90  mcg/act inhaler 2 puffs, Inhalation, Every 6 hours PRN    albuterol (PROVENTIL HFA,VENTOLIN HFA) 90 mcg/act inhaler 2 puffs, Inhalation, Every 6 hours PRN    albuterol 2.5 mg, Every 4 hours PRN    amoxicillin (AMOXIL) 500 mg capsule     Arthritis Pain Relief 650 MG CR tablet take 1 tablet by mouth every 8 hours if needed for mild pain    aspirin 325 mg, Daily    chlorhexidine (HIBICLENS) 4 % external liquid 1 Application, Topical, Daily PRN, Can use to clean wound during VAC changes.    clonazePAM (KLONOPIN) 0.5 mg, 2 times daily PRN    clotrimazole (LOTRIMIN) 1 % cream Topical, 2 times daily, To rash of abdomen    cyclobenzaprine (FLEXERIL) 5 mg, Oral, 3 times daily PRN    erythromycin (ILOTYCIN) ophthalmic ointment     escitalopram (LEXAPRO) 10 mg, Daily    escitalopram (LEXAPRO) 5 mg, Daily    fluticasone (FLONASE) 50 mcg/act nasal spray 2 sprays, Nasal, Daily    Fluticasone-Salmeterol (Advair) 250-50 mcg/dose inhaler No dose, route, or frequency recorded.    Fluticasone-Salmeterol (Advair) 500-50 mcg/dose inhaler 1 puff, 2 times daily    folic acid (FOLVITE) 1 mg tablet Daily    gabapentin (NEURONTIN) 100 mg, Oral, 3 times daily    gabapentin (NEURONTIN) 300 mg, 3 times daily    gentamicin (GARAMYCIN) 0.1 % ointment Topical, 3 times daily, To packing and place into open wound    hydroCHLOROthiazide 12.5 mg, Daily    ibuprofen (MOTRIN) 600 mg, Every 6 hours PRN    methocarbamol (ROBAXIN) 500 mg, Oral, Every 6 hours scheduled    methotrexate 2.5 mg tablet Four 2.5 tabs one time a week ( Every Saturday)    montelukast (SINGULAIR) 10 mg tablet Daily at bedtime PRN    mupirocin (BACTROBAN) 2 % ointment Topical, Daily, To wound and areas of folliculitis    naloxone (NARCAN) 4 mg/0.1 mL nasal spray Administer 1 spray into a nostril. If no response after 2-3 minutes, give another dose in the other nostril using a new spray.    neomycin-polymyxin-hydrocortisone (CORTISPORIN) otic solution 3 drops, Every 6 hours scheduled     nicotine (NICODERM CQ) 14 mg/24hr TD 24 hr patch 1 patch, Transdermal, Every 24 hours    nystatin (MYCOSTATIN) powder Topical, 2 times daily, To periwound mixed with barrier cream    omeprazole (PRILOSEC) 40 mg, Daily    ondansetron (ZOFRAN) 4 mg, Daily PRN    semaglutide (0.25 or 0.5 mg/dose) (OZEMPIC) 0.5 mg, Weekly    sodium chloride, PF, 0.9 % 10 mL, Intravenous, 3 times weekly, Wound irrigation.    sodium hypochlorite (DAKIN'S HALF-STRENGTH) external solution     Trulicity 0.75 mg, Weekly    Vitamin D, Ergocalciferol, 12517 units CAPS Weekly    Zepbound 2.5 mg, Weekly   No Known Allergies   Current Outpatient Medications on File Prior to Visit   Medication Sig Dispense Refill    albuterol (2.5 mg/3 mL) 0.083 % nebulizer solution Take 2.5 mg by nebulization every 4 (four) hours as needed for shortness of breath or wheezing      albuterol (ProAir HFA) 90 mcg/act inhaler Inhale 2 puffs every 6 (six) hours as needed for wheezing or shortness of breath 8.5 g 0    albuterol (PROVENTIL HFA,VENTOLIN HFA) 90 mcg/act inhaler Inhale 2 puffs every 6 (six) hours as needed for wheezing 8 g 2    erythromycin (ILOTYCIN) ophthalmic ointment       escitalopram (LEXAPRO) 10 mg tablet Take 10 mg by mouth daily      escitalopram (LEXAPRO) 5 mg tablet Take 5 mg by mouth daily      Fluticasone-Salmeterol (Advair) 500-50 mcg/dose inhaler Inhale 1 puff 2 (two) times a day      folic acid (FOLVITE) 1 mg tablet Take by mouth daily      gabapentin (NEURONTIN) 300 mg capsule Take 300 mg by mouth 3 (three) times a day      ibuprofen (MOTRIN) 600 mg tablet Take 600 mg by mouth every 6 (six) hours as needed for moderate pain As needed      methotrexate 2.5 mg tablet Four 2.5 tabs one time a week ( Every Saturday)      montelukast (SINGULAIR) 10 mg tablet Take by mouth daily at bedtime as needed      omeprazole (PriLOSEC) 40 MG capsule Take 40 mg by mouth daily      ondansetron (ZOFRAN) 4 mg tablet Take 4 mg by mouth daily as needed       sodium hypochlorite (DAKIN'S HALF-STRENGTH) external solution       Vitamin D, Ergocalciferol, 92049 units CAPS Take by mouth once a week      amoxicillin (AMOXIL) 500 mg capsule  (Patient not taking: Reported on 1/29/2024)      Arthritis Pain Relief 650 MG CR tablet take 1 tablet by mouth every 8 hours if needed for mild pain (Patient not taking: Reported on 3/7/2024)      aspirin 325 mg tablet Take 325 mg by mouth daily. Indications: as needed (Patient not taking: Reported on 3/7/2024)      chlorhexidine (HIBICLENS) 4 % external liquid Apply 1 Application topically daily as needed for wound care Can use to clean wound during VAC changes. (Patient not taking: Reported on 1/29/2024) 473 mL 5    clonazePAM (KlonoPIN) 0.5 mg tablet Take 0.5 mg by mouth 2 (two) times a day as needed (Patient not taking: Reported on 1/29/2024)      clotrimazole (LOTRIMIN) 1 % cream Apply topically 2 (two) times a day for 14 days To rash of abdomen 28 g 1    cyclobenzaprine (FLEXERIL) 5 mg tablet Take 1 tablet (5 mg total) by mouth 3 (three) times a day as needed for muscle spasms (Patient taking differently: Take 5 mg by mouth 3 (three) times a day as needed for muscle spasms As needed) 60 tablet 0    dulaglutide (Trulicity) 0.75 MG/0.5ML injection Inject 0.75 mg under the skin Once a week (Patient not taking: Reported on 1/29/2024)      fluticasone (FLONASE) 50 mcg/act nasal spray 2 sprays into each nostril daily 16 g 4    Fluticasone-Salmeterol (Advair) 250-50 mcg/dose inhaler  (Patient not taking: Reported on 1/29/2024)      gabapentin (NEURONTIN) 100 mg capsule Take 1 capsule (100 mg total) by mouth 3 (three) times a day (Patient not taking: Reported on 3/7/2024) 90 capsule 0    gentamicin (GARAMYCIN) 0.1 % ointment Apply topically 3 (three) times a day for 7 days To packing and place into open wound 30 g 1    hydrochlorothiazide (HYDRODIURIL) 12.5 mg tablet Take 12.5 mg by mouth daily Pt only  taking as needed for leg swelling  (Patient not taking: Reported on 3/7/2024)      methocarbamol (ROBAXIN) 500 mg tablet Take 1 tablet (500 mg total) by mouth every 6 (six) hours for 14 days (Patient taking differently: Take 500 mg by mouth every 6 (six) hours As needed) 56 tablet 0    mupirocin (BACTROBAN) 2 % ointment Apply topically daily for 14 days To wound and areas of folliculitis 30 g 1    naloxone (NARCAN) 4 mg/0.1 mL nasal spray Administer 1 spray into a nostril. If no response after 2-3 minutes, give another dose in the other nostril using a new spray. (Patient not taking: Reported on 4/14/2023) 1 each 1    neomycin-polymyxin-hydrocortisone (CORTISPORIN) otic solution Administer 3 drops into the left ear every 6 (six) hours (Patient not taking: Reported on 1/29/2024)      nicotine (NICODERM CQ) 14 mg/24hr TD 24 hr patch Place 1 patch on the skin every 24 hours (Patient not taking: Reported on 6/2/2023) 28 patch 3    nystatin (MYCOSTATIN) powder Apply topically 2 (two) times a day for 14 days To periwound mixed with barrier cream 30 g 1    semaglutide, 0.25 or 0.5 mg/dose, (Ozempic) 2 mg/3 mL injection pen Inject 0.5 mg under the skin Once a week (Patient not taking: Reported on 1/29/2024)      sodium chloride, PF, 0.9 % Inject 10 mL into a catheter in a vein 3 (three) times a week Wound irrigation. (Patient not taking: Reported on 1/29/2024) 100 mL 10    tirzepatide (Zepbound) 2.5 mg/0.5 mL auto-injector Inject 2.5 mg under the skin Once a week (Patient not taking: Reported on 3/10/2025)       No current facility-administered medications on file prior to visit.      Social History     Tobacco Use    Smoking status: Every Day     Current packs/day: 0.50     Average packs/day: 0.5 packs/day for 31.2 years (15.6 ttl pk-yrs)     Types: Cigarettes     Start date: 1995    Smokeless tobacco: Never   Vaping Use    Vaping status: Never Used   Substance and Sexual Activity    Alcohol use: Not Currently     Alcohol/week: 1.0 standard drink of alcohol  "    Types: 1 Standard drinks or equivalent per week     Comment: 3 x monthly    Drug use: Never    Sexual activity: Yes     Partners: Male     Birth control/protection: Male Sterilization        Objective   /80 (BP Location: Left arm, Patient Position: Sitting, Cuff Size: Large)   Pulse 89   Temp 97.5 °F (36.4 °C) (Temporal)   Ht 5' 5\" (1.651 m)   Wt 136 kg (299 lb)   LMP 11/23/2022 (Exact Date) Comment: partial  SpO2 95%   BMI 49.76 kg/m²      Physical Exam  Vitals and nursing note reviewed.   Constitutional:       General: She is not in acute distress.     Appearance: She is well-developed.   HENT:      Head: Normocephalic and atraumatic.   Eyes:      Conjunctiva/sclera: Conjunctivae normal.   Cardiovascular:      Rate and Rhythm: Normal rate and regular rhythm.      Heart sounds: No murmur heard.  Pulmonary:      Effort: Pulmonary effort is normal. No respiratory distress.      Breath sounds: Normal breath sounds.   Abdominal:      Palpations: Abdomen is soft.      Tenderness: There is no abdominal tenderness.      Comments: Abdominal wound has healed beautifully.  There is a Q-tip sized defect with no drainage at the base of the wound   Musculoskeletal:         General: No swelling.      Cervical back: Neck supple.   Skin:     General: Skin is warm and dry.      Capillary Refill: Capillary refill takes less than 2 seconds.   Neurological:      Mental Status: She is alert.   Psychiatric:         Mood and Affect: Mood normal.         Administrative Statements   I have spent a total time of 15 minutes in caring for this patient on the day of the visit/encounter including Prognosis, Impressions, Counseling / Coordination of care, and Documenting in the medical record.  "

## 2025-03-10 ENCOUNTER — TELEPHONE (OUTPATIENT)
Age: 45
End: 2025-03-10

## 2025-03-10 ENCOUNTER — OFFICE VISIT (OUTPATIENT)
Dept: SURGERY | Facility: CLINIC | Age: 45
End: 2025-03-10
Payer: COMMERCIAL

## 2025-03-10 VITALS
HEIGHT: 65 IN | TEMPERATURE: 97.5 F | WEIGHT: 293 LBS | OXYGEN SATURATION: 95 % | BODY MASS INDEX: 48.82 KG/M2 | DIASTOLIC BLOOD PRESSURE: 80 MMHG | SYSTOLIC BLOOD PRESSURE: 124 MMHG | HEART RATE: 89 BPM

## 2025-03-10 DIAGNOSIS — T81.89XS NON-HEALING SURGICAL WOUND, SEQUELA: ICD-10-CM

## 2025-03-10 DIAGNOSIS — S31.109S OPEN WOUND OF ABDOMEN, SEQUELA: Primary | ICD-10-CM

## 2025-03-10 PROCEDURE — 99213 OFFICE O/P EST LOW 20 MIN: CPT | Performed by: SURGERY

## 2025-03-10 NOTE — ASSESSMENT & PLAN NOTE
Patient is a pleasant 44-year-old female with a complex past surgical history significant for fascial wound dehiscence status post hysterectomy and subsequent ventral hernia repair with formation of a chronic draining sinus.    Patient reports being clinically well and stable.    The patient is interested in pursuing definitive treatment by component separation repair with the aid of plastic surgery.    Patient prefers to undergo that procedure in the fall 2025.    On physical examination she is well-appearing.  The surgical wounds clean.  There is a Q-tip sized defect at the base of the wound.  No evidence of active soft tissue infection.    Patient appears clinically stable for proposed surgery in the fall.    She has been reminded that she needs to abstain from tobacco and nicotine for 6 weeks prior to surgery.    I look forward seeing her back in the fall in anticipation of that operation.    Orders:    Ambulatory Referral to Smoking Cessation Program; Future

## 2025-03-10 NOTE — TELEPHONE ENCOUNTER
Pt calling stating a gentleman called her regarding scheduling surgery and she would like to speak to him again, she was unsure of the name.    Pt saw Dr Hernandez, so Catie would be coordinating, but perhaps Jesus was assisting her and that's who called her?    Pt would like to speak to him again, regarding perhaps scheduling before Thanksgiving, as they spoke about Dec 10 as a possibility    Advised I will have Jesus (or Catie) call her back as he is away from his desk, pt agreed

## 2025-03-17 ENCOUNTER — OFFICE VISIT (OUTPATIENT)
Facility: HOSPITAL | Age: 45
End: 2025-03-17
Payer: COMMERCIAL

## 2025-03-17 VITALS
RESPIRATION RATE: 18 BRPM | DIASTOLIC BLOOD PRESSURE: 73 MMHG | TEMPERATURE: 96.8 F | HEART RATE: 76 BPM | SYSTOLIC BLOOD PRESSURE: 147 MMHG

## 2025-03-17 DIAGNOSIS — Z87.19 S/P HERNIA REPAIR: ICD-10-CM

## 2025-03-17 DIAGNOSIS — F17.200 TOBACCO USE DISORDER: ICD-10-CM

## 2025-03-17 DIAGNOSIS — Z98.890 S/P HERNIA REPAIR: ICD-10-CM

## 2025-03-17 DIAGNOSIS — T81.89XD NON-HEALING SURGICAL WOUND, SUBSEQUENT ENCOUNTER: Primary | ICD-10-CM

## 2025-03-17 DIAGNOSIS — Z90.710 S/P HYSTERECTOMY: ICD-10-CM

## 2025-03-17 PROCEDURE — 99213 OFFICE O/P EST LOW 20 MIN: CPT | Performed by: STUDENT IN AN ORGANIZED HEALTH CARE EDUCATION/TRAINING PROGRAM

## 2025-03-17 PROCEDURE — G0463 HOSPITAL OUTPT CLINIC VISIT: HCPCS | Performed by: STUDENT IN AN ORGANIZED HEALTH CARE EDUCATION/TRAINING PROGRAM

## 2025-03-17 NOTE — PROGRESS NOTES
Patient ID: Maria R Webb is a 44 y.o. female Date of Birth 1980       Chief Complaint   Patient presents with    Follow Up Wound Care Visit     Using new silver honey gel that pt purchased on her own from [employer]. Reported less odor        Allergies:  Patient has no known allergies.    Diagnosis:   Diagnosis ICD-10-CM Associated Orders   1. Non-healing surgical wound, subsequent encounter  T81.89XD Wound cleansing and dressings Abdomen     Wound Procedure Treatment Abdomen      2. S/P hernia repair  Z98.890     Z87.19       3. S/P hysterectomy  Z90.710       4. Tobacco use disorder  F17.200            Assessment  & Plan:    F/u surgical wound of the midline abdomen s/p hysterectomy complicated by fascial dehiscence with subsequent abdominal herniation repair with phasic's mesh. Chronic midline sinus tract. There is minimal drainage present. No periwound erythema or maceration.   Continue with application of silver honey dressing. Continue with daily changes and keeping site covered.   Has plans for abdominal reconstruction with component separation repair.  Met with plastic surgery since previous visit here.  Plastic surgery would like her completely nicotine free for at least 6 weeks prior to operation.  Patient plans to quit.   Monitor for any alarm symptoms such as abdominal pain, nausea, vomiting, change in bowel movements, fevers, chills.  Report to ED should these occur.  F/u in 4 weeks. Instructed to call if any questions or concerns arise in meantime.            Subjective:   02/17/25: Pt presents for continued palliative wound care of chronic draining abdominal sinus tract. Since her previous visit she has met with plastic surgery whom discussed component separation repair, however it was noted that she needs to be completely nicotine free for 4-6 weeks prior to any operation. Pt reports she is planning to quit and will likely go through with the operation this fall. She has been using Medihoney and  doing well with it, she did have a heavy amount of bloody drainage on her dressing today which has happened several times in the past. Denies any abdominal pain, nausea, vomiting, change in bowel movements, fevers, chills.    03/17/25: Pt presents for f/u care of chronic abdominal sinus tract. She reports that she has purchased a silver honey dressing and has been using this on her wound. Does not have any acute concerns. Reports minimal drainage from the wound without any episodes of bleeding. Has met with Dr. Conroy since her previous visit at the wound care center and currently the plan is for component separation repair for definitive treatment in the fall of 2025.           The following portions of the patient's history were reviewed and updated as appropriate:   Patient Active Problem List   Diagnosis    Tobacco use disorder    COVID-19    Obesity, morbid, BMI 50 or higher (HCC)    Bulky or enlarged uterus    Pelvic pain    Preoperative exam for gynecologic surgery    HTN (hypertension)    Gastroesophageal reflux disease    Asthma    Obstructive sleep apnea syndrome    S/P hysterectomy    Postoperative anemia    Rheumatoid arthritis involving multiple sites with positive rheumatoid factor (HCC)    Encounter for postoperative care    Open wound of abdomen    Surgical wound, non healing    Cigarette nicotine dependence without complication     Past Medical History:   Diagnosis Date    Abdominal wall abscess at site of surgical wound 1/9/2023    Abnormal uterine bleeding due to intramural leiomyoma 08/16/2022    Arthritis     Rheumatoid    Asthma     Breathing difficulty     only occured during inverted robotic hysterectomy    Cellulitis of drainage site following surgery 1/9/2023    CPAP (continuous positive airway pressure) dependence     GERD (gastroesophageal reflux disease)     Hypertension     Obese     Pneumonia 2007    Sleep apnea     Uterine fibroid     LTH today 12/15/2022    Wound dehiscence,  surgical 2022     Past Surgical History:   Procedure Laterality Date    ABDOMINAL WASHOUT      post  with wound vac     SECTION      had hematoma removed after the surgery    FOREARM SURGERY Right     repair of fracture with plating    INCISIONAL HERNIA REPAIR  2022    Procedure: REPAIR  RECURRENT HERNIA INCISIONAL WITH MESH;  Surgeon: Donna Lau MD;  Location: [facility];  Service: Gynecology    IR DRAINAGE TUBE CHECK WITH SCLEROSIS  2/3/2023    IR DRAINAGE TUBE CHECK WITH SCLEROSIS  3/7/2023    IR DRAINAGE TUBE CHECK/CHANGE/REPOSITION/REINSERTION/UPSIZE  2023    IR DRAINAGE TUBE CHECK/CHANGE/REPOSITION/REINSERTION/UPSIZE  2023    IR DRAINAGE TUBE CHECK/CHANGE/REPOSITION/REINSERTION/UPSIZE  2023    IR DRAINAGE TUBE CHECK/CHANGE/REPOSITION/REINSERTION/UPSIZE  2023    IR DRAINAGE TUBE PLACEMENT  2023    LAPAROTOMY N/A 2022    Procedure: LAPAROTOMY EXPLORATORY;  Surgeon: Donna Lau MD;  Location: [facility];  Service: Gynecology    MYRINGOTOMY W/ TUBES      two sets as a young child    MO CYSTOURETHROSCOPY N/A 12/15/2022    Procedure: CYSTO W/ ROBOT;  Surgeon: Donna Lau MD;  Location: [facility];  Service: Gynecology    MO LAPS TOTAL HYSTERECT 250 GM/< W/RMVL TUBE/OVARY N/A 12/15/2022    Procedure: (LTH) W/ BILATERAL SALPINGECTOMY  W/ ROBOT COVERTED TO OPEN;  Surgeon: Donna Lau MD;  Location: [facility];  Service: Gynecology    VAC DRESSING APPLICATION N/A 3/17/2023    Procedure: APPLICATION VAC DRESSING ABDOMEN/TRUNK;  Surgeon: Chan Conroy MD;  Location: [facility];  Service: General    WOUND DEBRIDEMENT N/A 3/15/2023    Procedure: DEBRIDEMENT Abdominal WOUND and placement of Wound VAC;  Surgeon: Chan Conroy MD;  Location: [facility];  Service: General    WOUND DEBRIDEMENT N/A 3/17/2023    Procedure: DEBRIDEMENT WOUND (WASH OUT);  Surgeon: Chan Conroy MD;  Location: [facility];  Service: General      Family History   Problem Relation Age of Onset    Multiple sclerosis Mother     Hypertension Father     Hyperlipidemia Father     Cerebral aneurysm Father     Pulmonary embolism Father     Liver disease Brother      Social History     Socioeconomic History    Marital status:      Spouse name: Not on file    Number of children: Not on file    Years of education: Not on file    Highest education level: Not on file   Occupational History    Not on file   Tobacco Use    Smoking status: Every Day     Current packs/day: 0.50     Average packs/day: 0.5 packs/day for 31.2 years (15.6 ttl pk-yrs)     Types: Cigarettes     Start date: 1995    Smokeless tobacco: Never   Vaping Use    Vaping status: Never Used   Substance and Sexual Activity    Alcohol use: Not Currently     Alcohol/week: 1.0 standard drink of alcohol     Types: 1 Standard drinks or equivalent per week     Comment: 3 x monthly    Drug use: Never    Sexual activity: Yes     Partners: Male     Birth control/protection: Male Sterilization   Other Topics Concern    Not on file   Social History Narrative    Not on file     Social Drivers of Health     Financial Resource Strain: Not on file   Food Insecurity: No Food Insecurity (3/16/2023)    Hunger Vital Sign     Worried About Running Out of Food in the Last Year: Never true     Ran Out of Food in the Last Year: Never true   Transportation Needs: No Transportation Needs (3/16/2023)    PRAPARE - Transportation     Lack of Transportation (Medical): No     Lack of Transportation (Non-Medical): No   Physical Activity: Not on file   Stress: Not on file   Social Connections: Not on file   Intimate Partner Violence: Not on file   Housing Stability: Low Risk  (3/16/2023)    Housing Stability Vital Sign     Unable to Pay for Housing in the Last Year: No     Number of Places Lived in the Last Year: 1     Unstable Housing in the Last Year: No       Current Outpatient Medications:     albuterol (2.5 mg/3 mL) 0.083 %  nebulizer solution, Take 2.5 mg by nebulization every 4 (four) hours as needed for shortness of breath or wheezing, Disp: , Rfl:     albuterol (ProAir HFA) 90 mcg/act inhaler, Inhale 2 puffs every 6 (six) hours as needed for wheezing or shortness of breath, Disp: 8.5 g, Rfl: 0    albuterol (PROVENTIL HFA,VENTOLIN HFA) 90 mcg/act inhaler, Inhale 2 puffs every 6 (six) hours as needed for wheezing, Disp: 8 g, Rfl: 2    amoxicillin (AMOXIL) 500 mg capsule, , Disp: , Rfl:     Arthritis Pain Relief 650 MG CR tablet, take 1 tablet by mouth every 8 hours if needed for mild pain (Patient not taking: Reported on 3/7/2024), Disp: , Rfl:     aspirin 325 mg tablet, Take 325 mg by mouth daily. Indications: as needed (Patient not taking: Reported on 3/7/2024), Disp: , Rfl:     chlorhexidine (HIBICLENS) 4 % external liquid, Apply 1 Application topically daily as needed for wound care Can use to clean wound during VAC changes. (Patient not taking: Reported on 1/29/2024), Disp: 473 mL, Rfl: 5    clonazePAM (KlonoPIN) 0.5 mg tablet, Take 0.5 mg by mouth 2 (two) times a day as needed (Patient not taking: Reported on 1/29/2024), Disp: , Rfl:     clotrimazole (LOTRIMIN) 1 % cream, Apply topically 2 (two) times a day for 14 days To rash of abdomen, Disp: 28 g, Rfl: 1    cyclobenzaprine (FLEXERIL) 5 mg tablet, Take 1 tablet (5 mg total) by mouth 3 (three) times a day as needed for muscle spasms (Patient taking differently: Take 5 mg by mouth 3 (three) times a day as needed for muscle spasms As needed), Disp: 60 tablet, Rfl: 0    dulaglutide (Trulicity) 0.75 MG/0.5ML injection, Inject 0.75 mg under the skin Once a week (Patient not taking: Reported on 1/29/2024), Disp: , Rfl:     erythromycin (ILOTYCIN) ophthalmic ointment, , Disp: , Rfl:     escitalopram (LEXAPRO) 10 mg tablet, Take 10 mg by mouth daily, Disp: , Rfl:     escitalopram (LEXAPRO) 5 mg tablet, Take 5 mg by mouth daily, Disp: , Rfl:     fluticasone (FLONASE) 50 mcg/act nasal  spray, 2 sprays into each nostril daily, Disp: 16 g, Rfl: 4    Fluticasone-Salmeterol (Advair) 250-50 mcg/dose inhaler, , Disp: , Rfl:     Fluticasone-Salmeterol (Advair) 500-50 mcg/dose inhaler, Inhale 1 puff 2 (two) times a day, Disp: , Rfl:     folic acid (FOLVITE) 1 mg tablet, Take by mouth daily, Disp: , Rfl:     gabapentin (NEURONTIN) 100 mg capsule, Take 1 capsule (100 mg total) by mouth 3 (three) times a day (Patient not taking: Reported on 3/7/2024), Disp: 90 capsule, Rfl: 0    gabapentin (NEURONTIN) 300 mg capsule, Take 300 mg by mouth 3 (three) times a day, Disp: , Rfl:     gentamicin (GARAMYCIN) 0.1 % ointment, Apply topically 3 (three) times a day for 7 days To packing and place into open wound, Disp: 30 g, Rfl: 1    hydrochlorothiazide (HYDRODIURIL) 12.5 mg tablet, Take 12.5 mg by mouth daily Pt only  taking as needed for leg swelling (Patient not taking: Reported on 3/7/2024), Disp: , Rfl:     ibuprofen (MOTRIN) 600 mg tablet, Take 600 mg by mouth every 6 (six) hours as needed for moderate pain As needed, Disp: , Rfl:     methocarbamol (ROBAXIN) 500 mg tablet, Take 1 tablet (500 mg total) by mouth every 6 (six) hours for 14 days (Patient taking differently: Take 500 mg by mouth every 6 (six) hours As needed), Disp: 56 tablet, Rfl: 0    methotrexate 2.5 mg tablet, Four 2.5 tabs one time a week ( Every Saturday), Disp: , Rfl:     montelukast (SINGULAIR) 10 mg tablet, Take by mouth daily at bedtime as needed, Disp: , Rfl:     mupirocin (BACTROBAN) 2 % ointment, Apply topically daily for 14 days To wound and areas of folliculitis, Disp: 30 g, Rfl: 1    naloxone (NARCAN) 4 mg/0.1 mL nasal spray, Administer 1 spray into a nostril. If no response after 2-3 minutes, give another dose in the other nostril using a new spray. (Patient not taking: Reported on 4/14/2023), Disp: 1 each, Rfl: 1    neomycin-polymyxin-hydrocortisone (CORTISPORIN) otic solution, Administer 3 drops into the left ear every 6 (six) hours  (Patient not taking: Reported on 1/29/2024), Disp: , Rfl:     nicotine (NICODERM CQ) 14 mg/24hr TD 24 hr patch, Place 1 patch on the skin every 24 hours (Patient not taking: Reported on 6/2/2023), Disp: 28 patch, Rfl: 3    nystatin (MYCOSTATIN) powder, Apply topically 2 (two) times a day for 14 days To periwound mixed with barrier cream, Disp: 30 g, Rfl: 1    omeprazole (PriLOSEC) 40 MG capsule, Take 40 mg by mouth daily, Disp: , Rfl:     ondansetron (ZOFRAN) 4 mg tablet, Take 4 mg by mouth daily as needed, Disp: , Rfl:     semaglutide, 0.25 or 0.5 mg/dose, (Ozempic) 2 mg/3 mL injection pen, Inject 0.5 mg under the skin Once a week (Patient not taking: Reported on 1/29/2024), Disp: , Rfl:     sodium chloride, PF, 0.9 %, Inject 10 mL into a catheter in a vein 3 (three) times a week Wound irrigation. (Patient not taking: Reported on 1/29/2024), Disp: 100 mL, Rfl: 10    sodium hypochlorite (DAKIN'S HALF-STRENGTH) external solution, , Disp: , Rfl:     tirzepatide (Zepbound) 2.5 mg/0.5 mL auto-injector, Inject 2.5 mg under the skin Once a week (Patient not taking: Reported on 3/10/2025), Disp: , Rfl:     Vitamin D, Ergocalciferol, 83062 units CAPS, Take by mouth once a week, Disp: , Rfl:     Review of Systems   Constitutional:  Negative for chills and fever.   Gastrointestinal:  Negative for abdominal pain, constipation, diarrhea, nausea and vomiting.   Musculoskeletal:  Positive for arthralgias.   Skin:  Positive for wound (open surgical wound abdomen).   Psychiatric/Behavioral:  Negative for agitation.          Objective:  /73   Pulse 76   Temp (!) 96.8 °F (36 °C)   Resp 18   LMP 11/23/2022 (Exact Date) Comment: partial        Physical Exam  Vitals reviewed.   Constitutional:       General: She is not in acute distress.     Appearance: She is morbidly obese.   HENT:      Head: Normocephalic and atraumatic.   Cardiovascular:      Rate and Rhythm: Normal rate.   Pulmonary:      Effort: Pulmonary effort is  normal. No respiratory distress.   Abdominal:          Comments: Continues to have a small sinus tract present on midline abdomen. There is minimal drainage present. No periwound erythema or maceration.      Skin:     Findings: Wound present. No rash.   Neurological:      Mental Status: She is alert and oriented to person, place, and time.   Psychiatric:         Mood and Affect: Mood normal.         Behavior: Behavior normal.         Wound 07/10/23 Abdomen (Active)   Wound Image   03/17/25 0807   Wound Description Pink;Epithelialization;White 03/17/25 0804   Non-staged Wound Description Full thickness 03/17/25 0804   Wound Length (cm) 0.3 cm 03/17/25 0804   Wound Width (cm) 0.2 cm 03/17/25 0804   Wound Depth (cm) 0.9 cm 03/17/25 0804   Wound Surface Area (cm^2) 0.06 cm^2 03/17/25 0804   Wound Volume (cm^3) 0.054 cm^3 03/17/25 0804   Calculated Wound Volume (cm^3) 0.05 cm^3 03/17/25 0804   Change in Wound Size % 99.88 03/17/25 0804   Tunneling 1 2 cm 03/04/24 0822   Tunneling 1 in depth located at 7 o'clock 03/04/24 0822   Number of underminings 1 09/30/24 1445   Undermining 1 1 09/30/24 1445   Undermining 1 is depth extending from 7 o 'clock 09/30/24 1445   Drainage Amount Scant 03/17/25 0804   Drainage Description Brown 03/17/25 0804   Alexa-wound Assessment Pink;Intact;Scar Tissue 03/17/25 0804   Treatments Cleansed 11/04/24 0834   Dressing Wound V.A.C. 07/31/23 0859   Wound packed? No 03/17/25 0804   Packing- # inserted 2 07/24/23 0832   Packing- # removed 1 10/14/24 0836   Dressing Changed New 07/24/23 0832   Patient Tolerance Tolerated well 02/17/25 1047   Dressing Status Intact;Old drainage 03/17/25 0804         Results from last 6 Months   Lab Units 12/30/24  1000   WOUND CULTURE  3+ Growth of Enterococcus faecalis*  2+ Growth of Serratia marcescens*  2+ Growth of Pseudomonas aeruginosa*           Wound Instructions:  Orders Placed This Encounter   Procedures    Wound cleansing and dressings Abdomen     Wash  "your hands with soap and water. Remove abdominal dressing, wash with Normal Saline, pat dry prior to applying new dressing      Apply thin layer of silver honey gel that you purchased from [employer].  Apply nystatin mixed with zinc as needed to the periwound  Cover with gauze/ABD Pad and tape  Change every day and as needed for excessive drainage.         Watch for signs of infection these include a fever of 100.4°F (38°C) or higher, chills  Signs of wound infection include increasing swelling, redness, warmth, pain around the wound. Foul smell coming from wound  Go to the closest Emergency Room with signs of infection to be evaluated.        Quit Smoking or cut back as much as possible! As discussed smoking slows the ability for a wound to heal by constricting blood vessels for approximately 30 minutes after each cigarette.    Follow up in 1 month     Standing Status:   Future     Expiration Date:   3/24/2025    Wound Procedure Treatment Abdomen     This order was created via procedure documentation       Cally Garcia, PA-C      Portions of the record may have been created with voice recognition software. Occasional wrong word or \"sound alike\" substitutions may have occurred due to the inherent limitations of voice recognition software. Read the chart carefully and recognize, using context, where substitutions have occurred.      "

## 2025-03-17 NOTE — PROGRESS NOTES
Wound Procedure Treatment Abdomen    Performed by: Gudelia Combs RN  Authorized by: Salma Garcia PA-C  Associated wounds:   Wound 07/10/23 Abdomen    Wound cleansed with:  Wound aggrssively cleansed with NSS and gauze   Applied secondary dressing:  ABD   Dressing secured with:  Tape

## 2025-03-28 ENCOUNTER — TELEPHONE (OUTPATIENT)
Dept: PLASTIC SURGERY | Facility: CLINIC | Age: 45
End: 2025-03-28

## 2025-04-11 ENCOUNTER — TELEPHONE (OUTPATIENT)
Age: 45
End: 2025-04-11

## 2025-04-11 NOTE — TELEPHONE ENCOUNTER
Called patient and left message informing her that Pre op appointment on 10/27/25 has been rescheduled for 11/5/25 at 10AM.  Left message informing the patient to call back and confirm message was received.

## 2025-04-14 ENCOUNTER — OFFICE VISIT (OUTPATIENT)
Facility: HOSPITAL | Age: 45
End: 2025-04-14
Payer: COMMERCIAL

## 2025-04-14 VITALS
RESPIRATION RATE: 18 BRPM | TEMPERATURE: 96.8 F | HEART RATE: 82 BPM | DIASTOLIC BLOOD PRESSURE: 62 MMHG | SYSTOLIC BLOOD PRESSURE: 142 MMHG

## 2025-04-14 DIAGNOSIS — Z87.19 S/P HERNIA REPAIR: ICD-10-CM

## 2025-04-14 DIAGNOSIS — Z98.890 S/P HERNIA REPAIR: ICD-10-CM

## 2025-04-14 DIAGNOSIS — T81.89XD NON-HEALING SURGICAL WOUND, SUBSEQUENT ENCOUNTER: Primary | ICD-10-CM

## 2025-04-14 DIAGNOSIS — Z90.710 S/P HYSTERECTOMY: ICD-10-CM

## 2025-04-14 DIAGNOSIS — F17.200 TOBACCO USE DISORDER: ICD-10-CM

## 2025-04-14 PROCEDURE — 99213 OFFICE O/P EST LOW 20 MIN: CPT | Performed by: STUDENT IN AN ORGANIZED HEALTH CARE EDUCATION/TRAINING PROGRAM

## 2025-04-14 PROCEDURE — G0463 HOSPITAL OUTPT CLINIC VISIT: HCPCS | Performed by: STUDENT IN AN ORGANIZED HEALTH CARE EDUCATION/TRAINING PROGRAM

## 2025-04-14 NOTE — PATIENT INSTRUCTIONS
Orders Placed This Encounter   Procedures    Wound cleansing and dressings Abdomen     Wash your hands with soap and water. Remove abdominal dressing, wash with Normal Saline, pat dry prior to applying new dressing        Apply small strip of [place] packing, leaving a tail visible.  Apply nystatin mixed with zinc as needed to the periwound  Cover with gauze/ABD Pad and tape  Change every day and as needed for excessive drainage.     **When drainage lessens you can go back to thin layer of silver honey gel that you purchased from [employer].        Watch for signs of infection these include a fever of 100.4°F (38°C) or higher, chills  Signs of wound infection include increasing swelling, redness, warmth, pain around the wound. Foul smell coming from wound  Go to the closest Emergency Room with signs of infection to be evaluated.         Quit Smoking or cut back as much as possible! As discussed smoking slows the ability for a wound to heal by constricting blood vessels for approximately 30 minutes after each cigarette.     Follow up in 1 month     Standing Status:   Future     Expiration Date:   4/21/2025

## 2025-04-14 NOTE — PROGRESS NOTES
Patient ID: Maria R Webb is a 44 y.o. female Date of Birth 1980       Chief Complaint   Patient presents with    Follow Up Wound Care Visit       Allergies:  Patient has no known allergies.    Diagnosis:   Diagnosis ICD-10-CM Associated Orders   1. Non-healing surgical wound, subsequent encounter  T81.89XD Wound cleansing and dressings Abdomen     Wound Procedure Treatment Abdomen      2. S/P hernia repair  Z98.890     Z87.19       3. S/P hysterectomy  Z90.710       4. Tobacco use disorder  F17.200            Assessment  & Plan:    F/u surgical wound of the midline abdomen s/p hysterectomy complicated by fascial dehiscence with subsequent abdominal herniation repair with phasic's mesh. Chronic midline sinus tract that appears stable in size. There is a small amount of serous drainage from the remaining opening site. Small amount of scattered skin irritation of the periwound  consistent with MASD. No satellite lesions or leading edge to indicate fungal etiology.  No diffuse erythema to indicate cellulitis.  Will pack today with Mesalt packing. Leave tail. Change once daily. If drainage decreases again then may return to use of honey dressing. Keep site covered. Utilize barrier cream should drainage begin irritate surrounding skin.   Has plans for abdominal reconstruction with component separation repair for definitive closure scheduled for November 2025.   Monitor for any alarm symptoms such as abdominal pain, nausea, vomiting, change in bowel movements, fevers, chills.  Report to ED should these occur.  F/u in 4 weeks. Instructed to call if any questions or concerns arise in meantime.          Subjective:   02/17/25: Pt presents for continued palliative wound care of chronic draining abdominal sinus tract. Since her previous visit she has met with plastic surgery whom discussed component separation repair, however it was noted that she needs to be completely nicotine free for 4-6 weeks prior to any operation.  Pt reports she is planning to quit and will likely go through with the operation this fall. She has been using Medihoney and doing well with it, she did have a heavy amount of bloody drainage on her dressing today which has happened several times in the past. Denies any abdominal pain, nausea, vomiting, change in bowel movements, fevers, chills.    03/17/25: Pt presents for f/u care of chronic abdominal sinus tract. She reports that she has purchased a silver honey dressing and has been using this on her wound. Does not have any acute concerns. Reports minimal drainage from the wound without any episodes of bleeding. Has met with Dr. Conroy since her previous visit at the wound care center and currently the plan is for component separation repair for definitive treatment in the fall of 2025.     04/14/25: Pt presents for f/u care of chronic abdominal sinus tract. She has been using a silver honey ointment on the wound. Has noticed a bit of increased drainage this past few days d/t increased activity levels. Has plans for component separation repair in November of 2025 with plastic and general surgery.           The following portions of the patient's history were reviewed and updated as appropriate:   Patient Active Problem List   Diagnosis    Tobacco use disorder    COVID-19    Obesity, morbid, BMI 50 or higher (HCC)    Bulky or enlarged uterus    Pelvic pain    Preoperative exam for gynecologic surgery    HTN (hypertension)    Gastroesophageal reflux disease    Asthma    Obstructive sleep apnea syndrome    S/P hysterectomy    Postoperative anemia    Rheumatoid arthritis involving multiple sites with positive rheumatoid factor (HCC)    Encounter for postoperative care    Open wound of abdomen    Surgical wound, non healing    Cigarette nicotine dependence without complication     Past Medical History:   Diagnosis Date    Abdominal wall abscess at site of surgical wound 1/9/2023    Abnormal uterine bleeding due to  intramural leiomyoma 2022    Arthritis     Rheumatoid    Asthma     Breathing difficulty     only occured during inverted robotic hysterectomy    Cellulitis of drainage site following surgery 2023    CPAP (continuous positive airway pressure) dependence     GERD (gastroesophageal reflux disease)     Hypertension     Obese     Pneumonia 2007    Sleep apnea     Uterine fibroid     LTH today 12/15/2022    Wound dehiscence, surgical 2022     Past Surgical History:   Procedure Laterality Date    ABDOMINAL WASHOUT      post  with wound vac     SECTION      had hematoma removed after the surgery    FOREARM SURGERY Right     repair of fracture with plating    INCISIONAL HERNIA REPAIR  2022    Procedure: REPAIR  RECURRENT HERNIA INCISIONAL WITH MESH;  Surgeon: Donna Lau MD;  Location: [facility];  Service: Gynecology    IR DRAINAGE TUBE CHECK WITH SCLEROSIS  2/3/2023    IR DRAINAGE TUBE CHECK WITH SCLEROSIS  3/7/2023    IR DRAINAGE TUBE CHECK/CHANGE/REPOSITION/REINSERTION/UPSIZE  2023    IR DRAINAGE TUBE CHECK/CHANGE/REPOSITION/REINSERTION/UPSIZE  2023    IR DRAINAGE TUBE CHECK/CHANGE/REPOSITION/REINSERTION/UPSIZE  2023    IR DRAINAGE TUBE CHECK/CHANGE/REPOSITION/REINSERTION/UPSIZE  2023    IR DRAINAGE TUBE PLACEMENT  2023    LAPAROTOMY N/A 2022    Procedure: LAPAROTOMY EXPLORATORY;  Surgeon: Donna Lau MD;  Location: [facility];  Service: Gynecology    MYRINGOTOMY W/ TUBES      two sets as a young child    NY CYSTOURETHROSCOPY N/A 12/15/2022    Procedure: CYSTO W/ ROBOT;  Surgeon: Donna Lau MD;  Location: [facility];  Service: Gynecology    NY LAPS TOTAL HYSTERECT 250 GM/< W/RMVL TUBE/OVARY N/A 12/15/2022    Procedure: (LTH) W/ BILATERAL SALPINGECTOMY  W/ ROBOT COVERTED TO OPEN;  Surgeon: Donna Lau MD;  Location: [facility];  Service: Gynecology    VAC DRESSING APPLICATION N/A 3/17/2023    Procedure: APPLICATION  VAC DRESSING ABDOMEN/TRUNK;  Surgeon: Chan Conroy MD;  Location: [facility];  Service: General    WOUND DEBRIDEMENT N/A 3/15/2023    Procedure: DEBRIDEMENT Abdominal WOUND and placement of Wound VAC;  Surgeon: Chan Conroy MD;  Location: [facility];  Service: General    WOUND DEBRIDEMENT N/A 3/17/2023    Procedure: DEBRIDEMENT WOUND (WASH OUT);  Surgeon: Chan Conroy MD;  Location: [facility];  Service: General     Family History   Problem Relation Age of Onset    Multiple sclerosis Mother     Hypertension Father     Hyperlipidemia Father     Cerebral aneurysm Father     Pulmonary embolism Father     Liver disease Brother      Social History     Socioeconomic History    Marital status:      Spouse name: Not on file    Number of children: Not on file    Years of education: Not on file    Highest education level: Not on file   Occupational History    Not on file   Tobacco Use    Smoking status: Every Day     Current packs/day: 0.50     Average packs/day: 0.5 packs/day for 31.3 years (15.6 ttl pk-yrs)     Types: Cigarettes     Start date: 1995    Smokeless tobacco: Never   Vaping Use    Vaping status: Never Used   Substance and Sexual Activity    Alcohol use: Not Currently     Alcohol/week: 1.0 standard drink of alcohol     Types: 1 Standard drinks or equivalent per week     Comment: 3 x monthly    Drug use: Never    Sexual activity: Yes     Partners: Male     Birth control/protection: Male Sterilization   Other Topics Concern    Not on file   Social History Narrative    Not on file     Social Drivers of Health     Financial Resource Strain: Not on file   Food Insecurity: No Food Insecurity (3/16/2023)    Hunger Vital Sign     Worried About Running Out of Food in the Last Year: Never true     Ran Out of Food in the Last Year: Never true   Transportation Needs: No Transportation Needs (3/16/2023)    PRAPARE - Transportation     Lack of Transportation (Medical): No     Lack of Transportation  (Non-Medical): No   Physical Activity: Not on file   Stress: Not on file   Social Connections: Not on file   Intimate Partner Violence: Not on file   Housing Stability: Low Risk  (3/16/2023)    Housing Stability Vital Sign     Unable to Pay for Housing in the Last Year: No     Number of Places Lived in the Last Year: 1     Unstable Housing in the Last Year: No       Current Outpatient Medications:     albuterol (2.5 mg/3 mL) 0.083 % nebulizer solution, Take 2.5 mg by nebulization every 4 (four) hours as needed for shortness of breath or wheezing, Disp: , Rfl:     albuterol (ProAir HFA) 90 mcg/act inhaler, Inhale 2 puffs every 6 (six) hours as needed for wheezing or shortness of breath, Disp: 8.5 g, Rfl: 0    albuterol (PROVENTIL HFA,VENTOLIN HFA) 90 mcg/act inhaler, Inhale 2 puffs every 6 (six) hours as needed for wheezing, Disp: 8 g, Rfl: 2    amoxicillin (AMOXIL) 500 mg capsule, , Disp: , Rfl:     Arthritis Pain Relief 650 MG CR tablet, take 1 tablet by mouth every 8 hours if needed for mild pain (Patient not taking: Reported on 3/7/2024), Disp: , Rfl:     aspirin 325 mg tablet, Take 325 mg by mouth daily. Indications: as needed (Patient not taking: Reported on 3/7/2024), Disp: , Rfl:     chlorhexidine (HIBICLENS) 4 % external liquid, Apply 1 Application topically daily as needed for wound care Can use to clean wound during VAC changes. (Patient not taking: Reported on 1/29/2024), Disp: 473 mL, Rfl: 5    clonazePAM (KlonoPIN) 0.5 mg tablet, Take 0.5 mg by mouth 2 (two) times a day as needed (Patient not taking: Reported on 1/29/2024), Disp: , Rfl:     clotrimazole (LOTRIMIN) 1 % cream, Apply topically 2 (two) times a day for 14 days To rash of abdomen, Disp: 28 g, Rfl: 1    cyclobenzaprine (FLEXERIL) 5 mg tablet, Take 1 tablet (5 mg total) by mouth 3 (three) times a day as needed for muscle spasms (Patient taking differently: Take 5 mg by mouth 3 (three) times a day as needed for muscle spasms As needed), Disp: 60  tablet, Rfl: 0    dulaglutide (Trulicity) 0.75 MG/0.5ML injection, Inject 0.75 mg under the skin Once a week (Patient not taking: Reported on 1/29/2024), Disp: , Rfl:     erythromycin (ILOTYCIN) ophthalmic ointment, , Disp: , Rfl:     escitalopram (LEXAPRO) 10 mg tablet, Take 10 mg by mouth daily, Disp: , Rfl:     escitalopram (LEXAPRO) 5 mg tablet, Take 5 mg by mouth daily, Disp: , Rfl:     fluticasone (FLONASE) 50 mcg/act nasal spray, 2 sprays into each nostril daily, Disp: 16 g, Rfl: 4    Fluticasone-Salmeterol (Advair) 250-50 mcg/dose inhaler, , Disp: , Rfl:     Fluticasone-Salmeterol (Advair) 500-50 mcg/dose inhaler, Inhale 1 puff 2 (two) times a day, Disp: , Rfl:     folic acid (FOLVITE) 1 mg tablet, Take by mouth daily, Disp: , Rfl:     gabapentin (NEURONTIN) 100 mg capsule, Take 1 capsule (100 mg total) by mouth 3 (three) times a day (Patient not taking: Reported on 3/7/2024), Disp: 90 capsule, Rfl: 0    gabapentin (NEURONTIN) 300 mg capsule, Take 300 mg by mouth 3 (three) times a day, Disp: , Rfl:     gentamicin (GARAMYCIN) 0.1 % ointment, Apply topically 3 (three) times a day for 7 days To packing and place into open wound, Disp: 30 g, Rfl: 1    hydrochlorothiazide (HYDRODIURIL) 12.5 mg tablet, Take 12.5 mg by mouth daily Pt only  taking as needed for leg swelling (Patient not taking: Reported on 3/7/2024), Disp: , Rfl:     ibuprofen (MOTRIN) 600 mg tablet, Take 600 mg by mouth every 6 (six) hours as needed for moderate pain As needed, Disp: , Rfl:     methocarbamol (ROBAXIN) 500 mg tablet, Take 1 tablet (500 mg total) by mouth every 6 (six) hours for 14 days (Patient taking differently: Take 500 mg by mouth every 6 (six) hours As needed), Disp: 56 tablet, Rfl: 0    methotrexate 2.5 mg tablet, Four 2.5 tabs one time a week ( Every Saturday), Disp: , Rfl:     montelukast (SINGULAIR) 10 mg tablet, Take by mouth daily at bedtime as needed, Disp: , Rfl:     mupirocin (BACTROBAN) 2 % ointment, Apply topically  daily for 14 days To wound and areas of folliculitis, Disp: 30 g, Rfl: 1    naloxone (NARCAN) 4 mg/0.1 mL nasal spray, Administer 1 spray into a nostril. If no response after 2-3 minutes, give another dose in the other nostril using a new spray. (Patient not taking: Reported on 4/14/2023), Disp: 1 each, Rfl: 1    neomycin-polymyxin-hydrocortisone (CORTISPORIN) otic solution, Administer 3 drops into the left ear every 6 (six) hours (Patient not taking: Reported on 1/29/2024), Disp: , Rfl:     nicotine (NICODERM CQ) 14 mg/24hr TD 24 hr patch, Place 1 patch on the skin every 24 hours (Patient not taking: Reported on 6/2/2023), Disp: 28 patch, Rfl: 3    nystatin (MYCOSTATIN) powder, Apply topically 2 (two) times a day for 14 days To periwound mixed with barrier cream, Disp: 30 g, Rfl: 1    omeprazole (PriLOSEC) 40 MG capsule, Take 40 mg by mouth daily, Disp: , Rfl:     ondansetron (ZOFRAN) 4 mg tablet, Take 4 mg by mouth daily as needed, Disp: , Rfl:     semaglutide, 0.25 or 0.5 mg/dose, (Ozempic) 2 mg/3 mL injection pen, Inject 0.5 mg under the skin Once a week (Patient not taking: Reported on 1/29/2024), Disp: , Rfl:     sodium chloride, PF, 0.9 %, Inject 10 mL into a catheter in a vein 3 (three) times a week Wound irrigation. (Patient not taking: Reported on 1/29/2024), Disp: 100 mL, Rfl: 10    sodium hypochlorite (DAKIN'S HALF-STRENGTH) external solution, , Disp: , Rfl:     tirzepatide (Zepbound) 2.5 mg/0.5 mL auto-injector, Inject 2.5 mg under the skin Once a week (Patient not taking: Reported on 3/10/2025), Disp: , Rfl:     Vitamin D, Ergocalciferol, 36768 units CAPS, Take by mouth once a week, Disp: , Rfl:     Review of Systems      Objective:  /62   Pulse 82   Temp (!) 96.8 °F (36 °C)   Resp 18   LMP 11/23/2022 (Exact Date) Comment: partial        Physical Exam  Vitals reviewed.   Constitutional:       General: She is not in acute distress.     Appearance: She is morbidly obese.   HENT:      Head:  Normocephalic and atraumatic.   Cardiovascular:      Rate and Rhythm: Normal rate.   Pulmonary:      Effort: Pulmonary effort is normal. No respiratory distress.   Abdominal:          Comments: Continues to have a small sinus tract present on midline abdomen. There is small drainage present. No periwound erythema or maceration.      Skin:     Findings: Wound present. No rash.   Neurological:      Mental Status: She is alert and oriented to person, place, and time.   Psychiatric:         Mood and Affect: Mood normal.         Behavior: Behavior normal.         Wound 07/10/23 Abdomen (Active)   Wound Image Images linked 04/14/25 0824   Wound Description Pink;Epithelialization;White 04/14/25 0824   Non-staged Wound Description Full thickness 04/14/25 0824   Wound Length (cm) 0.6 cm 04/14/25 0824   Wound Width (cm) 0.4 cm 04/14/25 0824   Wound Depth (cm) 1 cm 04/14/25 0824   Wound Surface Area (cm^2) 0.24 cm^2 04/14/25 0824   Wound Volume (cm^3) 0.24 cm^3 04/14/25 0824   Calculated Wound Volume (cm^3) 0.24 cm^3 04/14/25 0824   Change in Wound Size % 99.44 04/14/25 0824   Drainage Amount Moderate 04/14/25 0824   Drainage Description Brown (kennedy) 04/14/25 0824   Alexa-wound Assessment Pink;Rash;Scar Tissue 04/14/25 0824   Patient Tolerance Tolerated well 04/14/25 0824   Dressing Status Intact;Old drainage 04/14/25 0824       Results from last 6 Months   Lab Units 12/30/24  1000   WOUND CULTURE  3+ Growth of Enterococcus faecalis*  2+ Growth of Serratia marcescens*  2+ Growth of Pseudomonas aeruginosa*           Wound Instructions:  Orders Placed This Encounter   Procedures    Wound cleansing and dressings Abdomen     Wash your hands with soap and water. Remove abdominal dressing, wash with Normal Saline, pat dry prior to applying new dressing        Apply small strip of Mesalt packing, leaving a tail visible.  Apply nystatin mixed with zinc as needed to the periwound  Cover with gauze/ABD Pad and tape  Change every day  "and as needed for excessive drainage.     **When drainage lessens you can go back to thin layer of silver honey gel that you purchased from [employer].        Watch for signs of infection these include a fever of 100.4°F (38°C) or higher, chills  Signs of wound infection include increasing swelling, redness, warmth, pain around the wound. Foul smell coming from wound  Go to the closest Emergency Room with signs of infection to be evaluated.         Quit Smoking or cut back as much as possible! As discussed smoking slows the ability for a wound to heal by constricting blood vessels for approximately 30 minutes after each cigarette.     Follow up in 1 month     Standing Status:   Future     Expiration Date:   4/21/2025    Wound Procedure Treatment Abdomen     This order was created via procedure documentation       Cally Garcia, PA-C      Portions of the record may have been created with voice recognition software. Occasional wrong word or \"sound alike\" substitutions may have occurred due to the inherent limitations of voice recognition software. Read the chart carefully and recognize, using context, where substitutions have occurred.    "

## 2025-04-28 ENCOUNTER — OFFICE VISIT (OUTPATIENT)
Dept: URGENT CARE | Facility: CLINIC | Age: 45
End: 2025-04-28
Payer: COMMERCIAL

## 2025-04-28 VITALS
BODY MASS INDEX: 47.76 KG/M2 | RESPIRATION RATE: 20 BRPM | TEMPERATURE: 98.5 F | DIASTOLIC BLOOD PRESSURE: 80 MMHG | HEART RATE: 84 BPM | WEIGHT: 287 LBS | SYSTOLIC BLOOD PRESSURE: 130 MMHG | OXYGEN SATURATION: 97 %

## 2025-04-28 DIAGNOSIS — J02.9 SORE THROAT: Primary | ICD-10-CM

## 2025-04-28 DIAGNOSIS — J01.90 ACUTE SINUSITIS, RECURRENCE NOT SPECIFIED, UNSPECIFIED LOCATION: ICD-10-CM

## 2025-04-28 LAB — S PYO AG THROAT QL: NEGATIVE

## 2025-04-28 PROCEDURE — 99213 OFFICE O/P EST LOW 20 MIN: CPT

## 2025-04-28 PROCEDURE — 87880 STREP A ASSAY W/OPTIC: CPT

## 2025-04-28 PROCEDURE — 87070 CULTURE OTHR SPECIMN AEROBIC: CPT

## 2025-04-28 NOTE — PROGRESS NOTES
Name: Maria R Webb      : 1980      MRN: [id number]  Encounter Provider: Kylah Saeed PA-C  Encounter Date: 2025   Encounter department: [facility]  :  Assessment & Plan  Sore throat    Orders:    POCT rapid strepA    amoxicillin-clavulanate (AUGMENTIN) 875-125 mg per tablet; Take 1 tablet by mouth every 12 (twelve) hours for 7 days    Abx sent for concern for sinus infection and possible strep. Will await throat culture but should take abx for concern for sinus infection.Reviewed OTC management of mucus as well.    History of Present Illness   Sore Throat   Associated symptoms include congestion and ear pain.     Maria R Webb is a 44 y.o. female who presents with sore throat and congestion with head pressure worsening for one week.      Review of Systems   HENT:  Positive for congestion, ear pain, sinus pressure and sore throat.           Objective   /80   Pulse 84   Temp 98.5 °F (36.9 °C)   Resp 20   Wt 130 kg (287 lb)   LMP 2022 (Exact Date) Comment: partial  SpO2 97%   BMI 47.76 kg/m²      Physical Exam  Constitutional:       Appearance: She is well-developed.   HENT:      Head: Normocephalic and atraumatic.      Right Ear: Ear canal normal.      Left Ear: Ear canal normal. A middle ear effusion is present. Tympanic membrane is erythematous.      Mouth/Throat:      Mouth: Mucous membranes are moist.      Pharynx: Pharyngeal swelling and posterior oropharyngeal erythema present.   Cardiovascular:      Rate and Rhythm: Normal rate.   Pulmonary:      Effort: Pulmonary effort is normal.      Breath sounds: Normal breath sounds.   Skin:     General: Skin is warm and dry.   Neurological:      Mental Status: She is alert.

## 2025-04-28 NOTE — LETTER
April 29, 2025     Patient: Maria R Webb   YOB: 1980   Date of Visit: 4/28/2025       To Whom it May Concern:    Maria R Webb was seen in my clinic on 4/28/2025. She may return to work on 4/30/25 .    If you have any questions or concerns, please don't hesitate to call.         Sincerely,          Kylah Saeed PA-C        CC: No Recipients

## 2025-04-29 LAB — BACTERIA THROAT CULT: NORMAL

## 2025-05-12 ENCOUNTER — OFFICE VISIT (OUTPATIENT)
Facility: HOSPITAL | Age: 45
End: 2025-05-12
Payer: COMMERCIAL

## 2025-05-12 VITALS
HEART RATE: 84 BPM | RESPIRATION RATE: 18 BRPM | SYSTOLIC BLOOD PRESSURE: 119 MMHG | TEMPERATURE: 96.9 F | DIASTOLIC BLOOD PRESSURE: 63 MMHG

## 2025-05-12 DIAGNOSIS — T81.89XD NON-HEALING SURGICAL WOUND, SUBSEQUENT ENCOUNTER: Primary | ICD-10-CM

## 2025-05-12 DIAGNOSIS — Z98.890 S/P HERNIA REPAIR: ICD-10-CM

## 2025-05-12 DIAGNOSIS — Z87.19 S/P HERNIA REPAIR: ICD-10-CM

## 2025-05-12 DIAGNOSIS — Z90.710 S/P HYSTERECTOMY: ICD-10-CM

## 2025-05-12 DIAGNOSIS — F17.200 TOBACCO USE DISORDER: ICD-10-CM

## 2025-05-12 PROCEDURE — 99213 OFFICE O/P EST LOW 20 MIN: CPT | Performed by: STUDENT IN AN ORGANIZED HEALTH CARE EDUCATION/TRAINING PROGRAM

## 2025-05-12 PROCEDURE — G0463 HOSPITAL OUTPT CLINIC VISIT: HCPCS | Performed by: STUDENT IN AN ORGANIZED HEALTH CARE EDUCATION/TRAINING PROGRAM

## 2025-05-12 NOTE — PROGRESS NOTES
Patient ID: Maria R Webb is a 44 y.o. female Date of Birth 1980       Chief Complaint   Patient presents with    Follow Up Wound Care Visit     Non healing surgical wound in abdomen       Allergies:  Patient has no known allergies.    Diagnosis:   Diagnosis ICD-10-CM Associated Orders   1. Non-healing surgical wound, subsequent encounter  T81.89XD Wound cleansing and dressings Abdomen     Wound Procedure Treatment Abdomen      2. S/P hernia repair  Z98.890     Z87.19       3. S/P hysterectomy  Z90.710       4. Tobacco use disorder  F17.200            Assessment  & Plan:    F/u surgical wound of the midline abdomen s/p hysterectomy complicated by fascial dehiscence with subsequent abdominal herniation repair with Phasix mesh. Chronic midline sinus tract that appears stable in size. There is a small amount of serous drainage from the remaining opening site. Small amount of scattered skin irritation of the periwound  consistent with MASD. No satellite lesions or leading edge to indicate fungal etiology.  No diffuse erythema to indicate cellulitis.   No significant change since previous visit. May continue with silver honey ointment and keeping site covered. Barrier cream to areas of skin affected by irritation on the periwound.   Has plans for abdominal reconstruction with component separation repair for definitive closure scheduled for November 2025.   Monitor for any alarm symptoms such as abdominal pain, nausea, vomiting, change in bowel movements, fevers, chills.  Report to ED should these occur.  F/u in 6 weeks. Instructed to call if any questions or concerns arise in meantime.          Subjective:   02/17/25: Pt presents for continued palliative wound care of chronic draining abdominal sinus tract. Since her previous visit she has met with plastic surgery whom discussed component separation repair, however it was noted that she needs to be completely nicotine free for 4-6 weeks prior to any operation. Pt  reports she is planning to quit and will likely go through with the operation this fall. She has been using Medihoney and doing well with it, she did have a heavy amount of bloody drainage on her dressing today which has happened several times in the past. Denies any abdominal pain, nausea, vomiting, change in bowel movements, fevers, chills.    03/17/25: Pt presents for f/u care of chronic abdominal sinus tract. She reports that she has purchased a silver honey dressing and has been using this on her wound. Does not have any acute concerns. Reports minimal drainage from the wound without any episodes of bleeding. Has met with Dr. Conroy since her previous visit at the wound care center and currently the plan is for component separation repair for definitive treatment in the fall of 2025.     04/14/25: Pt presents for f/u care of chronic abdominal sinus tract. She has been using a silver honey ointment on the wound. Has noticed a bit of increased drainage this past few days d/t increased activity levels. Has plans for component separation repair in November of 2025 with plastic and general surgery.     05/12/25: Pt presents for f/u care of chronic draining abdominal sinus tract. She has continued using a silver honey ointment. No significant changes. Has not had any episodes of high volume draining or bleeding. Is presently smoking but plans on being nicotine free by October prior to her surgery (component separation repair) that is scheduled for November. Denies N/V, abdominal pain. Small amount of irritation of the periwound has not been present for a long time, reports noticing some periwound irritation that started yesterday.           The following portions of the patient's history were reviewed and updated as appropriate:   Patient Active Problem List   Diagnosis    Tobacco use disorder    COVID-19    Obesity, morbid, BMI 50 or higher (HCC)    Bulky or enlarged uterus    Pelvic pain    Preoperative exam for  gynecologic surgery    HTN (hypertension)    Gastroesophageal reflux disease    Asthma    Obstructive sleep apnea syndrome    S/P hysterectomy    Postoperative anemia    Rheumatoid arthritis involving multiple sites with positive rheumatoid factor (HCC)    Encounter for postoperative care    Open wound of abdomen    Surgical wound, non healing    Cigarette nicotine dependence without complication     Past Medical History:   Diagnosis Date    Abdominal wall abscess at site of surgical wound 2023    Abnormal uterine bleeding due to intramural leiomyoma 2022    Arthritis     Rheumatoid    Asthma     Breathing difficulty     only occured during inverted robotic hysterectomy    Cellulitis of drainage site following surgery 2023    CPAP (continuous positive airway pressure) dependence     GERD (gastroesophageal reflux disease)     Hypertension     Obese     Pneumonia     Sleep apnea     Uterine fibroid     LTH today 12/15/2022    Wound dehiscence, surgical 2022     Past Surgical History:   Procedure Laterality Date    ABDOMINAL WASHOUT      post  with wound vac     SECTION      had hematoma removed after the surgery    FOREARM SURGERY Right     repair of fracture with plating    INCISIONAL HERNIA REPAIR  2022    Procedure: REPAIR  RECURRENT HERNIA INCISIONAL WITH MESH;  Surgeon: Donna Lau MD;  Location: [facility];  Service: Gynecology    IR DRAINAGE TUBE CHECK WITH SCLEROSIS  2/3/2023    IR DRAINAGE TUBE CHECK WITH SCLEROSIS  3/7/2023    IR DRAINAGE TUBE CHECK/CHANGE/REPOSITION/REINSERTION/UPSIZE  2023    IR DRAINAGE TUBE CHECK/CHANGE/REPOSITION/REINSERTION/UPSIZE  2023    IR DRAINAGE TUBE CHECK/CHANGE/REPOSITION/REINSERTION/UPSIZE  2023    IR DRAINAGE TUBE CHECK/CHANGE/REPOSITION/REINSERTION/UPSIZE  2023    IR DRAINAGE TUBE PLACEMENT  2023    LAPAROTOMY N/A 2022    Procedure: LAPAROTOMY EXPLORATORY;  Surgeon: Donna Lau MD;   Location: [facility];  Service: Gynecology    MYRINGOTOMY W/ TUBES      two sets as a young child    OK CYSTOURETHROSCOPY N/A 12/15/2022    Procedure: CYSTO W/ ROBOT;  Surgeon: Donna Lau MD;  Location: [facility];  Service: Gynecology    OK LAPS TOTAL HYSTERECT 250 GM/< W/RMVL TUBE/OVARY N/A 12/15/2022    Procedure: (LTH) W/ BILATERAL SALPINGECTOMY  W/ ROBOT COVERTED TO OPEN;  Surgeon: Donna Lau MD;  Location: [facility];  Service: Gynecology    VAC DRESSING APPLICATION N/A 3/17/2023    Procedure: APPLICATION VAC DRESSING ABDOMEN/TRUNK;  Surgeon: Chan Conroy MD;  Location: [facility];  Service: General    WOUND DEBRIDEMENT N/A 3/15/2023    Procedure: DEBRIDEMENT Abdominal WOUND and placement of Wound VAC;  Surgeon: Chan Conroy MD;  Location: [facility];  Service: General    WOUND DEBRIDEMENT N/A 3/17/2023    Procedure: DEBRIDEMENT WOUND (WASH OUT);  Surgeon: Chan Conroy MD;  Location: [facility];  Service: General     Family History   Problem Relation Age of Onset    Multiple sclerosis Mother     Hypertension Father     Hyperlipidemia Father     Cerebral aneurysm Father     Pulmonary embolism Father     Liver disease Brother      Social History     Socioeconomic History    Marital status:      Spouse name: Not on file    Number of children: Not on file    Years of education: Not on file    Highest education level: Not on file   Occupational History    Not on file   Tobacco Use    Smoking status: Every Day     Current packs/day: 0.50     Average packs/day: 0.5 packs/day for 31.4 years (15.7 ttl pk-yrs)     Types: Cigarettes     Start date: 1995    Smokeless tobacco: Never   Vaping Use    Vaping status: Never Used   Substance and Sexual Activity    Alcohol use: Not Currently     Alcohol/week: 1.0 standard drink of alcohol     Types: 1 Standard drinks or equivalent per week     Comment: 3 x monthly    Drug use: Never    Sexual activity: Yes     Partners: Male     Birth  control/protection: Male Sterilization   Other Topics Concern    Not on file   Social History Narrative    Not on file     Social Drivers of Health     Financial Resource Strain: Not on file   Food Insecurity: No Food Insecurity (3/16/2023)    Hunger Vital Sign     Worried About Running Out of Food in the Last Year: Never true     Ran Out of Food in the Last Year: Never true   Transportation Needs: No Transportation Needs (3/16/2023)    PRAPARE - Transportation     Lack of Transportation (Medical): No     Lack of Transportation (Non-Medical): No   Physical Activity: Not on file   Stress: Not on file   Social Connections: Not on file   Intimate Partner Violence: Not on file   Housing Stability: Low Risk  (3/16/2023)    Housing Stability Vital Sign     Unable to Pay for Housing in the Last Year: No     Number of Places Lived in the Last Year: 1     Unstable Housing in the Last Year: No       Current Outpatient Medications:     albuterol (2.5 mg/3 mL) 0.083 % nebulizer solution, Take 2.5 mg by nebulization every 4 (four) hours as needed for shortness of breath or wheezing, Disp: , Rfl:     albuterol (ProAir HFA) 90 mcg/act inhaler, Inhale 2 puffs every 6 (six) hours as needed for wheezing or shortness of breath, Disp: 8.5 g, Rfl: 0    albuterol (PROVENTIL HFA,VENTOLIN HFA) 90 mcg/act inhaler, Inhale 2 puffs every 6 (six) hours as needed for wheezing, Disp: 8 g, Rfl: 2    amoxicillin (AMOXIL) 500 mg capsule, , Disp: , Rfl:     Arthritis Pain Relief 650 MG CR tablet, take 1 tablet by mouth every 8 hours if needed for mild pain (Patient not taking: Reported on 3/7/2024), Disp: , Rfl:     aspirin 325 mg tablet, Take 325 mg by mouth daily. Indications: as needed (Patient not taking: Reported on 3/7/2024), Disp: , Rfl:     chlorhexidine (HIBICLENS) 4 % external liquid, Apply 1 Application topically daily as needed for wound care Can use to clean wound during VAC changes. (Patient not taking: Reported on 1/29/2024), Disp: 473  mL, Rfl: 5    clonazePAM (KlonoPIN) 0.5 mg tablet, Take 0.5 mg by mouth 2 (two) times a day as needed (Patient not taking: Reported on 1/29/2024), Disp: , Rfl:     clotrimazole (LOTRIMIN) 1 % cream, Apply topically 2 (two) times a day for 14 days To rash of abdomen, Disp: 28 g, Rfl: 1    cyclobenzaprine (FLEXERIL) 5 mg tablet, Take 1 tablet (5 mg total) by mouth 3 (three) times a day as needed for muscle spasms (Patient taking differently: Take 5 mg by mouth 3 (three) times a day as needed for muscle spasms As needed), Disp: 60 tablet, Rfl: 0    dulaglutide (Trulicity) 0.75 MG/0.5ML injection, Inject 0.75 mg under the skin Once a week (Patient not taking: Reported on 1/29/2024), Disp: , Rfl:     erythromycin (ILOTYCIN) ophthalmic ointment, , Disp: , Rfl:     escitalopram (LEXAPRO) 10 mg tablet, Take 10 mg by mouth daily, Disp: , Rfl:     escitalopram (LEXAPRO) 5 mg tablet, Take 5 mg by mouth daily, Disp: , Rfl:     fluticasone (FLONASE) 50 mcg/act nasal spray, 2 sprays into each nostril daily, Disp: 16 g, Rfl: 4    Fluticasone-Salmeterol (Advair) 250-50 mcg/dose inhaler, , Disp: , Rfl:     Fluticasone-Salmeterol (Advair) 500-50 mcg/dose inhaler, Inhale 1 puff 2 (two) times a day, Disp: , Rfl:     folic acid (FOLVITE) 1 mg tablet, Take by mouth daily, Disp: , Rfl:     gabapentin (NEURONTIN) 100 mg capsule, Take 1 capsule (100 mg total) by mouth 3 (three) times a day (Patient not taking: Reported on 3/7/2024), Disp: 90 capsule, Rfl: 0    gabapentin (NEURONTIN) 300 mg capsule, Take 300 mg by mouth 3 (three) times a day, Disp: , Rfl:     gentamicin (GARAMYCIN) 0.1 % ointment, Apply topically 3 (three) times a day for 7 days To packing and place into open wound, Disp: 30 g, Rfl: 1    hydrochlorothiazide (HYDRODIURIL) 12.5 mg tablet, Take 12.5 mg by mouth daily Pt only  taking as needed for leg swelling (Patient not taking: Reported on 3/7/2024), Disp: , Rfl:     ibuprofen (MOTRIN) 600 mg tablet, Take 600 mg by mouth  every 6 (six) hours as needed for moderate pain As needed, Disp: , Rfl:     methocarbamol (ROBAXIN) 500 mg tablet, Take 1 tablet (500 mg total) by mouth every 6 (six) hours for 14 days (Patient taking differently: Take 500 mg by mouth every 6 (six) hours As needed), Disp: 56 tablet, Rfl: 0    methotrexate 2.5 mg tablet, Four 2.5 tabs one time a week ( Every Saturday), Disp: , Rfl:     montelukast (SINGULAIR) 10 mg tablet, Take by mouth daily at bedtime as needed, Disp: , Rfl:     mupirocin (BACTROBAN) 2 % ointment, Apply topically daily for 14 days To wound and areas of folliculitis, Disp: 30 g, Rfl: 1    naloxone (NARCAN) 4 mg/0.1 mL nasal spray, Administer 1 spray into a nostril. If no response after 2-3 minutes, give another dose in the other nostril using a new spray. (Patient not taking: Reported on 4/14/2023), Disp: 1 each, Rfl: 1    neomycin-polymyxin-hydrocortisone (CORTISPORIN) otic solution, Administer 3 drops into the left ear every 6 (six) hours (Patient not taking: Reported on 1/29/2024), Disp: , Rfl:     nicotine (NICODERM CQ) 14 mg/24hr TD 24 hr patch, Place 1 patch on the skin every 24 hours (Patient not taking: Reported on 6/2/2023), Disp: 28 patch, Rfl: 3    nystatin (MYCOSTATIN) powder, Apply topically 2 (two) times a day for 14 days To periwound mixed with barrier cream, Disp: 30 g, Rfl: 1    omeprazole (PriLOSEC) 40 MG capsule, Take 40 mg by mouth daily, Disp: , Rfl:     ondansetron (ZOFRAN) 4 mg tablet, Take 4 mg by mouth daily as needed, Disp: , Rfl:     semaglutide, 0.25 or 0.5 mg/dose, (Ozempic) 2 mg/3 mL injection pen, Inject 0.5 mg under the skin Once a week (Patient not taking: Reported on 1/29/2024), Disp: , Rfl:     sodium chloride, PF, 0.9 %, Inject 10 mL into a catheter in a vein 3 (three) times a week Wound irrigation. (Patient not taking: Reported on 1/29/2024), Disp: 100 mL, Rfl: 10    sodium hypochlorite (DAKIN'S HALF-STRENGTH) external solution, , Disp: , Rfl:     tirzepatide  (Zepbound) 2.5 mg/0.5 mL auto-injector, Inject 2.5 mg under the skin Once a week (Patient not taking: Reported on 3/10/2025), Disp: , Rfl:     Vitamin D, Ergocalciferol, 92139 units CAPS, Take by mouth once a week, Disp: , Rfl:     Review of Systems      Objective:  /63   Pulse 84   Temp (!) 96.9 °F (36.1 °C)   Resp 18   LMP 11/23/2022 (Exact Date) Comment: partial        Physical Exam  Vitals reviewed.   Constitutional:       General: She is not in acute distress.     Appearance: She is morbidly obese.   HENT:      Head: Normocephalic and atraumatic.   Cardiovascular:      Rate and Rhythm: Normal rate.   Pulmonary:      Effort: Pulmonary effort is normal. No respiratory distress.   Abdominal:          Comments: Continues to have a small sinus tract present on midline abdomen. There is small drainage present. See full wound assessment.      Skin:     Findings: Wound present. No rash.   Neurological:      Mental Status: She is alert and oriented to person, place, and time.   Psychiatric:         Mood and Affect: Mood normal.         Behavior: Behavior normal.         Wound 07/10/23 Abdomen (Active)   Wound Image Images linked 05/12/25 0810   Wound Description Pink;Epithelialization 05/12/25 0813   Non-staged Wound Description Full thickness 05/12/25 0813   Wound Length (cm) 0.1 cm 05/12/25 0813   Wound Width (cm) 0.1 cm 05/12/25 0813   Wound Depth (cm) 0.3 cm 05/12/25 0813   Wound Surface Area (cm^2) 0.01 cm^2 05/12/25 0813   Wound Volume (cm^3) 0.003 cm^3 05/12/25 0813   Calculated Wound Volume (cm^3) 0 cm^3 05/12/25 0813   Change in Wound Size % 100 05/12/25 0813   Drainage Amount Scant 05/12/25 0813   Drainage Description Tan 05/12/25 0813   Alexa-wound Assessment Pink;Rash;Scar Tissue 05/12/25 0813   Wound packed? No 05/12/25 0813   Dressing Status Intact;Old drainage 05/12/25 0813       Results from last 6 Months   Lab Units 12/30/24  1000   WOUND CULTURE  3+ Growth of Enterococcus faecalis*  2+ Growth  "of Serratia marcescens*  2+ Growth of Pseudomonas aeruginosa*           Wound Instructions:  Orders Placed This Encounter   Procedures    Wound cleansing and dressings Abdomen     Wound location: Abdomen    Wash your hands with soap and water. Remove abdominal dressing, wash with Normal Saline, pat dry prior to applying new dressing         Apply thin layer of silver honey gel that you purchased from [employer].  Apply nystatin mixed with zinc as needed to the periwound  Cover with gauze/ABD Pad and tape  Change every day and as needed for excessive drainage.      **If drainage increases you can go back to applying small strip of [place] packing, leaving a tail visible.        Watch for signs of infection these include a fever of 100.4°F (38°C) or higher, chills  Signs of wound infection include increasing swelling, redness, warmth, pain around the wound. Foul smell coming from wound  Go to the closest Emergency Room with signs of infection to be evaluated.         Quit Smoking or cut back as much as possible! As discussed smoking slows the ability for a wound to heal by constricting blood vessels for approximately 30 minutes after each cigarette.       Follow up in 6 weeks at the wound care center.     Standing Status:   Future     Expected Date:   6/12/2025     Expiration Date:   6/23/2025    Wound Procedure Treatment Abdomen     This order was created via procedure documentation       Cally Garcia, PA-C      Portions of the record may have been created with voice recognition software. Occasional wrong word or \"sound alike\" substitutions may have occurred due to the inherent limitations of voice recognition software. Read the chart carefully and recognize, using context, where substitutions have occurred.    "

## 2025-05-12 NOTE — PROGRESS NOTES
Wound Procedure Treatment Abdomen    Performed by: Tanya Hickman RN  Authorized by: Salma Garcia PA-C  Associated wounds:   Wound 07/10/23 Abdomen    Wound cleansed with:  NSS   Applied secondary dressing:  ABD   Dressing secured with:  Tape

## 2025-06-23 ENCOUNTER — OFFICE VISIT (OUTPATIENT)
Facility: HOSPITAL | Age: 45
End: 2025-06-23
Payer: COMMERCIAL

## 2025-06-23 VITALS
TEMPERATURE: 96.6 F | DIASTOLIC BLOOD PRESSURE: 70 MMHG | HEART RATE: 96 BPM | SYSTOLIC BLOOD PRESSURE: 122 MMHG | RESPIRATION RATE: 16 BRPM

## 2025-06-23 DIAGNOSIS — Z98.890 S/P HERNIA REPAIR: ICD-10-CM

## 2025-06-23 DIAGNOSIS — Z90.710 S/P HYSTERECTOMY: ICD-10-CM

## 2025-06-23 DIAGNOSIS — F17.200 TOBACCO USE DISORDER: ICD-10-CM

## 2025-06-23 DIAGNOSIS — Z87.19 S/P HERNIA REPAIR: ICD-10-CM

## 2025-06-23 DIAGNOSIS — T81.89XD NON-HEALING SURGICAL WOUND, SUBSEQUENT ENCOUNTER: Primary | ICD-10-CM

## 2025-06-23 DIAGNOSIS — E66.813 OBESITY, CLASS III, BMI 40-49.9 (MORBID OBESITY): ICD-10-CM

## 2025-06-23 PROCEDURE — 99213 OFFICE O/P EST LOW 20 MIN: CPT | Performed by: STUDENT IN AN ORGANIZED HEALTH CARE EDUCATION/TRAINING PROGRAM

## 2025-06-23 PROCEDURE — G0463 HOSPITAL OUTPT CLINIC VISIT: HCPCS | Performed by: STUDENT IN AN ORGANIZED HEALTH CARE EDUCATION/TRAINING PROGRAM

## 2025-06-23 NOTE — PATIENT INSTRUCTIONS
Orders Placed This Encounter   Procedures    Wound cleansing and dressings Abdomen     Abdomen     Wash your hands with soap and water. Remove abdominal dressing, wash with Normal Saline, pat dry prior to applying new dressing      May apply nystatin mixed with zinc as needed to the periwound as needed for irritation     Apply thin layer of silver honey gel   Cover with gauze/ABD Pad and tape  Change every day and as needed for excessive drainage.         Watch for signs of infection these include a fever of 100.4°F (38°C) or higher, chills  Signs of wound infection include increasing swelling, redness, warmth, pain around the wound. Foul smell coming from wound  Go to the closest Emergency Room with signs of infection to be evaluated.         Quit Smoking or cut back as much as possible! As discussed smoking slows the ability for a wound to heal by constricting blood vessels for approximately 30 minutes after each cigarette.        Follow up in 6 weeks at the wound care center.     Standing Status:   Future     Expiration Date:   8/4/2025

## 2025-06-23 NOTE — PROGRESS NOTES
Wound Procedure Treatment Abdomen    Performed by: Qi Tripp RN  Authorized by: Salma Garcia PA-C  Associated wounds:   Wound 07/10/23 Abdomen    Wound cleansed with:  NSS   Applied topical:  Betadine   Applied secondary dressing:  Gauze and ABD   Dressing secured with:  Tape

## 2025-06-24 NOTE — PROGRESS NOTES
Patient ID: Maria R Webb is a 45 y.o. female Date of Birth 1980       Chief Complaint   Patient presents with    Follow Up Wound Care Visit     Follow up for the Abdominal wound--has been using Dakin to the wound for the past few days       Allergies:  Patient has no known allergies.    Diagnosis:   Diagnosis ICD-10-CM Associated Orders   1. Non-healing surgical wound, subsequent encounter  T81.89XD Wound cleansing and dressings Abdomen     Wound Procedure Treatment Abdomen      2. S/P hernia repair  Z98.890     Z87.19       3. S/P hysterectomy  Z90.710       4. Tobacco use disorder  F17.200       5. Obesity, Class III, BMI 40-49.9 (morbid obesity)  E66.813            Assessment  & Plan:    F/u chronic draining sinus tract of midline abdomen (is s/p hysterectomy complication by fascial dehiscence with subsequent abdominal herniation repair with Phasix mesh). Draining tract with very small diameter opening inhibiting inspection of wound base or probing/exploration of the wound. Scant drainage. Periwound MASD has resolved. No rash or erythema.   Continue with silver honey ointment and keeping site covered. May utilize barrier cream if periwound irritation returns but not necessary for time being.   Has plans for abdominal reconstruction with component separation repair for definitive closure scheduled for November 2025.   Monitor for any alarm symptoms such as abdominal pain, nausea, vomiting, change in bowel movements, fevers, chills.  Report to ED should these occur.  F/u in 6 weeks. Instructed to call if any questions or concerns arise in meantime.          Subjective:   02/17/25: Pt presents for continued palliative wound care of chronic draining abdominal sinus tract. Since her previous visit she has met with plastic surgery whom discussed component separation repair, however it was noted that she needs to be completely nicotine free for 4-6 weeks prior to any operation. Pt reports she is planning to quit  and will likely go through with the operation this fall. She has been using Medihoney and doing well with it, she did have a heavy amount of bloody drainage on her dressing today which has happened several times in the past. Denies any abdominal pain, nausea, vomiting, change in bowel movements, fevers, chills.    03/17/25: Pt presents for f/u care of chronic abdominal sinus tract. She reports that she has purchased a silver honey dressing and has been using this on her wound. Does not have any acute concerns. Reports minimal drainage from the wound without any episodes of bleeding. Has met with Dr. Conroy since her previous visit at the wound care center and currently the plan is for component separation repair for definitive treatment in the fall of 2025.     04/14/25: Pt presents for f/u care of chronic abdominal sinus tract. She has been using a silver honey ointment on the wound. Has noticed a bit of increased drainage this past few days d/t increased activity levels. Has plans for component separation repair in November of 2025 with plastic and general surgery.     05/12/25: Pt presents for f/u care of chronic draining abdominal sinus tract. She has continued using a silver honey ointment. No significant changes. Has not had any episodes of high volume draining or bleeding. Is presently smoking but plans on being nicotine free by October prior to her surgery (component separation repair) that is scheduled for November. Denies N/V, abdominal pain. Small amount of irritation of the periwound has not been present for a long time, reports noticing some periwound irritation that started yesterday.     06/23/25: Pt presents for f/u care of chronic draining sinus tract of the abdomen. Reports that she noticed a bit of a smell again a few days ago therefore she used Dakin solution for a few days. Has been continuing with silver honey ointment daily. Has not noticed any significant drainage from the wound. Periwound  skin has not been irritated therefore use of topical antifungal and barrier cream was d/c. Is still working on smoking cessation, has not yet been able to completely quit.           The following portions of the patient's history were reviewed and updated as appropriate:   Problem List[1]  Past Medical History[2]  Past Surgical History[3]  Family History[4]  Social History[5]  Current Medications[6]    Review of Systems      Objective:  /70   Pulse 96   Temp (!) 96.6 °F (35.9 °C)   Resp 16   LMP 11/23/2022 (Exact Date) Comment: partial  Pain Score: 0-No pain     Physical Exam  Vitals reviewed.   Constitutional:       General: She is not in acute distress.     Appearance: She is morbidly obese.   HENT:      Head: Normocephalic and atraumatic.     Cardiovascular:      Rate and Rhythm: Normal rate.   Pulmonary:      Effort: Pulmonary effort is normal. No respiratory distress.   Abdominal:      Comments: Continues to have a small sinus tract present on midline abdomen. There is small drainage present. Diameter that remains open is so small it inhibits visualization or even probing of the wound. No periwound erythema, malodor, purulence to indicate infection.        Skin:     Findings: Wound present. No rash.     Neurological:      Mental Status: She is alert and oriented to person, place, and time.     Psychiatric:         Mood and Affect: Mood normal.         Behavior: Behavior normal.           Wound 07/10/23 Abdomen (Active)   Wound Image   06/23/25 1027   Wound Description EILEEN 06/23/25 1030   Non-staged Wound Description Full thickness 05/12/25 0813   Wound Length (cm) 0.1 cm 06/23/25 1030   Wound Width (cm) 0.1 cm 06/23/25 1030   Wound Depth (cm) 0.1 cm 06/23/25 1030   Wound Surface Area (cm^2) 0.01 cm^2 06/23/25 1030   Wound Volume (cm^3) 0.001 cm^3 06/23/25 1030   Calculated Wound Volume (cm^3) 0 cm^3 06/23/25 1030   Change in Wound Size % 100 06/23/25 1030   Tunneling 1 2 cm 03/04/24 0822   Tunneling 1  in depth located at 7 o'clock 03/04/24 0822   Number of underminings 1 09/30/24 1445   Undermining 1 1 09/30/24 1445   Undermining 1 is depth extending from 7 o 'clock 09/30/24 1445   Drainage Amount Scant 06/23/25 1030   Drainage Description Other (Comment) 06/23/25 1030   Alexa-wound Assessment Pink;Rash;Scar Tissue 06/23/25 1030   Treatments Irrigation with NSS 06/23/25 1030   Dressing Wound V.A.C. 07/31/23 0859   Wound packed? No 05/12/25 0813   Packing- # inserted 2 07/24/23 0832   Packing- # removed 1 10/14/24 0836   Dressing Changed New 07/24/23 0832   Patient Tolerance Tolerated well 04/14/25 0824   Dressing Status Intact 06/23/25 1030                 Results from last 6 Months   Lab Units 12/30/24  1000   WOUND CULTURE  3+ Growth of Enterococcus faecalis*  2+ Growth of Serratia marcescens*  2+ Growth of Pseudomonas aeruginosa*           Wound Instructions:  Orders Placed This Encounter   Procedures    Wound cleansing and dressings Abdomen     Abdomen     Wash your hands with soap and water. Remove abdominal dressing, wash with Normal Saline, pat dry prior to applying new dressing      May apply nystatin mixed with zinc as needed to the periwound as needed for irritation     Apply thin layer of silver honey gel   Cover with gauze/ABD Pad and tape  Change every day and as needed for excessive drainage.         Watch for signs of infection these include a fever of 100.4°F (38°C) or higher, chills  Signs of wound infection include increasing swelling, redness, warmth, pain around the wound. Foul smell coming from wound  Go to the closest Emergency Room with signs of infection to be evaluated.         Quit Smoking or cut back as much as possible! As discussed smoking slows the ability for a wound to heal by constricting blood vessels for approximately 30 minutes after each cigarette.        Follow up in 6 weeks at the wound care center.     Standing Status:   Future     Expiration Date:   8/4/2025    Wound  "Procedure Treatment Abdomen     This order was created via procedure documentation       Cally Garcia, PA-C        Portions of the record may have been created with voice recognition software. Occasional wrong word or \"sound alike\" substitutions may have occurred due to the inherent limitations of voice recognition software. Read the chart carefully and recognize, using context, where substitutions have occurred.           [1]   Patient Active Problem List  Diagnosis    Tobacco use disorder    COVID-19    Obesity, morbid, BMI 50 or higher (HCC)    Bulky or enlarged uterus    Pelvic pain    Preoperative exam for gynecologic surgery    HTN (hypertension)    Gastroesophageal reflux disease    Asthma    Obstructive sleep apnea syndrome    S/P hysterectomy    Postoperative anemia    Rheumatoid arthritis involving multiple sites with positive rheumatoid factor (HCC)    Encounter for postoperative care    Open wound of abdomen    Surgical wound, non healing    Cigarette nicotine dependence without complication   [2]   Past Medical History:  Diagnosis Date    Abdominal wall abscess at site of surgical wound 2023    Abnormal uterine bleeding due to intramural leiomyoma 2022    Arthritis     Rheumatoid    Asthma     Breathing difficulty     only occured during inverted robotic hysterectomy    Cellulitis of drainage site following surgery 2023    CPAP (continuous positive airway pressure) dependence     GERD (gastroesophageal reflux disease)     Hypertension     Obese     Pneumonia     Sleep apnea     Uterine fibroid     LT today 12/15/2022    Wound dehiscence, surgical 2022   [3]   Past Surgical History:  Procedure Laterality Date    ABDOMINAL WASHOUT      post  with wound vac     SECTION      had hematoma removed after the surgery    FOREARM SURGERY Right     repair of fracture with plating    INCISIONAL HERNIA REPAIR  2022    Procedure: REPAIR  RECURRENT HERNIA INCISIONAL " WITH MESH;  Surgeon: Donna Lau MD;  Location: [facility];  Service: Gynecology    IR DRAINAGE TUBE CHECK WITH SCLEROSIS  2/3/2023    IR DRAINAGE TUBE CHECK WITH SCLEROSIS  3/7/2023    IR DRAINAGE TUBE CHECK/CHANGE/REPOSITION/REINSERTION/UPSIZE  1/23/2023    IR DRAINAGE TUBE CHECK/CHANGE/REPOSITION/REINSERTION/UPSIZE  1/27/2023    IR DRAINAGE TUBE CHECK/CHANGE/REPOSITION/REINSERTION/UPSIZE  2/13/2023    IR DRAINAGE TUBE CHECK/CHANGE/REPOSITION/REINSERTION/UPSIZE  2/27/2023    IR DRAINAGE TUBE PLACEMENT  1/9/2023    LAPAROTOMY N/A 12/20/2022    Procedure: LAPAROTOMY EXPLORATORY;  Surgeon: Donna Lau MD;  Location: [facility];  Service: Gynecology    MYRINGOTOMY W/ TUBES      two sets as a young child    MI CYSTOURETHROSCOPY N/A 12/15/2022    Procedure: CYSTO W/ ROBOT;  Surgeon: Donna Lau MD;  Location: [facility];  Service: Gynecology    MI LAPS TOTAL HYSTERECT 250 GM/< W/RMVL TUBE/OVARY N/A 12/15/2022    Procedure: (LTH) W/ BILATERAL SALPINGECTOMY  W/ ROBOT COVERTED TO OPEN;  Surgeon: Donna Lau MD;  Location: [facility];  Service: Gynecology    VAC DRESSING APPLICATION N/A 3/17/2023    Procedure: APPLICATION VAC DRESSING ABDOMEN/TRUNK;  Surgeon: Chan Conroy MD;  Location: [facility];  Service: General    WOUND DEBRIDEMENT N/A 3/15/2023    Procedure: DEBRIDEMENT Abdominal WOUND and placement of Wound VAC;  Surgeon: Chan Conroy MD;  Location: [facility];  Service: General    WOUND DEBRIDEMENT N/A 3/17/2023    Procedure: DEBRIDEMENT WOUND (WASH OUT);  Surgeon: Chan Conroy MD;  Location: [facility];  Service: General   [4]   Family History  Problem Relation Name Age of Onset    Multiple sclerosis Mother      Hypertension Father      Hyperlipidemia Father      Cerebral aneurysm Father      Pulmonary embolism Father      Liver disease Brother     [5]   Social History  Socioeconomic History    Marital status:    Tobacco Use    Smoking status: Every Day      Current packs/day: 0.50     Average packs/day: 0.5 packs/day for 31.5 years (15.7 ttl pk-yrs)     Types: Cigarettes     Start date: 1995    Smokeless tobacco: Never   Vaping Use    Vaping status: Never Used   Substance and Sexual Activity    Alcohol use: Not Currently     Alcohol/week: 1.0 standard drink of alcohol     Types: 1 Standard drinks or equivalent per week     Comment: 3 x monthly    Drug use: Never    Sexual activity: Yes     Partners: Male     Birth control/protection: Male Sterilization     Social Drivers of Health     Food Insecurity: No Food Insecurity (3/16/2023)    Hunger Vital Sign     Worried About Running Out of Food in the Last Year: Never true     Ran Out of Food in the Last Year: Never true   Transportation Needs: No Transportation Needs (3/16/2023)    PRAPARE - Transportation     Lack of Transportation (Medical): No     Lack of Transportation (Non-Medical): No   Housing Stability: Low Risk  (3/16/2023)    Housing Stability Vital Sign     Unable to Pay for Housing in the Last Year: No     Number of Places Lived in the Last Year: 1     Unstable Housing in the Last Year: No   [6]   Current Outpatient Medications:     Tirzepatide 12.5 MG/0.5ML SOAJ, Inject under the skin once a week, Disp: , Rfl:     albuterol (2.5 mg/3 mL) 0.083 % nebulizer solution, Take 2.5 mg by nebulization every 4 (four) hours as needed for shortness of breath or wheezing, Disp: , Rfl:     albuterol (ProAir HFA) 90 mcg/act inhaler, Inhale 2 puffs every 6 (six) hours as needed for wheezing or shortness of breath, Disp: 8.5 g, Rfl: 0    albuterol (PROVENTIL HFA,VENTOLIN HFA) 90 mcg/act inhaler, Inhale 2 puffs every 6 (six) hours as needed for wheezing, Disp: 8 g, Rfl: 2    amoxicillin (AMOXIL) 500 mg capsule, , Disp: , Rfl:     Arthritis Pain Relief 650 MG CR tablet, take 1 tablet by mouth every 8 hours if needed for mild pain (Patient not taking: Reported on 3/7/2024), Disp: , Rfl:     aspirin 325 mg tablet, Take 325 mg by  mouth daily. Indications: as needed (Patient not taking: Reported on 3/7/2024), Disp: , Rfl:     chlorhexidine (HIBICLENS) 4 % external liquid, Apply 1 Application topically daily as needed for wound care Can use to clean wound during VAC changes. (Patient not taking: Reported on 1/29/2024), Disp: 473 mL, Rfl: 5    clonazePAM (KlonoPIN) 0.5 mg tablet, Take 0.5 mg by mouth 2 (two) times a day as needed (Patient not taking: Reported on 1/29/2024), Disp: , Rfl:     clotrimazole (LOTRIMIN) 1 % cream, Apply topically 2 (two) times a day for 14 days To rash of abdomen, Disp: 28 g, Rfl: 1    cyclobenzaprine (FLEXERIL) 5 mg tablet, Take 1 tablet (5 mg total) by mouth 3 (three) times a day as needed for muscle spasms (Patient taking differently: Take 5 mg by mouth 3 (three) times a day as needed for muscle spasms As needed), Disp: 60 tablet, Rfl: 0    dulaglutide (Trulicity) 0.75 MG/0.5ML injection, Inject 0.75 mg under the skin Once a week (Patient not taking: Reported on 1/29/2024), Disp: , Rfl:     erythromycin (ILOTYCIN) ophthalmic ointment, , Disp: , Rfl:     escitalopram (LEXAPRO) 10 mg tablet, Take 10 mg by mouth daily, Disp: , Rfl:     escitalopram (LEXAPRO) 5 mg tablet, Take 5 mg by mouth daily, Disp: , Rfl:     fluticasone (FLONASE) 50 mcg/act nasal spray, 2 sprays into each nostril daily, Disp: 16 g, Rfl: 4    Fluticasone-Salmeterol (Advair) 250-50 mcg/dose inhaler, , Disp: , Rfl:     Fluticasone-Salmeterol (Advair) 500-50 mcg/dose inhaler, Inhale 1 puff 2 (two) times a day, Disp: , Rfl:     folic acid (FOLVITE) 1 mg tablet, Take by mouth daily, Disp: , Rfl:     gabapentin (NEURONTIN) 100 mg capsule, Take 1 capsule (100 mg total) by mouth 3 (three) times a day (Patient not taking: Reported on 3/7/2024), Disp: 90 capsule, Rfl: 0    gabapentin (NEURONTIN) 300 mg capsule, Take 300 mg by mouth 3 (three) times a day, Disp: , Rfl:     gentamicin (GARAMYCIN) 0.1 % ointment, Apply topically 3 (three) times a day for 7  days To packing and place into open wound, Disp: 30 g, Rfl: 1    hydrochlorothiazide (HYDRODIURIL) 12.5 mg tablet, Take 12.5 mg by mouth daily Pt only  taking as needed for leg swelling (Patient not taking: Reported on 3/7/2024), Disp: , Rfl:     ibuprofen (MOTRIN) 600 mg tablet, Take 600 mg by mouth every 6 (six) hours as needed for moderate pain As needed, Disp: , Rfl:     methocarbamol (ROBAXIN) 500 mg tablet, Take 1 tablet (500 mg total) by mouth every 6 (six) hours for 14 days (Patient taking differently: Take 500 mg by mouth every 6 (six) hours As needed), Disp: 56 tablet, Rfl: 0    methotrexate 2.5 mg tablet, Four 2.5 tabs one time a week ( Every Saturday), Disp: , Rfl:     montelukast (SINGULAIR) 10 mg tablet, Take by mouth daily at bedtime as needed, Disp: , Rfl:     mupirocin (BACTROBAN) 2 % ointment, Apply topically daily for 14 days To wound and areas of folliculitis, Disp: 30 g, Rfl: 1    naloxone (NARCAN) 4 mg/0.1 mL nasal spray, Administer 1 spray into a nostril. If no response after 2-3 minutes, give another dose in the other nostril using a new spray. (Patient not taking: Reported on 4/14/2023), Disp: 1 each, Rfl: 1    neomycin-polymyxin-hydrocortisone (CORTISPORIN) otic solution, Administer 3 drops into the left ear every 6 (six) hours (Patient not taking: Reported on 1/29/2024), Disp: , Rfl:     nicotine (NICODERM CQ) 14 mg/24hr TD 24 hr patch, Place 1 patch on the skin every 24 hours (Patient not taking: Reported on 6/2/2023), Disp: 28 patch, Rfl: 3    nystatin (MYCOSTATIN) powder, Apply topically 2 (two) times a day for 14 days To periwound mixed with barrier cream, Disp: 30 g, Rfl: 1    omeprazole (PriLOSEC) 40 MG capsule, Take 40 mg by mouth daily, Disp: , Rfl:     ondansetron (ZOFRAN) 4 mg tablet, Take 4 mg by mouth daily as needed, Disp: , Rfl:     semaglutide, 0.25 or 0.5 mg/dose, (Ozempic) 2 mg/3 mL injection pen, Inject 0.5 mg under the skin Once a week (Patient not taking: Reported on  1/29/2024), Disp: , Rfl:     sodium chloride, PF, 0.9 %, Inject 10 mL into a catheter in a vein 3 (three) times a week Wound irrigation. (Patient not taking: Reported on 1/29/2024), Disp: 100 mL, Rfl: 10    sodium hypochlorite (DAKIN'S HALF-STRENGTH) external solution, , Disp: , Rfl:     tirzepatide (Zepbound) 2.5 mg/0.5 mL auto-injector, Inject 2.5 mg under the skin Once a week (Patient not taking: Reported on 3/10/2025), Disp: , Rfl:     Vitamin D, Ergocalciferol, 02970 units CAPS, Take by mouth once a week, Disp: , Rfl:

## 2025-08-04 ENCOUNTER — OFFICE VISIT (OUTPATIENT)
Facility: HOSPITAL | Age: 45
End: 2025-08-04
Payer: COMMERCIAL

## 2025-08-04 VITALS
DIASTOLIC BLOOD PRESSURE: 70 MMHG | TEMPERATURE: 96.9 F | RESPIRATION RATE: 16 BRPM | HEART RATE: 80 BPM | SYSTOLIC BLOOD PRESSURE: 118 MMHG

## 2025-08-04 DIAGNOSIS — Z87.19 S/P HERNIA REPAIR: ICD-10-CM

## 2025-08-04 DIAGNOSIS — F17.200 TOBACCO USE DISORDER: ICD-10-CM

## 2025-08-04 DIAGNOSIS — E66.813 OBESITY, CLASS III, BMI 40-49.9 (MORBID OBESITY): ICD-10-CM

## 2025-08-04 DIAGNOSIS — Z90.710 S/P HYSTERECTOMY: ICD-10-CM

## 2025-08-04 DIAGNOSIS — Z98.890 S/P HERNIA REPAIR: ICD-10-CM

## 2025-08-04 DIAGNOSIS — T81.89XD NON-HEALING SURGICAL WOUND, SUBSEQUENT ENCOUNTER: Primary | ICD-10-CM

## 2025-08-04 PROCEDURE — G0463 HOSPITAL OUTPT CLINIC VISIT: HCPCS | Performed by: STUDENT IN AN ORGANIZED HEALTH CARE EDUCATION/TRAINING PROGRAM

## 2025-08-04 PROCEDURE — 99212 OFFICE O/P EST SF 10 MIN: CPT | Performed by: STUDENT IN AN ORGANIZED HEALTH CARE EDUCATION/TRAINING PROGRAM

## 2025-08-04 PROCEDURE — 99213 OFFICE O/P EST LOW 20 MIN: CPT | Performed by: STUDENT IN AN ORGANIZED HEALTH CARE EDUCATION/TRAINING PROGRAM

## 2025-08-04 RX ORDER — SODIUM HYPOCHLORITE 2.5 MG/ML
1 SOLUTION TOPICAL ONCE
Qty: 473 ML | Refills: 0 | Status: SHIPPED | OUTPATIENT
Start: 2025-08-04 | End: 2025-08-04